# Patient Record
Sex: FEMALE | Race: BLACK OR AFRICAN AMERICAN | NOT HISPANIC OR LATINO | Employment: UNEMPLOYED | URBAN - METROPOLITAN AREA
[De-identification: names, ages, dates, MRNs, and addresses within clinical notes are randomized per-mention and may not be internally consistent; named-entity substitution may affect disease eponyms.]

---

## 2020-09-18 ENCOUNTER — HOSPITAL ENCOUNTER (INPATIENT)
Facility: HOSPITAL | Age: 25
LOS: 30 days | Discharge: HOME HEALTH | DRG: 834 | End: 2020-10-18
Attending: INTERNAL MEDICINE | Admitting: INTERNAL MEDICINE
Payer: MEDICAID

## 2020-09-18 DIAGNOSIS — R00.1 BRADYCARDIA: ICD-10-CM

## 2020-09-18 DIAGNOSIS — J96.01 ACUTE HYPOXEMIC RESPIRATORY FAILURE: ICD-10-CM

## 2020-09-18 DIAGNOSIS — R06.02 SOB (SHORTNESS OF BREATH): ICD-10-CM

## 2020-09-18 DIAGNOSIS — C92.40 APL (ACUTE PROMYELOCYTIC LEUKEMIA): Primary | ICD-10-CM

## 2020-09-18 DIAGNOSIS — E87.6 HYPOKALEMIA: ICD-10-CM

## 2020-09-18 DIAGNOSIS — R07.9 CHEST PAIN: ICD-10-CM

## 2020-09-18 DIAGNOSIS — I49.9 ARRHYTHMIA: ICD-10-CM

## 2020-09-18 DIAGNOSIS — B00.9 HSV-1 INFECTION: ICD-10-CM

## 2020-09-18 DIAGNOSIS — C95.00 ACUTE LEUKEMIA: ICD-10-CM

## 2020-09-18 DIAGNOSIS — E83.42 HYPOMAGNESEMIA: ICD-10-CM

## 2020-09-18 DIAGNOSIS — D61.818 PANCYTOPENIA: ICD-10-CM

## 2020-09-18 DIAGNOSIS — R94.31 QT PROLONGATION: ICD-10-CM

## 2020-09-18 DIAGNOSIS — R00.0 TACHYCARDIA: ICD-10-CM

## 2020-09-18 DIAGNOSIS — C92.40 ACUTE PROMYELOCYTIC LEUKEMIA NOT HAVING ACHIEVED REMISSION: ICD-10-CM

## 2020-09-18 DIAGNOSIS — R94.31 ABNORMAL QT INTERVAL PRESENT ON ELECTROCARDIOGRAM: ICD-10-CM

## 2020-09-18 DIAGNOSIS — R06.02 SHORTNESS OF BREATH: ICD-10-CM

## 2020-09-18 PROBLEM — R50.81 NEUTROPENIC FEVER: Status: ACTIVE | Noted: 2020-09-18

## 2020-09-18 PROBLEM — E66.01 MORBID OBESITY WITH BMI OF 50.0-59.9, ADULT: Status: ACTIVE | Noted: 2020-09-18

## 2020-09-18 PROBLEM — F43.22 ADJUSTMENT REACTION WITH ANXIETY: Status: ACTIVE | Noted: 2020-09-18

## 2020-09-18 PROBLEM — D70.9 NEUTROPENIC FEVER: Status: ACTIVE | Noted: 2020-09-18

## 2020-09-18 LAB
ABO + RH BLD: NORMAL
ALBUMIN SERPL BCP-MCNC: 2.7 G/DL (ref 3.5–5.2)
ALBUMIN SERPL BCP-MCNC: 3.1 G/DL (ref 3.5–5.2)
ALP SERPL-CCNC: 39 U/L (ref 55–135)
ALP SERPL-CCNC: 46 U/L (ref 55–135)
ALT SERPL W/O P-5'-P-CCNC: 44 U/L (ref 10–44)
ALT SERPL W/O P-5'-P-CCNC: 47 U/L (ref 10–44)
ANION GAP SERPL CALC-SCNC: 11 MMOL/L (ref 8–16)
ANION GAP SERPL CALC-SCNC: 12 MMOL/L (ref 8–16)
ANION GAP SERPL CALC-SCNC: 14 MMOL/L (ref 8–16)
ANISOCYTOSIS BLD QL SMEAR: SLIGHT
APTT BLDCRRT: 25.5 SEC (ref 21–32)
APTT BLDCRRT: 27.7 SEC (ref 21–32)
APTT BLDCRRT: 28.5 SEC (ref 21–32)
ASCENDING AORTA: 2.47 CM
AST SERPL-CCNC: 17 U/L (ref 10–40)
AST SERPL-CCNC: 22 U/L (ref 10–40)
AV INDEX (PROSTH): 0.62
AV MEAN GRADIENT: 7 MMHG
AV PEAK GRADIENT: 14 MMHG
AV VALVE AREA: 1.99 CM2
AV VELOCITY RATIO: 0.58
BASO STIPL BLD QL SMEAR: ABNORMAL
BASOPHILS # BLD AUTO: ABNORMAL K/UL (ref 0–0.2)
BASOPHILS # BLD AUTO: ABNORMAL K/UL (ref 0–0.2)
BASOPHILS NFR BLD: 0 % (ref 0–1.9)
BILIRUB SERPL-MCNC: 1.2 MG/DL (ref 0.1–1)
BILIRUB SERPL-MCNC: 2 MG/DL (ref 0.1–1)
BLD GP AB SCN CELLS X3 SERPL QL: NORMAL
BSA FOR ECHO PROCEDURE: 2.38 M2
BUN SERPL-MCNC: 10 MG/DL (ref 6–20)
BUN SERPL-MCNC: 10 MG/DL (ref 6–20)
BUN SERPL-MCNC: 9 MG/DL (ref 6–20)
CALCIUM SERPL-MCNC: 8 MG/DL (ref 8.7–10.5)
CALCIUM SERPL-MCNC: 8.2 MG/DL (ref 8.7–10.5)
CALCIUM SERPL-MCNC: 8.3 MG/DL (ref 8.7–10.5)
CHLORIDE SERPL-SCNC: 101 MMOL/L (ref 95–110)
CHLORIDE SERPL-SCNC: 103 MMOL/L (ref 95–110)
CHLORIDE SERPL-SCNC: 104 MMOL/L (ref 95–110)
CO2 SERPL-SCNC: 24 MMOL/L (ref 23–29)
CO2 SERPL-SCNC: 25 MMOL/L (ref 23–29)
CO2 SERPL-SCNC: 25 MMOL/L (ref 23–29)
CREAT SERPL-MCNC: 0.6 MG/DL (ref 0.5–1.4)
CV ECHO LV RWT: 0.46 CM
DIFFERENTIAL METHOD: ABNORMAL
DOP CALC AO PEAK VEL: 1.85 M/S
DOP CALC AO VTI: 29.81 CM
DOP CALC LVOT AREA: 3.2 CM2
DOP CALC LVOT DIAMETER: 2.02 CM
DOP CALC LVOT PEAK VEL: 1.07 M/S
DOP CALC LVOT STROKE VOLUME: 59.19 CM3
DOP CALCLVOT PEAK VEL VTI: 18.48 CM
ECHO LV POSTERIOR WALL: 1.04 CM (ref 0.6–1.1)
EOSINOPHIL # BLD AUTO: ABNORMAL K/UL (ref 0–0.5)
EOSINOPHIL # BLD AUTO: ABNORMAL K/UL (ref 0–0.5)
EOSINOPHIL NFR BLD: 0 % (ref 0–8)
EOSINOPHIL NFR BLD: 0 % (ref 0–8)
EOSINOPHIL NFR BLD: 1.6 % (ref 0–8)
ERYTHROCYTE [DISTWIDTH] IN BLOOD BY AUTOMATED COUNT: 16.1 % (ref 11.5–14.5)
ERYTHROCYTE [DISTWIDTH] IN BLOOD BY AUTOMATED COUNT: 16.2 % (ref 11.5–14.5)
ERYTHROCYTE [DISTWIDTH] IN BLOOD BY AUTOMATED COUNT: 16.4 % (ref 11.5–14.5)
EST. GFR  (AFRICAN AMERICAN): >60 ML/MIN/1.73 M^2
EST. GFR  (NON AFRICAN AMERICAN): >60 ML/MIN/1.73 M^2
FIBRINOGEN PPP-MCNC: 283 MG/DL (ref 182–366)
FIBRINOGEN PPP-MCNC: 304 MG/DL (ref 182–366)
FIBRINOGEN PPP-MCNC: 304 MG/DL (ref 182–366)
FRACTIONAL SHORTENING: 27 % (ref 28–44)
GLUCOSE SERPL-MCNC: 102 MG/DL (ref 70–110)
GLUCOSE SERPL-MCNC: 111 MG/DL (ref 70–110)
GLUCOSE SERPL-MCNC: 120 MG/DL (ref 70–110)
HCT VFR BLD AUTO: 21.2 % (ref 37–48.5)
HCT VFR BLD AUTO: 22 % (ref 37–48.5)
HCT VFR BLD AUTO: 24.3 % (ref 37–48.5)
HGB BLD-MCNC: 7.5 G/DL (ref 12–16)
HGB BLD-MCNC: 7.6 G/DL (ref 12–16)
HGB BLD-MCNC: 8.2 G/DL (ref 12–16)
IMM GRANULOCYTES # BLD AUTO: ABNORMAL K/UL (ref 0–0.04)
IMM GRANULOCYTES NFR BLD AUTO: ABNORMAL % (ref 0–0.5)
INR PPP: 1.2 (ref 0.8–1.2)
INTERVENTRICULAR SEPTUM: 0.76 CM (ref 0.6–1.1)
LA MAJOR: 5.25 CM
LA MINOR: 4.67 CM
LA WIDTH: 3.43 CM
LDH SERPL L TO P-CCNC: 346 U/L (ref 110–260)
LEFT ATRIUM SIZE: 3.45 CM
LEFT ATRIUM VOLUME INDEX: 22.5 ML/M2
LEFT ATRIUM VOLUME: 49.72 CM3
LEFT INTERNAL DIMENSION IN SYSTOLE: 3.31 CM (ref 2.1–4)
LEFT VENTRICLE DIASTOLIC VOLUME INDEX: 43.18 ML/M2
LEFT VENTRICLE DIASTOLIC VOLUME: 95.21 ML
LEFT VENTRICLE MASS INDEX: 62 G/M2
LEFT VENTRICLE SYSTOLIC VOLUME INDEX: 20.2 ML/M2
LEFT VENTRICLE SYSTOLIC VOLUME: 44.52 ML
LEFT VENTRICULAR INTERNAL DIMENSION IN DIASTOLE: 4.56 CM (ref 3.5–6)
LEFT VENTRICULAR MASS: 135.75 G
LYMPHOCYTES # BLD AUTO: ABNORMAL K/UL (ref 1–4.8)
LYMPHOCYTES # BLD AUTO: ABNORMAL K/UL (ref 1–4.8)
LYMPHOCYTES NFR BLD: 55 % (ref 18–48)
LYMPHOCYTES NFR BLD: 65 % (ref 18–48)
LYMPHOCYTES NFR BLD: 72.6 % (ref 18–48)
MAGNESIUM SERPL-MCNC: 1.6 MG/DL (ref 1.6–2.6)
MCH RBC QN AUTO: 29 PG (ref 27–31)
MCH RBC QN AUTO: 29.1 PG (ref 27–31)
MCH RBC QN AUTO: 29.3 PG (ref 27–31)
MCHC RBC AUTO-ENTMCNC: 33.7 G/DL (ref 32–36)
MCHC RBC AUTO-ENTMCNC: 34.5 G/DL (ref 32–36)
MCHC RBC AUTO-ENTMCNC: 35.4 G/DL (ref 32–36)
MCV RBC AUTO: 83 FL (ref 82–98)
MCV RBC AUTO: 84 FL (ref 82–98)
MCV RBC AUTO: 86 FL (ref 82–98)
METAMYELOCYTES NFR BLD MANUAL: 6.7 %
MONOCYTES # BLD AUTO: ABNORMAL K/UL (ref 0.3–1)
MONOCYTES # BLD AUTO: ABNORMAL K/UL (ref 0.3–1)
MONOCYTES NFR BLD: 5 % (ref 4–15)
MONOCYTES NFR BLD: 6.4 % (ref 4–15)
MONOCYTES NFR BLD: 8.3 % (ref 4–15)
MYELOCYTES NFR BLD MANUAL: 11.7 %
NEUTROPHILS NFR BLD: 12.5 % (ref 38–73)
NEUTROPHILS NFR BLD: 8.3 % (ref 38–73)
NEUTROPHILS NFR BLD: 9.7 % (ref 38–73)
NRBC BLD-RTO: 0 /100 WBC
NRBC BLD-RTO: 0 /100 WBC
NRBC BLD-RTO: 3 /100 WBC
OVALOCYTES BLD QL SMEAR: ABNORMAL
OVALOCYTES BLD QL SMEAR: ABNORMAL
PATH REV BLD -IMP: NORMAL
PATH REV BLD -IMP: NORMAL
PHOSPHATE SERPL-MCNC: 2.3 MG/DL (ref 2.7–4.5)
PLATELET # BLD AUTO: 58 K/UL (ref 150–350)
PLATELET # BLD AUTO: 63 K/UL (ref 150–350)
PLATELET # BLD AUTO: 64 K/UL (ref 150–350)
PLATELET BLD QL SMEAR: ABNORMAL
PLATELET BLD QL SMEAR: ABNORMAL
PMV BLD AUTO: 10.7 FL (ref 9.2–12.9)
PMV BLD AUTO: 11 FL (ref 9.2–12.9)
PMV BLD AUTO: 12 FL (ref 9.2–12.9)
POIKILOCYTOSIS BLD QL SMEAR: SLIGHT
POIKILOCYTOSIS BLD QL SMEAR: SLIGHT
POLYCHROMASIA BLD QL SMEAR: ABNORMAL
POTASSIUM SERPL-SCNC: 3 MMOL/L (ref 3.5–5.1)
POTASSIUM SERPL-SCNC: 3.2 MMOL/L (ref 3.5–5.1)
POTASSIUM SERPL-SCNC: 3.2 MMOL/L (ref 3.5–5.1)
PROT SERPL-MCNC: 6.6 G/DL (ref 6–8.4)
PROT SERPL-MCNC: 7.3 G/DL (ref 6–8.4)
PROTHROMBIN TIME: 13.1 SEC (ref 9–12.5)
PROTHROMBIN TIME: 13.2 SEC (ref 9–12.5)
PROTHROMBIN TIME: 13.3 SEC (ref 9–12.5)
RA MAJOR: 5.58 CM
RA PRESSURE: 3 MMHG
RA WIDTH: 3.13 CM
RBC # BLD AUTO: 2.56 M/UL (ref 4–5.4)
RBC # BLD AUTO: 2.61 M/UL (ref 4–5.4)
RBC # BLD AUTO: 2.83 M/UL (ref 4–5.4)
RIGHT VENTRICULAR END-DIASTOLIC DIMENSION: 2.84 CM
SINUS: 2.28 CM
SODIUM SERPL-SCNC: 139 MMOL/L (ref 136–145)
SODIUM SERPL-SCNC: 139 MMOL/L (ref 136–145)
SODIUM SERPL-SCNC: 141 MMOL/L (ref 136–145)
STJ: 2.54 CM
TDI LATERAL: 0.12 M/S
TDI SEPTAL: 0.1 M/S
TDI: 0.11 M/S
URATE SERPL-MCNC: 5 MG/DL (ref 2.4–5.7)
WBC # BLD AUTO: 0.56 K/UL (ref 3.9–12.7)
WBC # BLD AUTO: 0.61 K/UL (ref 3.9–12.7)
WBC # BLD AUTO: 0.63 K/UL (ref 3.9–12.7)
WBC OTHER NFR BLD MANUAL: 10 %
WBC OTHER NFR BLD MANUAL: 10 %
WBC OTHER NFR BLD MANUAL: 18 %

## 2020-09-18 PROCEDURE — 25000003 PHARM REV CODE 250: Performed by: STUDENT IN AN ORGANIZED HEALTH CARE EDUCATION/TRAINING PROGRAM

## 2020-09-18 PROCEDURE — 25000003 PHARM REV CODE 250: Performed by: INTERNAL MEDICINE

## 2020-09-18 PROCEDURE — 88271 CYTOGENETICS DNA PROBE: CPT | Mod: 59

## 2020-09-18 PROCEDURE — 80053 COMPREHEN METABOLIC PANEL: CPT

## 2020-09-18 PROCEDURE — 88275 CYTOGENETICS 100-300: CPT | Mod: 59

## 2020-09-18 PROCEDURE — 86901 BLOOD TYPING SEROLOGIC RH(D): CPT

## 2020-09-18 PROCEDURE — 20600001 HC STEP DOWN PRIVATE ROOM

## 2020-09-18 PROCEDURE — 99223 1ST HOSP IP/OBS HIGH 75: CPT | Mod: ,,, | Performed by: INTERNAL MEDICINE

## 2020-09-18 PROCEDURE — 85027 COMPLETE CBC AUTOMATED: CPT | Mod: 91

## 2020-09-18 PROCEDURE — 83735 ASSAY OF MAGNESIUM: CPT

## 2020-09-18 PROCEDURE — 36415 COLL VENOUS BLD VENIPUNCTURE: CPT

## 2020-09-18 PROCEDURE — 83615 LACTATE (LD) (LDH) ENZYME: CPT

## 2020-09-18 PROCEDURE — 63600175 PHARM REV CODE 636 W HCPCS: Performed by: STUDENT IN AN ORGANIZED HEALTH CARE EDUCATION/TRAINING PROGRAM

## 2020-09-18 PROCEDURE — 80048 BASIC METABOLIC PNL TOTAL CA: CPT

## 2020-09-18 PROCEDURE — 63600175 PHARM REV CODE 636 W HCPCS: Performed by: INTERNAL MEDICINE

## 2020-09-18 PROCEDURE — 81315 PML/RARALPHA COM BREAKPOINTS: CPT

## 2020-09-18 PROCEDURE — 90791 PR PSYCHIATRIC DIAGNOSTIC EVALUATION: ICD-10-PCS | Mod: ,,, | Performed by: PSYCHOLOGIST

## 2020-09-18 PROCEDURE — 96136 PR PSYCH/NEUROPSYCH TEST ADMIN/SCORING, 2+ TESTS, 1ST 30 MIN: ICD-10-PCS | Mod: ,,, | Performed by: PSYCHOLOGIST

## 2020-09-18 PROCEDURE — 96136 PSYCL/NRPSYC TST PHY/QHP 1ST: CPT | Mod: ,,, | Performed by: PSYCHOLOGIST

## 2020-09-18 PROCEDURE — 99223 PR INITIAL HOSPITAL CARE,LEVL III: ICD-10-PCS | Mod: ,,, | Performed by: INTERNAL MEDICINE

## 2020-09-18 PROCEDURE — 85007 BL SMEAR W/DIFF WBC COUNT: CPT | Mod: 91

## 2020-09-18 PROCEDURE — 85384 FIBRINOGEN ACTIVITY: CPT

## 2020-09-18 PROCEDURE — 86920 COMPATIBILITY TEST SPIN: CPT

## 2020-09-18 PROCEDURE — 93010 ELECTROCARDIOGRAM REPORT: CPT | Mod: ,,, | Performed by: INTERNAL MEDICINE

## 2020-09-18 PROCEDURE — 85730 THROMBOPLASTIN TIME PARTIAL: CPT | Mod: 91

## 2020-09-18 PROCEDURE — 85060 BLOOD SMEAR INTERPRETATION: CPT | Mod: ,,, | Performed by: PATHOLOGY

## 2020-09-18 PROCEDURE — 84550 ASSAY OF BLOOD/URIC ACID: CPT

## 2020-09-18 PROCEDURE — 93005 ELECTROCARDIOGRAM TRACING: CPT

## 2020-09-18 PROCEDURE — 93010 EKG 12-LEAD: ICD-10-PCS | Mod: ,,, | Performed by: INTERNAL MEDICINE

## 2020-09-18 PROCEDURE — 84100 ASSAY OF PHOSPHORUS: CPT

## 2020-09-18 PROCEDURE — 88299 UNLISTED CYTOGENETIC STUDY: CPT

## 2020-09-18 PROCEDURE — 87040 BLOOD CULTURE FOR BACTERIA: CPT | Mod: 59

## 2020-09-18 PROCEDURE — 90791 PSYCH DIAGNOSTIC EVALUATION: CPT | Mod: ,,, | Performed by: PSYCHOLOGIST

## 2020-09-18 PROCEDURE — 85060 PATHOLOGIST REVIEW: ICD-10-PCS | Mod: ,,, | Performed by: PATHOLOGY

## 2020-09-18 PROCEDURE — 85610 PROTHROMBIN TIME: CPT | Mod: 91

## 2020-09-18 PROCEDURE — 88271 CYTOGENETICS DNA PROBE: CPT

## 2020-09-18 PROCEDURE — 25000003 PHARM REV CODE 250: Performed by: NURSE PRACTITIONER

## 2020-09-18 RX ORDER — GLUCAGON 1 MG
1 KIT INJECTION
Status: DISCONTINUED | OUTPATIENT
Start: 2020-09-18 | End: 2020-10-18 | Stop reason: HOSPADM

## 2020-09-18 RX ORDER — POTASSIUM CHLORIDE 750 MG/1
40 CAPSULE, EXTENDED RELEASE ORAL ONCE
Status: COMPLETED | OUTPATIENT
Start: 2020-09-18 | End: 2020-09-18

## 2020-09-18 RX ORDER — POTASSIUM CHLORIDE 750 MG/1
40 CAPSULE, EXTENDED RELEASE ORAL ONCE
Status: DISCONTINUED | OUTPATIENT
Start: 2020-09-18 | End: 2020-09-18

## 2020-09-18 RX ORDER — IBUPROFEN 200 MG
24 TABLET ORAL
Status: DISCONTINUED | OUTPATIENT
Start: 2020-09-18 | End: 2020-10-18 | Stop reason: HOSPADM

## 2020-09-18 RX ORDER — POTASSIUM CHLORIDE 7.45 MG/ML
10 INJECTION INTRAVENOUS
Status: COMPLETED | OUTPATIENT
Start: 2020-09-18 | End: 2020-09-18

## 2020-09-18 RX ORDER — IBUPROFEN 200 MG
16 TABLET ORAL
Status: DISCONTINUED | OUTPATIENT
Start: 2020-09-18 | End: 2020-10-18 | Stop reason: HOSPADM

## 2020-09-18 RX ORDER — POTASSIUM CHLORIDE 1.5 G/1.58G
40 POWDER, FOR SOLUTION ORAL ONCE
Status: COMPLETED | OUTPATIENT
Start: 2020-09-18 | End: 2020-09-18

## 2020-09-18 RX ORDER — LIDOCAINE HYDROCHLORIDE 10 MG/ML
1 INJECTION INFILTRATION; PERINEURAL ONCE AS NEEDED
Status: DISCONTINUED | OUTPATIENT
Start: 2020-09-18 | End: 2020-09-22

## 2020-09-18 RX ORDER — ACETAMINOPHEN 325 MG/1
650 TABLET ORAL ONCE
Status: COMPLETED | OUTPATIENT
Start: 2020-09-18 | End: 2020-09-18

## 2020-09-18 RX ORDER — TRETINOIN 10 MG/1
45 CAPSULE ORAL 2 TIMES DAILY WITH MEALS
Status: DISCONTINUED | OUTPATIENT
Start: 2020-09-18 | End: 2020-10-05

## 2020-09-18 RX ORDER — CEFEPIME HYDROCHLORIDE 2 G/1
2 INJECTION, POWDER, FOR SOLUTION INTRAVENOUS
Status: DISCONTINUED | OUTPATIENT
Start: 2020-09-18 | End: 2020-09-24

## 2020-09-18 RX ORDER — ACYCLOVIR 200 MG/1
400 CAPSULE ORAL 2 TIMES DAILY
Status: DISCONTINUED | OUTPATIENT
Start: 2020-09-18 | End: 2020-09-21

## 2020-09-18 RX ORDER — SODIUM CHLORIDE 0.9 % (FLUSH) 0.9 %
10 SYRINGE (ML) INJECTION
Status: DISCONTINUED | OUTPATIENT
Start: 2020-09-18 | End: 2020-09-22

## 2020-09-18 RX ORDER — TRETINOIN 10 MG/1
45 CAPSULE ORAL 2 TIMES DAILY WITH MEALS
Status: DISCONTINUED | OUTPATIENT
Start: 2020-09-18 | End: 2020-09-18

## 2020-09-18 RX ORDER — SODIUM CHLORIDE 9 MG/ML
INJECTION, SOLUTION INTRAVENOUS CONTINUOUS
Status: DISCONTINUED | OUTPATIENT
Start: 2020-09-18 | End: 2020-09-22

## 2020-09-18 RX ORDER — ALLOPURINOL 100 MG/1
300 TABLET ORAL DAILY
Status: DISCONTINUED | OUTPATIENT
Start: 2020-09-18 | End: 2020-09-29

## 2020-09-18 RX ORDER — TRETINOIN 10 MG/1
10 CAPSULE ORAL 2 TIMES DAILY WITH MEALS
Status: DISCONTINUED | OUTPATIENT
Start: 2020-09-18 | End: 2020-09-18

## 2020-09-18 RX ADMIN — POTASSIUM CHLORIDE 10 MEQ: 7.46 INJECTION, SOLUTION INTRAVENOUS at 03:09

## 2020-09-18 RX ADMIN — POTASSIUM CHLORIDE 40 MEQ: 750 CAPSULE, EXTENDED RELEASE ORAL at 02:09

## 2020-09-18 RX ADMIN — SODIUM CHLORIDE: 0.9 INJECTION, SOLUTION INTRAVENOUS at 03:09

## 2020-09-18 RX ADMIN — SODIUM CHLORIDE: 0.9 INJECTION, SOLUTION INTRAVENOUS at 11:09

## 2020-09-18 RX ADMIN — ACYCLOVIR 400 MG: 200 CAPSULE ORAL at 10:09

## 2020-09-18 RX ADMIN — POTASSIUM CHLORIDE 10 MEQ: 7.46 INJECTION, SOLUTION INTRAVENOUS at 02:09

## 2020-09-18 RX ADMIN — VANCOMYCIN HYDROCHLORIDE 2500 MG: 1.5 INJECTION, POWDER, LYOPHILIZED, FOR SOLUTION INTRAVENOUS at 05:09

## 2020-09-18 RX ADMIN — TRETINOIN 50 MG: 10 CAPSULE ORAL at 09:09

## 2020-09-18 RX ADMIN — CEFEPIME 2 G: 2 INJECTION, POWDER, FOR SOLUTION INTRAVENOUS at 05:09

## 2020-09-18 RX ADMIN — POTASSIUM CHLORIDE 10 MEQ: 7.46 INJECTION, SOLUTION INTRAVENOUS at 05:09

## 2020-09-18 RX ADMIN — ALLOPURINOL 300 MG: 300 TABLET ORAL at 03:09

## 2020-09-18 RX ADMIN — ACYCLOVIR 400 MG: 200 CAPSULE ORAL at 08:09

## 2020-09-18 RX ADMIN — POTASSIUM CHLORIDE 40 MEQ: 1.5 POWDER, FOR SOLUTION ORAL at 11:09

## 2020-09-18 RX ADMIN — CEFEPIME 2 G: 2 INJECTION, POWDER, FOR SOLUTION INTRAVENOUS at 01:09

## 2020-09-18 RX ADMIN — TRETINOIN 50 MG: 10 CAPSULE ORAL at 05:09

## 2020-09-18 RX ADMIN — ACETAMINOPHEN 650 MG: 325 TABLET ORAL at 02:09

## 2020-09-18 RX ADMIN — SODIUM CHLORIDE: 0.9 INJECTION, SOLUTION INTRAVENOUS at 08:09

## 2020-09-18 RX ADMIN — POTASSIUM CHLORIDE 10 MEQ: 7.46 INJECTION, SOLUTION INTRAVENOUS at 04:09

## 2020-09-18 RX ADMIN — CEFEPIME 2 G: 2 INJECTION, POWDER, FOR SOLUTION INTRAVENOUS at 08:09

## 2020-09-18 NOTE — HPI
"Ms. Saini is a 25 year old woman with no significant medical history who presents as a transfer from [facility] for concern of APL. She says that she went to the ED on Wednesday (09/16) for decreased appetite, dehydration. She says that she has been feeling like that since the towards the end of last month. She has also been having a hard time swallowing and feeling weak to the point that she had a fall on Tuesday. She stated that she was too weak and that is why she fell. She did not hit her head at the time. The last time she ate was when she had a chocolate pudding on Wednesday. She says that she had a fever of up to 103F in the hospital but did not have fevers prior to then. Upon review of symptoms, she denies weight loss, chills, night sweats, chest pain, SOB, cough, abdominal pain, nausea, vomiting, constipation, diarrhea, dysuria, leg swelling, headaches. Regarding family history, she states that her paternal grandfather had cancer but does not know which type of cancer. She denies smoking and states that she "barely" drinks alcohol.     At OSH, her labs were notable for pancytopenia: WBC 0.6, H&H 3.0/9.4, platelets 8. She was started on vancomycin and cefepime as she had a fever of 39.6C. CXR did not show an acute process. UA was suggestive of possible UTI. Blood cultures were positive for group B strep. CT chest, abdomen, pelvis did not show acute findings. She was given about 3U PRBC and 2U platelets. Bone marrow biopsy was concerning for APL.     Patient will be admitted to the Hematology BMT service for further evaluation and management.  "

## 2020-09-18 NOTE — PLAN OF CARE
POC reviewed w patient on arrival to unit and PRN. VSS pt afebrile. Educated to call for assistance when getting OOB. IVF initiated and IV cefepime and Vanc given. Awaiting urine specimen for UPT. Pt unable to void as she did prior to leaving [facility] Bed locked in low position call light in reach. Will CTM.

## 2020-09-18 NOTE — ASSESSMENT & PLAN NOTE
Patient is a 25 year old woman presenting with concern for APL.    Plan:  - CBC, CMP, PT/INR, fibrinogen BID    - Uric acid daily    - Follow up PML KAVITHA PCR/FISH, AML FISH, peripheral smear    - Follow up 2D ECHO    - Continue tretinoin BID w/ meals, allopurinol 300mg daily   - IVF  - Transfuse cyroglobulin if fibrinogen <150    - Transfuse if platelets <10, Hgb<7   - Most likely will receive APL induction with ATRA and BEN pending studies

## 2020-09-18 NOTE — ASSESSMENT & PLAN NOTE
Patient presenting with possible APL and neutropenic fever. Likely due to positive Group B strep blood cultures (+ at OSH). She was started on vancomycin and cefepime at the OSH.     - Continue vancomycin and cefepime  - Follow up repeat cultures, UA

## 2020-09-18 NOTE — SUBJECTIVE & OBJECTIVE
Patient information was obtained from patient.     Oncology History: None     No medications prior to admission.       Amoxicillin     No past medical history on file.  No past surgical history on file.  Family History     None        Tobacco Use    Smoking status: Not on file   Substance and Sexual Activity    Alcohol use: Not on file    Drug use: Not on file    Sexual activity: Not on file       Review of Systems   Constitutional: Positive for activity change, appetite change, fatigue and fever. Negative for chills and unexpected weight change.   HENT: Positive for trouble swallowing. Negative for congestion.    Eyes: Negative for photophobia and visual disturbance.   Respiratory: Negative for cough and shortness of breath.    Cardiovascular: Negative for chest pain and leg swelling.   Gastrointestinal: Negative for abdominal pain, constipation, diarrhea and nausea.   Genitourinary: Negative for difficulty urinating and dysuria.   Musculoskeletal: Negative for arthralgias and back pain.   Skin: Negative for rash and wound.   Neurological: Positive for weakness. Negative for syncope and light-headedness.   Psychiatric/Behavioral: Negative for behavioral problems and confusion.     Objective:     Vital Signs (Most Recent):  Temp: 98.6 °F (37 °C) (09/18/20 0155)  Pulse: 101 (09/18/20 0155)  Resp: 18 (09/18/20 0155)  BP: 116/71 (09/18/20 0155)  SpO2: 100 % (09/18/20 0155) Vital Signs (24h Range):  Temp:  [97.3 °F (36.3 °C)-98.6 °F (37 °C)] 98.6 °F (37 °C)  Pulse:  [] 101  Resp:  [15-20] 18  SpO2:  [95 %-100 %] 100 %  BP: (112-118)/(71-87) 116/71     Weight: 134.5 kg (296 lb 8 oz)  Body mass index is 57.91 kg/m².  Body surface area is 2.39 meters squared.    ECOG SCORE         [unfilled]    Lines/Drains/Airways     Peripheral Intravenous Line                 Midline Catheter Insertion/Assessment  - Single Lumen 09/16/20 2000 Right basilic vein (medial side of arm) 1 day         Peripheral IV - Single Lumen  09/16/20 2100 Anterior;Left;Proximal Forearm 1 day                Physical Exam  Vitals signs reviewed.   Constitutional:       Appearance: Normal appearance. She is obese.   HENT:      Head: Normocephalic and atraumatic.      Nose: Nose normal.      Mouth/Throat:      Mouth: Mucous membranes are dry.      Comments: Fever blister noted.  Eyes:      Extraocular Movements: Extraocular movements intact.   Neck:      Musculoskeletal: Normal range of motion and neck supple.   Cardiovascular:      Rate and Rhythm: Normal rate and regular rhythm.      Heart sounds: No murmur.   Pulmonary:      Effort: Pulmonary effort is normal. No respiratory distress.   Abdominal:      General: There is no distension.      Palpations: Abdomen is soft.      Tenderness: There is no abdominal tenderness.   Musculoskeletal: Normal range of motion.         General: No swelling.   Skin:     General: Skin is warm and dry.   Neurological:      General: No focal deficit present.      Mental Status: She is alert and oriented to person, place, and time.   Psychiatric:         Mood and Affect: Mood normal.         Behavior: Behavior normal.          Significant Labs:   CBC:   Recent Labs   Lab 09/18/20 0221   WBC 0.61*   HGB 8.2*   HCT 24.3*   PLT 64*   , CMP:   Recent Labs   Lab 09/18/20 0221      K 3.2*      CO2 24      BUN 10   CREATININE 0.6   CALCIUM 8.3*   PROT 7.3   ALBUMIN 3.1*   BILITOT 2.0*   ALKPHOS 46*   AST 22   ALT 47*   ANIONGAP 14   EGFRNONAA >60.0    and Coagulation:   Recent Labs   Lab 09/18/20 0221   INR 1.2   APTT 25.5       Diagnostic Results:  I have reviewed all pertinent imaging results/findings within the past 24 hours.

## 2020-09-18 NOTE — PROGRESS NOTES
Pharmacokinetic Initial Assessment: IV Vancomycin    Assessment/Plan:    Initiate intravenous vancomycin with loading dose of 2500 mg once followed by a maintenance dose of vancomycin 2000mg IV every 12 hours.  Desired empiric serum trough concentration is 15 to 20 mcg/mL.  Draw vancomycin trough level 30 min prior to fourth dose on 09/19/2020 at approximately 1645.  Pharmacy will continue to follow and monitor vancomycin.      Please contact pharmacy at extension [phone] with any questions regarding this assessment.     Thank you for the consult,   Stephon Olson       Patient brief summary:  Erika Saini is a 25 y.o. female initiated on antimicrobial therapy with IV Vancomycin for treatment of suspected neutropenic fever.    Drug Allergies:   Review of patient's allergies indicates:   Allergen Reactions    Amoxicillin        Actual Body Weight:   134.5 kg    Renal Function:   Estimated Creatinine Clearance: 186.7 mL/min (based on SCr of 0.6 mg/dL).,     Dialysis Method (if applicable):  N/A    CBC (last 72 hours):  Recent Labs   Lab Result Units 09/18/20  0221   WBC K/uL 0.61*   Hemoglobin g/dL 8.2*   Hematocrit % 24.3*   Platelets K/uL 64*   Gran% % 8.3*   Lymph% % 55.0*   Mono% % 8.3   Eosinophil% % 0.0   Basophil% % 0.0   Differential Method  Manual       Metabolic Panel (last 72 hours):  Recent Labs   Lab Result Units 09/18/20  0221   Sodium mmol/L 139   Potassium mmol/L 3.2*   Chloride mmol/L 101   CO2 mmol/L 24   Glucose mg/dL 102   BUN, Bld mg/dL 10   Creatinine mg/dL 0.6   Albumin g/dL 3.1*   Total Bilirubin mg/dL 2.0*   Alkaline Phosphatase U/L 46*   AST U/L 22   ALT U/L 47*   Magnesium mg/dL 1.6   Phosphorus mg/dL 2.3*       Drug levels (last 3 results):  No results for input(s): VANCOMYCINRA, VANCOMYCINPE, VANCOMYCINTR in the last 72 hours.    Microbiologic Results:  Microbiology Results (last 7 days)     Procedure Component Value Units Date/Time    Blood culture [[id number]] Collected:  09/18/20 0222    Order Status: Sent Specimen: Blood Updated: 09/18/20 0303    Blood culture [[id number]] Collected: 09/18/20 0222    Order Status: Sent Specimen: Blood Updated: 09/18/20 0303

## 2020-09-18 NOTE — PROGRESS NOTES
Therapy with vancomycin complete and/or consult discontinued by provider.  Pharmacy will sign off, please re-consult as needed.    Marita Davenport, PharmD, BCPS, BCOP  Clinical Pharmacy Specialist   BMT/Hematology Oncology  SpectraLink: [phone]

## 2020-09-18 NOTE — H&P
"[facility]  Hematology  Bone Marrow Transplant  H&P    Subjective:     Principal Problem: APL (acute promyelocytic leukemia)    HPI: Ms. Saini is a 25 year old woman with no significant medical history who presents as a transfer from [facility] for concern of APL. She says that she went to the ED on Wednesday (09/16) for decreased appetite, dehydration. She says that she has been feeling like that since the towards the end of last month. She has also been having a hard time swallowing and feeling weak to the point that she had a fall on Tuesday. She stated that she was too weak and that is why she fell. She did not hit her head at the time. The last time she ate was when she had a chocolate pudding on Wednesday. She says that she had a fever of up to 103F in the hospital but did not have fevers prior to then. Upon review of symptoms, she denies weight loss, chills, night sweats, chest pain, SOB, cough, abdominal pain, nausea, vomiting, constipation, diarrhea, dysuria, leg swelling, headaches. Regarding family history, she states that her paternal grandfather had cancer but does not know which type of cancer. She denies smoking and states that she "barely" drinks alcohol.     At OSH, her labs were notable for pancytopenia: WBC 0.6, H&H 3.0/9.4, platelets 8. She was started on vancomycin and cefepime as she had a fever of 39.6C. CXR did not show an acute process. UA was suggestive of possible UTI. Blood cultures were positive for group B strep. CT chest, abdomen, pelvis did not show acute findings. She was given about 3U PRBC and 2U platelets. Bone marrow biopsy was concerning for APL.     Patient will be admitted to the Hematology BMT service for further evaluation and management.    Patient information was obtained from patient.     Oncology History: None     No medications prior to admission.       Amoxicillin     No past medical history on file.  No past surgical history on " file.  Family History     None        Tobacco Use    Smoking status: Not on file   Substance and Sexual Activity    Alcohol use: Not on file    Drug use: Not on file    Sexual activity: Not on file       Review of Systems   Constitutional: Positive for activity change, appetite change, fatigue and fever. Negative for chills and unexpected weight change.   HENT: Positive for trouble swallowing. Negative for congestion.    Eyes: Negative for photophobia and visual disturbance.   Respiratory: Negative for cough and shortness of breath.    Cardiovascular: Negative for chest pain and leg swelling.   Gastrointestinal: Negative for abdominal pain, constipation, diarrhea and nausea.   Genitourinary: Negative for difficulty urinating and dysuria.   Musculoskeletal: Negative for arthralgias and back pain.   Skin: Negative for rash and wound.   Neurological: Positive for weakness. Negative for syncope and light-headedness.   Psychiatric/Behavioral: Negative for behavioral problems and confusion.     Objective:     Vital Signs (Most Recent):  Temp: 98.6 °F (37 °C) (09/18/20 0155)  Pulse: 101 (09/18/20 0155)  Resp: 18 (09/18/20 0155)  BP: 116/71 (09/18/20 0155)  SpO2: 100 % (09/18/20 0155) Vital Signs (24h Range):  Temp:  [97.3 °F (36.3 °C)-98.6 °F (37 °C)] 98.6 °F (37 °C)  Pulse:  [] 101  Resp:  [15-20] 18  SpO2:  [95 %-100 %] 100 %  BP: (112-118)/(71-87) 116/71     Weight: 134.5 kg (296 lb 8 oz)  Body mass index is 57.91 kg/m².  Body surface area is 2.39 meters squared.    ECOG SCORE         [unfilled]    Lines/Drains/Airways     Peripheral Intravenous Line                 Midline Catheter Insertion/Assessment  - Single Lumen 09/16/20 2000 Right basilic vein (medial side of arm) 1 day         Peripheral IV - Single Lumen 09/16/20 2100 Anterior;Left;Proximal Forearm 1 day                Physical Exam  Vitals signs reviewed.   Constitutional:       Appearance: Normal appearance. She is obese.   HENT:      Head:  Normocephalic and atraumatic.      Nose: Nose normal.      Mouth/Throat:      Mouth: Mucous membranes are dry.      Comments: Fever blister noted.  Eyes:      Extraocular Movements: Extraocular movements intact.   Neck:      Musculoskeletal: Normal range of motion and neck supple.   Cardiovascular:      Rate and Rhythm: Normal rate and regular rhythm.      Heart sounds: No murmur.   Pulmonary:      Effort: Pulmonary effort is normal. No respiratory distress.   Abdominal:      General: There is no distension.      Palpations: Abdomen is soft.      Tenderness: There is no abdominal tenderness.   Musculoskeletal: Normal range of motion.         General: No swelling.   Skin:     General: Skin is warm and dry.   Neurological:      General: No focal deficit present.      Mental Status: She is alert and oriented to person, place, and time.   Psychiatric:         Mood and Affect: Mood normal.         Behavior: Behavior normal.          Significant Labs:   CBC:   Recent Labs   Lab 09/18/20 0221   WBC 0.61*   HGB 8.2*   HCT 24.3*   PLT 64*   , CMP:   Recent Labs   Lab 09/18/20 0221      K 3.2*      CO2 24      BUN 10   CREATININE 0.6   CALCIUM 8.3*   PROT 7.3   ALBUMIN 3.1*   BILITOT 2.0*   ALKPHOS 46*   AST 22   ALT 47*   ANIONGAP 14   EGFRNONAA >60.0    and Coagulation:   Recent Labs   Lab 09/18/20 0221   INR 1.2   APTT 25.5       Diagnostic Results:  I have reviewed all pertinent imaging results/findings within the past 24 hours.    Assessment/Plan:     * APL (acute promyelocytic leukemia)  Patient is a 25 year old woman presenting with concern for APL.    Plan:  - CBC, CMP, PT/INR, fibrinogen BID    - Uric acid daily    - Follow up PML KAVITHA PCR/FISH, AML FISH, peripheral smear    - Follow up 2D ECHO    - Continue tretinoin BID w/ meals, allopurinol 300mg daily   - IVF  - Transfuse cyroglobulin if fibrinogen <150    - Transfuse if platelets <10, Hgb<7   - Most likely will receive APL induction with  ATRA and BEN pending studies     Neutropenic fever  Patient presenting with possible APL and neutropenic fever. Likely due to positive Group B strep blood cultures (+ at OSH). She was started on vancomycin and cefepime at the OSH.     - Continue vancomycin and cefepime  - Follow up repeat cultures, UA    Morbid obesity with BMI of 50.0-59.9, adult  Patient with BMI 57.91    Patient seen, and case to be discussed with staff. Attending attestation to follow. Please contact BMT with any questions.      VTE Risk Mitigation (From admission, onward)         Ordered     IP VTE HIGH RISK PATIENT  Once      09/18/20 0148     Place sequential compression device  Until discontinued      09/18/20 0148                Disposition: TBD, remains inpatient    Chyna Claire MD  Bone Marrow Transplant  Hematology  [facility]

## 2020-09-18 NOTE — NURSING TRANSFER
Nursing Transfer Note  Arrived w EMS via stretcher in NAD w PRBC's infusion complete to DAT Midline. DC'd blood. Flushed line. Pt transferred self from Stretcher to bed. VSS . Oriented to POC and call light placed in reach. Notified Dr Claire of patient arrival. Urine collection hat placed in urinal and pt educated to void in hat and notify Nursing for specimen collection.

## 2020-09-18 NOTE — PROGRESS NOTES
Received request to assist family with lodging.  Spoke to Mother Jennifer ([phone]), who will need to stay close due to her own health issues.  Comfortable with plan for discounted rate at [facility]; [facility] name and her  Mohamud.  Will follow.

## 2020-09-19 PROBLEM — D61.818 PANCYTOPENIA: Status: ACTIVE | Noted: 2020-09-19

## 2020-09-19 PROBLEM — E83.42 HYPOMAGNESEMIA: Status: ACTIVE | Noted: 2020-09-19

## 2020-09-19 LAB
ALBUMIN SERPL BCP-MCNC: 2.5 G/DL (ref 3.5–5.2)
ALP SERPL-CCNC: 48 U/L (ref 55–135)
ALT SERPL W/O P-5'-P-CCNC: 36 U/L (ref 10–44)
ANION GAP SERPL CALC-SCNC: 10 MMOL/L (ref 8–16)
ANISOCYTOSIS BLD QL SMEAR: SLIGHT
APTT BLDCRRT: 28.9 SEC (ref 21–32)
APTT BLDCRRT: 29.6 SEC (ref 21–32)
AST SERPL-CCNC: 15 U/L (ref 10–40)
BASOPHILS NFR BLD: 0 % (ref 0–1.9)
BILIRUB SERPL-MCNC: 0.5 MG/DL (ref 0.1–1)
BUN SERPL-MCNC: 9 MG/DL (ref 6–20)
CALCIUM SERPL-MCNC: 7.8 MG/DL (ref 8.7–10.5)
CHLORIDE SERPL-SCNC: 106 MMOL/L (ref 95–110)
CO2 SERPL-SCNC: 22 MMOL/L (ref 23–29)
CREAT SERPL-MCNC: 0.6 MG/DL (ref 0.5–1.4)
DIFFERENTIAL METHOD: ABNORMAL
EOSINOPHIL NFR BLD: 0 % (ref 0–8)
ERYTHROCYTE [DISTWIDTH] IN BLOOD BY AUTOMATED COUNT: 17.2 % (ref 11.5–14.5)
EST. GFR  (AFRICAN AMERICAN): >60 ML/MIN/1.73 M^2
EST. GFR  (NON AFRICAN AMERICAN): >60 ML/MIN/1.73 M^2
FIBRINOGEN PPP-MCNC: 378 MG/DL (ref 182–366)
FIBRINOGEN PPP-MCNC: 378 MG/DL (ref 182–366)
GLUCOSE SERPL-MCNC: 117 MG/DL (ref 70–110)
HCT VFR BLD AUTO: 20.7 % (ref 37–48.5)
HGB BLD-MCNC: 7.1 G/DL (ref 12–16)
HYPOCHROMIA BLD QL SMEAR: ABNORMAL
IMM GRANULOCYTES # BLD AUTO: ABNORMAL K/UL (ref 0–0.04)
IMM GRANULOCYTES NFR BLD AUTO: ABNORMAL % (ref 0–0.5)
INR PPP: 1.1 (ref 0.8–1.2)
INR PPP: 1.1 (ref 0.8–1.2)
LDH SERPL L TO P-CCNC: 340 U/L (ref 110–260)
LYMPHOCYTES NFR BLD: 84.3 % (ref 18–48)
MAGNESIUM SERPL-MCNC: 1.4 MG/DL (ref 1.6–2.6)
MCH RBC QN AUTO: 29 PG (ref 27–31)
MCHC RBC AUTO-ENTMCNC: 34.3 G/DL (ref 32–36)
MCV RBC AUTO: 85 FL (ref 82–98)
MONOCYTES NFR BLD: 0 % (ref 4–15)
NEUTROPHILS NFR BLD: 15.7 % (ref 38–73)
NRBC BLD-RTO: 0 /100 WBC
OVALOCYTES BLD QL SMEAR: ABNORMAL
PHOSPHATE SERPL-MCNC: 2.6 MG/DL (ref 2.7–4.5)
PLATELET # BLD AUTO: 47 K/UL (ref 150–350)
PMV BLD AUTO: 12.1 FL (ref 9.2–12.9)
POIKILOCYTOSIS BLD QL SMEAR: SLIGHT
POLYCHROMASIA BLD QL SMEAR: ABNORMAL
POTASSIUM SERPL-SCNC: 4.2 MMOL/L (ref 3.5–5.1)
PROT SERPL-MCNC: 6.1 G/DL (ref 6–8.4)
PROTHROMBIN TIME: 12.2 SEC (ref 9–12.5)
PROTHROMBIN TIME: 12.4 SEC (ref 9–12.5)
RBC # BLD AUTO: 2.45 M/UL (ref 4–5.4)
SODIUM SERPL-SCNC: 138 MMOL/L (ref 136–145)
SPHEROCYTES BLD QL SMEAR: ABNORMAL
URATE SERPL-MCNC: 4.1 MG/DL (ref 2.4–5.7)
VANCOMYCIN TROUGH SERPL-MCNC: 2.3 UG/ML (ref 10–22)
WBC # BLD AUTO: 0.99 K/UL (ref 3.9–12.7)

## 2020-09-19 PROCEDURE — 99233 SBSQ HOSP IP/OBS HIGH 50: CPT | Mod: ,,, | Performed by: INTERNAL MEDICINE

## 2020-09-19 PROCEDURE — 85610 PROTHROMBIN TIME: CPT

## 2020-09-19 PROCEDURE — 36415 COLL VENOUS BLD VENIPUNCTURE: CPT

## 2020-09-19 PROCEDURE — 99233 PR SUBSEQUENT HOSPITAL CARE,LEVL III: ICD-10-PCS | Mod: ,,, | Performed by: INTERNAL MEDICINE

## 2020-09-19 PROCEDURE — 25000003 PHARM REV CODE 250: Performed by: STUDENT IN AN ORGANIZED HEALTH CARE EDUCATION/TRAINING PROGRAM

## 2020-09-19 PROCEDURE — 20600001 HC STEP DOWN PRIVATE ROOM

## 2020-09-19 PROCEDURE — 85027 COMPLETE CBC AUTOMATED: CPT

## 2020-09-19 PROCEDURE — 63600175 PHARM REV CODE 636 W HCPCS: Performed by: STUDENT IN AN ORGANIZED HEALTH CARE EDUCATION/TRAINING PROGRAM

## 2020-09-19 PROCEDURE — 85007 BL SMEAR W/DIFF WBC COUNT: CPT

## 2020-09-19 PROCEDURE — 83735 ASSAY OF MAGNESIUM: CPT

## 2020-09-19 PROCEDURE — 85384 FIBRINOGEN ACTIVITY: CPT | Mod: 91

## 2020-09-19 PROCEDURE — 80202 ASSAY OF VANCOMYCIN: CPT

## 2020-09-19 PROCEDURE — 80053 COMPREHEN METABOLIC PANEL: CPT

## 2020-09-19 PROCEDURE — 85730 THROMBOPLASTIN TIME PARTIAL: CPT

## 2020-09-19 PROCEDURE — 84550 ASSAY OF BLOOD/URIC ACID: CPT

## 2020-09-19 PROCEDURE — 25000003 PHARM REV CODE 250: Performed by: NURSE PRACTITIONER

## 2020-09-19 PROCEDURE — 83615 LACTATE (LD) (LDH) ENZYME: CPT

## 2020-09-19 PROCEDURE — 84100 ASSAY OF PHOSPHORUS: CPT

## 2020-09-19 RX ORDER — SODIUM,POTASSIUM PHOSPHATES 280-250MG
2 POWDER IN PACKET (EA) ORAL ONCE
Status: COMPLETED | OUTPATIENT
Start: 2020-09-19 | End: 2020-09-19

## 2020-09-19 RX ORDER — ONDANSETRON 2 MG/ML
4 INJECTION INTRAMUSCULAR; INTRAVENOUS EVERY 6 HOURS PRN
Status: DISCONTINUED | OUTPATIENT
Start: 2020-09-19 | End: 2020-09-21

## 2020-09-19 RX ORDER — MAGNESIUM SULFATE HEPTAHYDRATE 40 MG/ML
2 INJECTION, SOLUTION INTRAVENOUS ONCE
Status: COMPLETED | OUTPATIENT
Start: 2020-09-19 | End: 2020-09-19

## 2020-09-19 RX ADMIN — CEFEPIME 2 G: 2 INJECTION, POWDER, FOR SOLUTION INTRAVENOUS at 12:09

## 2020-09-19 RX ADMIN — ACYCLOVIR 400 MG: 200 CAPSULE ORAL at 08:09

## 2020-09-19 RX ADMIN — MAGNESIUM SULFATE IN WATER 2 G: 40 INJECTION, SOLUTION INTRAVENOUS at 09:09

## 2020-09-19 RX ADMIN — SODIUM CHLORIDE: 0.9 INJECTION, SOLUTION INTRAVENOUS at 06:09

## 2020-09-19 RX ADMIN — TRETINOIN 50 MG: 10 CAPSULE ORAL at 05:09

## 2020-09-19 RX ADMIN — CEFEPIME 2 G: 2 INJECTION, POWDER, FOR SOLUTION INTRAVENOUS at 05:09

## 2020-09-19 RX ADMIN — ONDANSETRON 4 MG: 2 INJECTION INTRAMUSCULAR; INTRAVENOUS at 08:09

## 2020-09-19 RX ADMIN — SODIUM CHLORIDE: 0.9 INJECTION, SOLUTION INTRAVENOUS at 11:09

## 2020-09-19 RX ADMIN — CEFEPIME 2 G: 2 INJECTION, POWDER, FOR SOLUTION INTRAVENOUS at 08:09

## 2020-09-19 RX ADMIN — ALLOPURINOL 300 MG: 300 TABLET ORAL at 08:09

## 2020-09-19 RX ADMIN — TRETINOIN 50 MG: 10 CAPSULE ORAL at 08:09

## 2020-09-19 RX ADMIN — POTASSIUM & SODIUM PHOSPHATES POWDER PACK 280-160-250 MG 2 PACKET: 280-160-250 PACK at 09:09

## 2020-09-19 NOTE — HPI
Erika Saini, a 25 y.o. female, for therapy visit.  Met with patient    Chief Complaint/Reason for Encounter: adaptation to disease and treatment, anxiety, interpersonal skills
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
## 2020-09-19 NOTE — PROGRESS NOTES
[facility]  Hematology  Bone Marrow Transplant  Progress Note    Patient Name: Erika Saini  Admission Date: 9/18/2020  Hospital Length of Stay: 1 days  Code Status: Full Code    Subjective:     Interval History:   Patient has headache and vomited this morning. Denies any other symptoms.     Objective:     Vital Signs (Most Recent):  Temp: 99 °F (37.2 °C) (09/19/20 1151)  Pulse: 99 (09/19/20 1151)  Resp: 17 (09/19/20 1151)  BP: 119/71 (09/19/20 1151)  SpO2: 97 % (09/19/20 1151) Vital Signs (24h Range):  Temp:  [98.1 °F (36.7 °C)-99.2 °F (37.3 °C)] 99 °F (37.2 °C)  Pulse:  [] 99  Resp:  [16-20] 17  SpO2:  [96 %-100 %] 97 %  BP: (103-134)/(52-73) 119/71     Weight: 134.3 kg (296 lb)  Body mass index is 57.81 kg/m².  Body surface area is 2.38 meters squared.    ECOG SCORE         [unfilled]    Intake/Output - Last 3 Shifts       09/17 0700 - 09/18 0659 09/18 0700 - 09/19 0659 09/19 0700 - 09/20 0659    P.O.  520 200    I.V. (mL/kg)  3680 (27.4) 870 (6.5)    IV Piggyback  400     Total Intake(mL/kg)  4600 (34.3) 1070 (8)    Urine (mL/kg/hr)  650 (0.2)     Total Output  650     Net  +3950 +1070           Urine Occurrence  5 x 1 x    Stool Occurrence  1 x 1 x    Emesis Occurrence   1 x          Physical Exam  Vitals signs reviewed.   Constitutional:       Appearance: Normal appearance. She is obese.   HENT:      Head: Normocephalic and atraumatic.      Nose: Nose normal.      Mouth/Throat:      Comments: Fever blister noted.  Eyes:      Extraocular Movements: Extraocular movements intact.   Neck:      Musculoskeletal: Normal range of motion and neck supple.   Cardiovascular:      Rate and Rhythm: Normal rate and regular rhythm.      Heart sounds: No murmur.   Pulmonary:      Effort: Pulmonary effort is normal. No respiratory distress.   Abdominal:      General: There is no distension.      Palpations: Abdomen is soft.      Tenderness: There is no abdominal tenderness.   Musculoskeletal: Normal  range of motion.         General: No swelling.   Skin:     General: Skin is warm and dry.   Neurological:      General: No focal deficit present.      Mental Status: She is alert and oriented to person, place, and time.   Psychiatric:         Mood and Affect: Mood normal.         Behavior: Behavior normal.         Significant Labs:   All pertinent labs from the last 24 hours have been reviewed.    Diagnostic Results:  I have reviewed and interpreted all pertinent imaging results/findings within the past 24 hours.    Assessment/Plan:     * APL (acute promyelocytic leukemia)  Patient is a 25 year old woman presenting with concern for APL.    Plan:  - Monitor TLS labs daily and DIC labs BID   - Follow up PML KAVITHA PCR/FISH, AML FISH, peripheral smear    - Follow up 2D ECHO    - Continue tretinoin (start 9/18)  BID w/ meals Day 2  - Continue allopurinol 300mg daily and IVF  - Transfuse cyroglobulin if fibrinogen <150    - Most likely will receive APL induction with ATRA and BEN pending studies next week  - On acyclovir prophylaxis  - Monitor I/O and daily weights twice daily for differentiation syndrome     Pancytopenia  Secondary to AML   -- Transfuse if platelets <10, Hgb<7     Hypomagnesemia  - Monitor and replace PRN      Adjustment reaction with anxiety  - seen by psychology/oncology    Neutropenic fever  Patient presenting with possible APL and neutropenic fever. Likely due to positive Group B strep blood cultures (+ at OSH). She was started on vancomycin and cefepime at the OSH.     - Continue cefepime  - Follow up repeat cultures  - obtain records from OSH    Morbid obesity with BMI of 50.0-59.9, adult  Patient with BMI 57.91        VTE Risk Mitigation (From admission, onward)         Ordered     IP VTE HIGH RISK PATIENT  Once      09/18/20 0148     Place sequential compression device  Until discontinued      09/18/20 0148                Disposition:  SATISH Cheng MD  Bone Marrow  Transplant  [facility]

## 2020-09-19 NOTE — ASSESSMENT & PLAN NOTE
Patient with current significant anxiety symptoms interfering with social, occupational and functional activities    Anxiety over being away from home is significant and will complicate her emotional reaction to illness and prolonged hospitalization.  Her mother's arrival on Sunday will help her to adjust.    She has never taken psychotropic medications and is not willing to consider medications at this time. She is willing to see me while inpatient, so I will continue to follow her.     She was given information about expected behavior toward staff and reminded of her responsibility to treat others with respect.

## 2020-09-19 NOTE — ASSESSMENT & PLAN NOTE
Competence: Based upon my evaluation,  Ms. Saini does communicate a clear, consistent, and free choice regarding chemotherapy treatment. Ms. Saini has a rudimentary, but factual understanding of her medical situation (limited by her characterological avoidance of upsetting information).  Erika Saini does appreciate some of the risks and benefits of treatment and nontreatment, but would benefit from additional education.  She is particularly interested in information about future fertility. She demonstrates adequate health literacy, so could be directed to appropriate websites.  Lastly, Ms. Saini does demonstrate rational reasoning. Therefore, in my professional opinion, Erika Saini has the capacity to make this medical decision.  Barriers to completion of treatment for this patient will likely include anxiety and interpersonal behaviors.     She has no historical educational deficits, no history of head injury, graduated from high school with a standard degree, and has never been tested to be incompetent or intellectually disabled.  She does demonstrate some impairment in cognitive functioning, but has the ability to complete all ADLs and has the ability to consent to chemotherapy based upon knowledge and understanding of the treatment.

## 2020-09-19 NOTE — PLAN OF CARE
POC reviewed with patient; understanding verbalized. NS @150. Cefepime continued. Zofran administered for one episode of nausea and emesis this AM with moderate relief obtained. Regular diet; good appetite. Pt instructed to call before getting OOB; standby assist to ambulate; no BMs this shift. Pt. with nonskid footwear on, bed in lowest position, and locked with bed rails up x2. Pt. has call light and personal items within reach. VSS and afebrile this shift. All questions and concerns addressed at this time.

## 2020-09-19 NOTE — ASSESSMENT & PLAN NOTE
Patient presenting with possible APL and neutropenic fever. Likely due to positive Group B strep blood cultures (+ at OSH). She was started on vancomycin and cefepime at the OSH.     - Continue cefepime  - Follow up repeat cultures  - obtain records from OSH
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
## 2020-09-19 NOTE — PLAN OF CARE
POC reviewed w patient at beginning of shift and PRN. VSS pt afebrile. Educated to call for assistance when getting OOB.Ambulated to BR w standby assist x2 to void. IVF continued and IV cefepime.Emesis w/o nausea x1 episode. Bed locked in low position call light in reach. Will CTM

## 2020-09-19 NOTE — ASSESSMENT & PLAN NOTE
Patient is a 25 year old woman presenting with concern for APL.    Plan:  - Monitor TLS labs daily and DIC labs BID   - Follow up PML KAVITHA PCR/FISH, AML FISH, peripheral smear    - Follow up 2D ECHO    - Continue tretinoin (start 9/18)  BID w/ meals Day 2  - Continue allopurinol 300mg daily and IVF  - Transfuse cyroglobulin if fibrinogen <150    - Most likely will receive APL induction with ATRA and BEN pending studies next week  - On acyclovir prophylaxis  - Monitor I/O and daily weights twice daily for differentiation syndrome

## 2020-09-19 NOTE — HOSPITAL COURSE
26 yo F transferred from [facility] with concern for APL (presented pancytopenic with nonspecific sx and fever). 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 Treated with ATRA (held day 18-20 d/t differentiation syndrome) and ASO (held day 11, 12, 15-18 d/t QTc prolongation, transaminitis, differentiation syndrome). 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     Patient with APL and pancytopenia, started on ATRA on 9/18, on allopurinol and IVF, being monitored for TLS and DIC. She was found to have Group B streptococcus bacteremia in the OSH, currently on cefepime, will need to get records from OSH.   09/21/2020 NAEON. Weight has increased 10lbs since admission, no shortness of breath. D4 ATRA. APL testing pending, reaching out to pathology dept at [facility]. Blood cultures have NGTD. Plan for PICC today.   09/22/2020  PICC line placed yesterday. PML LORENA FISH confirmed low risk APL. Initiated ASO, today is day 2. ATRA day 5. Hypoxic overnight - briefly requiring 5L NC, now comfortable on 2L. CXR revealed ? RLL airspace disease.  Weight has increased 4kgs. Blood cultures have NGTD. Plan for PICC today.  09/23/2020 ATRA day 6, ASO day 3. Dexamethasone day 2 for ? Differentiation syndrome. Now on RA. Awaiting slides from [facility], request sent. Platelets 29, getting 1 pack, .  09/24/2020 ATRA day 7, ASO day 4. Dexamethasone day 3/3 for differentiation syndrome. Now on RA. Awaiting slides from [facility], request sent. WBC count has recovered to 1.98  09/25/2020 ATRA day 8, ASO day 5. Comfortable on RA. Awaiting slides from [facility], request sent. WBC count has recovered to 3.67.   09/26/2020 ATRA day 9 and  ASO D6, has heartburn, no other complaints.  09/27/2020 ATRA day 10 and ASO D7. Complained of vision changes, evaluated by ophthalmology possibly related to leukemia retinopathy.    09/28/2020 ATRA day 11, ASO day 8. Comfortable on RA. Endorses blurry vision but denies severe HA, focal neurological deficits, or severe vision loss. No chest pain, comfortable on RA. WBC count normal. PLTs 41.  09/29/2020 ATRA day 12, ASO day 9. Comfortable on RA. Tachycardic overnight. Awaiting ophthalmology exam. Denies severe HA, focal neurological deficits, or severe vision loss. WBC count normal. PLTs 41, transfusion ordered for goal PLT > 50.   09/30/2020 ATRA day 13, ASO day 10. Comfortable on RA. HA last night resolved with tramadol, no complaints this AM, says vision improved. Tachycardic overnight. Ophthalmology exam revealed scattered intra retinal hemorrhages. Denies severe HA, focal neurological deficits, or severe vision loss. AST 71 -> 106,  -> 144  10/01/2020 ATRA day 14, ASO day 11. Had reflux due to coughing and a few loose stools overnight. Continues to have the same headache that has not changed in intensity. Feels her visual symptoms have not changed. Slept well otherwise and denies complaints this morning. Plts of 36 with LFTs remaining stable from yesterday.  10/02/2020 ATRA day 15, ASO day 12. ASO held yesterday due to prolonged QTc.Continues to complain of headache and vomiting that is brought on by cough. Symptoms improve with Tylenol. Has not tried the Benzonatate yet to prevent cough, encouraged use this morning. WBC increased to 19 this AM, remains afebrile. Plts 53. Received a 250cc bolus overnight for BP in the 90s/50s with improvement. Pt reports not eating or drinking very much.  10/03/2020  ATRA day 16, ASO day 13 (but ASO has been held since day 11 due to prolonged QTc). EKG this AM pending. WBC stable at 19 this AM, remains afebrile.  10/04/2020 ATRA day 17, ASO day 14 (ASO held day 11 - 12  due to prolonged QTc). EKG this AM pending. WBC increased to 24 this AM, remains afebrile. Complaining of nausea and persistent cough this AM.   10/04/2020 ATRA day 17, ASO day 14 (ASO held day 11 - 12 due to prolonged QTc). EKG this AM pending. WBC increased to 24 this AM, remains afebrile. Complaining of nausea and persistent cough this AM. In the afternoon her O2 sats dropped to 64%, put on NRB. CXR and stat labs ordered. Given Dexamethasone 10mg IV.   10/05/2020 ATRA day 18, ASO day 15 (ASO held day 11 - 12 due to prolonged QTc). EKG this AM with QTc 496. Remains on Comfort Flow 22L 70% FIO2. Only complains of cough this morning. Otherwise feels comfortable and denies complaints. States headache resolved Saturday and has not returned. Will hold ATRA and ASO today given pleural effusion/pulmonary edema with oxygen requirements and QTc.   10/06/2020 ATRA day 19, ASO day 16 (both held yesterday with QTc 493 and concern for differentiation syndrome). Comfort flow titrated down, currently 20L 50%. Pt comfortable in bed on those settings however desaturates and becomes short of breath with movement to the bedside commode. Hgb 6.9 and plts 48 this AM so received a unit of both with lasix prior to transfusion. Net negative 870cc over last 24hrs. Pt denies complaints this morning other than shortness of breath with significant movement.  10/07/2020 ATRA 20 day, ASO day 17 (both held the last two days). Slept well overnight, was awoken by cough this morning. Still feels short of breath with getting up to use the cammode but is comfortable in bed. Remains on comfort flow 20L 50%. Denies any new complaints. Left blurry vision remains the same and has had no recurrence of her headache.   10/08/2020 ATRA 21 day, ASO day 18 (both held the last three days). UOP 2L yesterday with net negative 1L. Comfort flow down to 15L and 40% this morning. Was able to get up and use the commode without shortness of breath. Continues to have a  "cough but denies any other complaints this morning. Bradycardia noted into the 30s while pt was asleep, EKG showed pt with HR in the 70's once awake with QTc 504.  10/09/2020 ATRA 22 day, ASO day 19. Received ATRA but not ASO yesterday.UOP 1.9L with net negative 1.4L over last 24hrs. Afebrile overnight and comfortably asleep this morning on comfort flow 15L 30%.  10/10/2020 ATRA 23 day, ASO day 20. Received ATRA but not ASO yesterday. UOP 2.8L, in's not recorded. Titrated down from comfort flow to high flow overnight. Afebrile and comfortably asleep this morning. Denies complaints once awake.  10/11/2020  ATRA 24 day, ASO day 21. ATRA and ASO held yesterday with increasing ALT. UOP 1.8L with PO 40mg lasix. Currently on 2L NC with no complaints. Had a headache yesterday evening which has since resolved. Denies any complaints and is questioning when she will have her bone marrow done.  10/12/2020 ATRA 25 day, ASO day 22. ATRA and ASO held yesterday with increasing ALT. Net negative 360cc without diuretics yesterday. Remains on 2L NC. Will give another dose of oral lasix to help remove oxygen. Weight 125kg from 134kg on admission. Encouraged movement as pt lays in bed all day. Denies any complaints this morning. Slept well. Has been persistently bradycardic when laying in bed/sleeping but HR will increase into 60s when pt sit up.  10/13/2020 ATRA 26 day, ASO day 23 for APL. Afebrile. Weaned off oxygen overnight. Urinated "a lot" yesterday but only net negative -120cc documented with 800cc UOP so likely inaccurate recording. Denies any complaints this morning. States she got up to the couch several times yesterday "just no one saw me."  10/14/2020 ATRA 27 day, ASO day 24 for APL. ATRA and ASO continue to be held due to transaminitis. Walked with PT in halls yesterday. Denies any complaints this morning. Afebrile. Saturating 93-97% on RA.  10/15/2020 ATRA 28 day, ASO day 25 for APL. ATRA and ASO continue to be held. " Underwent BMBx yesterday without issue. Weights 121kg from 134.5 on admission. Is upset she's losing weight while in the hospital. Denies any complaints this morning.  10/16/2020 ATRA 29 day, ASO day 26 for APL. ATRA and ASO continue to be held due to transaminitis which is improving today. Her biopsy site was sore yesterday but states it is completely resolved today and denies any complaints other than the lgihts in her room not working appropriately.  10/17/2020 ATRA 30 day, ASO, day 27 for APL. On hold due to to transaminitis, which is same levels as yesterday. She has no other complaints today.   10/18/2020 Bone marrow done on 10/14 is reviewed. She is in morphological remission. PML/LORENA +ve 2.2%.   She will discharged. Follow-up will be arranged with Dr. Jenkins at [facility]. She will be discharged on acyclovir. ATRA will be mailed to her address. Her mother was informed of the results and discharge plans.

## 2020-09-19 NOTE — SUBJECTIVE & OBJECTIVE
Subjective:     Interval History:   Patient has headache and vomited this morning. Denies any other symptoms.     Objective:     Vital Signs (Most Recent):  Temp: 99 °F (37.2 °C) (09/19/20 1151)  Pulse: 99 (09/19/20 1151)  Resp: 17 (09/19/20 1151)  BP: 119/71 (09/19/20 1151)  SpO2: 97 % (09/19/20 1151) Vital Signs (24h Range):  Temp:  [98.1 °F (36.7 °C)-99.2 °F (37.3 °C)] 99 °F (37.2 °C)  Pulse:  [] 99  Resp:  [16-20] 17  SpO2:  [96 %-100 %] 97 %  BP: (103-134)/(52-73) 119/71     Weight: 134.3 kg (296 lb)  Body mass index is 57.81 kg/m².  Body surface area is 2.38 meters squared.    ECOG SCORE         [unfilled]    Intake/Output - Last 3 Shifts       09/17 0700 - 09/18 0659 09/18 0700 - 09/19 0659 09/19 0700 - 09/20 0659    P.O.  520 200    I.V. (mL/kg)  3680 (27.4) 870 (6.5)    IV Piggyback  400     Total Intake(mL/kg)  4600 (34.3) 1070 (8)    Urine (mL/kg/hr)  650 (0.2)     Total Output  650     Net  +3950 +1070           Urine Occurrence  5 x 1 x    Stool Occurrence  1 x 1 x    Emesis Occurrence   1 x          Physical Exam  Vitals signs reviewed.   Constitutional:       Appearance: Normal appearance. She is obese.   HENT:      Head: Normocephalic and atraumatic.      Nose: Nose normal.      Mouth/Throat:      Comments: Fever blister noted.  Eyes:      Extraocular Movements: Extraocular movements intact.   Neck:      Musculoskeletal: Normal range of motion and neck supple.   Cardiovascular:      Rate and Rhythm: Normal rate and regular rhythm.      Heart sounds: No murmur.   Pulmonary:      Effort: Pulmonary effort is normal. No respiratory distress.   Abdominal:      General: There is no distension.      Palpations: Abdomen is soft.      Tenderness: There is no abdominal tenderness.   Musculoskeletal: Normal range of motion.         General: No swelling.   Skin:     General: Skin is warm and dry.   Neurological:      General: No focal deficit present.      Mental Status: She is alert and oriented to  person, place, and time.   Psychiatric:         Mood and Affect: Mood normal.         Behavior: Behavior normal.         Significant Labs:   All pertinent labs from the last 24 hours have been reviewed.    Diagnostic Results:  I have reviewed and interpreted all pertinent imaging results/findings within the past 24 hours.

## 2020-09-19 NOTE — PLAN OF CARE
Side rails up x2; call bell in place; bed in lowest, locked position; skid proof socks on; no evidence of skin breakdown; care plan explained to patient; pt remains free of injury.  Pt tolerated po, voids, BM x 1, ambulates to BR,  IVF infusing at 150 cc/hr, received cefepime and tretinoin as scheduled. Pt given tylenol for a HA, VSS and afebrile.

## 2020-09-20 PROBLEM — E87.6 HYPOKALEMIA: Status: ACTIVE | Noted: 2020-09-20

## 2020-09-20 PROBLEM — E83.42 HYPOMAGNESEMIA: Status: RESOLVED | Noted: 2020-09-19 | Resolved: 2020-09-20

## 2020-09-20 LAB
ALBUMIN SERPL BCP-MCNC: 2.4 G/DL (ref 3.5–5.2)
ALP SERPL-CCNC: 47 U/L (ref 55–135)
ALT SERPL W/O P-5'-P-CCNC: 44 U/L (ref 10–44)
ANION GAP SERPL CALC-SCNC: 11 MMOL/L (ref 8–16)
ANISOCYTOSIS BLD QL SMEAR: SLIGHT
APTT BLDCRRT: 23.1 SEC (ref 21–32)
APTT BLDCRRT: 27 SEC (ref 21–32)
AST SERPL-CCNC: 32 U/L (ref 10–40)
BASOPHILS # BLD AUTO: 0 K/UL (ref 0–0.2)
BASOPHILS NFR BLD: 0 % (ref 0–1.9)
BILIRUB SERPL-MCNC: 0.4 MG/DL (ref 0.1–1)
BLD PROD TYP BPU: NORMAL
BLOOD UNIT EXPIRATION DATE: NORMAL
BLOOD UNIT TYPE CODE: 5100
BLOOD UNIT TYPE: NORMAL
BUN SERPL-MCNC: 5 MG/DL (ref 6–20)
CALCIUM SERPL-MCNC: 7.7 MG/DL (ref 8.7–10.5)
CHLORIDE SERPL-SCNC: 109 MMOL/L (ref 95–110)
CO2 SERPL-SCNC: 23 MMOL/L (ref 23–29)
CODING SYSTEM: NORMAL
CREAT SERPL-MCNC: 0.6 MG/DL (ref 0.5–1.4)
DIFFERENTIAL METHOD: ABNORMAL
DISPENSE STATUS: NORMAL
EOSINOPHIL # BLD AUTO: 0 K/UL (ref 0–0.5)
EOSINOPHIL NFR BLD: 0 % (ref 0–8)
ERYTHROCYTE [DISTWIDTH] IN BLOOD BY AUTOMATED COUNT: 17.1 % (ref 11.5–14.5)
EST. GFR  (AFRICAN AMERICAN): >60 ML/MIN/1.73 M^2
EST. GFR  (NON AFRICAN AMERICAN): >60 ML/MIN/1.73 M^2
FIBRINOGEN PPP-MCNC: 395 MG/DL (ref 182–366)
FIBRINOGEN PPP-MCNC: 404 MG/DL (ref 182–366)
GLUCOSE SERPL-MCNC: 118 MG/DL (ref 70–110)
HCT VFR BLD AUTO: 20 % (ref 37–48.5)
HGB BLD-MCNC: 6.5 G/DL (ref 12–16)
HYPOCHROMIA BLD QL SMEAR: ABNORMAL
IMM GRANULOCYTES # BLD AUTO: 0 K/UL (ref 0–0.04)
IMM GRANULOCYTES NFR BLD AUTO: 0 % (ref 0–0.5)
INR PPP: 1.1 (ref 0.8–1.2)
INR PPP: 1.1 (ref 0.8–1.2)
LDH SERPL L TO P-CCNC: 359 U/L (ref 110–260)
LYMPHOCYTES # BLD AUTO: 0.5 K/UL (ref 1–4.8)
LYMPHOCYTES NFR BLD: 63 % (ref 18–48)
MAGNESIUM SERPL-MCNC: 1.7 MG/DL (ref 1.6–2.6)
MCH RBC QN AUTO: 28.8 PG (ref 27–31)
MCHC RBC AUTO-ENTMCNC: 32.5 G/DL (ref 32–36)
MCV RBC AUTO: 89 FL (ref 82–98)
MONOCYTES # BLD AUTO: 0.1 K/UL (ref 0.3–1)
MONOCYTES NFR BLD: 12.3 % (ref 4–15)
NEUTROPHILS # BLD AUTO: 0.2 K/UL (ref 1.8–7.7)
NEUTROPHILS NFR BLD: 24.7 % (ref 38–73)
NRBC BLD-RTO: 0 /100 WBC
NUM UNITS TRANS PACKED RBC: NORMAL
OVALOCYTES BLD QL SMEAR: ABNORMAL
PHOSPHATE SERPL-MCNC: 3.7 MG/DL (ref 2.7–4.5)
PLATELET # BLD AUTO: 42 K/UL (ref 150–350)
PMV BLD AUTO: 11.5 FL (ref 9.2–12.9)
POIKILOCYTOSIS BLD QL SMEAR: SLIGHT
POLYCHROMASIA BLD QL SMEAR: ABNORMAL
POTASSIUM SERPL-SCNC: 3.4 MMOL/L (ref 3.5–5.1)
PROT SERPL-MCNC: 5.9 G/DL (ref 6–8.4)
PROTHROMBIN TIME: 11.6 SEC (ref 9–12.5)
PROTHROMBIN TIME: 11.7 SEC (ref 9–12.5)
RBC # BLD AUTO: 2.26 M/UL (ref 4–5.4)
SODIUM SERPL-SCNC: 143 MMOL/L (ref 136–145)
URATE SERPL-MCNC: 3.4 MG/DL (ref 2.4–5.7)
WBC # BLD AUTO: 0.73 K/UL (ref 3.9–12.7)

## 2020-09-20 PROCEDURE — 85610 PROTHROMBIN TIME: CPT

## 2020-09-20 PROCEDURE — 84100 ASSAY OF PHOSPHORUS: CPT

## 2020-09-20 PROCEDURE — 25000003 PHARM REV CODE 250: Performed by: STUDENT IN AN ORGANIZED HEALTH CARE EDUCATION/TRAINING PROGRAM

## 2020-09-20 PROCEDURE — 83615 LACTATE (LD) (LDH) ENZYME: CPT

## 2020-09-20 PROCEDURE — 63600175 PHARM REV CODE 636 W HCPCS: Performed by: STUDENT IN AN ORGANIZED HEALTH CARE EDUCATION/TRAINING PROGRAM

## 2020-09-20 PROCEDURE — 99233 SBSQ HOSP IP/OBS HIGH 50: CPT | Mod: ,,, | Performed by: INTERNAL MEDICINE

## 2020-09-20 PROCEDURE — 80053 COMPREHEN METABOLIC PANEL: CPT

## 2020-09-20 PROCEDURE — 83735 ASSAY OF MAGNESIUM: CPT

## 2020-09-20 PROCEDURE — 36430 TRANSFUSION BLD/BLD COMPNT: CPT

## 2020-09-20 PROCEDURE — 25000003 PHARM REV CODE 250: Performed by: NURSE PRACTITIONER

## 2020-09-20 PROCEDURE — P9040 RBC LEUKOREDUCED IRRADIATED: HCPCS

## 2020-09-20 PROCEDURE — 86644 CMV ANTIBODY: CPT

## 2020-09-20 PROCEDURE — 99233 PR SUBSEQUENT HOSPITAL CARE,LEVL III: ICD-10-PCS | Mod: ,,, | Performed by: INTERNAL MEDICINE

## 2020-09-20 PROCEDURE — 85730 THROMBOPLASTIN TIME PARTIAL: CPT | Mod: 91

## 2020-09-20 PROCEDURE — 85025 COMPLETE CBC W/AUTO DIFF WBC: CPT

## 2020-09-20 PROCEDURE — 84550 ASSAY OF BLOOD/URIC ACID: CPT

## 2020-09-20 PROCEDURE — 36415 COLL VENOUS BLD VENIPUNCTURE: CPT

## 2020-09-20 PROCEDURE — 85384 FIBRINOGEN ACTIVITY: CPT

## 2020-09-20 PROCEDURE — 20600001 HC STEP DOWN PRIVATE ROOM

## 2020-09-20 RX ORDER — HYDROCODONE BITARTRATE AND ACETAMINOPHEN 500; 5 MG/1; MG/1
TABLET ORAL
Status: DISCONTINUED | OUTPATIENT
Start: 2020-09-20 | End: 2020-09-23

## 2020-09-20 RX ORDER — DIPHENHYDRAMINE HCL 25 MG
25 CAPSULE ORAL EVERY 6 HOURS PRN
Status: DISCONTINUED | OUTPATIENT
Start: 2020-09-20 | End: 2020-10-18 | Stop reason: HOSPADM

## 2020-09-20 RX ORDER — POTASSIUM CHLORIDE 750 MG/1
40 CAPSULE, EXTENDED RELEASE ORAL ONCE
Status: COMPLETED | OUTPATIENT
Start: 2020-09-20 | End: 2020-09-20

## 2020-09-20 RX ORDER — ACETAMINOPHEN 325 MG/1
650 TABLET ORAL ONCE AS NEEDED
Status: COMPLETED | OUTPATIENT
Start: 2020-09-20 | End: 2020-09-20

## 2020-09-20 RX ORDER — ACETAMINOPHEN 325 MG/1
650 TABLET ORAL EVERY 6 HOURS PRN
Status: DISCONTINUED | OUTPATIENT
Start: 2020-09-20 | End: 2020-10-18 | Stop reason: HOSPADM

## 2020-09-20 RX ORDER — MAGNESIUM SULFATE HEPTAHYDRATE 40 MG/ML
2 INJECTION, SOLUTION INTRAVENOUS ONCE
Status: COMPLETED | OUTPATIENT
Start: 2020-09-20 | End: 2020-09-20

## 2020-09-20 RX ADMIN — CEFEPIME 2 G: 2 INJECTION, POWDER, FOR SOLUTION INTRAVENOUS at 01:09

## 2020-09-20 RX ADMIN — TRETINOIN 50 MG: 10 CAPSULE ORAL at 09:09

## 2020-09-20 RX ADMIN — ACETAMINOPHEN 650 MG: 325 TABLET ORAL at 04:09

## 2020-09-20 RX ADMIN — CEFEPIME 2 G: 2 INJECTION, POWDER, FOR SOLUTION INTRAVENOUS at 06:09

## 2020-09-20 RX ADMIN — POTASSIUM CHLORIDE 40 MEQ: 750 CAPSULE, EXTENDED RELEASE ORAL at 09:09

## 2020-09-20 RX ADMIN — MAGNESIUM SULFATE IN WATER 2 G: 40 INJECTION, SOLUTION INTRAVENOUS at 09:09

## 2020-09-20 RX ADMIN — ACETAMINOPHEN 650 MG: 325 TABLET ORAL at 09:09

## 2020-09-20 RX ADMIN — ALLOPURINOL 300 MG: 300 TABLET ORAL at 09:09

## 2020-09-20 RX ADMIN — DIPHENHYDRAMINE HYDROCHLORIDE 25 MG: 25 CAPSULE ORAL at 09:09

## 2020-09-20 RX ADMIN — SODIUM CHLORIDE: 0.9 INJECTION, SOLUTION INTRAVENOUS at 08:09

## 2020-09-20 RX ADMIN — CEFEPIME 2 G: 2 INJECTION, POWDER, FOR SOLUTION INTRAVENOUS at 08:09

## 2020-09-20 RX ADMIN — ACYCLOVIR 400 MG: 200 CAPSULE ORAL at 08:09

## 2020-09-20 RX ADMIN — TRETINOIN 50 MG: 10 CAPSULE ORAL at 05:09

## 2020-09-20 RX ADMIN — ACYCLOVIR 400 MG: 200 CAPSULE ORAL at 09:09

## 2020-09-20 NOTE — PLAN OF CARE
Pt with no acute events overnight. Emesis x1 relieved with prn zofran IV. Pt's lower lip with large bleeding sore due to Pt biting her lips. Gauze applied to site. IVF infusing @ 150. Pt voiding in toilet. Pt with no other complaints at this time. Remains afebrile and VSS. Will continue to monitor.

## 2020-09-20 NOTE — ASSESSMENT & PLAN NOTE
Patient is a 25 year old woman presenting with concern for APL.    Plan:  - Monitor TLS labs daily and DIC labs BID   - Follow up PML KAVITHA PCR/FISH, AML FISH, peripheral smear    - Follow up 2D ECHO    - Continue tretinoin (start 9/18)  BID w/ meals Day 3  - Continue allopurinol 300mg daily and IVF 100ml hr  - Transfuse cyroglobulin if fibrinogen <150    - Most likely will receive APL induction with ATRA and BEN pending studies next week  - PICC line on Monday   - On acyclovir prophylaxis  - Monitor I/O and daily weights twice daily for differentiation syndrome

## 2020-09-20 NOTE — PROGRESS NOTES
POC reviewed with patient; understanding verbalized. NS @100. 1U PRBCs administered today; pt tolerated well. Cefepime continued. Tylenol administered x1 for complaint of headache. Regular diet; good appetite. Pt instructed to call before getting OOB; standby assist to ambulate; no BMs this shift. Pt. with nonskid footwear on, bed in lowest position, and locked with bed rails up x2. Pt. has call light and personal items within reach. VSS and afebrile this shift. All questions and concerns addressed at this time.

## 2020-09-20 NOTE — SUBJECTIVE & OBJECTIVE
Subjective:     Interval History:   Patient has vomiting controlled with RPN. Denies any other symptoms.   Monitoring weight gain closely. Denies SOB    Objective:     Vital Signs (Most Recent):  Temp: 98.3 °F (36.8 °C) (09/20/20 1045)  Pulse: 98 (09/20/20 1045)  Resp: 17 (09/20/20 1045)  BP: (!) 99/54 (09/20/20 1045)  SpO2: 100 % (09/20/20 1045) Vital Signs (24h Range):  Temp:  [97.3 °F (36.3 °C)-99.6 °F (37.6 °C)] 98.3 °F (36.8 °C)  Pulse:  [] 98  Resp:  [17-18] 17  SpO2:  [95 %-100 %] 100 %  BP: ()/(54-77) 99/54     Weight: (!) 137.2 kg (302 lb 7.5 oz)  Body mass index is 59.07 kg/m².  Body surface area is 2.41 meters squared.    ECOG SCORE         [unfilled]    Intake/Output - Last 3 Shifts       09/18 0700 - 09/19 0659 09/19 0700 - 09/20 0659 09/20 0700 - 09/21 0659    P.O. 520 450     I.V. (mL/kg) 3680 (27.4) 3700 (27) 363 (2.6)    Blood   320    IV Piggyback 400      Total Intake(mL/kg) 4600 (34.3) 4150 (30.2) 683 (5)    Urine (mL/kg/hr) 650 (0.2)      Total Output 650      Net +3950 +4150 +683           Urine Occurrence 5 x 5 x 601 x    Stool Occurrence 1 x 1 x     Emesis Occurrence  2 x           Physical Exam  Vitals signs reviewed.   Constitutional:       Appearance: Normal appearance. She is obese.   HENT:      Head: Normocephalic and atraumatic.      Nose: Nose normal.      Mouth/Throat:      Comments: Fever blister noted.  Eyes:      Extraocular Movements: Extraocular movements intact.   Neck:      Musculoskeletal: Normal range of motion and neck supple.   Cardiovascular:      Rate and Rhythm: Normal rate and regular rhythm.      Heart sounds: No murmur.   Pulmonary:      Effort: Pulmonary effort is normal. No respiratory distress.   Abdominal:      General: There is no distension.      Palpations: Abdomen is soft.      Tenderness: There is no abdominal tenderness.   Musculoskeletal: Normal range of motion.         General: No swelling.   Skin:     General: Skin is warm and dry.    Neurological:      General: No focal deficit present.      Mental Status: She is alert and oriented to person, place, and time.   Psychiatric:         Mood and Affect: Mood normal.         Behavior: Behavior normal.         Significant Labs:   All pertinent labs from the last 24 hours have been reviewed.    Diagnostic Results:  I have reviewed and interpreted all pertinent imaging results/findings within the past 24 hours.

## 2020-09-20 NOTE — PROGRESS NOTES
[facility]  Hematology  Bone Marrow Transplant  Progress Note    Patient Name: Erika Saini  Admission Date: 9/18/2020  Hospital Length of Stay: 2 days  Code Status: Full Code    Subjective:     Interval History:   Patient has vomiting controlled with RPN. Denies any other symptoms.   Monitoring weight gain closely. Denies SOB    Objective:     Vital Signs (Most Recent):  Temp: 98.3 °F (36.8 °C) (09/20/20 1045)  Pulse: 98 (09/20/20 1045)  Resp: 17 (09/20/20 1045)  BP: (!) 99/54 (09/20/20 1045)  SpO2: 100 % (09/20/20 1045) Vital Signs (24h Range):  Temp:  [97.3 °F (36.3 °C)-99.6 °F (37.6 °C)] 98.3 °F (36.8 °C)  Pulse:  [] 98  Resp:  [17-18] 17  SpO2:  [95 %-100 %] 100 %  BP: ()/(54-77) 99/54     Weight: (!) 137.2 kg (302 lb 7.5 oz)  Body mass index is 59.07 kg/m².  Body surface area is 2.41 meters squared.    ECOG SCORE         [unfilled]    Intake/Output - Last 3 Shifts       09/18 0700 - 09/19 0659 09/19 0700 - 09/20 0659 09/20 0700 - 09/21 0659    P.O. 520 450     I.V. (mL/kg) 3680 (27.4) 3700 (27) 363 (2.6)    Blood   320    IV Piggyback 400      Total Intake(mL/kg) 4600 (34.3) 4150 (30.2) 683 (5)    Urine (mL/kg/hr) 650 (0.2)      Total Output 650      Net +3950 +4150 +683           Urine Occurrence 5 x 5 x 601 x    Stool Occurrence 1 x 1 x     Emesis Occurrence  2 x           Physical Exam  Vitals signs reviewed.   Constitutional:       Appearance: Normal appearance. She is obese.   HENT:      Head: Normocephalic and atraumatic.      Nose: Nose normal.      Mouth/Throat:      Comments: Fever blister noted.  Eyes:      Extraocular Movements: Extraocular movements intact.   Neck:      Musculoskeletal: Normal range of motion and neck supple.   Cardiovascular:      Rate and Rhythm: Normal rate and regular rhythm.      Heart sounds: No murmur.   Pulmonary:      Effort: Pulmonary effort is normal. No respiratory distress.   Abdominal:      General: There is no distension.       Palpations: Abdomen is soft.      Tenderness: There is no abdominal tenderness.   Musculoskeletal: Normal range of motion.         General: No swelling.   Skin:     General: Skin is warm and dry.   Neurological:      General: No focal deficit present.      Mental Status: She is alert and oriented to person, place, and time.   Psychiatric:         Mood and Affect: Mood normal.         Behavior: Behavior normal.         Significant Labs:   All pertinent labs from the last 24 hours have been reviewed.    Diagnostic Results:  I have reviewed and interpreted all pertinent imaging results/findings within the past 24 hours.    Assessment/Plan:     * APL (acute promyelocytic leukemia)  Patient is a 25 year old woman presenting with concern for APL.    Plan:  - Monitor TLS labs daily and DIC labs BID   - Follow up PML KAVITHA PCR/FISH, AML FISH, peripheral smear    - Follow up 2D ECHO    - Continue tretinoin (start 9/18)  BID w/ meals Day 3  - Continue allopurinol 300mg daily and IVF 100ml hr  - Transfuse cyroglobulin if fibrinogen <150    - Most likely will receive APL induction with ATRA and BEN pending studies next week  - PICC line on Monday   - On acyclovir prophylaxis  - Monitor I/O and daily weights twice daily for differentiation syndrome     Hypokalemia  - Monitor and replace PRN      Pancytopenia  Secondary to AML   -- Transfuse if platelets <10, Hgb<7   1U PRBC transfused today     Adjustment reaction with anxiety  - seen by oncology psychology     Neutropenic fever  Patient presenting with possible APL and neutropenic fever. Likely due to positive Group B strep blood cultures (+ at OSH). She was started on vancomycin and cefepime at the OSH.     - Continue cefepime  - Follow up repeat cultures  - obtain records from OSH to tailor antibiotics     Morbid obesity with BMI of 50.0-59.9, adult  Patient with BMI 57.91        VTE Risk Mitigation (From admission, onward)         Ordered     IP VTE HIGH RISK PATIENT  Once       09/18/20 0148     Place sequential compression device  Until discontinued      09/18/20 0148                Disposition: Home    Paul Cheng MD  Bone Marrow Transplant  [facility]

## 2020-09-21 LAB
ALBUMIN SERPL BCP-MCNC: 2.4 G/DL (ref 3.5–5.2)
ALP SERPL-CCNC: 51 U/L (ref 55–135)
ALT SERPL W/O P-5'-P-CCNC: 67 U/L (ref 10–44)
AML FISH ADDITIONAL INFORMATION: NORMAL
AML FISH DISCLAIMER: NORMAL
AML FISH REASON FOR REFERRAL (BLD): NORMAL
AML FISH RELEASED BY: NORMAL
AML FISH RESULT (BLOOD): NORMAL
AML FISH RESULT SUMMARY: NORMAL
AML FISH RESULT TABLE: NORMAL
ANION GAP SERPL CALC-SCNC: 12 MMOL/L (ref 8–16)
ANISOCYTOSIS BLD QL SMEAR: SLIGHT
APTT BLDCRRT: 29.6 SEC (ref 21–32)
APTT BLDCRRT: 30.4 SEC (ref 21–32)
AST SERPL-CCNC: 53 U/L (ref 10–40)
BASOPHILS # BLD AUTO: 0 K/UL (ref 0–0.2)
BASOPHILS NFR BLD: 0 % (ref 0–1.9)
BILIRUB SERPL-MCNC: 0.4 MG/DL (ref 0.1–1)
BUN SERPL-MCNC: 5 MG/DL (ref 6–20)
CALCIUM SERPL-MCNC: 8 MG/DL (ref 8.7–10.5)
CHLORIDE SERPL-SCNC: 110 MMOL/L (ref 95–110)
CLINICAL CYTOGENETICIST REVIEW: NORMAL
CO2 SERPL-SCNC: 18 MMOL/L (ref 23–29)
CREAT SERPL-MCNC: 0.6 MG/DL (ref 0.5–1.4)
DIFFERENTIAL METHOD: ABNORMAL
EOSINOPHIL # BLD AUTO: 0 K/UL (ref 0–0.5)
EOSINOPHIL NFR BLD: 0 % (ref 0–8)
ERYTHROCYTE [DISTWIDTH] IN BLOOD BY AUTOMATED COUNT: 16.1 % (ref 11.5–14.5)
EST. GFR  (AFRICAN AMERICAN): >60 ML/MIN/1.73 M^2
EST. GFR  (NON AFRICAN AMERICAN): >60 ML/MIN/1.73 M^2
FAML SPECIMEN: NORMAL
FIBRINOGEN PPP-MCNC: 377 MG/DL (ref 182–366)
FIBRINOGEN PPP-MCNC: 423 MG/DL (ref 182–366)
GLUCOSE SERPL-MCNC: 112 MG/DL (ref 70–110)
HCT VFR BLD AUTO: 24.4 % (ref 37–48.5)
HGB BLD-MCNC: 8.3 G/DL (ref 12–16)
HYPOCHROMIA BLD QL SMEAR: ABNORMAL
IMM GRANULOCYTES # BLD AUTO: 0 K/UL (ref 0–0.04)
IMM GRANULOCYTES NFR BLD AUTO: 0 % (ref 0–0.5)
INR PPP: 1 (ref 0.8–1.2)
INR PPP: 1.1 (ref 0.8–1.2)
LDH SERPL L TO P-CCNC: 410 U/L (ref 110–260)
LYMPHOCYTES # BLD AUTO: 0.7 K/UL (ref 1–4.8)
LYMPHOCYTES NFR BLD: 61.9 % (ref 18–48)
MAGNESIUM SERPL-MCNC: 1.9 MG/DL (ref 1.6–2.6)
MCH RBC QN AUTO: 29.3 PG (ref 27–31)
MCHC RBC AUTO-ENTMCNC: 34 G/DL (ref 32–36)
MCV RBC AUTO: 86 FL (ref 82–98)
MONOCYTES # BLD AUTO: 0 K/UL (ref 0.3–1)
MONOCYTES NFR BLD: 3.4 % (ref 4–15)
NEUTROPHILS # BLD AUTO: 0.4 K/UL (ref 1.8–7.7)
NEUTROPHILS NFR BLD: 34.7 % (ref 38–73)
NRBC BLD-RTO: 0 /100 WBC
PHOSPHATE SERPL-MCNC: 3.4 MG/DL (ref 2.7–4.5)
PLATELET # BLD AUTO: 45 K/UL (ref 150–350)
PLATELET BLD QL SMEAR: ABNORMAL
PMV BLD AUTO: 11.2 FL (ref 9.2–12.9)
POLYCHROMASIA BLD QL SMEAR: ABNORMAL
POTASSIUM SERPL-SCNC: 3.7 MMOL/L (ref 3.5–5.1)
PROT SERPL-MCNC: 6.4 G/DL (ref 6–8.4)
PROTHROMBIN TIME: 11.3 SEC (ref 9–12.5)
PROTHROMBIN TIME: 11.9 SEC (ref 9–12.5)
RBC # BLD AUTO: 2.83 M/UL (ref 4–5.4)
REF LAB TEST METHOD: NORMAL
SODIUM SERPL-SCNC: 140 MMOL/L (ref 136–145)
SPECIMEN SOURCE: NORMAL
URATE SERPL-MCNC: 3.1 MG/DL (ref 2.4–5.7)
WBC # BLD AUTO: 1.18 K/UL (ref 3.9–12.7)

## 2020-09-21 PROCEDURE — 76937 US GUIDE VASCULAR ACCESS: CPT

## 2020-09-21 PROCEDURE — 99233 PR SUBSEQUENT HOSPITAL CARE,LEVL III: ICD-10-PCS | Mod: ,,, | Performed by: INTERNAL MEDICINE

## 2020-09-21 PROCEDURE — 25000003 PHARM REV CODE 250: Performed by: STUDENT IN AN ORGANIZED HEALTH CARE EDUCATION/TRAINING PROGRAM

## 2020-09-21 PROCEDURE — 85730 THROMBOPLASTIN TIME PARTIAL: CPT

## 2020-09-21 PROCEDURE — 36573 INSJ PICC RS&I 5 YR+: CPT

## 2020-09-21 PROCEDURE — 84550 ASSAY OF BLOOD/URIC ACID: CPT

## 2020-09-21 PROCEDURE — 63600175 PHARM REV CODE 636 W HCPCS: Performed by: STUDENT IN AN ORGANIZED HEALTH CARE EDUCATION/TRAINING PROGRAM

## 2020-09-21 PROCEDURE — 25000003 PHARM REV CODE 250: Performed by: NURSE PRACTITIONER

## 2020-09-21 PROCEDURE — 36415 COLL VENOUS BLD VENIPUNCTURE: CPT

## 2020-09-21 PROCEDURE — 85025 COMPLETE CBC W/AUTO DIFF WBC: CPT

## 2020-09-21 PROCEDURE — 85610 PROTHROMBIN TIME: CPT | Mod: 91

## 2020-09-21 PROCEDURE — 25000003 PHARM REV CODE 250: Performed by: INTERNAL MEDICINE

## 2020-09-21 PROCEDURE — 20600001 HC STEP DOWN PRIVATE ROOM

## 2020-09-21 PROCEDURE — 99233 SBSQ HOSP IP/OBS HIGH 50: CPT | Mod: ,,, | Performed by: INTERNAL MEDICINE

## 2020-09-21 PROCEDURE — 83615 LACTATE (LD) (LDH) ENZYME: CPT

## 2020-09-21 PROCEDURE — 80053 COMPREHEN METABOLIC PANEL: CPT

## 2020-09-21 PROCEDURE — 63600175 PHARM REV CODE 636 W HCPCS: Mod: JG | Performed by: INTERNAL MEDICINE

## 2020-09-21 PROCEDURE — 84100 ASSAY OF PHOSPHORUS: CPT

## 2020-09-21 PROCEDURE — 83735 ASSAY OF MAGNESIUM: CPT

## 2020-09-21 PROCEDURE — C1751 CATH, INF, PER/CENT/MIDLINE: HCPCS

## 2020-09-21 PROCEDURE — 85384 FIBRINOGEN ACTIVITY: CPT

## 2020-09-21 RX ORDER — SODIUM CHLORIDE 0.9 % (FLUSH) 0.9 %
10 SYRINGE (ML) INJECTION
Status: CANCELLED | OUTPATIENT
Start: 2020-09-21

## 2020-09-21 RX ORDER — VALACYCLOVIR HYDROCHLORIDE 500 MG/1
1000 TABLET, FILM COATED ORAL 2 TIMES DAILY
Status: DISCONTINUED | OUTPATIENT
Start: 2020-09-21 | End: 2020-09-28

## 2020-09-21 RX ORDER — HEPARIN 100 UNIT/ML
300 SYRINGE INTRAVENOUS
Status: DISCONTINUED | OUTPATIENT
Start: 2020-09-21 | End: 2020-10-18 | Stop reason: HOSPADM

## 2020-09-21 RX ORDER — SODIUM CHLORIDE 0.9 % (FLUSH) 0.9 %
10 SYRINGE (ML) INJECTION
Status: DISCONTINUED | OUTPATIENT
Start: 2020-09-21 | End: 2020-09-22

## 2020-09-21 RX ORDER — SODIUM CHLORIDE 0.9 % (FLUSH) 0.9 %
10 SYRINGE (ML) INJECTION
Status: DISCONTINUED | OUTPATIENT
Start: 2020-09-21 | End: 2020-10-18 | Stop reason: HOSPADM

## 2020-09-21 RX ORDER — SODIUM CHLORIDE 0.9 % (FLUSH) 0.9 %
10 SYRINGE (ML) INJECTION EVERY 6 HOURS
Status: DISCONTINUED | OUTPATIENT
Start: 2020-09-21 | End: 2020-09-22

## 2020-09-21 RX ORDER — GUAIFENESIN 100 MG/5ML
100 SOLUTION ORAL EVERY 8 HOURS PRN
Status: DISCONTINUED | OUTPATIENT
Start: 2020-09-21 | End: 2020-10-18 | Stop reason: HOSPADM

## 2020-09-21 RX ORDER — PROCHLORPERAZINE EDISYLATE 5 MG/ML
10 INJECTION INTRAMUSCULAR; INTRAVENOUS
Status: DISCONTINUED | OUTPATIENT
Start: 2020-09-21 | End: 2020-10-01

## 2020-09-21 RX ORDER — HEPARIN 100 UNIT/ML
300 SYRINGE INTRAVENOUS
Status: CANCELLED | OUTPATIENT
Start: 2020-09-21

## 2020-09-21 RX ORDER — PROCHLORPERAZINE EDISYLATE 5 MG/ML
10 INJECTION INTRAMUSCULAR; INTRAVENOUS
Status: CANCELLED
Start: 2020-09-21

## 2020-09-21 RX ADMIN — CEFEPIME 2 G: 2 INJECTION, POWDER, FOR SOLUTION INTRAVENOUS at 09:09

## 2020-09-21 RX ADMIN — TRETINOIN 50 MG: 10 CAPSULE ORAL at 08:09

## 2020-09-21 RX ADMIN — ALLOPURINOL 300 MG: 300 TABLET ORAL at 08:09

## 2020-09-21 RX ADMIN — CEFEPIME 2 G: 2 INJECTION, POWDER, FOR SOLUTION INTRAVENOUS at 04:09

## 2020-09-21 RX ADMIN — VALACYCLOVIR HYDROCHLORIDE 1000 MG: 500 TABLET, FILM COATED ORAL at 08:09

## 2020-09-21 RX ADMIN — CEFEPIME 2 G: 2 INJECTION, POWDER, FOR SOLUTION INTRAVENOUS at 01:09

## 2020-09-21 RX ADMIN — SODIUM CHLORIDE: 0.9 INJECTION, SOLUTION INTRAVENOUS at 01:09

## 2020-09-21 RX ADMIN — GUAIFENESIN 100 MG: 200 SOLUTION ORAL at 11:09

## 2020-09-21 RX ADMIN — ONDANSETRON 4 MG: 2 INJECTION INTRAMUSCULAR; INTRAVENOUS at 05:09

## 2020-09-21 RX ADMIN — ARSENIC TRIOXIDE 20 MG: 1 INJECTION, SOLUTION INTRAVENOUS at 05:09

## 2020-09-21 RX ADMIN — TRETINOIN 50 MG: 10 CAPSULE ORAL at 04:09

## 2020-09-21 RX ADMIN — PROCHLORPERAZINE EDISYLATE 10 MG: 5 INJECTION INTRAMUSCULAR; INTRAVENOUS at 04:09

## 2020-09-21 RX ADMIN — ACETAMINOPHEN 650 MG: 325 TABLET ORAL at 12:09

## 2020-09-21 RX ADMIN — VALACYCLOVIR HYDROCHLORIDE 1000 MG: 500 TABLET, FILM COATED ORAL at 09:09

## 2020-09-21 RX ADMIN — ONDANSETRON 4 MG: 2 INJECTION INTRAMUSCULAR; INTRAVENOUS at 12:09

## 2020-09-21 NOTE — PSYCH
Brief visit with patient. Patient not interested in therapy session. Will attempt to see patient with her mother tomorrow.  KARLI Munoz, PhD

## 2020-09-21 NOTE — PROGRESS NOTES
[facility]  Hematology  Bone Marrow Transplant  Progress Note    Patient Name: Erkia Saini  Admission Date: 9/18/2020  Hospital Length of Stay: 3 days  Code Status: Full Code    Subjective:     Interval History: NAEON. Weight has increased 10lbs since admission, no shortness of breath. D4 ATRA. APL testing pending, reaching out to pathology dept at [facility]. Blood cultures have NGTD. Plan for PICC today.     Objective:     Vital Signs (Most Recent):  Temp: 98.2 °F (36.8 °C) (09/21/20 0424)  Pulse: 101 (09/21/20 0424)  Resp: 19 (09/21/20 0424)  BP: 124/72 (09/21/20 0424)  SpO2: 96 % (09/21/20 0424) Vital Signs (24h Range):  Temp:  [98 °F (36.7 °C)-99 °F (37.2 °C)] 98.2 °F (36.8 °C)  Pulse:  [] 101  Resp:  [16-19] 19  SpO2:  [93 %-100 %] 96 %  BP: ()/(54-72) 124/72     Weight: (!) 139 kg (306 lb 5.3 oz)  Body mass index is 59.83 kg/m².  Body surface area is 2.43 meters squared.    ECOG SCORE         [unfilled]    Intake/Output - Last 3 Shifts       09/19 0700 - 09/20 0659 09/20 0700 - 09/21 0659 09/21 0700 - 09/22 0659    P.O. 450 567     I.V. (mL/kg) 3700 (27) 2813 (20.3)     Blood  320     IV Piggyback       Total Intake(mL/kg) 4150 (30.2) 3700 (26.6)     Urine (mL/kg/hr)  2300 (0.7)     Total Output  2300     Net +4150 +1400            Urine Occurrence 5 x 1 x     Stool Occurrence 1 x      Emesis Occurrence 2 x            Physical Exam  Vitals signs reviewed.   Constitutional:       Appearance: Normal appearance. She is obese.   HENT:      Head: Normocephalic and atraumatic.      Nose: Nose normal.      Mouth/Throat:      Comments: Fever blister noted.  Eyes:      Extraocular Movements: Extraocular movements intact.   Neck:      Musculoskeletal: Normal range of motion and neck supple.   Cardiovascular:      Rate and Rhythm: Normal rate and regular rhythm.      Heart sounds: No murmur.   Pulmonary:      Effort: Pulmonary effort is normal. No respiratory distress.   Abdominal:       General: There is no distension.      Palpations: Abdomen is soft.      Tenderness: There is no abdominal tenderness.   Musculoskeletal: Normal range of motion.         General: No swelling.   Skin:     General: Skin is warm and dry.   Neurological:      General: No focal deficit present.      Mental Status: She is alert and oriented to person, place, and time.   Psychiatric:         Mood and Affect: Mood normal.         Behavior: Behavior normal.         Significant Labs:   All pertinent labs from the last 24 hours have been reviewed.    Diagnostic Results:  I have reviewed and interpreted all pertinent imaging results/findings within the past 24 hours.    Assessment/Plan:     * APL (acute promyelocytic leukemia)  Patient is a 25 year old woman presenting with concern for APL.  Day 4 ATRA    Plan:  - Monitor TLS labs daily and DIC labs BID   - Follow up PML KAVITHA PCR/FISH, AML FISH  - 2D ECHO - normal heart function.    - Continue tretinoin (start 9/18, today is Day 4) BID w/ meals   - Continue allopurinol 300mg daily and IVF 100ml hr  - Transfuse cyroglobulin if fibrinogen <150    - Most likely will receive APL induction with ATRA and BEN pending studies next week  - PICC line   - Monitor I/O and daily weights twice daily for differentiation syndrome     Hypokalemia  - Monitor and replace PRN      Pancytopenia  Secondary to AML   -- Transfuse if platelets <10, Hgb<7       Adjustment reaction with anxiety  - seen by oncology psychology     Neutropenic fever  Patient presenting with possible APL and neutropenic fever. Likely due to positive Group B strep blood cultures (+ at OSH). She was started on vancomycin and cefepime at the OSH.     - Continue cefepime  - Follow up repeat cultures  - obtain records from OSH to tailor antibiotics     Morbid obesity with BMI of 50.0-59.9, adult  Patient with BMI 57.91        VTE Risk Mitigation (From admission, onward)         Ordered     IP VTE HIGH RISK PATIENT  Once       09/18/20 0148     Place sequential compression device  Until discontinued      09/18/20 0148                Disposition: BMT unit    Ethel Uribe MD  Bone Marrow Transplant  [facility]

## 2020-09-21 NOTE — PLAN OF CARE
Problem: Adult Inpatient Plan of Care  Goal: Plan of Care Review  Outcome: Ongoing, Progressing  Patient remains free from falls and injury this shift. Bed in low, locked position with call light in reach. Plan of care reviewed with pt and mother. Mother at bedside. VSS. Patient affect irritable and abrasive, uncooperative. PICC placement today. No pain reported. Nausea present. Diet tolerated. Gash sites to lower lip monitored with partially scabbed appearance, patient re-opens gashes due to repeated biting of her lip. Open to air. Consults to psych. Patient encouraged to call for assistance when getting out of bed. Arsenic chemo infusing at 135ml/hr for 2 hours. Vital signs [id number] x4, [id number] until complete. Patient tolerating infusion. Patient verbalized understanding, but needs reinforcement with teaching. All belongings within reach. Will continue to monitor.

## 2020-09-21 NOTE — ASSESSMENT & PLAN NOTE
Patient presenting with possible APL and neutropenic fever. Likely due to positive Group B strep blood cultures (+ at OSH). She was started on vancomycin and cefepime at the OSH.     - Continue cefepime  - Follow up repeat cultures  - obtain records from OSH to tailor antibiotics

## 2020-09-21 NOTE — PT/OT/SLP PROGRESS
Physical Therapy      Patient Name:  Erika Saini   MRN:  [id number]    Patient not seen today secondary to (pt. with MD in room and then c/o N/V). Will follow-up tomorrow.    Alan Carias, PT   9/21/2020

## 2020-09-21 NOTE — PROCEDURES
Erika Saini is a 25 y.o. female patient.    Temp: 98.7 °F (37.1 °C) (09/21/20 0743)  Pulse: 97 (09/21/20 0743)  Resp: 18 (09/21/20 0743)  BP: 132/76 (09/21/20 0743)  SpO2: (!) 93 % (09/21/20 0743)  Weight: (!) 139 kg (306 lb 5.3 oz) (09/21/20 0400)  Height: 5' (152.4 cm) (09/18/20 1049)    PICC  Date/Time: 9/21/2020 12:18 PM  Performed by: Wale Killian RN  Consent Done: Yes  Time out: Immediately prior to procedure a time out was called to verify the correct patient, procedure, equipment, support staff and site/side marked as required  Indications: med administration and vascular access  Local anesthetic: lidocaine 1% without epinephrine  Anesthetic Total (mL): 4  Preparation: skin prepped with ChloraPrep  Skin prep agent dried: skin prep agent completely dried prior to procedure  Sterile barriers: all five maximum sterile barriers used - cap, mask, sterile gown, sterile gloves, and large sterile sheet  Hand hygiene: hand hygiene performed prior to central venous catheter insertion  Location details: right basilic  Catheter type: double lumen  Catheter size: 5 Fr  Catheter Length: 35cm    Ultrasound guidance: yes  Vessel Caliber: medium and patent, compressibility normal  Vascular Doppler: not done  Needle advanced into vessel with real time Ultrasound guidance.  Guidewire confirmed in vessel.  Image recorded and saved.  Sterile sheath used.  Number of attempts: 1  Post-procedure: blood return through all ports, chlorhexidine patch and sterile dressing applied    Assessment: placement verified by x-ray          Naman Greene  9/21/2020

## 2020-09-21 NOTE — CONSULTS
Double lumen PICC placed to rt basilic- vein. 35 cm in length, 0 cm exposed, 55cm circumference.  Lot # [id number]

## 2020-09-21 NOTE — ASSESSMENT & PLAN NOTE
Patient is a 25 year old woman presenting with concern for APL.  Day 4 ATRA    Plan:  - Monitor TLS labs daily and DIC labs BID   - Follow up PML KAVITHA PCR/FISH, AML FISH  - 2D ECHO - normal heart function.    - Continue tretinoin (start 9/18, today is Day 4) BID w/ meals   - Continue allopurinol 300mg daily and IVF 100ml hr  - Transfuse cyroglobulin if fibrinogen <150    - Most likely will receive APL induction with ATRA and BEN pending studies next week  - PICC line   - Monitor I/O and daily weights twice daily for differentiation syndrome

## 2020-09-21 NOTE — PLAN OF CARE
Pt with no acute events overnight. Given prn zofran x1 for c/o nausea, no emesis this shift. IVF infusing @ 100. Pt up to toilet and voiding without difficulty. Strict I/Os and BID standing weights. Remains afebrile and VSS. Will continue to monitor.

## 2020-09-21 NOTE — SUBJECTIVE & OBJECTIVE
Subjective:     Interval History: NAEON. Weight has increased 10lbs since admission, no shortness of breath. D4 ATRA. APL testing pending, reaching out to pathology dept at [facility]. Blood cultures have NGTD. Plan for PICC today.     Objective:     Vital Signs (Most Recent):  Temp: 98.2 °F (36.8 °C) (09/21/20 0424)  Pulse: 101 (09/21/20 0424)  Resp: 19 (09/21/20 0424)  BP: 124/72 (09/21/20 0424)  SpO2: 96 % (09/21/20 0424) Vital Signs (24h Range):  Temp:  [98 °F (36.7 °C)-99 °F (37.2 °C)] 98.2 °F (36.8 °C)  Pulse:  [] 101  Resp:  [16-19] 19  SpO2:  [93 %-100 %] 96 %  BP: ()/(54-72) 124/72     Weight: (!) 139 kg (306 lb 5.3 oz)  Body mass index is 59.83 kg/m².  Body surface area is 2.43 meters squared.    ECOG SCORE         [unfilled]    Intake/Output - Last 3 Shifts       09/19 0700 - 09/20 0659 09/20 0700 - 09/21 0659 09/21 0700 - 09/22 0659    P.O. 450 567     I.V. (mL/kg) 3700 (27) 2813 (20.3)     Blood  320     IV Piggyback       Total Intake(mL/kg) 4150 (30.2) 3700 (26.6)     Urine (mL/kg/hr)  2300 (0.7)     Total Output  2300     Net +4150 +1400            Urine Occurrence 5 x 1 x     Stool Occurrence 1 x      Emesis Occurrence 2 x            Physical Exam  Vitals signs reviewed.   Constitutional:       Appearance: Normal appearance. She is obese.   HENT:      Head: Normocephalic and atraumatic.      Nose: Nose normal.      Mouth/Throat:      Comments: Fever blister noted.  Eyes:      Extraocular Movements: Extraocular movements intact.   Neck:      Musculoskeletal: Normal range of motion and neck supple.   Cardiovascular:      Rate and Rhythm: Normal rate and regular rhythm.      Heart sounds: No murmur.   Pulmonary:      Effort: Pulmonary effort is normal. No respiratory distress.   Abdominal:      General: There is no distension.      Palpations: Abdomen is soft.      Tenderness: There is no abdominal tenderness.   Musculoskeletal: Normal range of motion.         General: No swelling.   Skin:      General: Skin is warm and dry.   Neurological:      General: No focal deficit present.      Mental Status: She is alert and oriented to person, place, and time.   Psychiatric:         Mood and Affect: Mood normal.         Behavior: Behavior normal.         Significant Labs:   All pertinent labs from the last 24 hours have been reviewed.    Diagnostic Results:  I have reviewed and interpreted all pertinent imaging results/findings within the past 24 hours.

## 2020-09-22 PROBLEM — R06.02 SHORTNESS OF BREATH: Status: ACTIVE | Noted: 2020-09-22

## 2020-09-22 LAB
ABO + RH BLD: NORMAL
ALBUMIN SERPL BCP-MCNC: 2.3 G/DL (ref 3.5–5.2)
ALP SERPL-CCNC: 51 U/L (ref 55–135)
ALT SERPL W/O P-5'-P-CCNC: 66 U/L (ref 10–44)
ANION GAP SERPL CALC-SCNC: 10 MMOL/L (ref 8–16)
APTT BLDCRRT: 31.3 SEC (ref 21–32)
APTT BLDCRRT: 31.6 SEC (ref 21–32)
AST SERPL-CCNC: 41 U/L (ref 10–40)
BASOPHILS # BLD AUTO: 0 K/UL (ref 0–0.2)
BASOPHILS NFR BLD: 0 % (ref 0–1.9)
BILIRUB SERPL-MCNC: 0.5 MG/DL (ref 0.1–1)
BLD GP AB SCN CELLS X3 SERPL QL: NORMAL
BUN SERPL-MCNC: 6 MG/DL (ref 6–20)
CALCIUM SERPL-MCNC: 8 MG/DL (ref 8.7–10.5)
CHLORIDE SERPL-SCNC: 109 MMOL/L (ref 95–110)
CO2 SERPL-SCNC: 23 MMOL/L (ref 23–29)
CREAT SERPL-MCNC: 0.6 MG/DL (ref 0.5–1.4)
DACRYOCYTES BLD QL SMEAR: ABNORMAL
DIFFERENTIAL METHOD: ABNORMAL
DNA/RNA EXTRACT AND HOLD RESULT: NORMAL
DNA/RNA EXTRACTION: NORMAL
EOSINOPHIL # BLD AUTO: 0 K/UL (ref 0–0.5)
EOSINOPHIL NFR BLD: 0 % (ref 0–8)
ERYTHROCYTE [DISTWIDTH] IN BLOOD BY AUTOMATED COUNT: 15.7 % (ref 11.5–14.5)
EST. GFR  (AFRICAN AMERICAN): >60 ML/MIN/1.73 M^2
EST. GFR  (NON AFRICAN AMERICAN): >60 ML/MIN/1.73 M^2
EXHR SPECIMEN TYPE: NORMAL
FIBRINOGEN PPP-MCNC: 386 MG/DL (ref 182–366)
FIBRINOGEN PPP-MCNC: 423 MG/DL (ref 182–366)
GLUCOSE SERPL-MCNC: 118 MG/DL (ref 70–110)
HCT VFR BLD AUTO: 23.3 % (ref 37–48.5)
HGB BLD-MCNC: 7.7 G/DL (ref 12–16)
HYPOCHROMIA BLD QL SMEAR: ABNORMAL
IMM GRANULOCYTES # BLD AUTO: 0 K/UL (ref 0–0.04)
IMM GRANULOCYTES NFR BLD AUTO: 0 % (ref 0–0.5)
INR PPP: 1.1 (ref 0.8–1.2)
INR PPP: 1.1 (ref 0.8–1.2)
LDH SERPL L TO P-CCNC: 387 U/L (ref 110–260)
LYMPHOCYTES # BLD AUTO: 0.6 K/UL (ref 1–4.8)
LYMPHOCYTES NFR BLD: 61.9 % (ref 18–48)
MAGNESIUM SERPL-MCNC: 1.7 MG/DL (ref 1.6–2.6)
MCH RBC QN AUTO: 28.7 PG (ref 27–31)
MCHC RBC AUTO-ENTMCNC: 33 G/DL (ref 32–36)
MCV RBC AUTO: 87 FL (ref 82–98)
MONOCYTES # BLD AUTO: 0 K/UL (ref 0.3–1)
MONOCYTES NFR BLD: 3.1 % (ref 4–15)
NEUTROPHILS # BLD AUTO: 0.3 K/UL (ref 1.8–7.7)
NEUTROPHILS NFR BLD: 35 % (ref 38–73)
NRBC BLD-RTO: 0 /100 WBC
OVALOCYTES BLD QL SMEAR: ABNORMAL
PHOSPHATE SERPL-MCNC: 3.4 MG/DL (ref 2.7–4.5)
PLATELET # BLD AUTO: 33 K/UL (ref 150–350)
PLATELET BLD QL SMEAR: ABNORMAL
PMV BLD AUTO: 10.8 FL (ref 9.2–12.9)
POLYCHROMASIA BLD QL SMEAR: ABNORMAL
POTASSIUM SERPL-SCNC: 3.4 MMOL/L (ref 3.5–5.1)
PROT SERPL-MCNC: 6.1 G/DL (ref 6–8.4)
PROTHROMBIN TIME: 11.9 SEC (ref 9–12.5)
PROTHROMBIN TIME: 12.2 SEC (ref 9–12.5)
RBC # BLD AUTO: 2.68 M/UL (ref 4–5.4)
SCHISTOCYTES BLD QL SMEAR: PRESENT
SODIUM SERPL-SCNC: 142 MMOL/L (ref 136–145)
URATE SERPL-MCNC: 3.1 MG/DL (ref 2.4–5.7)
WBC # BLD AUTO: 0.97 K/UL (ref 3.9–12.7)

## 2020-09-22 PROCEDURE — 99900035 HC TECH TIME PER 15 MIN (STAT)

## 2020-09-22 PROCEDURE — 25000003 PHARM REV CODE 250: Performed by: STUDENT IN AN ORGANIZED HEALTH CARE EDUCATION/TRAINING PROGRAM

## 2020-09-22 PROCEDURE — 80053 COMPREHEN METABOLIC PANEL: CPT

## 2020-09-22 PROCEDURE — 84100 ASSAY OF PHOSPHORUS: CPT

## 2020-09-22 PROCEDURE — 90834 PSYTX W PT 45 MINUTES: CPT | Mod: ,,, | Performed by: PSYCHOLOGIST

## 2020-09-22 PROCEDURE — 25000003 PHARM REV CODE 250: Performed by: NURSE PRACTITIONER

## 2020-09-22 PROCEDURE — 94761 N-INVAS EAR/PLS OXIMETRY MLT: CPT

## 2020-09-22 PROCEDURE — 85730 THROMBOPLASTIN TIME PARTIAL: CPT

## 2020-09-22 PROCEDURE — 83615 LACTATE (LD) (LDH) ENZYME: CPT

## 2020-09-22 PROCEDURE — 90834 PR PSYCHOTHERAPY W/PATIENT, 45 MIN: ICD-10-PCS | Mod: ,,, | Performed by: PSYCHOLOGIST

## 2020-09-22 PROCEDURE — 85025 COMPLETE CBC W/AUTO DIFF WBC: CPT

## 2020-09-22 PROCEDURE — 27000221 HC OXYGEN, UP TO 24 HOURS

## 2020-09-22 PROCEDURE — 84550 ASSAY OF BLOOD/URIC ACID: CPT

## 2020-09-22 PROCEDURE — 63600175 PHARM REV CODE 636 W HCPCS: Performed by: STUDENT IN AN ORGANIZED HEALTH CARE EDUCATION/TRAINING PROGRAM

## 2020-09-22 PROCEDURE — 83735 ASSAY OF MAGNESIUM: CPT

## 2020-09-22 PROCEDURE — 94799 UNLISTED PULMONARY SVC/PX: CPT

## 2020-09-22 PROCEDURE — 20600001 HC STEP DOWN PRIVATE ROOM

## 2020-09-22 PROCEDURE — 97161 PT EVAL LOW COMPLEX 20 MIN: CPT

## 2020-09-22 PROCEDURE — 25000242 PHARM REV CODE 250 ALT 637 W/ HCPCS: Performed by: STUDENT IN AN ORGANIZED HEALTH CARE EDUCATION/TRAINING PROGRAM

## 2020-09-22 PROCEDURE — 85384 FIBRINOGEN ACTIVITY: CPT

## 2020-09-22 PROCEDURE — A4216 STERILE WATER/SALINE, 10 ML: HCPCS | Performed by: INTERNAL MEDICINE

## 2020-09-22 PROCEDURE — 25000003 PHARM REV CODE 250: Performed by: INTERNAL MEDICINE

## 2020-09-22 PROCEDURE — 99233 SBSQ HOSP IP/OBS HIGH 50: CPT | Mod: ,,, | Performed by: INTERNAL MEDICINE

## 2020-09-22 PROCEDURE — 99233 PR SUBSEQUENT HOSPITAL CARE,LEVL III: ICD-10-PCS | Mod: ,,, | Performed by: INTERNAL MEDICINE

## 2020-09-22 PROCEDURE — 86850 RBC ANTIBODY SCREEN: CPT

## 2020-09-22 PROCEDURE — 94640 AIRWAY INHALATION TREATMENT: CPT

## 2020-09-22 PROCEDURE — 85610 PROTHROMBIN TIME: CPT

## 2020-09-22 PROCEDURE — 63600175 PHARM REV CODE 636 W HCPCS: Performed by: INTERNAL MEDICINE

## 2020-09-22 RX ORDER — POTASSIUM CHLORIDE 750 MG/1
60 CAPSULE, EXTENDED RELEASE ORAL ONCE
Status: COMPLETED | OUTPATIENT
Start: 2020-09-22 | End: 2020-09-22

## 2020-09-22 RX ORDER — LANOLIN ALCOHOL/MO/W.PET/CERES
800 CREAM (GRAM) TOPICAL EVERY 4 HOURS PRN
Status: DISCONTINUED | OUTPATIENT
Start: 2020-09-22 | End: 2020-10-18 | Stop reason: HOSPADM

## 2020-09-22 RX ORDER — ALBUTEROL SULFATE 2.5 MG/.5ML
2.5 SOLUTION RESPIRATORY (INHALATION) EVERY 4 HOURS PRN
Status: DISCONTINUED | OUTPATIENT
Start: 2020-09-22 | End: 2020-10-18 | Stop reason: HOSPADM

## 2020-09-22 RX ORDER — LANOLIN ALCOHOL/MO/W.PET/CERES
400 CREAM (GRAM) TOPICAL EVERY 4 HOURS PRN
Status: DISCONTINUED | OUTPATIENT
Start: 2020-09-22 | End: 2020-10-18 | Stop reason: HOSPADM

## 2020-09-22 RX ORDER — SODIUM,POTASSIUM PHOSPHATES 280-250MG
2 POWDER IN PACKET (EA) ORAL EVERY 4 HOURS PRN
Status: DISCONTINUED | OUTPATIENT
Start: 2020-09-22 | End: 2020-10-18 | Stop reason: HOSPADM

## 2020-09-22 RX ORDER — SODIUM,POTASSIUM PHOSPHATES 280-250MG
1 POWDER IN PACKET (EA) ORAL EVERY 4 HOURS PRN
Status: DISCONTINUED | OUTPATIENT
Start: 2020-09-22 | End: 2020-10-18 | Stop reason: HOSPADM

## 2020-09-22 RX ORDER — DEXAMETHASONE SODIUM PHOSPHATE 4 MG/ML
10 INJECTION, SOLUTION INTRA-ARTICULAR; INTRALESIONAL; INTRAMUSCULAR; INTRAVENOUS; SOFT TISSUE EVERY 12 HOURS
Status: COMPLETED | OUTPATIENT
Start: 2020-09-22 | End: 2020-09-24

## 2020-09-22 RX ORDER — POTASSIUM CHLORIDE 750 MG/1
20 CAPSULE, EXTENDED RELEASE ORAL
Status: DISCONTINUED | OUTPATIENT
Start: 2020-09-22 | End: 2020-10-18 | Stop reason: HOSPADM

## 2020-09-22 RX ADMIN — Medication 10 ML: at 12:09

## 2020-09-22 RX ADMIN — POTASSIUM CHLORIDE 60 MEQ: 750 CAPSULE, EXTENDED RELEASE ORAL at 10:09

## 2020-09-22 RX ADMIN — VALACYCLOVIR HYDROCHLORIDE 1000 MG: 500 TABLET, FILM COATED ORAL at 09:09

## 2020-09-22 RX ADMIN — PROCHLORPERAZINE EDISYLATE 10 MG: 5 INJECTION INTRAMUSCULAR; INTRAVENOUS at 04:09

## 2020-09-22 RX ADMIN — PROMETHAZINE HYDROCHLORIDE 6.25 MG: 25 INJECTION INTRAMUSCULAR; INTRAVENOUS at 12:09

## 2020-09-22 RX ADMIN — CEFEPIME 2 G: 2 INJECTION, POWDER, FOR SOLUTION INTRAVENOUS at 04:09

## 2020-09-22 RX ADMIN — CEFEPIME 2 G: 2 INJECTION, POWDER, FOR SOLUTION INTRAVENOUS at 01:09

## 2020-09-22 RX ADMIN — Medication 10 ML: at 06:09

## 2020-09-22 RX ADMIN — ALLOPURINOL 300 MG: 300 TABLET ORAL at 09:09

## 2020-09-22 RX ADMIN — ALBUTEROL SULFATE 2.5 MG: 2.5 SOLUTION RESPIRATORY (INHALATION) at 01:09

## 2020-09-22 RX ADMIN — ARSENIC TRIOXIDE 20 MG: 1 INJECTION, SOLUTION INTRAVENOUS at 05:09

## 2020-09-22 RX ADMIN — TRETINOIN 50 MG: 10 CAPSULE ORAL at 08:09

## 2020-09-22 RX ADMIN — DEXAMETHASONE SODIUM PHOSPHATE 10 MG: 4 INJECTION, SOLUTION INTRA-ARTICULAR; INTRALESIONAL; INTRAMUSCULAR; INTRAVENOUS; SOFT TISSUE at 10:09

## 2020-09-22 RX ADMIN — DEXAMETHASONE SODIUM PHOSPHATE 10 MG: 4 INJECTION, SOLUTION INTRA-ARTICULAR; INTRALESIONAL; INTRAMUSCULAR; INTRAVENOUS; SOFT TISSUE at 09:09

## 2020-09-22 RX ADMIN — TRETINOIN 50 MG: 10 CAPSULE ORAL at 05:09

## 2020-09-22 RX ADMIN — CEFEPIME 2 G: 2 INJECTION, POWDER, FOR SOLUTION INTRAVENOUS at 09:09

## 2020-09-22 NOTE — PROGRESS NOTES
[facility]  Hematology  Bone Marrow Transplant  Progress Note    Patient Name: Erika Saini  Admission Date: 9/18/2020  Hospital Length of Stay: 4 days  Code Status: Full Code    Subjective:     Interval History: PICC line placed yesterday. PML LORENA FISH confirmed low risk APL. Initiated ASO, today is day 2. ATRA day 5. Hypoxic overnight - briefly requiring 5L NC, now comfortable on 2L. CXR revealed ? RLL airspace disease.  Weight has increased 4kgs. Blood cultures have NGTD.      Objective:     Vital Signs (Most Recent):  Temp: 98.6 °F (37 °C) (09/22/20 0400)  Pulse: 105 (09/22/20 0400)  Resp: 19 (09/22/20 0400)  BP: 128/72 (09/22/20 0400)  SpO2: 95 % (09/22/20 0430) Vital Signs (24h Range):  Temp:  [98.6 °F (37 °C)-99.2 °F (37.3 °C)] 98.6 °F (37 °C)  Pulse:  [] 105  Resp:  [16-20] 19  SpO2:  [80 %-100 %] 95 %  BP: (111-151)/() 128/72     Weight: (!) 138.3 kg (305 lb 0.1 oz)  Body mass index is 59.57 kg/m².  Body surface area is 2.42 meters squared.    ECOG SCORE         [unfilled]    Intake/Output - Last 3 Shifts       09/20 0700 - 09/21 0659 09/21 0700 - 09/22 0659    P.O. 567     I.V. (mL/kg) 2813 (20.3)     Blood 320     Total Intake(mL/kg) 3700 (26.6)     Urine (mL/kg/hr) 2300 (0.7) 900 (0.3)    Stool  0    Total Output 2300 900    Net +1400 -900          Urine Occurrence 1 x 1 x    Stool Occurrence  2 x          Physical Exam  Vitals signs reviewed.   Constitutional:       Appearance: Normal appearance. She is obese.   HENT:      Head: Normocephalic and atraumatic.      Nose: Nose normal.      Mouth/Throat:      Comments: Fever blister noted.  Eyes:      Extraocular Movements: Extraocular movements intact.   Neck:      Musculoskeletal: Normal range of motion and neck supple.   Cardiovascular:      Rate and Rhythm: Normal rate and regular rhythm.      Heart sounds: No murmur.   Pulmonary:      Effort: Pulmonary effort is normal. No respiratory distress.   Abdominal:       General: There is no distension.      Palpations: Abdomen is soft.      Tenderness: There is no abdominal tenderness.   Musculoskeletal: Normal range of motion.         General: No swelling.   Skin:     General: Skin is warm and dry.   Neurological:      General: No focal deficit present.      Mental Status: She is alert and oriented to person, place, and time.   Psychiatric:         Mood and Affect: Mood normal.         Behavior: Behavior normal.         Significant Labs:   All pertinent labs from the last 24 hours have been reviewed.    Diagnostic Results:  I have reviewed and interpreted all pertinent imaging results/findings within the past 24 hours.    Assessment/Plan:     * APL (acute promyelocytic leukemia)  Patient is a 25 year old woman presenting with concern for APL.  Day 5 ATRA  Day 2 ASO    Plan:  - Monitor TLS labs daily and DIC labs BID   - Follow up PML KAVITHA PCR/FISH, AML FISH - FISH +ve for PML LORENA translocation  - 2D ECHO - normal heart function.    - Continue tretinoin (start 9/18, today is Day 5) BID w/ meals  - Continue ASO (start 09/21, today is day 2).   - Continue allopurinol 300mg daily and IVF 100ml hr  - Transfuse cyroglobulin if fibrinogen <150    - Monitor I/O and daily weights twice daily for differentiation syndrome     Shortness of breath  Stable on 2L  New RLL airspace disease.   Weight gain of 4Kg since admit.  Monitor closely for signs of differentiation syndrome and start Dexamethasone 10mg BID if condition worsens.    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 Likely due to positive strep B agalactiae blood cultures (+ at OSH). She was started on vancomycin and cefepime at the OSH.     - Continue cefepime, organism sensitive to ampicillin per out side records  but will clarify PCN allergy.   - Follow up repeat cultures       Morbid obesity with BMI of 50.0-59.9, adult  Patient with BMI 57.91        VTE Risk Mitigation (From admission, onward)         Ordered     heparin, porcine (PF) 100 unit/mL injection flush 300 Units  As needed (PRN)      09/21/20 1554     IP VTE HIGH RISK PATIENT  Once      09/18/20 0148     Place sequential compression device  Until discontinued      09/18/20 0148                Disposition: BMT unit    Ethel Uribe MD  Bone Marrow Transplant  [facility]

## 2020-09-22 NOTE — ASSESSMENT & PLAN NOTE
Stable on 2L  New RLL airspace disease.   Weight gain of 4Kg since admit.  Monitor closely for signs of differentiation syndrome and start Dexamethasone 10mg BID if condition worsens.
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
## 2020-09-22 NOTE — PLAN OF CARE
Pt completed first dose of arsenic last night, tolerated infusion. Overnight Pt was placed on 5L NC. MD notified and stat CXR and resp tx done. Pt with worsening productive cough with copious amounts of sputum. Guaifenesin syrup given x1. IV phenergan given for nausea. IVF d/c'd. At 0400 vitals Pt weaned back to room air, sats now 95%. Pt up to BSC and voiding without difficulty. Strict I/Os and BID standing weights. Remains afebrile and VSS. Will continue to monitor

## 2020-09-22 NOTE — ASSESSMENT & PLAN NOTE
Patient with current significant anxiety symptoms interfering with social, occupational and functional activities    Anxiety over being away from home is significant and will complicate her emotional reaction to illness and prolonged hospitalization.  Her mother's arrival has helped but her mother is leaving again from Sunday (9/27) to Sunday (10/4).    She was again given information about expected behavior toward staff and reminded of her responsibility to treat others with respect.   Daily relaxation training practice encouraged. I will follow up with her to assess implementation.   She is still not willing to consider medications at this time.     Importance of movement, remaining out of bed during the daytime, and daytime light reinforced for mood/sleep management.

## 2020-09-22 NOTE — PT/OT/SLP EVAL
Physical Therapy Evaluation and Discharge Note    Patient Name:  Erkia Saini   MRN:  [id number]    Recommendations:     Discharge Recommendations:  home   Discharge Equipment Recommendations: none   Barriers to discharge: None    Assessment:     Erika Saini is a 25 y.o. female admitted with a medical diagnosis of APL (acute promyelocytic leukemia). .  At this time, patient is functioning at their prior level of function and does not require further acute PT services.     Recent Surgery: * No surgery found *      Plan:     During this hospitalization, patient does not require further acute PT services.  Please re-consult if situation changes.      Subjective     Chief Complaint: none  Patient/Family Comments/goals: to go home  Pain/Comfort:  · Pain Rating 1: 0/10  · Pain Rating Post-Intervention 1: 0/10    Patients cultural, spiritual, Alevism conflicts given the current situation: no    Living Environment:  Pt. Lives with mother in [facility]  Prior to admission, patients level of function was indep.  Equipment used at home: none.  Upon discharge, patient will have assistance from mother.    Objective:     Communicated with nursing prior to session.  Patient found supine with peripheral IV upon PT entry to room.    General Precautions: Standard, fall   Orthopedic Precautions:N/A   Braces:       Exams:  · RLE ROM: WFL  · RLE Strength: WFL  · LLE ROM: WFL  · LLE Strength: WFL    Functional Mobility:  · Bed Mobility:     · Rolling Left:  modified independence  · Scooting: modified independence  · Supine to Sit: modified independence  · Sit to Supine: modified independence  · Transfers:     · Sit to Stand:  modified independence with no AD  · Gait: Pt. amb. in room without AD or LOB  · Balance: good    AM-PAC 6 CLICK MOBILITY  Total Score:24       Therapeutic Activities and Exercises:   Discussed therapy needs, goals, and POC    AM-PAC 6 CLICK MOBILITY  Total Score:24     Patient left supine with all lines intact  and call button in reach.    GOALS:   Multidisciplinary Problems     Physical Therapy Goals     Not on file          Multidisciplinary Problems (Resolved)        Problem: Physical Therapy Goal    Goal Priority Disciplines Outcome Goal Variances Interventions   Physical Therapy Goal   (Resolved)     PT, PT/OT Met                     History:     No past medical history on file.    No past surgical history on file.    Time Tracking:     PT Received On: 09/22/20  PT Start Time: 1458     PT Stop Time: 1501  PT Total Time (min): 3 min     Billable Minutes: Evaluation 3      Alan Carias, PT  09/22/2020

## 2020-09-22 NOTE — SUBJECTIVE & OBJECTIVE
Therapeutic Intervention: Met with patient and mother.  Inpatient/Partial Hospital - Behavior modifying psychotherapy 45 min - CPT code 52785    Chief Complaint/Reason for Encounter: adaptation to disease and treatment, anxiety, interpersonal skills    Interval History and Content of Current Session:  Discussed goals of therapy and ways to minimize strain during prolonged admission. Nature of relaxation training for anxiety discussed. Handout given to patient. Reinforced that this is a skill that must be learned during less stressful times for implementation during greater stress.    Patient and mother discussed chronic interpersonal difficulties and struggles to contain irritability/temper with others. This will continue to be a focus of care for this patient.       Risk Parameters:  Patient reports no suicidal ideation  Patient reports no homicidal ideation  Patient reports no self-injurious behavior  Patient reports no violent behavior    Verbal Deficits: None    Patient's response to intervention:  The patient's response to intervention is guarded.    Progress toward goals and other mental status changes:  The patient's progress toward goals is not progressing.

## 2020-09-22 NOTE — ASSESSMENT & PLAN NOTE
Patient presenting with possible APL and neutropenic fever. Likely due to positive strep B agalactiae blood cultures (+ at OSH). She was started on vancomycin and cefepime at the OSH.     - Continue cefepime, organism sensitive to ampicillin per out side records but will clarify PCN allergy.   - Follow up repeat cultures

## 2020-09-22 NOTE — PLAN OF CARE
Problem: Adult Inpatient Plan of Care  Goal: Plan of Care Review  Outcome: Ongoing, Progressing  Patient remains free from falls and injury this shift. Bed in low, locked position with call light in reach. Plan of care reviewed with pt and mother. mother at bedside. VSS with tachycardia. Patient abrasive and uncooperative. Foul language, anxiety. Reenforcement needed when explaining care. Body odor. Refusing to shower. No pain or nausea reported. Electrolytes replaced. Diet tolerated. Wound to lower lip scabbed. Occasional scant drainage due to patient picking at lip. Consults to psychology. Patient independent, but encouraged to call for assistance when getting out of bed. Patient verbalized understanding, but needs reenforcement. All belongings within reach. Will continue to monitor.      Arsenic 20mg/270ml NS hung at 1730. Infusing at 135ml/hr. BP taken [id number] x4, then [id number] until complete. Patient tolerating infusion.

## 2020-09-22 NOTE — ASSESSMENT & PLAN NOTE
Patient is a 25 year old woman presenting with concern for APL.  Day 5 ATRA  Day 2 ASO    Plan:  - Monitor TLS labs daily and DIC labs BID   - Follow up PML KAVITHA PCR/FISH, AML FISH - FISH +ve for PML LORENA translocation  - 2D ECHO - normal heart function.    - Continue tretinoin (start 9/18, today is Day 5) BID w/ meals  - Continue ASO (start 09/21, today is day 2).   - Continue allopurinol 300mg daily and IVF 100ml hr  - Transfuse cyroglobulin if fibrinogen <150    - Monitor I/O and daily weights twice daily for differentiation syndrome

## 2020-09-22 NOTE — PROGRESS NOTES
[facility]  Psychology  Progress Note  Individual Psychotherapy (PhD/LCSW)    Patient Name: Erika Saini  MRN: [id number]    Patient Class: IP- Inpatient  Admission Date: 9/18/2020  Hospital Length of Stay: 4 days  Attending Physician: Karel Kaur MD  Primary Care Provider: Provider [employer]    Therapeutic Intervention: Met with patient and mother.  Inpatient/Partial Hospital - Behavior modifying psychotherapy 45 min - CPT code 75635    Chief Complaint/Reason for Encounter: adaptation to disease and treatment, anxiety, interpersonal skills    Interval History and Content of Current Session:  Discussed goals of therapy and ways to minimize strain during prolonged admission. Nature of relaxation training for anxiety discussed. Handout given to patient. Reinforced that this is a skill that must be learned during less stressful times for implementation during greater stress.    Patient and mother discussed chronic interpersonal difficulties and struggles to contain irritability/temper with others. This will continue to be a focus of care for this patient.       Risk Parameters:  Patient reports no suicidal ideation  Patient reports no homicidal ideation  Patient reports no self-injurious behavior  Patient reports no violent behavior    Verbal Deficits: None    Patient's response to intervention:  The patient's response to intervention is guarded.    Progress toward goals and other mental status changes:  The patient's progress toward goals is not progressing.    Diagnostic Impression - Plan:     Adjustment reaction with anxiety  Patient with current significant anxiety symptoms interfering with social, occupational and functional activities    Anxiety over being away from home is significant and will complicate her emotional reaction to illness and prolonged hospitalization.  Her mother's arrival has helped but her mother is leaving again from Sunday (9/27) to Sunday (10/4).    She was  again given information about expected behavior toward staff and reminded of her responsibility to treat others with respect.   Daily relaxation training practice encouraged. I will follow up with her to assess implementation.   She is still not willing to consider medications at this time.     Importance of movement, remaining out of bed during the daytime, and daytime light reinforced for mood/sleep management.          Treatment Plan:  · Target symptoms: depression, anxiety and interpersonal  · Why chosen therapy is appropriate versus another modality: relevant to diagnosis, evidence based practice  · Outcome monitoring methods: self-report, observation  · Therapeutic intervention type: behavior modifying psychotherapy    Plan:  individual psychotherapy  Medication management if patient agrees.    Next anticipated visit: 3 days    Length of Service (minutes): 45    Tejas Munoz, PhD  Psychology  [facility]

## 2020-09-23 LAB
ALBUMIN SERPL BCP-MCNC: 2.4 G/DL (ref 3.5–5.2)
ALP SERPL-CCNC: 55 U/L (ref 55–135)
ALT SERPL W/O P-5'-P-CCNC: 60 U/L (ref 10–44)
ANION GAP SERPL CALC-SCNC: 11 MMOL/L (ref 8–16)
ANISOCYTOSIS BLD QL SMEAR: SLIGHT
APTT BLDCRRT: 27.9 SEC (ref 21–32)
APTT BLDCRRT: 30.8 SEC (ref 21–32)
AST SERPL-CCNC: 32 U/L (ref 10–40)
BACTERIA BLD CULT: NORMAL
BACTERIA BLD CULT: NORMAL
BASOPHILS # BLD AUTO: 0 K/UL (ref 0–0.2)
BASOPHILS NFR BLD: 0 % (ref 0–1.9)
BILIRUB SERPL-MCNC: 0.4 MG/DL (ref 0.1–1)
BLD PROD TYP BPU: NORMAL
BLOOD UNIT EXPIRATION DATE: NORMAL
BLOOD UNIT TYPE CODE: 6200
BLOOD UNIT TYPE: NORMAL
BUN SERPL-MCNC: 6 MG/DL (ref 6–20)
CALCIUM SERPL-MCNC: 8.3 MG/DL (ref 8.7–10.5)
CHLORIDE SERPL-SCNC: 108 MMOL/L (ref 95–110)
CO2 SERPL-SCNC: 21 MMOL/L (ref 23–29)
CODING SYSTEM: NORMAL
CREAT SERPL-MCNC: 0.6 MG/DL (ref 0.5–1.4)
DIFFERENTIAL METHOD: ABNORMAL
DISPENSE STATUS: NORMAL
EOSINOPHIL # BLD AUTO: 0 K/UL (ref 0–0.5)
EOSINOPHIL NFR BLD: 0 % (ref 0–8)
ERYTHROCYTE [DISTWIDTH] IN BLOOD BY AUTOMATED COUNT: 15.3 % (ref 11.5–14.5)
EST. GFR  (AFRICAN AMERICAN): >60 ML/MIN/1.73 M^2
EST. GFR  (NON AFRICAN AMERICAN): >60 ML/MIN/1.73 M^2
FIBRINOGEN PPP-MCNC: 387 MG/DL (ref 182–366)
FIBRINOGEN PPP-MCNC: 395 MG/DL (ref 182–366)
GLUCOSE SERPL-MCNC: 196 MG/DL (ref 70–110)
HCT VFR BLD AUTO: 24.8 % (ref 37–48.5)
HGB BLD-MCNC: 8.3 G/DL (ref 12–16)
IMM GRANULOCYTES # BLD AUTO: 0 K/UL (ref 0–0.04)
IMM GRANULOCYTES NFR BLD AUTO: 0 % (ref 0–0.5)
INR PPP: 1.1 (ref 0.8–1.2)
INR PPP: 1.1 (ref 0.8–1.2)
LDH SERPL L TO P-CCNC: 432 U/L (ref 110–260)
LYMPHOCYTES # BLD AUTO: 0.3 K/UL (ref 1–4.8)
LYMPHOCYTES NFR BLD: 38.2 % (ref 18–48)
MAGNESIUM SERPL-MCNC: 1.6 MG/DL (ref 1.6–2.6)
MCH RBC QN AUTO: 29.2 PG (ref 27–31)
MCHC RBC AUTO-ENTMCNC: 33.5 G/DL (ref 32–36)
MCV RBC AUTO: 87 FL (ref 82–98)
MONOCYTES # BLD AUTO: 0 K/UL (ref 0.3–1)
MONOCYTES NFR BLD: 5.3 % (ref 4–15)
NEUTROPHILS # BLD AUTO: 0.4 K/UL (ref 1.8–7.7)
NEUTROPHILS NFR BLD: 56.5 % (ref 38–73)
NRBC BLD-RTO: 3 /100 WBC
NUM UNITS TRANS WBC-POOR PLATPHERESIS: NORMAL
OVALOCYTES BLD QL SMEAR: ABNORMAL
PATH REPORT.FINAL DX SPEC: NORMAL
PHOSPHATE SERPL-MCNC: 3.3 MG/DL (ref 2.7–4.5)
PLATELET # BLD AUTO: 29 K/UL (ref 150–350)
PLATELET BLD QL SMEAR: ABNORMAL
PML/RARA RESULT (BLD): NORMAL
PML/RARA SPECIMEN:: NORMAL
PMV BLD AUTO: 12.8 FL (ref 9.2–12.9)
POIKILOCYTOSIS BLD QL SMEAR: SLIGHT
POLYCHROMASIA BLD QL SMEAR: ABNORMAL
POTASSIUM SERPL-SCNC: 4.1 MMOL/L (ref 3.5–5.1)
PROT SERPL-MCNC: 6.5 G/DL (ref 6–8.4)
PROTHROMBIN TIME: 12.4 SEC (ref 9–12.5)
PROTHROMBIN TIME: 12.5 SEC (ref 9–12.5)
RBC # BLD AUTO: 2.84 M/UL (ref 4–5.4)
SODIUM SERPL-SCNC: 140 MMOL/L (ref 136–145)
URATE SERPL-MCNC: 2.6 MG/DL (ref 2.4–5.7)
WBC # BLD AUTO: 0.76 K/UL (ref 3.9–12.7)

## 2020-09-23 PROCEDURE — 63600175 PHARM REV CODE 636 W HCPCS: Performed by: STUDENT IN AN ORGANIZED HEALTH CARE EDUCATION/TRAINING PROGRAM

## 2020-09-23 PROCEDURE — 83615 LACTATE (LD) (LDH) ENZYME: CPT

## 2020-09-23 PROCEDURE — P9037 PLATE PHERES LEUKOREDU IRRAD: HCPCS

## 2020-09-23 PROCEDURE — 83735 ASSAY OF MAGNESIUM: CPT

## 2020-09-23 PROCEDURE — 25000003 PHARM REV CODE 250: Performed by: STUDENT IN AN ORGANIZED HEALTH CARE EDUCATION/TRAINING PROGRAM

## 2020-09-23 PROCEDURE — 63600175 PHARM REV CODE 636 W HCPCS: Mod: JG | Performed by: INTERNAL MEDICINE

## 2020-09-23 PROCEDURE — 99233 SBSQ HOSP IP/OBS HIGH 50: CPT | Mod: ,,, | Performed by: INTERNAL MEDICINE

## 2020-09-23 PROCEDURE — 84100 ASSAY OF PHOSPHORUS: CPT

## 2020-09-23 PROCEDURE — 20600001 HC STEP DOWN PRIVATE ROOM

## 2020-09-23 PROCEDURE — 25000003 PHARM REV CODE 250: Performed by: INTERNAL MEDICINE

## 2020-09-23 PROCEDURE — 99233 PR SUBSEQUENT HOSPITAL CARE,LEVL III: ICD-10-PCS | Mod: ,,, | Performed by: INTERNAL MEDICINE

## 2020-09-23 PROCEDURE — 80053 COMPREHEN METABOLIC PANEL: CPT

## 2020-09-23 PROCEDURE — 94761 N-INVAS EAR/PLS OXIMETRY MLT: CPT

## 2020-09-23 PROCEDURE — 84550 ASSAY OF BLOOD/URIC ACID: CPT

## 2020-09-23 PROCEDURE — 85384 FIBRINOGEN ACTIVITY: CPT | Mod: 91

## 2020-09-23 PROCEDURE — 94799 UNLISTED PULMONARY SVC/PX: CPT

## 2020-09-23 PROCEDURE — 85610 PROTHROMBIN TIME: CPT

## 2020-09-23 PROCEDURE — 85730 THROMBOPLASTIN TIME PARTIAL: CPT

## 2020-09-23 PROCEDURE — 25000003 PHARM REV CODE 250: Performed by: NURSE PRACTITIONER

## 2020-09-23 PROCEDURE — 85025 COMPLETE CBC W/AUTO DIFF WBC: CPT

## 2020-09-23 RX ORDER — HYDROCODONE BITARTRATE AND ACETAMINOPHEN 500; 5 MG/1; MG/1
TABLET ORAL
Status: DISCONTINUED | OUTPATIENT
Start: 2020-09-23 | End: 2020-09-27

## 2020-09-23 RX ORDER — NYSTATIN 100000 [USP'U]/G
POWDER TOPICAL 2 TIMES DAILY
Status: DISCONTINUED | OUTPATIENT
Start: 2020-09-23 | End: 2020-10-12

## 2020-09-23 RX ORDER — NYSTATIN 100000 [USP'U]/G
POWDER TOPICAL 2 TIMES DAILY
Status: DISCONTINUED | OUTPATIENT
Start: 2020-09-23 | End: 2020-09-23

## 2020-09-23 RX ADMIN — ALLOPURINOL 300 MG: 300 TABLET ORAL at 08:09

## 2020-09-23 RX ADMIN — CEFEPIME 2 G: 2 INJECTION, POWDER, FOR SOLUTION INTRAVENOUS at 05:09

## 2020-09-23 RX ADMIN — TRETINOIN 50 MG: 10 CAPSULE ORAL at 05:09

## 2020-09-23 RX ADMIN — NYSTATIN: 100000 POWDER TOPICAL at 09:09

## 2020-09-23 RX ADMIN — VALACYCLOVIR HYDROCHLORIDE 1000 MG: 500 TABLET, FILM COATED ORAL at 09:09

## 2020-09-23 RX ADMIN — CEFEPIME 2 G: 2 INJECTION, POWDER, FOR SOLUTION INTRAVENOUS at 08:09

## 2020-09-23 RX ADMIN — DEXAMETHASONE SODIUM PHOSPHATE 10 MG: 4 INJECTION, SOLUTION INTRA-ARTICULAR; INTRALESIONAL; INTRAMUSCULAR; INTRAVENOUS; SOFT TISSUE at 08:09

## 2020-09-23 RX ADMIN — CEFEPIME 2 G: 2 INJECTION, POWDER, FOR SOLUTION INTRAVENOUS at 01:09

## 2020-09-23 RX ADMIN — PROCHLORPERAZINE EDISYLATE 10 MG: 5 INJECTION INTRAMUSCULAR; INTRAVENOUS at 01:09

## 2020-09-23 RX ADMIN — Medication 400 MG: at 04:09

## 2020-09-23 RX ADMIN — ARSENIC TRIOXIDE 20 MG: 1 INJECTION, SOLUTION INTRAVENOUS at 02:09

## 2020-09-23 RX ADMIN — NYSTATIN: 100000 POWDER TOPICAL at 04:09

## 2020-09-23 RX ADMIN — Medication 400 MG: at 08:09

## 2020-09-23 RX ADMIN — TRETINOIN 50 MG: 10 CAPSULE ORAL at 08:09

## 2020-09-23 RX ADMIN — VALACYCLOVIR HYDROCHLORIDE 1000 MG: 500 TABLET, FILM COATED ORAL at 08:09

## 2020-09-23 RX ADMIN — Medication 400 MG: at 01:09

## 2020-09-23 NOTE — ASSESSMENT & PLAN NOTE
Patient is a 25 year old woman presenting with concern for APL.  Day 6 ATRA  Day 3 ASO    Plan:  - Monitor TLS labs daily and DIC labs BID   - Follow up PML KAVITHA PCR/FISH, AML FISH - FISH +ve for PML LORENA translocation  - 2D ECHO - normal heart function.    - Continue tretinoin (start 9/18, today is Day 6) BID w/ meals  - Continue ASO (start 09/21, today is day 3).   - Continue allopurinol 300mg daily and IVF 100ml hr  - Transfuse cyroglobulin if fibrinogen <150    - Platelet goal > 50k.   - INR < 1.5  - Monitor I/O and daily weights twice daily for differentiation syndrome

## 2020-09-23 NOTE — SUBJECTIVE & OBJECTIVE
Subjective:     Interval History: ATRA day 6, ASO day 3. Dexamethasone day 2 for ? Differentiation syndrome. Now on RA. Awaiting slides from [facility], request sent. Platelets 29, getting 1 pack, .    Objective:     Vital Signs (Most Recent):  Temp: 98.3 °F (36.8 °C) (09/23/20 0333)  Pulse: 83 (09/23/20 0333)  Resp: 19 (09/23/20 0333)  BP: 120/67 (09/23/20 0333)  SpO2: (!) 92 % (09/23/20 0333) Vital Signs (24h Range):  Temp:  [98.1 °F (36.7 °C)-99.2 °F (37.3 °C)] 98.3 °F (36.8 °C)  Pulse:  [] 83  Resp:  [17-20] 19  SpO2:  [92 %-98 %] 92 %  BP: (120-134)/(67-89) 120/67     Weight: (!) 137 kg (301 lb 14.7 oz)  Body mass index is 58.96 kg/m².  Body surface area is 2.41 meters squared.    ECOG SCORE         [unfilled]    Intake/Output - Last 3 Shifts       09/21 0700 - 09/22 0659 09/22 0700 - 09/23 0659    IV Piggyback 320 270    Total Intake(mL/kg) 320 (2.3) 270 (2)    Urine (mL/kg/hr) 1500 (0.5) 1500 (0.5)    Stool 0 0    Total Output 1500 1500    Net -1180 -1230          Urine Occurrence 1 x 3 x    Stool Occurrence 2 x 5 x          Physical Exam  Vitals signs reviewed.   Constitutional:       Appearance: Normal appearance. She is obese.   HENT:      Head: Normocephalic and atraumatic.      Nose: Nose normal.      Mouth/Throat:      Comments: Fever blister noted.  Eyes:      Extraocular Movements: Extraocular movements intact.   Neck:      Musculoskeletal: Normal range of motion and neck supple.   Cardiovascular:      Rate and Rhythm: Normal rate and regular rhythm.      Heart sounds: No murmur.   Pulmonary:      Effort: Pulmonary effort is normal. No respiratory distress.   Abdominal:      General: There is no distension.      Palpations: Abdomen is soft.      Tenderness: There is no abdominal tenderness.   Musculoskeletal: Normal range of motion.         General: No swelling.   Skin:     General: Skin is warm and dry.   Neurological:      General: No focal deficit present.      Mental Status: She is alert and  oriented to person, place, and time.   Psychiatric:         Mood and Affect: Mood normal.         Behavior: Behavior normal.         Significant Labs:   All pertinent labs from the last 24 hours have been reviewed.    Diagnostic Results:  I have reviewed and interpreted all pertinent imaging results/findings within the past 24 hours.

## 2020-09-23 NOTE — PLAN OF CARE
Pt's overall status much improved this shift. O2 sats 92-95% on room air, no evidence of resp distress. Pt up to BSC and voiding without difficulty. Strict I/Os and BID standing weights. Remains afebrile and VSS. Pt with no complaints at this time. Will continue to monitor.

## 2020-09-23 NOTE — PLAN OF CARE
Problem: Adult Inpatient Plan of Care  Goal: Plan of Care Review  Outcome: Ongoing, Progressing  Patient remains free from falls and injury this shift. Bed in low, locked position with call light in reach. Plan of care reviewed with pt and mother. Mother at bedside. VSS. Patient abrasive and Irritable. No pain reported. Electrolytes replaced. Diet tolerated. Patient complains of having a rash to her anus and labia, no rash observed by nurse. 2 gashes in lower lip with scabbing due to patient picking at lip. Patient needs reinforced teaching. Patient had shower today. 1 unit platelets given. Dressing change on midline done today. Consults to psychology. Patient independent, but encouraged to call for assistance when getting out of bed.  Patient verbalized understanding. All belongings within reach.  Will continue to monitor.      Arsenic trioxide 20mg/270ml NS infused at 1400. Infusing at 135ml/hr. Compazine given as premedication. Vitals taken [id number] x4, then [id number] until completion. Patient tolerating infusion.

## 2020-09-23 NOTE — PROGRESS NOTES
[facility]  Hematology  Bone Marrow Transplant  Progress Note    Patient Name: Erika Saini  Admission Date: 9/18/2020  Hospital Length of Stay: 5 days  Code Status: Full Code    Subjective:     Interval History: ATRA day 6, ASO day 3. Dexamethasone day 2 for ? Differentiation syndrome. Now on RA. Awaiting slides from [facility], request sent. Platelets 29, getting 1 pack, .    Objective:     Vital Signs (Most Recent):  Temp: 98.3 °F (36.8 °C) (09/23/20 0333)  Pulse: 83 (09/23/20 0333)  Resp: 19 (09/23/20 0333)  BP: 120/67 (09/23/20 0333)  SpO2: (!) 92 % (09/23/20 0333) Vital Signs (24h Range):  Temp:  [98.1 °F (36.7 °C)-99.2 °F (37.3 °C)] 98.3 °F (36.8 °C)  Pulse:  [] 83  Resp:  [17-20] 19  SpO2:  [92 %-98 %] 92 %  BP: (120-134)/(67-89) 120/67     Weight: (!) 137 kg (301 lb 14.7 oz)  Body mass index is 58.96 kg/m².  Body surface area is 2.41 meters squared.    ECOG SCORE         [unfilled]    Intake/Output - Last 3 Shifts       09/21 0700 - 09/22 0659 09/22 0700 - 09/23 0659    IV Piggyback 320 270    Total Intake(mL/kg) 320 (2.3) 270 (2)    Urine (mL/kg/hr) 1500 (0.5) 1500 (0.5)    Stool 0 0    Total Output 1500 1500    Net -1180 -1230          Urine Occurrence 1 x 3 x    Stool Occurrence 2 x 5 x          Physical Exam  Vitals signs reviewed.   Constitutional:       Appearance: Normal appearance. She is obese.   HENT:      Head: Normocephalic and atraumatic.      Nose: Nose normal.      Mouth/Throat:      Comments: Fever blister noted.  Eyes:      Extraocular Movements: Extraocular movements intact.   Neck:      Musculoskeletal: Normal range of motion and neck supple.   Cardiovascular:      Rate and Rhythm: Normal rate and regular rhythm.      Heart sounds: No murmur.   Pulmonary:      Effort: Pulmonary effort is normal. No respiratory distress.   Abdominal:      General: There is no distension.      Palpations: Abdomen is soft.      Tenderness: There is no abdominal tenderness.    Musculoskeletal: Normal range of motion.         General: No swelling.   Skin:     General: Skin is warm and dry.   Neurological:      General: No focal deficit present.      Mental Status: She is alert and oriented to person, place, and time.   Psychiatric:         Mood and Affect: Mood normal.         Behavior: Behavior normal.         Significant Labs:   All pertinent labs from the last 24 hours have been reviewed.    Diagnostic Results:  I have reviewed and interpreted all pertinent imaging results/findings within the past 24 hours.    Assessment/Plan:     * APL (acute promyelocytic leukemia)  Patient is a 25 year old woman presenting with concern for APL.  Day 6 ATRA  Day 3 ASO    Plan:  - Monitor TLS labs daily and DIC labs BID   - Follow up PML KAVITHA PCR/FISH, AML FISH - FISH +ve for PML LORENA translocation  - 2D ECHO - normal heart function.    - Continue tretinoin (start 9/18, today is Day 6) BID w/ meals  - Continue ASO (start 09/21, today is day 3).   - Continue allopurinol 300mg daily and IVF 100ml hr  - Transfuse cyroglobulin if fibrinogen <150    - Platelet goal > 50k.   - INR < 1.5  - Monitor I/O and daily weights twice daily for differentiation syndrome     Shortness of breath  Stable on 2L  New RLL airspace disease.   Weight gain of 4Kg since admit.  Monitor closely for signs of differentiation syndrome and start Dexamethasone 10mg BID if condition worsens.    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 Likely due to positive strep B agalactiae blood cultures (+ at OSH). She was started on vancomycin and cefepime at the OSH.     - Continue cefepime, organism sensitive to ampicillin per out side records but will clarify PCN allergy.   - Follow up repeat cultures       Morbid  obesity with BMI of 50.0-59.9, adult  Patient with BMI 57.91        VTE Risk Mitigation (From admission, onward)         Ordered     heparin, porcine (PF) 100 unit/mL injection flush 300 Units  As needed (PRN)      09/21/20 1554     IP VTE HIGH RISK PATIENT  Once      09/18/20 0148     Place sequential compression device  Until discontinued      09/18/20 0148                Disposition: BMT unit    Ethel Uribe MD  Bone Marrow Transplant  [facility]

## 2020-09-24 LAB
ALBUMIN SERPL BCP-MCNC: 2.3 G/DL (ref 3.5–5.2)
ALP SERPL-CCNC: 49 U/L (ref 55–135)
ALT SERPL W/O P-5'-P-CCNC: 40 U/L (ref 10–44)
ANION GAP SERPL CALC-SCNC: 9 MMOL/L (ref 8–16)
ANISOCYTOSIS BLD QL SMEAR: SLIGHT
APTT BLDCRRT: 26.2 SEC (ref 21–32)
APTT BLDCRRT: 27 SEC (ref 21–32)
AST SERPL-CCNC: 41 U/L (ref 10–40)
BASOPHILS NFR BLD: 0 % (ref 0–1.9)
BILIRUB SERPL-MCNC: 0.4 MG/DL (ref 0.1–1)
BLASTS NFR BLD MANUAL: 26 %
BUN SERPL-MCNC: 13 MG/DL (ref 6–20)
CALCIUM SERPL-MCNC: 8.2 MG/DL (ref 8.7–10.5)
CHLORIDE SERPL-SCNC: 108 MMOL/L (ref 95–110)
CO2 SERPL-SCNC: 23 MMOL/L (ref 23–29)
CREAT SERPL-MCNC: 0.6 MG/DL (ref 0.5–1.4)
DACRYOCYTES BLD QL SMEAR: ABNORMAL
DIFFERENTIAL METHOD: ABNORMAL
EOSINOPHIL NFR BLD: 0 % (ref 0–8)
ERYTHROCYTE [DISTWIDTH] IN BLOOD BY AUTOMATED COUNT: 15.5 % (ref 11.5–14.5)
EST. GFR  (AFRICAN AMERICAN): >60 ML/MIN/1.73 M^2
EST. GFR  (NON AFRICAN AMERICAN): >60 ML/MIN/1.73 M^2
FIBRINOGEN PPP-MCNC: 288 MG/DL (ref 182–366)
FIBRINOGEN PPP-MCNC: 304 MG/DL (ref 182–366)
GLUCOSE SERPL-MCNC: 185 MG/DL (ref 70–110)
HCT VFR BLD AUTO: 23.9 % (ref 37–48.5)
HGB BLD-MCNC: 7.9 G/DL (ref 12–16)
HYPOCHROMIA BLD QL SMEAR: ABNORMAL
IMM GRANULOCYTES # BLD AUTO: ABNORMAL K/UL (ref 0–0.04)
IMM GRANULOCYTES NFR BLD AUTO: ABNORMAL % (ref 0–0.5)
INR PPP: 1.2 (ref 0.8–1.2)
INR PPP: 1.2 (ref 0.8–1.2)
LDH SERPL L TO P-CCNC: 451 U/L (ref 110–260)
LYMPHOCYTES NFR BLD: 24 % (ref 18–48)
MAGNESIUM SERPL-MCNC: 1.7 MG/DL (ref 1.6–2.6)
MCH RBC QN AUTO: 29.2 PG (ref 27–31)
MCHC RBC AUTO-ENTMCNC: 33.1 G/DL (ref 32–36)
MCV RBC AUTO: 88 FL (ref 82–98)
MONOCYTES NFR BLD: 12 % (ref 4–15)
MYELOCYTES NFR BLD MANUAL: 24 %
NEUTROPHILS NFR BLD: 14 % (ref 38–73)
NRBC BLD-RTO: 3 /100 WBC
OVALOCYTES BLD QL SMEAR: ABNORMAL
PATH REV BLD -IMP: NORMAL
PHOSPHATE SERPL-MCNC: 3.9 MG/DL (ref 2.7–4.5)
PLATELET # BLD AUTO: 65 K/UL (ref 150–350)
PLATELET BLD QL SMEAR: ABNORMAL
PMV BLD AUTO: 10.9 FL (ref 9.2–12.9)
POIKILOCYTOSIS BLD QL SMEAR: SLIGHT
POLYCHROMASIA BLD QL SMEAR: ABNORMAL
POTASSIUM SERPL-SCNC: 4 MMOL/L (ref 3.5–5.1)
PROT SERPL-MCNC: 6 G/DL (ref 6–8.4)
PROTHROMBIN TIME: 13.5 SEC (ref 9–12.5)
PROTHROMBIN TIME: 13.5 SEC (ref 9–12.5)
RBC # BLD AUTO: 2.71 M/UL (ref 4–5.4)
SODIUM SERPL-SCNC: 140 MMOL/L (ref 136–145)
URATE SERPL-MCNC: 2.4 MG/DL (ref 2.4–5.7)
WBC # BLD AUTO: 1.98 K/UL (ref 3.9–12.7)

## 2020-09-24 PROCEDURE — 99233 SBSQ HOSP IP/OBS HIGH 50: CPT | Mod: ,,, | Performed by: INTERNAL MEDICINE

## 2020-09-24 PROCEDURE — 85610 PROTHROMBIN TIME: CPT | Mod: 91

## 2020-09-24 PROCEDURE — 63600175 PHARM REV CODE 636 W HCPCS: Performed by: STUDENT IN AN ORGANIZED HEALTH CARE EDUCATION/TRAINING PROGRAM

## 2020-09-24 PROCEDURE — 85027 COMPLETE CBC AUTOMATED: CPT

## 2020-09-24 PROCEDURE — 83735 ASSAY OF MAGNESIUM: CPT

## 2020-09-24 PROCEDURE — 25000003 PHARM REV CODE 250: Performed by: STUDENT IN AN ORGANIZED HEALTH CARE EDUCATION/TRAINING PROGRAM

## 2020-09-24 PROCEDURE — 85060 BLOOD SMEAR INTERPRETATION: CPT | Mod: ,,, | Performed by: PATHOLOGY

## 2020-09-24 PROCEDURE — 63600175 PHARM REV CODE 636 W HCPCS: Performed by: INTERNAL MEDICINE

## 2020-09-24 PROCEDURE — 93005 ELECTROCARDIOGRAM TRACING: CPT

## 2020-09-24 PROCEDURE — 87449 NOS EACH ORGANISM AG IA: CPT

## 2020-09-24 PROCEDURE — 84550 ASSAY OF BLOOD/URIC ACID: CPT

## 2020-09-24 PROCEDURE — 20600001 HC STEP DOWN PRIVATE ROOM

## 2020-09-24 PROCEDURE — 85007 BL SMEAR W/DIFF WBC COUNT: CPT

## 2020-09-24 PROCEDURE — 85730 THROMBOPLASTIN TIME PARTIAL: CPT | Mod: 91

## 2020-09-24 PROCEDURE — 25000003 PHARM REV CODE 250: Performed by: NURSE PRACTITIONER

## 2020-09-24 PROCEDURE — 93010 ELECTROCARDIOGRAM REPORT: CPT | Mod: ,,, | Performed by: INTERNAL MEDICINE

## 2020-09-24 PROCEDURE — 80053 COMPREHEN METABOLIC PANEL: CPT

## 2020-09-24 PROCEDURE — 93010 EKG 12-LEAD: ICD-10-PCS | Mod: ,,, | Performed by: INTERNAL MEDICINE

## 2020-09-24 PROCEDURE — 85060 PATHOLOGIST REVIEW: ICD-10-PCS | Mod: ,,, | Performed by: PATHOLOGY

## 2020-09-24 PROCEDURE — 99233 PR SUBSEQUENT HOSPITAL CARE,LEVL III: ICD-10-PCS | Mod: ,,, | Performed by: INTERNAL MEDICINE

## 2020-09-24 PROCEDURE — 84100 ASSAY OF PHOSPHORUS: CPT

## 2020-09-24 PROCEDURE — 25000003 PHARM REV CODE 250: Performed by: INTERNAL MEDICINE

## 2020-09-24 PROCEDURE — 85384 FIBRINOGEN ACTIVITY: CPT | Mod: 91

## 2020-09-24 PROCEDURE — 87324 CLOSTRIDIUM AG IA: CPT

## 2020-09-24 PROCEDURE — 83615 LACTATE (LD) (LDH) ENZYME: CPT

## 2020-09-24 RX ADMIN — PROCHLORPERAZINE EDISYLATE 10 MG: 5 INJECTION INTRAMUSCULAR; INTRAVENOUS at 01:09

## 2020-09-24 RX ADMIN — DEXAMETHASONE SODIUM PHOSPHATE 10 MG: 4 INJECTION, SOLUTION INTRA-ARTICULAR; INTRALESIONAL; INTRAMUSCULAR; INTRAVENOUS; SOFT TISSUE at 08:09

## 2020-09-24 RX ADMIN — CEFTRIAXONE 2 G: 2 INJECTION, SOLUTION INTRAVENOUS at 10:09

## 2020-09-24 RX ADMIN — NYSTATIN: 100000 POWDER TOPICAL at 09:09

## 2020-09-24 RX ADMIN — VALACYCLOVIR HYDROCHLORIDE 1000 MG: 500 TABLET, FILM COATED ORAL at 08:09

## 2020-09-24 RX ADMIN — TRETINOIN 50 MG: 10 CAPSULE ORAL at 08:09

## 2020-09-24 RX ADMIN — TRETINOIN 50 MG: 10 CAPSULE ORAL at 05:09

## 2020-09-24 RX ADMIN — Medication 800 MG: at 10:09

## 2020-09-24 RX ADMIN — ALLOPURINOL 300 MG: 300 TABLET ORAL at 08:09

## 2020-09-24 RX ADMIN — CEFEPIME 2 G: 2 INJECTION, POWDER, FOR SOLUTION INTRAVENOUS at 04:09

## 2020-09-24 RX ADMIN — Medication 400 MG: at 06:09

## 2020-09-24 RX ADMIN — NYSTATIN: 100000 POWDER TOPICAL at 08:09

## 2020-09-24 RX ADMIN — ARSENIC TRIOXIDE 20 MG: 1 INJECTION, SOLUTION INTRAVENOUS at 01:09

## 2020-09-24 NOTE — PLAN OF CARE
POC reviewed with patient; pt very irritable during the shift. Day 7 of ATRA  and Day 3 ASO. Pt voiding without difficulty in BSCC. Pt with nonskid footwear on, bed in lowest position, and locked with bed rails up x2. Pt instructed to call prior to getting OOB. Pt has call light and personal items within reach. Patient ambulates in room independently. Lesion on lip noted on assessment. Pt complains of rash to anus. Nystatin powder given scheduled. VSS and afebrile this shift. All questions and concerns addressed at this time. Will continue to monitor.

## 2020-09-24 NOTE — ASSESSMENT & PLAN NOTE
Patient is a 25 year old woman presenting with concern for APL.  Day 7 ATRA  Day 4 ASO    Plan:  - Monitor TLS labs daily and DIC labs BID   - 2D ECHO - normal heart function.    - Continue tretinoin (start 9/18, today is Day 7) BID w/ meals  - Continue ASO (start 09/21, today is day 4).   - Continue allopurinol 300mg daily and IVF 100ml hr  - Transfuse cyroglobulin if fibrinogen <150    - Platelet goal > 50k.   - INR < 1.5  - Monitor I/O and daily weights twice daily

## 2020-09-24 NOTE — PROGRESS NOTES
[facility]  Hematology  Bone Marrow Transplant  Progress Note    Patient Name: Erika Saini  Admission Date: 9/18/2020  Hospital Length of Stay: 6 days  Code Status: Full Code    Subjective:     Interval History: ATRA day 7, ASO day 4. Dexamethasone day 3/3 for differentiation syndrome. Now on RA. Awaiting slides from [facility], request sent. WBC count has recovered to 1.98.    Objective:     Vital Signs (Most Recent):  Temp: 98.2 °F (36.8 °C) (09/24/20 0342)  Pulse: 89 (09/24/20 0342)  Resp: 18 (09/24/20 0342)  BP: 121/67 (09/24/20 0342)  SpO2: 95 % (09/24/20 0342) Vital Signs (24h Range):  Temp:  [98 °F (36.7 °C)-98.6 °F (37 °C)] 98.2 °F (36.8 °C)  Pulse:  [] 89  Resp:  [17-19] 18  SpO2:  [92 %-100 %] 95 %  BP: (106-162)/(58-90) 121/67     Weight: (!) 137.9 kg (304 lb 2 oz)  Body mass index is 59.4 kg/m².  Body surface area is 2.42 meters squared.    ECOG SCORE         [unfilled]    Intake/Output - Last 3 Shifts       09/22 0700 - 09/23 0659 09/23 0700 - 09/24 0659 09/24 0700 - 09/25 0659    Blood  250     IV Piggyback 270      Total Intake(mL/kg) 270 (2) 250 (1.8)     Urine (mL/kg/hr) 1500 (0.5)      Stool 0      Total Output 1500      Net -1230 +250            Urine Occurrence 3 x 2 x     Stool Occurrence 5 x 6 x           Physical Exam  Vitals signs reviewed.   Constitutional:       Appearance: Normal appearance. She is obese.   HENT:      Head: Normocephalic and atraumatic.      Nose: Nose normal.      Mouth/Throat:      Comments: Fever blister noted.  Eyes:      Extraocular Movements: Extraocular movements intact.   Neck:      Musculoskeletal: Normal range of motion and neck supple.   Cardiovascular:      Rate and Rhythm: Normal rate and regular rhythm.      Heart sounds: No murmur.   Pulmonary:      Effort: Pulmonary effort is normal. No respiratory distress.   Abdominal:      General: There is no distension.      Palpations: Abdomen is soft.      Tenderness: There is no abdominal  tenderness.   Musculoskeletal: Normal range of motion.         General: No swelling.   Skin:     General: Skin is warm and dry.   Neurological:      General: No focal deficit present.      Mental Status: She is alert and oriented to person, place, and time.   Psychiatric:         Mood and Affect: Mood normal.         Behavior: Behavior normal.         Significant Labs:   All pertinent labs from the last 24 hours have been reviewed.    Diagnostic Results:  I have reviewed and interpreted all pertinent imaging results/findings within the past 24 hours.    Assessment/Plan:     * APL (acute promyelocytic leukemia)  Patient is a 25 year old woman presenting with concern for APL.  Day 7 ATRA  Day 4 ASO    Plan:  - Monitor TLS labs daily and DIC labs BID   - 2D ECHO - normal heart function.    - Continue tretinoin (start 9/18, today is Day 7) BID w/ meals  - Continue ASO (start 09/21, today is day 4).   - Continue allopurinol 300mg daily and IVF 100ml hr  - Transfuse cyroglobulin if fibrinogen <150    - Platelet goal > 50k.   - INR < 1.5  - Monitor I/O and daily weights twice daily    Shortness of breath  Stable on 2L  New RLL airspace disease.   Weight gain of 4Kg since admit.  Monitor closely for signs of differentiation syndrome and start Dexamethasone 10mg BID if condition worsens.    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 Likely due to positive strep B agalactiae blood cultures (+ at OSH). She was started on vancomycin and cefepime at the OSH.     - Continue cefepime, organism sensitive to ampicillin per out side records but will clarify PCN allergy.   - Follow up repeat cultures       Morbid obesity with BMI of 50.0-59.9, adult  Patient with BMI 57.91        VTE Risk Mitigation (From  admission, onward)         Ordered     heparin, porcine (PF) 100 unit/mL injection flush 300 Units  As needed (PRN)      09/21/20 1554     IP VTE HIGH RISK PATIENT  Once      09/18/20 0148     Place sequential compression device  Until discontinued      09/18/20 0148                Disposition: BMT unit    Ethel Uribe MD  Bone Marrow Transplant  [facility]

## 2020-09-24 NOTE — SUBJECTIVE & OBJECTIVE
Subjective:     Interval History: ATRA day 7, ASO day 4. Dexamethasone day 3/3 for differentiation syndrome. Now on RA. Awaiting slides from [facility], request sent. WBC count has recovered to 1.98.    Objective:     Vital Signs (Most Recent):  Temp: 98.2 °F (36.8 °C) (09/24/20 0342)  Pulse: 89 (09/24/20 0342)  Resp: 18 (09/24/20 0342)  BP: 121/67 (09/24/20 0342)  SpO2: 95 % (09/24/20 0342) Vital Signs (24h Range):  Temp:  [98 °F (36.7 °C)-98.6 °F (37 °C)] 98.2 °F (36.8 °C)  Pulse:  [] 89  Resp:  [17-19] 18  SpO2:  [92 %-100 %] 95 %  BP: (106-162)/(58-90) 121/67     Weight: (!) 137.9 kg (304 lb 2 oz)  Body mass index is 59.4 kg/m².  Body surface area is 2.42 meters squared.    ECOG SCORE         [unfilled]    Intake/Output - Last 3 Shifts       09/22 0700 - 09/23 0659 09/23 0700 - 09/24 0659 09/24 0700 - 09/25 0659    Blood  250     IV Piggyback 270      Total Intake(mL/kg) 270 (2) 250 (1.8)     Urine (mL/kg/hr) 1500 (0.5)      Stool 0      Total Output 1500      Net -1230 +250            Urine Occurrence 3 x 2 x     Stool Occurrence 5 x 6 x           Physical Exam  Vitals signs reviewed.   Constitutional:       Appearance: Normal appearance. She is obese.   HENT:      Head: Normocephalic and atraumatic.      Nose: Nose normal.      Mouth/Throat:      Comments: Fever blister noted.  Eyes:      Extraocular Movements: Extraocular movements intact.   Neck:      Musculoskeletal: Normal range of motion and neck supple.   Cardiovascular:      Rate and Rhythm: Normal rate and regular rhythm.      Heart sounds: No murmur.   Pulmonary:      Effort: Pulmonary effort is normal. No respiratory distress.   Abdominal:      General: There is no distension.      Palpations: Abdomen is soft.      Tenderness: There is no abdominal tenderness.   Musculoskeletal: Normal range of motion.         General: No swelling.   Skin:     General: Skin is warm and dry.   Neurological:      General: No focal deficit present.      Mental Status:  She is alert and oriented to person, place, and time.   Psychiatric:         Mood and Affect: Mood normal.         Behavior: Behavior normal.         Significant Labs:   All pertinent labs from the last 24 hours have been reviewed.    Diagnostic Results:  I have reviewed and interpreted all pertinent imaging results/findings within the past 24 hours.

## 2020-09-24 NOTE — PLAN OF CARE
Problem: Adult Inpatient Plan of Care  Goal: Plan of Care Review  Outcome: Ongoing, Progressing   Pt AAO; independent with assist oob  Vital signs stable and pt remained afebrile throughout shift..   Patient is day 7 of arsenic and day 3 of tretinoin.  Patient tolerated treatment well .  Ceftriaxone started this shift. PICC line dressing change completed due to dressing being loose. Pt remained free from falls during shift.  Bed lowest position and locked.  Call light in reach.  [facility]

## 2020-09-24 NOTE — PLAN OF CARE
Problem: Adult Inpatient Plan of Care  Goal: Plan of Care Review   Arsenic started through midline right upper arm  noted for having positive blood return over2 hours.Chemotherapy dosage and BSA checked by two chemotherapy certified nurses prior to administration.  Chemotherapy education done prior to hanging of chemotherapy.  Chemotherapeutic precautions in place throughout therapy.  Will continue to monitor.

## 2020-09-25 LAB
ALBUMIN SERPL BCP-MCNC: 2.4 G/DL (ref 3.5–5.2)
ALP SERPL-CCNC: 47 U/L (ref 55–135)
ALT SERPL W/O P-5'-P-CCNC: 52 U/L (ref 10–44)
ANION GAP SERPL CALC-SCNC: 9 MMOL/L (ref 8–16)
ANISOCYTOSIS BLD QL SMEAR: SLIGHT
APTT BLDCRRT: 25.5 SEC (ref 21–32)
AST SERPL-CCNC: 46 U/L (ref 10–40)
BASOPHILS NFR BLD: 0 % (ref 0–1.9)
BILIRUB SERPL-MCNC: 0.4 MG/DL (ref 0.1–1)
BLASTS NFR BLD MANUAL: 34 %
BUN SERPL-MCNC: 17 MG/DL (ref 6–20)
C DIFF GDH STL QL: NEGATIVE
C DIFF TOX A+B STL QL IA: NEGATIVE
CALCIUM SERPL-MCNC: 8.1 MG/DL (ref 8.7–10.5)
CHLORIDE SERPL-SCNC: 108 MMOL/L (ref 95–110)
CO2 SERPL-SCNC: 23 MMOL/L (ref 23–29)
CREAT SERPL-MCNC: 0.6 MG/DL (ref 0.5–1.4)
DIFFERENTIAL METHOD: ABNORMAL
EOSINOPHIL NFR BLD: 0 % (ref 0–8)
ERYTHROCYTE [DISTWIDTH] IN BLOOD BY AUTOMATED COUNT: 15.5 % (ref 11.5–14.5)
EST. GFR  (AFRICAN AMERICAN): >60 ML/MIN/1.73 M^2
EST. GFR  (NON AFRICAN AMERICAN): >60 ML/MIN/1.73 M^2
FIBRINOGEN PPP-MCNC: 249 MG/DL (ref 182–366)
GLUCOSE SERPL-MCNC: 204 MG/DL (ref 70–110)
HCT VFR BLD AUTO: 23.2 % (ref 37–48.5)
HGB BLD-MCNC: 7.6 G/DL (ref 12–16)
IMM GRANULOCYTES # BLD AUTO: ABNORMAL K/UL (ref 0–0.04)
IMM GRANULOCYTES NFR BLD AUTO: ABNORMAL % (ref 0–0.5)
INR PPP: 1.3 (ref 0.8–1.2)
LDH SERPL L TO P-CCNC: 519 U/L (ref 110–260)
LYMPHOCYTES NFR BLD: 40 % (ref 18–48)
MAGNESIUM SERPL-MCNC: 1.8 MG/DL (ref 1.6–2.6)
MCH RBC QN AUTO: 28.8 PG (ref 27–31)
MCHC RBC AUTO-ENTMCNC: 32.8 G/DL (ref 32–36)
MCV RBC AUTO: 88 FL (ref 82–98)
METAMYELOCYTES NFR BLD MANUAL: 1 %
MONOCYTES NFR BLD: 18 % (ref 4–15)
MYELOCYTES NFR BLD MANUAL: 3 %
NEUTROPHILS NFR BLD: 4 % (ref 38–73)
NRBC BLD-RTO: 1 /100 WBC
OVALOCYTES BLD QL SMEAR: ABNORMAL
PHOSPHATE SERPL-MCNC: 4.3 MG/DL (ref 2.7–4.5)
PLATELET # BLD AUTO: 61 K/UL (ref 150–350)
PLATELET BLD QL SMEAR: ABNORMAL
PMV BLD AUTO: 11.4 FL (ref 9.2–12.9)
POIKILOCYTOSIS BLD QL SMEAR: SLIGHT
POTASSIUM SERPL-SCNC: 4.2 MMOL/L (ref 3.5–5.1)
PROT SERPL-MCNC: 5.8 G/DL (ref 6–8.4)
PROTHROMBIN TIME: 13.7 SEC (ref 9–12.5)
RBC # BLD AUTO: 2.64 M/UL (ref 4–5.4)
SMUDGE CELLS BLD QL SMEAR: PRESENT
SODIUM SERPL-SCNC: 140 MMOL/L (ref 136–145)
URATE SERPL-MCNC: 2.4 MG/DL (ref 2.4–5.7)
WBC # BLD AUTO: 3.67 K/UL (ref 3.9–12.7)

## 2020-09-25 PROCEDURE — 25000003 PHARM REV CODE 250: Performed by: STUDENT IN AN ORGANIZED HEALTH CARE EDUCATION/TRAINING PROGRAM

## 2020-09-25 PROCEDURE — 80053 COMPREHEN METABOLIC PANEL: CPT

## 2020-09-25 PROCEDURE — 83735 ASSAY OF MAGNESIUM: CPT

## 2020-09-25 PROCEDURE — 63600175 PHARM REV CODE 636 W HCPCS: Performed by: INTERNAL MEDICINE

## 2020-09-25 PROCEDURE — 25000003 PHARM REV CODE 250: Performed by: NURSE PRACTITIONER

## 2020-09-25 PROCEDURE — 83615 LACTATE (LD) (LDH) ENZYME: CPT

## 2020-09-25 PROCEDURE — 20600001 HC STEP DOWN PRIVATE ROOM

## 2020-09-25 PROCEDURE — 85027 COMPLETE CBC AUTOMATED: CPT

## 2020-09-25 PROCEDURE — 84100 ASSAY OF PHOSPHORUS: CPT

## 2020-09-25 PROCEDURE — 85610 PROTHROMBIN TIME: CPT

## 2020-09-25 PROCEDURE — 85007 BL SMEAR W/DIFF WBC COUNT: CPT

## 2020-09-25 PROCEDURE — 85384 FIBRINOGEN ACTIVITY: CPT

## 2020-09-25 PROCEDURE — 99233 PR SUBSEQUENT HOSPITAL CARE,LEVL III: ICD-10-PCS | Mod: ,,, | Performed by: INTERNAL MEDICINE

## 2020-09-25 PROCEDURE — 25000003 PHARM REV CODE 250: Performed by: INTERNAL MEDICINE

## 2020-09-25 PROCEDURE — 99233 SBSQ HOSP IP/OBS HIGH 50: CPT | Mod: ,,, | Performed by: INTERNAL MEDICINE

## 2020-09-25 PROCEDURE — 85730 THROMBOPLASTIN TIME PARTIAL: CPT

## 2020-09-25 PROCEDURE — 84550 ASSAY OF BLOOD/URIC ACID: CPT

## 2020-09-25 RX ADMIN — ARSENIC TRIOXIDE 20 MG: 1 INJECTION, SOLUTION INTRAVENOUS at 02:09

## 2020-09-25 RX ADMIN — VALACYCLOVIR HYDROCHLORIDE 1000 MG: 500 TABLET, FILM COATED ORAL at 08:09

## 2020-09-25 RX ADMIN — TRETINOIN 50 MG: 10 CAPSULE ORAL at 05:09

## 2020-09-25 RX ADMIN — Medication 400 MG: at 10:09

## 2020-09-25 RX ADMIN — NYSTATIN: 100000 POWDER TOPICAL at 08:09

## 2020-09-25 RX ADMIN — TRETINOIN 50 MG: 10 CAPSULE ORAL at 07:09

## 2020-09-25 RX ADMIN — CEFTRIAXONE 2 G: 2 INJECTION, SOLUTION INTRAVENOUS at 10:09

## 2020-09-25 RX ADMIN — PROCHLORPERAZINE EDISYLATE 10 MG: 5 INJECTION INTRAMUSCULAR; INTRAVENOUS at 01:09

## 2020-09-25 RX ADMIN — Medication 400 MG: at 06:09

## 2020-09-25 RX ADMIN — ALLOPURINOL 300 MG: 300 TABLET ORAL at 08:09

## 2020-09-25 NOTE — ASSESSMENT & PLAN NOTE
Patient is a 25 year old woman presenting with concern for APL.    Plan:  - Monitor TLS labs daily and DIC labs BID   - 2D ECHO - normal heart function.    - Continue tretinoin (start 9/18, today is Day 8) BID w/ meals  - Continue ASO (start 09/21, today is day 5).   - Continue allopurinol 300mg daily and IVF 100ml hr  - Transfuse cyroglobulin if fibrinogen <150    - Platelet goal > 50k.   - INR < 1.5  - Monitor I/O and daily weights twice daily

## 2020-09-25 NOTE — PLAN OF CARE
Arsenic started through  right arm midline noted for having positive blood return over2 hours  Chemotherapy dosage and BSA checked by two chemotherapy certified nurses prior to administration.  Chemotherapy education done prior to hanging of chemotherapy.  Chemotherapeutic precautions in place throughout therapy.  Will continue to monitor.

## 2020-09-25 NOTE — PLAN OF CARE
POC reviewed with patient; pt very irritable during the shift. Day 8 of ATRA  and Day 4 ASO. Pt voiding without difficulty in BSCC. `1 BM this shift, Cdiff test pending. Pt with nonskid footwear on, bed in lowest position, and locked with bed rails up x2. Pt instructed to call prior to getting OOB. Pt has call light and personal items within reach. Patient ambulates in room independently. Lesion on lip noted on assessment. Pt complains of rash to anus. Nystatin powder given scheduled. VSS and afebrile this shift. All questions and concerns addressed at this time. Will continue to monitor.

## 2020-09-25 NOTE — PLAN OF CARE
Problem: Adult Inpatient Plan of Care  Goal: Plan of Care Review  9/25/2020 1618 by Becky Bernardo RN  Outcome: Ongoing, Progressing  9/25/2020 1434 by Becky Bernardo RN  Outcome: Ongoing, Progressing   Pt AAO;  assist oob for safety . Vital signs stable and pt remained afebrile throughout shift. Receiving  day 8 of Arsenic and Day 4 of tretinoin .  Tolerating chemo well. Patient remains ceftriaxone. Labs drawn as ordered.    Pt remained free from falls during shift. Patient bathed today.   Bed lowest position and locked.  Call light in reach.  TM

## 2020-09-25 NOTE — ASSESSMENT & PLAN NOTE
Patient presenting with possible APL and neutropenic fever.   Found to have strep B agalactiae blood cultures (+ at OSH). She was started on vancomycin and cefepime at the OSH.   - cultures and sensitivities faxed to us by [facility] show organism sensitive to pcn.   - changed to ctx 09/24  - Follow up repeat cultures - ngtd

## 2020-09-25 NOTE — SUBJECTIVE & OBJECTIVE
Subjective:     Interval History: ATRA day 8, ASO day 5. Comfortable on RA. Awaiting slides from [facility], request sent. WBC count has recovered to 3.67.    Objective:     Vital Signs (Most Recent):  Temp: 98.3 °F (36.8 °C) (09/25/20 0409)  Pulse: 94 (09/25/20 0409)  Resp: 19 (09/25/20 0409)  BP: 119/65 (09/25/20 0409)  SpO2: (!) 91 % (09/25/20 0409) Vital Signs (24h Range):  Temp:  [96.1 °F (35.6 °C)-98.6 °F (37 °C)] 98.3 °F (36.8 °C)  Pulse:  [] 94  Resp:  [15-19] 19  SpO2:  [91 %-98 %] 91 %  BP: ()/(54-75) 119/65     Weight: (!) 138.2 kg (304 lb 9.1 oz)  Body mass index is 59.48 kg/m².  Body surface area is 2.42 meters squared.    ECOG SCORE         [unfilled]    Intake/Output - Last 3 Shifts       09/23 0700 - 09/24 0659 09/24 0700 - 09/25 0659    Blood 250     Total Intake(mL/kg) 250 (1.8)     Net +250           Urine Occurrence 2 x 4 x    Stool Occurrence 6 x 2 x          Physical Exam  Vitals signs reviewed.   Constitutional:       Appearance: Normal appearance. She is obese.   HENT:      Head: Normocephalic and atraumatic.      Nose: Nose normal.      Mouth/Throat:      Comments: Fever blister noted.  Eyes:      Extraocular Movements: Extraocular movements intact.   Neck:      Musculoskeletal: Normal range of motion and neck supple.   Cardiovascular:      Rate and Rhythm: Normal rate and regular rhythm.      Heart sounds: No murmur.   Pulmonary:      Effort: Pulmonary effort is normal. No respiratory distress.   Abdominal:      General: There is no distension.      Palpations: Abdomen is soft.      Tenderness: There is no abdominal tenderness.   Musculoskeletal: Normal range of motion.         General: No swelling.   Skin:     General: Skin is warm and dry.   Neurological:      General: No focal deficit present.      Mental Status: She is alert and oriented to person, place, and time.   Psychiatric:         Mood and Affect: Mood normal.         Behavior: Behavior normal.         Significant Labs:    All pertinent labs from the last 24 hours have been reviewed.    Diagnostic Results:  I have reviewed and interpreted all pertinent imaging results/findings within the past 24 hours.

## 2020-09-25 NOTE — PROGRESS NOTES
[facility]  Hematology  Bone Marrow Transplant  Progress Note    Patient Name: Erika Saini  Admission Date: 9/18/2020  Hospital Length of Stay: 7 days  Code Status: Full Code    Subjective:     Interval History: ATRA day 8, ASO day 5. Comfortable on RA. Awaiting slides from [facility], request sent. WBC count has recovered to 3.67.    Objective:     Vital Signs (Most Recent):  Temp: 98.3 °F (36.8 °C) (09/25/20 0409)  Pulse: 94 (09/25/20 0409)  Resp: 19 (09/25/20 0409)  BP: 119/65 (09/25/20 0409)  SpO2: (!) 91 % (09/25/20 0409) Vital Signs (24h Range):  Temp:  [96.1 °F (35.6 °C)-98.6 °F (37 °C)] 98.3 °F (36.8 °C)  Pulse:  [] 94  Resp:  [15-19] 19  SpO2:  [91 %-98 %] 91 %  BP: ()/(54-75) 119/65     Weight: (!) 138.2 kg (304 lb 9.1 oz)  Body mass index is 59.48 kg/m².  Body surface area is 2.42 meters squared.    ECOG SCORE         [unfilled]    Intake/Output - Last 3 Shifts       09/23 0700 - 09/24 0659 09/24 0700 - 09/25 0659    Blood 250     Total Intake(mL/kg) 250 (1.8)     Net +250           Urine Occurrence 2 x 4 x    Stool Occurrence 6 x 2 x          Physical Exam  Vitals signs reviewed.   Constitutional:       Appearance: Normal appearance. She is obese.   HENT:      Head: Normocephalic and atraumatic.      Nose: Nose normal.      Mouth/Throat:      Comments: Fever blister noted.  Eyes:      Extraocular Movements: Extraocular movements intact.   Neck:      Musculoskeletal: Normal range of motion and neck supple.   Cardiovascular:      Rate and Rhythm: Normal rate and regular rhythm.      Heart sounds: No murmur.   Pulmonary:      Effort: Pulmonary effort is normal. No respiratory distress.   Abdominal:      General: There is no distension.      Palpations: Abdomen is soft.      Tenderness: There is no abdominal tenderness.   Musculoskeletal: Normal range of motion.         General: No swelling.   Skin:     General: Skin is warm and dry.   Neurological:      General: No focal deficit  present.      Mental Status: She is alert and oriented to person, place, and time.   Psychiatric:         Mood and Affect: Mood normal.         Behavior: Behavior normal.         Significant Labs:   All pertinent labs from the last 24 hours have been reviewed.    Diagnostic Results:  I have reviewed and interpreted all pertinent imaging results/findings within the past 24 hours.    Assessment/Plan:     * APL (acute promyelocytic leukemia)  Patient is a 25 year old woman presenting with concern for APL.    Plan:  - Monitor TLS labs daily and DIC labs BID   - 2D ECHO - normal heart function.    - Continue tretinoin (start 9/18, today is Day 8) BID w/ meals  - Continue ASO (start 09/21, today is day 5).   - Continue allopurinol 300mg daily and IVF 100ml hr  - Transfuse cyroglobulin if fibrinogen <150    - Platelet goal > 50k.   - INR < 1.5  - Monitor I/O and daily weights twice daily    Shortness of breath  On RA  S/p 3days of dexamethasone 10mg bid for mild symptoms of differentiation syndrome.     Hypokalemia  - Monitor and replace PRN      Pancytopenia  Secondary to APL   -- Transfuse if platelets <10, Hgb<7       Adjustment reaction with anxiety  - seen by oncology psychology     Neutropenic fever  Patient presenting with possible APL and neutropenic fever.   Found to have strep B agalactiae blood cultures (+ at OSH). She was started on vancomycin and cefepime at the OSH.   - cultures and sensitivities faxed to us by [facility] show organism sensitive to pcn.   - changed to ctx 09/24  - Follow up repeat cultures - ngtd       Morbid obesity with BMI of 50.0-59.9, adult  Patient with BMI 57.91        VTE Risk Mitigation (From admission, onward)         Ordered     heparin, porcine (PF) 100 unit/mL injection flush 300 Units  As needed (PRN)      09/21/20 1554     IP VTE HIGH RISK PATIENT  Once      09/18/20 0148     Place sequential compression device  Until discontinued      09/18/20 0148                Disposition: in  patient for induction    Ethel Uribe MD  Bone Marrow Transplant  [facility]

## 2020-09-26 LAB
ABO + RH BLD: NORMAL
ALBUMIN SERPL BCP-MCNC: 2.4 G/DL (ref 3.5–5.2)
ALP SERPL-CCNC: 57 U/L (ref 55–135)
ALT SERPL W/O P-5'-P-CCNC: 75 U/L (ref 10–44)
ANION GAP SERPL CALC-SCNC: 8 MMOL/L (ref 8–16)
ANISOCYTOSIS BLD QL SMEAR: SLIGHT
APTT BLDCRRT: 25.7 SEC (ref 21–32)
AST SERPL-CCNC: 51 U/L (ref 10–40)
BASOPHILS NFR BLD: 0 % (ref 0–1.9)
BILIRUB SERPL-MCNC: 0.3 MG/DL (ref 0.1–1)
BLASTS NFR BLD MANUAL: 16 %
BLD GP AB SCN CELLS X3 SERPL QL: NORMAL
BUN SERPL-MCNC: 21 MG/DL (ref 6–20)
CALCIUM SERPL-MCNC: 7.8 MG/DL (ref 8.7–10.5)
CHLORIDE SERPL-SCNC: 108 MMOL/L (ref 95–110)
CO2 SERPL-SCNC: 24 MMOL/L (ref 23–29)
CREAT SERPL-MCNC: 0.6 MG/DL (ref 0.5–1.4)
DIFFERENTIAL METHOD: ABNORMAL
EOSINOPHIL NFR BLD: 0 % (ref 0–8)
ERYTHROCYTE [DISTWIDTH] IN BLOOD BY AUTOMATED COUNT: 15.9 % (ref 11.5–14.5)
EST. GFR  (AFRICAN AMERICAN): >60 ML/MIN/1.73 M^2
EST. GFR  (NON AFRICAN AMERICAN): >60 ML/MIN/1.73 M^2
FIBRINOGEN PPP-MCNC: 200 MG/DL (ref 182–366)
GLUCOSE SERPL-MCNC: 146 MG/DL (ref 70–110)
HCT VFR BLD AUTO: 22.1 % (ref 37–48.5)
HGB BLD-MCNC: 7.2 G/DL (ref 12–16)
HYPOCHROMIA BLD QL SMEAR: ABNORMAL
IMM GRANULOCYTES # BLD AUTO: ABNORMAL K/UL (ref 0–0.04)
IMM GRANULOCYTES NFR BLD AUTO: ABNORMAL % (ref 0–0.5)
INR PPP: 1.2 (ref 0.8–1.2)
LDH SERPL L TO P-CCNC: 558 U/L (ref 110–260)
LYMPHOCYTES NFR BLD: 37 % (ref 18–48)
MAGNESIUM SERPL-MCNC: 1.8 MG/DL (ref 1.6–2.6)
MCH RBC QN AUTO: 28.8 PG (ref 27–31)
MCHC RBC AUTO-ENTMCNC: 32.6 G/DL (ref 32–36)
MCV RBC AUTO: 88 FL (ref 82–98)
METAMYELOCYTES NFR BLD MANUAL: 3 %
MONOCYTES NFR BLD: 19 % (ref 4–15)
MYELOCYTES NFR BLD MANUAL: 9 %
NEUTROPHILS NFR BLD: 10 % (ref 38–73)
NEUTS BAND NFR BLD MANUAL: 1 %
NRBC BLD-RTO: 1 /100 WBC
OVALOCYTES BLD QL SMEAR: ABNORMAL
PHOSPHATE SERPL-MCNC: 4.1 MG/DL (ref 2.7–4.5)
PLATELET # BLD AUTO: 52 K/UL (ref 150–350)
PLATELET BLD QL SMEAR: ABNORMAL
PMV BLD AUTO: 12.6 FL (ref 9.2–12.9)
POCT GLUCOSE: 117 MG/DL (ref 70–110)
POCT GLUCOSE: 121 MG/DL (ref 70–110)
POCT GLUCOSE: 141 MG/DL (ref 70–110)
POIKILOCYTOSIS BLD QL SMEAR: SLIGHT
POLYCHROMASIA BLD QL SMEAR: ABNORMAL
POTASSIUM SERPL-SCNC: 3.3 MMOL/L (ref 3.5–5.1)
PROMYELOCYTES NFR BLD MANUAL: 5 %
PROT SERPL-MCNC: 5.5 G/DL (ref 6–8.4)
PROTHROMBIN TIME: 13.5 SEC (ref 9–12.5)
RBC # BLD AUTO: 2.5 M/UL (ref 4–5.4)
SODIUM SERPL-SCNC: 140 MMOL/L (ref 136–145)
TROPONIN I SERPL DL<=0.01 NG/ML-MCNC: 0.01 NG/ML (ref 0–0.03)
URATE SERPL-MCNC: 2.5 MG/DL (ref 2.4–5.7)
WBC # BLD AUTO: 5.57 K/UL (ref 3.9–12.7)

## 2020-09-26 PROCEDURE — 85730 THROMBOPLASTIN TIME PARTIAL: CPT

## 2020-09-26 PROCEDURE — 25000003 PHARM REV CODE 250: Performed by: STUDENT IN AN ORGANIZED HEALTH CARE EDUCATION/TRAINING PROGRAM

## 2020-09-26 PROCEDURE — 85610 PROTHROMBIN TIME: CPT

## 2020-09-26 PROCEDURE — 25000003 PHARM REV CODE 250: Performed by: INTERNAL MEDICINE

## 2020-09-26 PROCEDURE — 93010 EKG 12-LEAD: ICD-10-PCS | Mod: ,,, | Performed by: INTERNAL MEDICINE

## 2020-09-26 PROCEDURE — 93005 ELECTROCARDIOGRAM TRACING: CPT

## 2020-09-26 PROCEDURE — 99232 SBSQ HOSP IP/OBS MODERATE 35: CPT | Mod: ,,, | Performed by: INTERNAL MEDICINE

## 2020-09-26 PROCEDURE — 85384 FIBRINOGEN ACTIVITY: CPT

## 2020-09-26 PROCEDURE — 84550 ASSAY OF BLOOD/URIC ACID: CPT

## 2020-09-26 PROCEDURE — 86920 COMPATIBILITY TEST SPIN: CPT

## 2020-09-26 PROCEDURE — 20600001 HC STEP DOWN PRIVATE ROOM

## 2020-09-26 PROCEDURE — 84100 ASSAY OF PHOSPHORUS: CPT

## 2020-09-26 PROCEDURE — 86901 BLOOD TYPING SEROLOGIC RH(D): CPT

## 2020-09-26 PROCEDURE — 99232 PR SUBSEQUENT HOSPITAL CARE,LEVL II: ICD-10-PCS | Mod: ,,, | Performed by: INTERNAL MEDICINE

## 2020-09-26 PROCEDURE — 84484 ASSAY OF TROPONIN QUANT: CPT

## 2020-09-26 PROCEDURE — 85027 COMPLETE CBC AUTOMATED: CPT

## 2020-09-26 PROCEDURE — 93010 ELECTROCARDIOGRAM REPORT: CPT | Mod: ,,, | Performed by: INTERNAL MEDICINE

## 2020-09-26 PROCEDURE — 85007 BL SMEAR W/DIFF WBC COUNT: CPT

## 2020-09-26 PROCEDURE — 63600175 PHARM REV CODE 636 W HCPCS: Performed by: STUDENT IN AN ORGANIZED HEALTH CARE EDUCATION/TRAINING PROGRAM

## 2020-09-26 PROCEDURE — 83615 LACTATE (LD) (LDH) ENZYME: CPT

## 2020-09-26 PROCEDURE — 25000003 PHARM REV CODE 250: Performed by: NURSE PRACTITIONER

## 2020-09-26 PROCEDURE — 83735 ASSAY OF MAGNESIUM: CPT

## 2020-09-26 PROCEDURE — 63600175 PHARM REV CODE 636 W HCPCS: Performed by: INTERNAL MEDICINE

## 2020-09-26 PROCEDURE — 80053 COMPREHEN METABOLIC PANEL: CPT

## 2020-09-26 RX ORDER — MAG HYDROX/ALUMINUM HYD/SIMETH 200-200-20
30 SUSPENSION, ORAL (FINAL DOSE FORM) ORAL ONCE
Status: COMPLETED | OUTPATIENT
Start: 2020-09-26 | End: 2020-09-26

## 2020-09-26 RX ORDER — LIDOCAINE HYDROCHLORIDE 20 MG/ML
10 SOLUTION OROPHARYNGEAL ONCE
Status: DISCONTINUED | OUTPATIENT
Start: 2020-09-26 | End: 2020-09-27

## 2020-09-26 RX ORDER — CALCIUM CARBONATE 200(500)MG
500 TABLET,CHEWABLE ORAL DAILY PRN
Status: DISCONTINUED | OUTPATIENT
Start: 2020-09-26 | End: 2020-10-18 | Stop reason: HOSPADM

## 2020-09-26 RX ORDER — POTASSIUM CHLORIDE 750 MG/1
40 CAPSULE, EXTENDED RELEASE ORAL ONCE
Status: COMPLETED | OUTPATIENT
Start: 2020-09-26 | End: 2020-09-26

## 2020-09-26 RX ADMIN — Medication 400 MG: at 06:09

## 2020-09-26 RX ADMIN — VALACYCLOVIR HYDROCHLORIDE 1000 MG: 500 TABLET, FILM COATED ORAL at 08:09

## 2020-09-26 RX ADMIN — TRETINOIN 50 MG: 10 CAPSULE ORAL at 05:09

## 2020-09-26 RX ADMIN — TRETINOIN 50 MG: 10 CAPSULE ORAL at 08:09

## 2020-09-26 RX ADMIN — POTASSIUM CHLORIDE 40 MEQ: 750 CAPSULE, EXTENDED RELEASE ORAL at 02:09

## 2020-09-26 RX ADMIN — Medication 400 MG: at 09:09

## 2020-09-26 RX ADMIN — CALCIUM CARBONATE (ANTACID) CHEW TAB 500 MG 500 MG: 500 CHEW TAB at 05:09

## 2020-09-26 RX ADMIN — CEFTRIAXONE 2 G: 2 INJECTION, SOLUTION INTRAVENOUS at 01:09

## 2020-09-26 RX ADMIN — ALLOPURINOL 300 MG: 300 TABLET ORAL at 08:09

## 2020-09-26 RX ADMIN — POTASSIUM CHLORIDE 20 MEQ: 750 CAPSULE, EXTENDED RELEASE ORAL at 06:09

## 2020-09-26 RX ADMIN — PROMETHAZINE HYDROCHLORIDE 6.25 MG: 25 INJECTION INTRAMUSCULAR; INTRAVENOUS at 09:09

## 2020-09-26 RX ADMIN — NYSTATIN: 100000 POWDER TOPICAL at 08:09

## 2020-09-26 RX ADMIN — ARSENIC TRIOXIDE 20 MG: 1 INJECTION, SOLUTION INTRAVENOUS at 02:09

## 2020-09-26 RX ADMIN — ALUMINUM HYDROXIDE, MAGNESIUM HYDROXIDE, AND SIMETHICONE 30 ML: 200; 200; 20 SUSPENSION ORAL at 03:09

## 2020-09-26 RX ADMIN — PROCHLORPERAZINE EDISYLATE 10 MG: 5 INJECTION INTRAMUSCULAR; INTRAVENOUS at 01:09

## 2020-09-26 RX ADMIN — POTASSIUM CHLORIDE 20 MEQ: 750 CAPSULE, EXTENDED RELEASE ORAL at 09:09

## 2020-09-26 NOTE — SUBJECTIVE & OBJECTIVE
Subjective:     Interval History: ATRA day 8, ASO day 6. Complained of heartburn after having gumbo. Discussed importance of staying inpatient and undergoing treatment, as patient keeps wanting to go home. WBC count is increasing due to differentiation.     Objective:     Vital Signs (Most Recent):  Temp: 97.6 °F (36.4 °C) (09/26/20 1111)  Pulse: (!) 128 (09/26/20 1111)  Resp: 16 (09/26/20 1111)  BP: (!) 124/90 (09/26/20 1111)  SpO2: 100 % (09/26/20 1111) Vital Signs (24h Range):  Temp:  [97.6 °F (36.4 °C)-98.6 °F (37 °C)] 97.6 °F (36.4 °C)  Pulse:  [107-128] 128  Resp:  [16-20] 16  SpO2:  [9 %-100 %] 100 %  BP: (106-130)/(52-90) 124/90     Weight: (!) 139.9 kg (308 lb 6.8 oz)  Body mass index is 60.23 kg/m².  Body surface area is 2.43 meters squared.    ECOG SCORE         [unfilled]    Intake/Output - Last 3 Shifts       09/24 0700 - 09/25 0659 09/25 0700 - 09/26 0659 09/26 0700 - 09/27 0659    P.O.  1038 525    Blood       IV Piggyback  50 50    Total Intake(mL/kg)  1088 (7.8) 575 (4.1)    Urine (mL/kg/hr)  100 (0) 125 (0.1)    Total Output  100 125    Net  +988 +450           Urine Occurrence 4 x 3 x     Stool Occurrence 2 x            Physical Exam  Vitals signs reviewed.   Constitutional:       Appearance: Normal appearance. She is obese.   HENT:      Head: Normocephalic and atraumatic.      Nose: Nose normal.      Mouth/Throat:      Comments: Fever blister noted.  Eyes:      Extraocular Movements: Extraocular movements intact.   Neck:      Musculoskeletal: Normal range of motion and neck supple.   Cardiovascular:      Rate and Rhythm: Regular rhythm. Tachycardia present.      Heart sounds: No murmur.   Pulmonary:      Effort: Pulmonary effort is normal. No respiratory distress.   Abdominal:      General: There is no distension.      Palpations: Abdomen is soft.      Tenderness: There is no abdominal tenderness.   Musculoskeletal: Normal range of motion.         General: No swelling.   Skin:     General: Skin  is warm and dry.   Neurological:      General: No focal deficit present.      Mental Status: She is alert and oriented to person, place, and time.   Psychiatric:         Mood and Affect: Mood normal.         Behavior: Behavior normal.         Significant Labs:   All pertinent labs from the last 24 hours have been reviewed.    Diagnostic Results:  I have reviewed and interpreted all pertinent imaging results/findings within the past 24 hours.

## 2020-09-26 NOTE — ASSESSMENT & PLAN NOTE
On RA  S/p 3days of dexamethasone 10mg bid for mild symptoms of differentiation syndrome.   - Denies any symptoms now, continue to monitor

## 2020-09-26 NOTE — PROGRESS NOTES
[facility]  Hematology  Bone Marrow Transplant  Progress Note    Patient Name: Erika Saini  Admission Date: 9/18/2020  Hospital Length of Stay: 8 days  Code Status: Full Code    Subjective:     Interval History: ATRA day 8, ASO day 6. Complained of heartburn after having gumbo. Discussed importance of staying inpatient and undergoing treatment, as patient keeps wanting to go home. WBC count is increasing due to differentiation.     Objective:     Vital Signs (Most Recent):  Temp: 97.6 °F (36.4 °C) (09/26/20 1111)  Pulse: (!) 128 (09/26/20 1111)  Resp: 16 (09/26/20 1111)  BP: (!) 124/90 (09/26/20 1111)  SpO2: 100 % (09/26/20 1111) Vital Signs (24h Range):  Temp:  [97.6 °F (36.4 °C)-98.6 °F (37 °C)] 97.6 °F (36.4 °C)  Pulse:  [107-128] 128  Resp:  [16-20] 16  SpO2:  [9 %-100 %] 100 %  BP: (106-130)/(52-90) 124/90     Weight: (!) 139.9 kg (308 lb 6.8 oz)  Body mass index is 60.23 kg/m².  Body surface area is 2.43 meters squared.    ECOG SCORE         [unfilled]    Intake/Output - Last 3 Shifts       09/24 0700 - 09/25 0659 09/25 0700 - 09/26 0659 09/26 0700 - 09/27 0659    P.O.  1038 525    Blood       IV Piggyback  50 50    Total Intake(mL/kg)  1088 (7.8) 575 (4.1)    Urine (mL/kg/hr)  100 (0) 125 (0.1)    Total Output  100 125    Net  +988 +450           Urine Occurrence 4 x 3 x     Stool Occurrence 2 x            Physical Exam  Vitals signs reviewed.   Constitutional:       Appearance: Normal appearance. She is obese.   HENT:      Head: Normocephalic and atraumatic.      Nose: Nose normal.      Mouth/Throat:      Comments: Fever blister noted.  Eyes:      Extraocular Movements: Extraocular movements intact.   Neck:      Musculoskeletal: Normal range of motion and neck supple.   Cardiovascular:      Rate and Rhythm: Regular rhythm. Tachycardia present.      Heart sounds: No murmur.   Pulmonary:      Effort: Pulmonary effort is normal. No respiratory distress.   Abdominal:      General: There  is no distension.      Palpations: Abdomen is soft.      Tenderness: There is no abdominal tenderness.   Musculoskeletal: Normal range of motion.         General: No swelling.   Skin:     General: Skin is warm and dry.   Neurological:      General: No focal deficit present.      Mental Status: She is alert and oriented to person, place, and time.   Psychiatric:         Mood and Affect: Mood normal.         Behavior: Behavior normal.         Significant Labs:   All pertinent labs from the last 24 hours have been reviewed.    Diagnostic Results:  I have reviewed and interpreted all pertinent imaging results/findings within the past 24 hours.    Assessment/Plan:     * APL (acute promyelocytic leukemia)  Patient is a 25 year old woman presenting with concern for APL.    Plan:  - Monitor TLS and DIC  labs daily, Keep monitoring for differentiation syndrome  - 2D ECHO - EF 58%  - Continue tretinoin (start 9/18, today is Day 9) BID w/ meals, monitor Qtc ( EKG weekly), LFT  - Continue ASO (start 09/21, today is day 6).   - Continue allopurinol 300mg daily  - Transfuse cyroglobulin if fibrinogen <150    - Platelet goal > 50k.   - INR < 1.5  - Monitor I/O and daily weights twice daily    Shortness of breath  On RA  S/p 3days of dexamethasone 10mg bid for mild symptoms of differentiation syndrome.   - Denies any symptoms now, continue to monitor     Hypokalemia  - Monitor and replace PRN, keep K >4 and Mg >4      Pancytopenia  Secondary to APL   -- Transfuse if platelets < 50, Hgb< 7      Adjustment reaction with anxiety  - seen by oncology psychology     Neutropenic fever  Patient presenting with possible APL and neutropenic fever.   Found to have strep B agalactiae blood cultures (+ at OSH). She was started on vancomycin and cefepime at the OSH.   - cultures and sensitivities faxed to us by [facility] show organism sensitive to pcn.   - changed to Ceftriaxone 09/24, End date 10/3  - Follow up repeat cultures - ngtd       Morbid  obesity with BMI of 50.0-59.9, adult  Patient with BMI 57.91        VTE Risk Mitigation (From admission, onward)         Ordered     heparin, porcine (PF) 100 unit/mL injection flush 300 Units  As needed (PRN)      09/21/20 1554     IP VTE HIGH RISK PATIENT  Once      09/18/20 0148     Place sequential compression device  Until discontinued      09/18/20 0148                Disposition: Home    Paul Cheng MD  Bone Marrow Transplant  [facility]

## 2020-09-26 NOTE — ASSESSMENT & PLAN NOTE
Patient is a 25 year old woman presenting with concern for APL.    Plan:  - Monitor TLS and DIC  labs daily, Keep monitoring for differentiation syndrome  - 2D ECHO - EF 58%  - Continue tretinoin (start 9/18, today is Day 9) BID w/ meals, monitor Qtc ( EKG weekly), LFT  - Continue ASO (start 09/21, today is day 6).   - Continue allopurinol 300mg daily  - Transfuse cyroglobulin if fibrinogen <150    - Platelet goal > 50k.   - INR < 1.5  - Monitor I/O and daily weights twice daily

## 2020-09-26 NOTE — PLAN OF CARE
POC reviewed with patient; pt very irritable during the shift. Day 9 of ATRA  and Day 5 ASO. Pt voiding without difficulty in BSCC. Pt with nonskid footwear on, bed in lowest position, and locked with bed rails up x2. Pt instructed to call prior to getting OOB. Pt has call light and personal items within reach. Patient ambulates in room independently. Lesion on lip noted on assessment. VSS and afebrile this shift. All questions and concerns addressed at this time. Will continue to monitor.

## 2020-09-26 NOTE — ASSESSMENT & PLAN NOTE
Patient presenting with possible APL and neutropenic fever.   Found to have strep B agalactiae blood cultures (+ at OSH). She was started on vancomycin and cefepime at the OSH.   - cultures and sensitivities faxed to us by [facility] show organism sensitive to pcn.   - changed to Ceftriaxone 09/24, End date 10/3  - Follow up repeat cultures - ngtd

## 2020-09-27 PROBLEM — H35.00 RETINOPATHY: Status: ACTIVE | Noted: 2020-09-27

## 2020-09-27 LAB
ALBUMIN SERPL BCP-MCNC: 2.7 G/DL (ref 3.5–5.2)
ALP SERPL-CCNC: 60 U/L (ref 55–135)
ALT SERPL W/O P-5'-P-CCNC: 107 U/L (ref 10–44)
ANION GAP SERPL CALC-SCNC: 10 MMOL/L (ref 8–16)
ANISOCYTOSIS BLD QL SMEAR: SLIGHT
APTT BLDCRRT: 28.8 SEC (ref 21–32)
AST SERPL-CCNC: 64 U/L (ref 10–40)
BASOPHILS NFR BLD: 0 % (ref 0–1.9)
BILIRUB SERPL-MCNC: 0.4 MG/DL (ref 0.1–1)
BLASTS NFR BLD MANUAL: 10 %
BLD PROD TYP BPU: NORMAL
BLOOD UNIT EXPIRATION DATE: NORMAL
BLOOD UNIT TYPE CODE: 6200
BLOOD UNIT TYPE: NORMAL
BUN SERPL-MCNC: 19 MG/DL (ref 6–20)
CALCIUM SERPL-MCNC: 8.2 MG/DL (ref 8.7–10.5)
CHLORIDE SERPL-SCNC: 110 MMOL/L (ref 95–110)
CO2 SERPL-SCNC: 21 MMOL/L (ref 23–29)
CODING SYSTEM: NORMAL
CREAT SERPL-MCNC: 0.6 MG/DL (ref 0.5–1.4)
DIFFERENTIAL METHOD: ABNORMAL
DISPENSE STATUS: NORMAL
EOSINOPHIL NFR BLD: 0 % (ref 0–8)
ERYTHROCYTE [DISTWIDTH] IN BLOOD BY AUTOMATED COUNT: 15.9 % (ref 11.5–14.5)
EST. GFR  (AFRICAN AMERICAN): >60 ML/MIN/1.73 M^2
EST. GFR  (NON AFRICAN AMERICAN): >60 ML/MIN/1.73 M^2
FIBRINOGEN PPP-MCNC: 249 MG/DL (ref 182–366)
GLUCOSE SERPL-MCNC: 114 MG/DL (ref 70–110)
HCT VFR BLD AUTO: 22.4 % (ref 37–48.5)
HGB BLD-MCNC: 7.2 G/DL (ref 12–16)
IMM GRANULOCYTES # BLD AUTO: ABNORMAL K/UL (ref 0–0.04)
IMM GRANULOCYTES NFR BLD AUTO: ABNORMAL % (ref 0–0.5)
INR PPP: 1.2 (ref 0.8–1.2)
LDH SERPL L TO P-CCNC: 587 U/L (ref 110–260)
LYMPHOCYTES NFR BLD: 42 % (ref 18–48)
MAGNESIUM SERPL-MCNC: 1.8 MG/DL (ref 1.6–2.6)
MCH RBC QN AUTO: 28.9 PG (ref 27–31)
MCHC RBC AUTO-ENTMCNC: 32.1 G/DL (ref 32–36)
MCV RBC AUTO: 90 FL (ref 82–98)
METAMYELOCYTES NFR BLD MANUAL: 6 %
MONOCYTES NFR BLD: 7 % (ref 4–15)
MYELOCYTES NFR BLD MANUAL: 12 %
NEUTROPHILS NFR BLD: 14 % (ref 38–73)
NEUTS BAND NFR BLD MANUAL: 4 %
NRBC BLD-RTO: 0 /100 WBC
NUM UNITS TRANS WBC-POOR PLATPHERESIS: NORMAL
OVALOCYTES BLD QL SMEAR: ABNORMAL
PHOSPHATE SERPL-MCNC: 3.1 MG/DL (ref 2.7–4.5)
PLATELET # BLD AUTO: 41 K/UL (ref 150–350)
PMV BLD AUTO: 11 FL (ref 9.2–12.9)
POCT GLUCOSE: 115 MG/DL (ref 70–110)
POCT GLUCOSE: 123 MG/DL (ref 70–110)
POCT GLUCOSE: 139 MG/DL (ref 70–110)
POIKILOCYTOSIS BLD QL SMEAR: SLIGHT
POLYCHROMASIA BLD QL SMEAR: ABNORMAL
POTASSIUM SERPL-SCNC: 3.6 MMOL/L (ref 3.5–5.1)
PROMYELOCYTES NFR BLD MANUAL: 5 %
PROT SERPL-MCNC: 5.7 G/DL (ref 6–8.4)
PROTHROMBIN TIME: 13.2 SEC (ref 9–12.5)
RBC # BLD AUTO: 2.49 M/UL (ref 4–5.4)
SODIUM SERPL-SCNC: 141 MMOL/L (ref 136–145)
URATE SERPL-MCNC: 2.5 MG/DL (ref 2.4–5.7)
WBC # BLD AUTO: 8.64 K/UL (ref 3.9–12.7)

## 2020-09-27 PROCEDURE — 25000003 PHARM REV CODE 250: Performed by: INTERNAL MEDICINE

## 2020-09-27 PROCEDURE — 25000003 PHARM REV CODE 250: Performed by: STUDENT IN AN ORGANIZED HEALTH CARE EDUCATION/TRAINING PROGRAM

## 2020-09-27 PROCEDURE — P9037 PLATE PHERES LEUKOREDU IRRAD: HCPCS

## 2020-09-27 PROCEDURE — 85027 COMPLETE CBC AUTOMATED: CPT

## 2020-09-27 PROCEDURE — 85384 FIBRINOGEN ACTIVITY: CPT

## 2020-09-27 PROCEDURE — 86644 CMV ANTIBODY: CPT

## 2020-09-27 PROCEDURE — 99233 PR SUBSEQUENT HOSPITAL CARE,LEVL III: ICD-10-PCS | Mod: ,,, | Performed by: INTERNAL MEDICINE

## 2020-09-27 PROCEDURE — 84100 ASSAY OF PHOSPHORUS: CPT

## 2020-09-27 PROCEDURE — 83615 LACTATE (LD) (LDH) ENZYME: CPT

## 2020-09-27 PROCEDURE — 36430 TRANSFUSION BLD/BLD COMPNT: CPT

## 2020-09-27 PROCEDURE — 36415 COLL VENOUS BLD VENIPUNCTURE: CPT

## 2020-09-27 PROCEDURE — 20600001 HC STEP DOWN PRIVATE ROOM

## 2020-09-27 PROCEDURE — 85730 THROMBOPLASTIN TIME PARTIAL: CPT

## 2020-09-27 PROCEDURE — 80053 COMPREHEN METABOLIC PANEL: CPT

## 2020-09-27 PROCEDURE — 63600175 PHARM REV CODE 636 W HCPCS: Performed by: INTERNAL MEDICINE

## 2020-09-27 PROCEDURE — 99233 SBSQ HOSP IP/OBS HIGH 50: CPT | Mod: ,,, | Performed by: INTERNAL MEDICINE

## 2020-09-27 PROCEDURE — 85610 PROTHROMBIN TIME: CPT

## 2020-09-27 PROCEDURE — 84550 ASSAY OF BLOOD/URIC ACID: CPT

## 2020-09-27 PROCEDURE — 85007 BL SMEAR W/DIFF WBC COUNT: CPT

## 2020-09-27 PROCEDURE — 83735 ASSAY OF MAGNESIUM: CPT

## 2020-09-27 PROCEDURE — 25000003 PHARM REV CODE 250: Performed by: NURSE PRACTITIONER

## 2020-09-27 RX ORDER — POTASSIUM CHLORIDE 750 MG/1
20 CAPSULE, EXTENDED RELEASE ORAL ONCE
Status: COMPLETED | OUTPATIENT
Start: 2020-09-27 | End: 2020-09-27

## 2020-09-27 RX ORDER — POTASSIUM CHLORIDE 750 MG/1
40 CAPSULE, EXTENDED RELEASE ORAL ONCE
Status: DISCONTINUED | OUTPATIENT
Start: 2020-09-27 | End: 2020-09-27

## 2020-09-27 RX ORDER — HYDROCODONE BITARTRATE AND ACETAMINOPHEN 500; 5 MG/1; MG/1
TABLET ORAL
Status: DISCONTINUED | OUTPATIENT
Start: 2020-09-27 | End: 2020-09-28

## 2020-09-27 RX ADMIN — ACETAMINOPHEN 650 MG: 325 TABLET ORAL at 05:09

## 2020-09-27 RX ADMIN — TRETINOIN 50 MG: 10 CAPSULE ORAL at 05:09

## 2020-09-27 RX ADMIN — NYSTATIN: 100000 POWDER TOPICAL at 09:09

## 2020-09-27 RX ADMIN — ALLOPURINOL 300 MG: 300 TABLET ORAL at 09:09

## 2020-09-27 RX ADMIN — CALCIUM CARBONATE (ANTACID) CHEW TAB 500 MG 500 MG: 500 CHEW TAB at 04:09

## 2020-09-27 RX ADMIN — VALACYCLOVIR HYDROCHLORIDE 1000 MG: 500 TABLET, FILM COATED ORAL at 09:09

## 2020-09-27 RX ADMIN — Medication 400 MG: at 09:09

## 2020-09-27 RX ADMIN — PROCHLORPERAZINE EDISYLATE 10 MG: 5 INJECTION INTRAMUSCULAR; INTRAVENOUS at 01:09

## 2020-09-27 RX ADMIN — DIPHENHYDRAMINE HYDROCHLORIDE 25 MG: 25 CAPSULE ORAL at 05:09

## 2020-09-27 RX ADMIN — CEFTRIAXONE 2 G: 2 INJECTION, SOLUTION INTRAVENOUS at 01:09

## 2020-09-27 RX ADMIN — POTASSIUM CHLORIDE 20 MEQ: 750 CAPSULE, EXTENDED RELEASE ORAL at 05:09

## 2020-09-27 RX ADMIN — TRETINOIN 50 MG: 10 CAPSULE ORAL at 09:09

## 2020-09-27 RX ADMIN — POTASSIUM CHLORIDE 20 MEQ: 750 CAPSULE, EXTENDED RELEASE ORAL at 09:09

## 2020-09-27 RX ADMIN — Medication 400 MG: at 05:09

## 2020-09-27 RX ADMIN — ARSENIC TRIOXIDE 20 MG: 1 INJECTION, SOLUTION INTRAVENOUS at 02:09

## 2020-09-27 NOTE — PLAN OF CARE
POC reviewed with patient and mother,questions answered and concerns addressed. VSS, tolerating regular diet without difficulty, OOB to chair x 1.5hr today and went to [employer] with SN and mother. Replacement K+ given as ordered, medicated x 2 for c/o heartburn. In no acute distress; WCM.

## 2020-09-27 NOTE — ASSESSMENT & PLAN NOTE
Patient presenting with possible APL and neutropenic fever.   Found to have strep B agalactiae blood cultures (+ at OSH). She was started on vancomycin and cefepime at the OSH.   - cultures and sensitivities faxed to us by [facility] show organism sensitive to pcn.   - changed to Ceftriaxone 09/24, End date 10/3  - Follow up repeat cultures - ngtd

## 2020-09-27 NOTE — NURSING
Chemotherapy note. CAR and consent on chart, pre=meds given as ordered. Dose # 9 Arsenic 20mg/ 270cc NS given over 2 hrs. Blood return good on both lumens of PICC line. Tolerating chemotherapy well, CATRACHITO.

## 2020-09-27 NOTE — CONSULTS
Consultation Report  Ophthalmology Service    Date: 09/27/2020    Chief complaint/Reason for Consult: Decreased vision both eyes      History of Present Illness: Erika Saini is a 25 y.o. female with recent diagnosis of APL was admitted on 9/18 for neutropenic fever. Currently on ATRA (day 10). Reports noticing intermittent blurriness with glare while watching tv a few weeks ago (prior to APL diagnosis) which has progressively worsened and lately her blurry vision seems constant. Occurring in both eyes but worse in the left. Denies flashes/floaters, visual obscurations, missing parts of vision, black spots in vision, changes in color vision, headache, eye pain. Reports her glasses are recent within the last year.     POcularHx: No history of ocular problems or past ocular surgeries.  Wears glasses but not contacts     Current eye gtts: none      PMHx:  has no past medical history on file.     PSurgHx:  has no past surgical history on file.     Home Medications:   Prior to Admission medications    Not on File        Medications this encounter:    allopurinoL  300 mg Oral Daily    arsenic (TRISENOX) chemo infusion  20 mg Intravenous Q24H    cefTRIAXone (ROCEPHIN) IVPB  2 g Intravenous Q24H    nystatin   Topical (Top) BID    prochlorperazine  10 mg Intravenous Q24H    tretinoin  45 mg/m2/day Oral BID WM    valACYclovir  1,000 mg Oral BID       Allergies: is allergic to amoxicillin.     Social:       Family Hx: grandfather with glaucoma.     ROS: Negative x 10 except for complaints as described in HPI; negative for fever, chills, weight loss, nausea, vomiting, diarrhea, shortness of breath, nasal discharge, cough, abdominal pain, dyspnea, difficulty moving arms and legs, confusion, dysuria, palpitations, or chest pain     Ocular examination/Dilated fundus examination:  Base Eye Exam     Visual Acuity       Right Left    Near cc 20/70 20/200          Tonometry (tonopen , 12:31 PM)       Right Left    Pressure  23 18          Pupils       Dark Light Shape React APD    Right 4 2 Round Brisk None    Left 4 2 Round Brisk Trace          Visual Fields       Right Left     Full Full          Extraocular Movement       Right Left     Full, Ortho Full, Ortho            Additional Tests     Color       Right Left    Ishihara 14/14 14/14            Slit Lamp and Fundus Exam     External Exam       Right Left    External Normal Normal          Pen Light Exam       Right Left    Lids/Lashes Normal Normal    Conjunctiva/Sclera White and quiet White and quiet    Cornea Clear     Iris Round and reactive Round and reactive    Lens Clear Clear    Vitreous Normal Normal          Fundus Exam       Right Left    Disc sharp/pink  sharp/pink     C/D Ratio 0.2 0.2    Macula Diffuse intraretinal/preretinal heme not overlying fovea 3-4DD preretinal heme overlying fovea, scatterred intraretinal heme, CWS along superior arcade    Vessels Normal Normal    Periphery Scattered intraretinal heme in midperiphery 360 Scattered intraretinal heme in midperiphery 360                  Assessment/Plan:   1. Leukemic Retinopathy Both Eyes   - Plan to obtain Mac OCT in ophthalmology clinic during the day tomorrow to rule out submacular hemorrhage.   - Recommend medical optimization of anemia and thrombocytopenia.   - Will bring patient to retina clinic this week while admitted.     Discussed patient's history, physical, assessment and plan with Dr. Frausto.  If there are further questions, please page the on call ophthalmology resident.    Brooks Miller MD  PGY2, Ophthalmology Resident  09/27/2020  12:32 PM

## 2020-09-27 NOTE — PLAN OF CARE
"Plan of care reviewed with the patient at the beginning of shift. The patient is alert and oriented. GCS 15. The patient did complaints of chest pain once over night. CXR, ECG, and labs ordered. Pt is also complaining of bed alarm being on despite being educated on why she has the bed alarm on. Pt admitted to HX of falls at home. Pt repeatedly would press the call bell then immediately get out of bed within 15-30 seconds of calling. By the time staff arrived in the room the pt would be up and about stating she "got tired of waiting." The patient was explained the safety reasons as to why she needed to give staff adequate time to respond. Per pt she "feels like I am in [place]" "I feel like I'm on a slave ship" and "I feel like a salve." Pt called her mother to complain of the bed alarm who asked to speak with the staff. After speaking with the mother, the mother attempted to explain to the PT the safety reasons as to why she is in a bed alarm. Pt began arguing with her mother. Staff attempted to explain to the patient again, that while it is desired that she spends time out of bed and moving around to prevent complications, there is a safety aspect to be considered and that her rapid movements, complaints of chest pain, and hx of falls makes her a high risk for falls. Up with assistance. Remained free from falls and injuries throughout shift. VSS. Bed in low locked position. Call bell and personal items within reach. Will continue to monitor.   "

## 2020-09-27 NOTE — PROGRESS NOTES
[facility]  Hematology  Bone Marrow Transplant  Progress Note    Patient Name: Erika Saini  Admission Date: 9/18/2020  Hospital Length of Stay: 9 days  Code Status: Full Code    Subjective:     Interval History: ATRA day 10, ASO day 7.  Chest pain overnight, work up negative and now resolved. 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     Objective:     Vital Signs (Most Recent):  Temp: 98.8 °F (37.1 °C) (09/27/20 1127)  Pulse: (!) 113 (09/27/20 1127)  Resp: 17 (09/27/20 1127)  BP: 109/64 (09/27/20 1127)  SpO2: 96 % (09/27/20 1127) Vital Signs (24h Range):  Temp:  [98.2 °F (36.8 °C)-98.8 °F (37.1 °C)] 98.8 °F (37.1 °C)  Pulse:  [113-131] 113  Resp:  [17-20] 17  SpO2:  [96 %-98 %] 96 %  BP: (101-109)/(56-67) 109/64     Weight: (!) 140.8 kg (310 lb 6.5 oz)  Body mass index is 60.62 kg/m².  Body surface area is 2.44 meters squared.    ECOG SCORE         [unfilled]    Intake/Output - Last 3 Shifts       09/25 0700 - 09/26 0659 09/26 0700 - 09/27 0659 09/27 0700 - 09/28 0659    P.O. 1038 1300     IV Piggyback 50 370     Total Intake(mL/kg) 1088 (7.8) 1670 (11.9)     Urine (mL/kg/hr) 100 (0) 525 (0.2)     Total Output 100 525     Net +988 +1145            Urine Occurrence 3 x 1 x           Physical Exam  Vitals signs reviewed.   Constitutional:       Appearance: Normal appearance. She is obese.   HENT:      Head: Normocephalic and atraumatic.      Nose: Nose normal.      Mouth/Throat:      Comments: Fever blister noted.  Eyes:      Extraocular Movements: Extraocular movements intact.      Pupils: Pupils are equal, round, and reactive to light.   Neck:      Musculoskeletal: Normal range of motion and neck supple.   Cardiovascular:      Rate and Rhythm: Regular rhythm. Tachycardia present.      Heart sounds: No murmur.   Pulmonary:      Effort: Pulmonary effort is normal. No respiratory  distress.   Abdominal:      General: There is no distension.      Palpations: Abdomen is soft.      Tenderness: There is no abdominal tenderness.   Musculoskeletal: Normal range of motion.         General: No swelling.   Skin:     General: Skin is warm and dry.   Neurological:      General: No focal deficit present.      Mental Status: She is alert and oriented to person, place, and time.   Psychiatric:         Mood and Affect: Mood normal.         Behavior: Behavior normal.         Significant Labs:   All pertinent labs from the last 24 hours have been reviewed.    Diagnostic Results:  I have reviewed and interpreted all pertinent imaging results/findings within the past 24 hours.    Assessment/Plan:     * APL (acute promyelocytic leukemia)  Patient is a 25 year old woman presenting with concern for APL.    Plan:  - Monitor TLS and DIC  labs daily, Keep monitoring for differentiation syndrome  - 2D ECHO - EF 58%  - Continue tretinoin (start 9/18, today is Day 10) BID w/ meals,  - Continue ASO (start 09/21, today is day 7), monitor Qtc ( EKG weekly), LFT  - Continue allopurinol 300mg daily  - Transfuse cyroglobulin if fibrinogen <150    - Platelet goal > 50k.   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   - Denies any symptoms now, continue to monitor     Hypokalemia  - Monitor and replace PRN, keep K >4 and Mg >2 ( Given qt prolongation risk with treatment)       Pancytopenia  Secondary to APL   -- Transfuse if platelets < 50, Hgb< 7      Adjustment reaction with anxiety  - seen by oncology psychology     Neutropenic fever  Patient presenting with possible APL and neutropenic fever.   Found to have strep B  agalactiae blood cultures (+ at OSH). She was started on vancomycin and cefepime at the OSH.   - cultures and sensitivities faxed to us by [facility] show organism sensitive to pcn.   - changed to Ceftriaxone 09/24, End date 10/3  - Follow up repeat cultures - ngtd       Morbid obesity with BMI of 50.0-59.9, adult  Patient with BMI 57.91        VTE Risk Mitigation (From admission, onward)         Ordered     heparin, porcine (PF) 100 unit/mL injection flush 300 Units  As needed (PRN)      09/21/20 1554     IP VTE HIGH RISK PATIENT  Once      09/18/20 0148     Place sequential compression device  Until discontinued      09/18/20 0148                Disposition: Home    Paul Cheng MD  Bone Marrow Transplant  [facility]

## 2020-09-27 NOTE — ASSESSMENT & PLAN NOTE
Patient is a 25 year old woman presenting with concern for APL.    Plan:  - Monitor TLS and DIC  labs daily, Keep monitoring for differentiation syndrome  - 2D ECHO - EF 58%  - Continue tretinoin (start 9/18, today is Day 10) BID w/ meals,  - Continue ASO (start 09/21, today is day 7), monitor Qtc ( EKG weekly), LFT  - Continue allopurinol 300mg daily  - Transfuse cyroglobulin if fibrinogen <150    - Platelet goal > 50k.   - INR < 1.5  - Monitor I/O and daily weights twice daily

## 2020-09-27 NOTE — ASSESSMENT & PLAN NOTE
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

## 2020-09-27 NOTE — SUBJECTIVE & OBJECTIVE
Subjective:     Interval History: ATRA day 10, ASO day 7.  Chest pain overnight, work up negative and now resolved. 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     Objective:     Vital Signs (Most Recent):  Temp: 98.8 °F (37.1 °C) (09/27/20 1127)  Pulse: (!) 113 (09/27/20 1127)  Resp: 17 (09/27/20 1127)  BP: 109/64 (09/27/20 1127)  SpO2: 96 % (09/27/20 1127) Vital Signs (24h Range):  Temp:  [98.2 °F (36.8 °C)-98.8 °F (37.1 °C)] 98.8 °F (37.1 °C)  Pulse:  [113-131] 113  Resp:  [17-20] 17  SpO2:  [96 %-98 %] 96 %  BP: (101-109)/(56-67) 109/64     Weight: (!) 140.8 kg (310 lb 6.5 oz)  Body mass index is 60.62 kg/m².  Body surface area is 2.44 meters squared.    ECOG SCORE         [unfilled]    Intake/Output - Last 3 Shifts       09/25 0700 - 09/26 0659 09/26 0700 - 09/27 0659 09/27 0700 - 09/28 0659    P.O. 1038 1300     IV Piggyback 50 370     Total Intake(mL/kg) 1088 (7.8) 1670 (11.9)     Urine (mL/kg/hr) 100 (0) 525 (0.2)     Total Output 100 525     Net +988 +1145            Urine Occurrence 3 x 1 x           Physical Exam  Vitals signs reviewed.   Constitutional:       Appearance: Normal appearance. She is obese.   HENT:      Head: Normocephalic and atraumatic.      Nose: Nose normal.      Mouth/Throat:      Comments: Fever blister noted.  Eyes:      Extraocular Movements: Extraocular movements intact.      Pupils: Pupils are equal, round, and reactive to light.   Neck:      Musculoskeletal: Normal range of motion and neck supple.   Cardiovascular:      Rate and Rhythm: Regular rhythm. Tachycardia present.      Heart sounds: No murmur.   Pulmonary:      Effort: Pulmonary effort is normal. No respiratory distress.   Abdominal:      General: There is no distension.      Palpations: Abdomen is soft.      Tenderness: There is no abdominal tenderness.   Musculoskeletal: Normal range of motion.         General:  No swelling.   Skin:     General: Skin is warm and dry.   Neurological:      General: No focal deficit present.      Mental Status: She is alert and oriented to person, place, and time.   Psychiatric:         Mood and Affect: Mood normal.         Behavior: Behavior normal.         Significant Labs:   All pertinent labs from the last 24 hours have been reviewed.    Diagnostic Results:  I have reviewed and interpreted all pertinent imaging results/findings within the past 24 hours.

## 2020-09-28 LAB
ALBUMIN SERPL BCP-MCNC: 2.4 G/DL (ref 3.5–5.2)
ALP SERPL-CCNC: 53 U/L (ref 55–135)
ALT SERPL W/O P-5'-P-CCNC: 95 U/L (ref 10–44)
ANION GAP SERPL CALC-SCNC: 9 MMOL/L (ref 8–16)
ANISOCYTOSIS BLD QL SMEAR: SLIGHT
APTT BLDCRRT: 31.5 SEC (ref 21–32)
AST SERPL-CCNC: 46 U/L (ref 10–40)
BASO STIPL BLD QL SMEAR: ABNORMAL
BASOPHILS NFR BLD: 0 % (ref 0–1.9)
BILIRUB SERPL-MCNC: 0.4 MG/DL (ref 0.1–1)
BLASTS NFR BLD MANUAL: 4 %
BLD PROD TYP BPU: NORMAL
BLOOD UNIT EXPIRATION DATE: NORMAL
BLOOD UNIT TYPE CODE: 5100
BLOOD UNIT TYPE: NORMAL
BUN SERPL-MCNC: 13 MG/DL (ref 6–20)
CALCIUM SERPL-MCNC: 7.8 MG/DL (ref 8.7–10.5)
CHLORIDE SERPL-SCNC: 110 MMOL/L (ref 95–110)
CO2 SERPL-SCNC: 21 MMOL/L (ref 23–29)
CODING SYSTEM: NORMAL
CREAT SERPL-MCNC: 0.5 MG/DL (ref 0.5–1.4)
DIFFERENTIAL METHOD: ABNORMAL
DISPENSE STATUS: NORMAL
EOSINOPHIL NFR BLD: 1 % (ref 0–8)
ERYTHROCYTE [DISTWIDTH] IN BLOOD BY AUTOMATED COUNT: 16.2 % (ref 11.5–14.5)
EST. GFR  (AFRICAN AMERICAN): >60 ML/MIN/1.73 M^2
EST. GFR  (NON AFRICAN AMERICAN): >60 ML/MIN/1.73 M^2
FIBRINOGEN PPP-MCNC: 310 MG/DL (ref 182–366)
GLUCOSE SERPL-MCNC: 123 MG/DL (ref 70–110)
HCT VFR BLD AUTO: 19.9 % (ref 37–48.5)
HGB BLD-MCNC: 6.5 G/DL (ref 12–16)
IMM GRANULOCYTES # BLD AUTO: ABNORMAL K/UL (ref 0–0.04)
IMM GRANULOCYTES NFR BLD AUTO: ABNORMAL % (ref 0–0.5)
INR PPP: 1.2 (ref 0.8–1.2)
LDH SERPL L TO P-CCNC: 445 U/L (ref 110–260)
LYMPHOCYTES NFR BLD: 34 % (ref 18–48)
MAGNESIUM SERPL-MCNC: 1.6 MG/DL (ref 1.6–2.6)
MCH RBC QN AUTO: 29 PG (ref 27–31)
MCHC RBC AUTO-ENTMCNC: 32.7 G/DL (ref 32–36)
MCV RBC AUTO: 89 FL (ref 82–98)
METAMYELOCYTES NFR BLD MANUAL: 4 %
MONOCYTES NFR BLD: 10 % (ref 4–15)
MYELOCYTES NFR BLD MANUAL: 10 %
NEUTROPHILS NFR BLD: 35 % (ref 38–73)
NEUTS BAND NFR BLD MANUAL: 2 %
NRBC BLD-RTO: 0 /100 WBC
NUM UNITS TRANS PACKED RBC: NORMAL
OVALOCYTES BLD QL SMEAR: ABNORMAL
PHOSPHATE SERPL-MCNC: 3.9 MG/DL (ref 2.7–4.5)
PLATELET # BLD AUTO: 47 K/UL (ref 150–350)
PLATELET BLD QL SMEAR: ABNORMAL
PMV BLD AUTO: 11.9 FL (ref 9.2–12.9)
POCT GLUCOSE: 121 MG/DL (ref 70–110)
POCT GLUCOSE: 130 MG/DL (ref 70–110)
POCT GLUCOSE: 141 MG/DL (ref 70–110)
POIKILOCYTOSIS BLD QL SMEAR: SLIGHT
POLYCHROMASIA BLD QL SMEAR: ABNORMAL
POTASSIUM SERPL-SCNC: 3.6 MMOL/L (ref 3.5–5.1)
PROT SERPL-MCNC: 5.3 G/DL (ref 6–8.4)
PROTHROMBIN TIME: 13.5 SEC (ref 9–12.5)
RBC # BLD AUTO: 2.24 M/UL (ref 4–5.4)
SMUDGE CELLS BLD QL SMEAR: PRESENT
SODIUM SERPL-SCNC: 140 MMOL/L (ref 136–145)
URATE SERPL-MCNC: 2.4 MG/DL (ref 2.4–5.7)
WBC # BLD AUTO: 7.79 K/UL (ref 3.9–12.7)

## 2020-09-28 PROCEDURE — 85730 THROMBOPLASTIN TIME PARTIAL: CPT

## 2020-09-28 PROCEDURE — 25000003 PHARM REV CODE 250: Performed by: NURSE PRACTITIONER

## 2020-09-28 PROCEDURE — 99233 PR SUBSEQUENT HOSPITAL CARE,LEVL III: ICD-10-PCS | Mod: ,,, | Performed by: INTERNAL MEDICINE

## 2020-09-28 PROCEDURE — 97802 MEDICAL NUTRITION INDIV IN: CPT

## 2020-09-28 PROCEDURE — P9040 RBC LEUKOREDUCED IRRADIATED: HCPCS

## 2020-09-28 PROCEDURE — 20600001 HC STEP DOWN PRIVATE ROOM

## 2020-09-28 PROCEDURE — 80053 COMPREHEN METABOLIC PANEL: CPT

## 2020-09-28 PROCEDURE — 84550 ASSAY OF BLOOD/URIC ACID: CPT

## 2020-09-28 PROCEDURE — 99233 SBSQ HOSP IP/OBS HIGH 50: CPT | Mod: ,,, | Performed by: INTERNAL MEDICINE

## 2020-09-28 PROCEDURE — 83615 LACTATE (LD) (LDH) ENZYME: CPT

## 2020-09-28 PROCEDURE — 85384 FIBRINOGEN ACTIVITY: CPT

## 2020-09-28 PROCEDURE — 25000003 PHARM REV CODE 250: Performed by: INTERNAL MEDICINE

## 2020-09-28 PROCEDURE — 63600175 PHARM REV CODE 636 W HCPCS: Mod: JG | Performed by: INTERNAL MEDICINE

## 2020-09-28 PROCEDURE — 83735 ASSAY OF MAGNESIUM: CPT

## 2020-09-28 PROCEDURE — 25000003 PHARM REV CODE 250: Performed by: STUDENT IN AN ORGANIZED HEALTH CARE EDUCATION/TRAINING PROGRAM

## 2020-09-28 PROCEDURE — 85007 BL SMEAR W/DIFF WBC COUNT: CPT

## 2020-09-28 PROCEDURE — 85610 PROTHROMBIN TIME: CPT

## 2020-09-28 PROCEDURE — 85027 COMPLETE CBC AUTOMATED: CPT

## 2020-09-28 PROCEDURE — 84100 ASSAY OF PHOSPHORUS: CPT

## 2020-09-28 RX ORDER — HYDROCODONE BITARTRATE AND ACETAMINOPHEN 500; 5 MG/1; MG/1
TABLET ORAL
Status: DISCONTINUED | OUTPATIENT
Start: 2020-09-28 | End: 2020-09-29

## 2020-09-28 RX ORDER — ACYCLOVIR 200 MG/1
400 CAPSULE ORAL 2 TIMES DAILY
Status: DISCONTINUED | OUTPATIENT
Start: 2020-09-28 | End: 2020-10-18 | Stop reason: HOSPADM

## 2020-09-28 RX ADMIN — PROCHLORPERAZINE EDISYLATE 10 MG: 5 INJECTION INTRAMUSCULAR; INTRAVENOUS at 05:09

## 2020-09-28 RX ADMIN — NYSTATIN: 100000 POWDER TOPICAL at 10:09

## 2020-09-28 RX ADMIN — ALLOPURINOL 300 MG: 300 TABLET ORAL at 09:09

## 2020-09-28 RX ADMIN — TRETINOIN 50 MG: 10 CAPSULE ORAL at 06:09

## 2020-09-28 RX ADMIN — ACYCLOVIR 400 MG: 200 CAPSULE ORAL at 09:09

## 2020-09-28 RX ADMIN — NYSTATIN: 100000 POWDER TOPICAL at 09:09

## 2020-09-28 RX ADMIN — CEFTRIAXONE 2 G: 2 INJECTION, SOLUTION INTRAVENOUS at 12:09

## 2020-09-28 RX ADMIN — ARSENIC TRIOXIDE 20 MG: 1 INJECTION, SOLUTION INTRAVENOUS at 06:09

## 2020-09-28 RX ADMIN — TRETINOIN 50 MG: 10 CAPSULE ORAL at 09:09

## 2020-09-28 RX ADMIN — DIPHENHYDRAMINE HYDROCHLORIDE 25 MG: 25 CAPSULE ORAL at 10:09

## 2020-09-28 RX ADMIN — ACETAMINOPHEN 650 MG: 325 TABLET ORAL at 10:09

## 2020-09-28 NOTE — ASSESSMENT & PLAN NOTE
Patient presenting with possible APL and neutropenic fever.   Found to have strep B agalactiae blood cultures (+ at OSH). She was started on vancomycin and cefepime at the OSH.   - cultures and sensitivities faxed to us by [facility] show organism sensitive to pcn.   - changed to Ceftriaxone 09/24, End date 10/3  - Follow up repeat cultures - ngtd

## 2020-09-28 NOTE — PLAN OF CARE
POC reviewed with patient and family, questions answered and concerns addressed. Tolerating regular diet without difficulty, voiding adequate amount cyu, c/o double vision and seen by ophthalmology @ bedside. CBG < 150 today, received 1 U platelets today without any problems. Patient answers questions appropriatey but does not retain instructions or directions continues to be very impulsive and tries to jumps  OOB setting bed alarm off. In no acute distress; will continue to monitor.

## 2020-09-28 NOTE — PROGRESS NOTES
[facility] Caregiver Fund card in the amount of $50 given to pts Mother Jennifer Saini.

## 2020-09-28 NOTE — ASSESSMENT & PLAN NOTE
Patient is a 25 year old woman presenting with concern for APL.    Plan:  - Monitor TLS and DIC  labs daily, Keep monitoring for differentiation syndrome  - 2D ECHO - EF 58%  - Continue tretinoin (start 9/18, today is Day 11) BID w/ meals,  - Continue ASO (start 09/21, today is day 8), monitor Qtc ( EKG weekly), LFT  - Continue allopurinol 300mg daily  - Transfuse cyroglobulin if fibrinogen <150    - Platelet goal > 50k.   - INR < 1.5  - Monitor I/O and daily weights twice daily

## 2020-09-28 NOTE — PROGRESS NOTES
Arsenic trioxide 20mg administered over two hours per chemo protocol via right arm PICC.  Blood return noted.  Dose delayed due to patient appointment in opthomology clinic.  Frequent vitals during transfusion.  Patient upset that dose might interfere with her 2030 bedtime and initially refused  but was assured that it would end about that time and that it would be detrimental to miss a dose.  Patient verbalized understanding.

## 2020-09-28 NOTE — CARE UPDATE
Rapid Response Nurse Chart Check     Chart check completed, abnormal VS noted. 1 unit PRBCs ordered and to be infused. Please closely monitor pt's BP. Call [phone] for further concerns or assistance.

## 2020-09-28 NOTE — PROGRESS NOTES
[facility]  Hematology  Bone Marrow Transplant  Progress Note    Patient Name: Erika Saini  Admission Date: 9/18/2020  Hospital Length of Stay: 10 days  Code Status: Full Code    Subjective:     Interval History: ATRA day 11, ASO day 8. Comfortable on RA. Endorses blurry vision but denies severe HA, focal neurological deficits, or severe vision loss. No chest pain, comfortable on RA. WBC count normal. PLTs 41. Optho plan mac OCT to rule submacular hemorrhage.     Objective:     Vital Signs (Most Recent):  Temp: 98.7 °F (37.1 °C) (09/28/20 0356)  Pulse: (!) 127 (09/28/20 0356)  Resp: 18 (09/28/20 0356)  BP: (!) 118/59 (09/28/20 0356)  SpO2: 95 % (09/28/20 0356) Vital Signs (24h Range):  Temp:  [97.6 °F (36.4 °C)-98.8 °F (37.1 °C)] 98.7 °F (37.1 °C)  Pulse:  [106-138] 127  Resp:  [17-20] 18  SpO2:  [95 %-100 %] 95 %  BP: (107-118)/(54-72) 118/59     Weight: (!) 141.3 kg (311 lb 6.4 oz)  Body mass index is 60.82 kg/m².  Body surface area is 2.45 meters squared.    ECOG SCORE         [unfilled]    Intake/Output - Last 3 Shifts       09/26 0700 - 09/27 0659 09/27 0700 - 09/28 0659    P.O. 1300 360    Blood  424    IV Piggyback 370     Total Intake(mL/kg) 1670 (11.9) 784 (5.6)    Urine (mL/kg/hr) 525 (0.2) 625 (0.2)    Total Output 525 625    Net +1145 +159          Urine Occurrence 1 x           Physical Exam  Vitals signs reviewed.   Constitutional:       Appearance: Normal appearance. She is obese.   HENT:      Head: Normocephalic and atraumatic.      Nose: Nose normal.      Mouth/Throat:      Comments: Fever blister noted.  Eyes:      Extraocular Movements: Extraocular movements intact.   Neck:      Musculoskeletal: Normal range of motion and neck supple.   Cardiovascular:      Rate and Rhythm: Normal rate and regular rhythm.      Heart sounds: No murmur.   Pulmonary:      Effort: Pulmonary effort is normal. No respiratory distress.   Abdominal:      General: There is no distension.       Palpations: Abdomen is soft.      Tenderness: There is no abdominal tenderness.   Musculoskeletal: Normal range of motion.         General: No swelling.   Skin:     General: Skin is warm and dry.   Neurological:      General: No focal deficit present.      Mental Status: She is alert and oriented to person, place, and time.   Psychiatric:         Mood and Affect: Mood normal.         Behavior: Behavior normal.         Significant Labs:   All pertinent labs from the last 24 hours have been reviewed.    Diagnostic Results:  I have reviewed and interpreted all pertinent imaging results/findings within the past 24 hours.    Assessment/Plan:     * APL (acute promyelocytic leukemia)  Patient is a 25 year old woman presenting with concern for APL.    Plan:  - Monitor TLS and DIC  labs daily, Keep monitoring for differentiation syndrome  - 2D ECHO - EF 58%  - Continue tretinoin (start 9/18, today is Day 11) BID w/ meals,  - Continue ASO (start 09/21, today is day 8), monitor Qtc ( EKG weekly), LFT  - Continue allopurinol 300mg daily  - Transfuse cyroglobulin if fibrinogen <150    - Platelet goal > 50k.   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     Shortness of breath  On RA  S/p 3days of dexamethasone 10mg bid for mild symptoms of differentiation syndrome.   - Denies any symptoms now, continue to monitor     Hypokalemia  - Monitor and replace PRN, keep K >4 and Mg >2 ( Given qt prolongation risk with treatment)       Pancytopenia  Secondary to APL   -- Transfuse if platelets < 50, Hgb< 7      Adjustment reaction with anxiety  - seen by oncology psychology     Neutropenic fever  Patient presenting with possible APL and neutropenic fever.   Found to have strep B agalactiae  blood cultures (+ at OSH). She was started on vancomycin and cefepime at the OSH.   - cultures and sensitivities faxed to us by [facility] show organism sensitive to pcn.   - changed to Ceftriaxone 09/24, End date 10/3  - Follow up repeat cultures - ngtd       Morbid obesity with BMI of 50.0-59.9, adult  Patient with BMI 57.91        VTE Risk Mitigation (From admission, onward)         Ordered     heparin, porcine (PF) 100 unit/mL injection flush 300 Units  As needed (PRN)      09/21/20 1554     IP VTE HIGH RISK PATIENT  Once      09/18/20 0148     Place sequential compression device  Until discontinued      09/18/20 0148                Disposition: in patient.    Ethel Uribe MD  Bone Marrow Transplant  [facility]

## 2020-09-28 NOTE — PLAN OF CARE
Plan of care reviewed. Patient is oriented X4. Bed alarm in use. Stand by assist. Bedside commode in room. DAT PICC and midline flushed and saline locked. Accu check HS. No complaints of pain or discomfort at this time. Antibiotics continued. Remained afebrile. Patient has been calm and cooperative this shift. All questions and concerns addressed. All safety precautions in place. Remains free of injury. Patient is stable. Will continue to monitor.

## 2020-09-28 NOTE — PLAN OF CARE
Problem: Oral Intake Inadequate  Goal: Improved Oral Intake  Outcome: Ongoing, Progressing   Recommendations     1. Continue regular diet, double portions.   2. Add Boost Plus TID.     Goals: 1. Pt's intake meals >75% x 7 days.  Nutrition Goal Status: progressing towards goal  Communication of RD Recs: (POC)

## 2020-09-28 NOTE — PROGRESS NOTES
"[facility]  Adult Nutrition  Progress Note    SUMMARY       Recommendations    1. Continue regular diet, double portions.   2. Add Boost Plus TID.    Goals: 1. Pt's intake meals >75% x 7 days.  Nutrition Goal Status: progressing towards goal  Communication of RD Recs: (POC)    Reason for Assessment    Reason For Assessment: length of stay  Diagnosis: cancer diagnosis/related complications(acute promyelocytic leukemia)  Relevant Medical History: APL, severe obesity  Interdisciplinary Rounds: did not attend  General Information Comments: Pt admitted with decreased appetite, dehydration. 75% intake double portions meals. Pt did not eat breakfast this morning and complained she is a picky eater and doesn't like the food options here; worked with pt to find things she can eat. Spoke with pt's mom on the phone; she is going to bring some things to put in the patient refrigerator that the pt likes. Pt reports she "thinks she has lost weight" but doesn't know her previous wt. Completed NFPE today 9/28/20: pt has no s/s wasting and does not meet criteria for malnutrition.  Nutrition Discharge Planning: Discharge on general healthy diet.    Nutrition Risk Screen    Nutrition Risk Screen: no indicators present    Nutrition/Diet History    Spiritual, Cultural Beliefs, Pentecostal Practices, Values that Affect Care: no    Anthropometrics    Temp: 98.6 °F (37 °C)  Height Method: Stated  Height: 5' (152.4 cm)  Height (inches): 60 in  Weight Method: Standard Scale  Weight: (!) 141.3 kg (311 lb 6.4 oz)  Weight (lb): (!) 311.4 lb  Ideal Body Weight (IBW), Female: 100 lb  % Ideal Body Weight, Female (lb): 296 %  BMI (Calculated): 60.8       Lab/Procedures/Meds    Pertinent Labs Reviewed: reviewed  Pertinent Labs Comments: Glu 123, Alb 2.4, ALT 95  Pertinent Medications Reviewed: reviewed  Pertinent Medications Comments: ---    Estimated/Assessed Needs    Weight Used For Calorie Calculations: (!) 141.3 kg (311 lb 8.2 " oz)  Energy Calorie Requirements (kcal): 2120  Energy Need Method: Kcal/kg(15 kcal/kg)  Protein Requirements: 113-127g  Weight Used For Protein Calculations: (!) 141.3 kg (311 lb 8.2 oz)        RDA Method (mL): 2120         Nutrition Prescription Ordered    Current Diet Order: Regular, double portions    Evaluation of Received Nutrient/Fluid Intake    Comments: LBM 9/28  % Intake of Estimated Energy Needs: 75 - 100 %  % Meal Intake: 75 - 100 %    Nutrition Risk    Level of Risk/Frequency of Follow-up: low     Assessment and Plan    Nutrition Problem  Inadequate oral intake    Related to (etiology):   Pt is picky eater    Signs and Symptoms (as evidenced by):   Pt reports not liking the hospital meal options, did not eat breakfast this morning.    Interventions(treatment strategy):  Collaboration of care with providers.    Nutrition Diagnosis Status:   New       Monitor and Evaluation    Food and Nutrient Intake: energy intake, food and beverage intake  Food and Nutrient Adminstration: diet order  Knowledge/Beliefs/Attitudes: food and nutrition knowledge/skill  Anthropometric Measurements: weight, weight change, body mass index  Biochemical Data, Medical Tests and Procedures: electrolyte and renal panel, gastrointestinal profile, glucose/endocrine profile, inflammatory profile, lipid profile  Nutrition-Focused Physical Findings: overall appearance     Malnutrition Assessment                 Orbital Region (Subcutaneous Fat Loss): well nourished  Upper Arm Region (Subcutaneous Fat Loss): well nourished  Thoracic and Lumbar Region: well nourished   Sabianism Region (Muscle Loss): well nourished  Clavicle Bone Region (Muscle Loss): well nourished  Clavicle and Acromion Bone Region (Muscle Loss): well nourished  Scapular Bone Region (Muscle Loss): well nourished  Dorsal Hand (Muscle Loss): mild depletion  Patellar Region (Muscle Loss): well nourished  Anterior Thigh Region (Muscle Loss): well nourished  Posterior Calf  Region (Muscle Loss): well nourished                 Nutrition Follow-Up    RD Follow-up?: Yes

## 2020-09-28 NOTE — ASSESSMENT & PLAN NOTE
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

## 2020-09-28 NOTE — SUBJECTIVE & OBJECTIVE
Subjective:     Interval History: ATRA day 11, ASO day 8. Comfortable on RA. Endorses blurry vision but denies severe HA, focal neurological deficits, or severe vision loss. No chest pain, comfortable on RA. WBC count normal. PLTs 41. Optho plan mac OCT to rule submacular hemorrhage.     Objective:     Vital Signs (Most Recent):  Temp: 98.7 °F (37.1 °C) (09/28/20 0356)  Pulse: (!) 127 (09/28/20 0356)  Resp: 18 (09/28/20 0356)  BP: (!) 118/59 (09/28/20 0356)  SpO2: 95 % (09/28/20 0356) Vital Signs (24h Range):  Temp:  [97.6 °F (36.4 °C)-98.8 °F (37.1 °C)] 98.7 °F (37.1 °C)  Pulse:  [106-138] 127  Resp:  [17-20] 18  SpO2:  [95 %-100 %] 95 %  BP: (107-118)/(54-72) 118/59     Weight: (!) 141.3 kg (311 lb 6.4 oz)  Body mass index is 60.82 kg/m².  Body surface area is 2.45 meters squared.    ECOG SCORE         [unfilled]    Intake/Output - Last 3 Shifts       09/26 0700 - 09/27 0659 09/27 0700 - 09/28 0659    P.O. 1300 360    Blood  424    IV Piggyback 370     Total Intake(mL/kg) 1670 (11.9) 784 (5.6)    Urine (mL/kg/hr) 525 (0.2) 625 (0.2)    Total Output 525 625    Net +1145 +159          Urine Occurrence 1 x           Physical Exam  Vitals signs reviewed.   Constitutional:       Appearance: Normal appearance. She is obese.   HENT:      Head: Normocephalic and atraumatic.      Nose: Nose normal.      Mouth/Throat:      Comments: Fever blister noted.  Eyes:      Extraocular Movements: Extraocular movements intact.   Neck:      Musculoskeletal: Normal range of motion and neck supple.   Cardiovascular:      Rate and Rhythm: Normal rate and regular rhythm.      Heart sounds: No murmur.   Pulmonary:      Effort: Pulmonary effort is normal. No respiratory distress.   Abdominal:      General: There is no distension.      Palpations: Abdomen is soft.      Tenderness: There is no abdominal tenderness.   Musculoskeletal: Normal range of motion.         General: No swelling.   Skin:     General: Skin is warm and dry.    Neurological:      General: No focal deficit present.      Mental Status: She is alert and oriented to person, place, and time.   Psychiatric:         Mood and Affect: Mood normal.         Behavior: Behavior normal.         Significant Labs:   All pertinent labs from the last 24 hours have been reviewed.    Diagnostic Results:  I have reviewed and interpreted all pertinent imaging results/findings within the past 24 hours.

## 2020-09-29 DIAGNOSIS — C92.40 ACUTE PROMYELOCYTIC LEUKEMIA NOT HAVING ACHIEVED REMISSION: Primary | ICD-10-CM

## 2020-09-29 LAB
ALBUMIN SERPL BCP-MCNC: 2.6 G/DL (ref 3.5–5.2)
ALP SERPL-CCNC: 62 U/L (ref 55–135)
ALT SERPL W/O P-5'-P-CCNC: 116 U/L (ref 10–44)
ANION GAP SERPL CALC-SCNC: 11 MMOL/L (ref 8–16)
ANISOCYTOSIS BLD QL SMEAR: SLIGHT
APTT BLDCRRT: 32.9 SEC (ref 21–32)
AST SERPL-CCNC: 71 U/L (ref 10–40)
BASOPHILS NFR BLD: 0 % (ref 0–1.9)
BILIRUB SERPL-MCNC: 0.5 MG/DL (ref 0.1–1)
BLASTS NFR BLD MANUAL: 2 %
BLD PROD TYP BPU: NORMAL
BLOOD UNIT EXPIRATION DATE: NORMAL
BLOOD UNIT TYPE CODE: 9500
BLOOD UNIT TYPE: NORMAL
BUN SERPL-MCNC: 10 MG/DL (ref 6–20)
CALCIUM SERPL-MCNC: 8.3 MG/DL (ref 8.7–10.5)
CHLORIDE SERPL-SCNC: 108 MMOL/L (ref 95–110)
CO2 SERPL-SCNC: 20 MMOL/L (ref 23–29)
CODING SYSTEM: NORMAL
CREAT SERPL-MCNC: 0.6 MG/DL (ref 0.5–1.4)
DIFFERENTIAL METHOD: ABNORMAL
DISPENSE STATUS: NORMAL
EOSINOPHIL NFR BLD: 0 % (ref 0–8)
ERYTHROCYTE [DISTWIDTH] IN BLOOD BY AUTOMATED COUNT: 15.9 % (ref 11.5–14.5)
EST. GFR  (AFRICAN AMERICAN): >60 ML/MIN/1.73 M^2
EST. GFR  (NON AFRICAN AMERICAN): >60 ML/MIN/1.73 M^2
FIBRINOGEN PPP-MCNC: 355 MG/DL (ref 182–366)
GLUCOSE SERPL-MCNC: 125 MG/DL (ref 70–110)
HCT VFR BLD AUTO: 23.4 % (ref 37–48.5)
HGB BLD-MCNC: 7.7 G/DL (ref 12–16)
HYPOCHROMIA BLD QL SMEAR: ABNORMAL
IMM GRANULOCYTES # BLD AUTO: ABNORMAL K/UL (ref 0–0.04)
IMM GRANULOCYTES NFR BLD AUTO: ABNORMAL % (ref 0–0.5)
INR PPP: 1.1 (ref 0.8–1.2)
LDH SERPL L TO P-CCNC: 490 U/L (ref 110–260)
LYMPHOCYTES NFR BLD: 42 % (ref 18–48)
MAGNESIUM SERPL-MCNC: 1.7 MG/DL (ref 1.6–2.6)
MCH RBC QN AUTO: 29.1 PG (ref 27–31)
MCHC RBC AUTO-ENTMCNC: 32.9 G/DL (ref 32–36)
MCV RBC AUTO: 88 FL (ref 82–98)
METAMYELOCYTES NFR BLD MANUAL: 10 %
MONOCYTES NFR BLD: 1 % (ref 4–15)
MYELOCYTES NFR BLD MANUAL: 7 %
NEUTROPHILS NFR BLD: 32 % (ref 38–73)
NEUTS BAND NFR BLD MANUAL: 6 %
NRBC BLD-RTO: 0 /100 WBC
NUM UNITS TRANS WBC-POOR PLATPHERESIS: NORMAL
PHOSPHATE SERPL-MCNC: 4.5 MG/DL (ref 2.7–4.5)
PLATELET # BLD AUTO: 41 K/UL (ref 150–350)
PLATELET BLD QL SMEAR: ABNORMAL
PMV BLD AUTO: 12.1 FL (ref 9.2–12.9)
POCT GLUCOSE: 112 MG/DL (ref 70–110)
POCT GLUCOSE: 121 MG/DL (ref 70–110)
POCT GLUCOSE: 127 MG/DL (ref 70–110)
POIKILOCYTOSIS BLD QL SMEAR: SLIGHT
POTASSIUM SERPL-SCNC: 3.6 MMOL/L (ref 3.5–5.1)
PROT SERPL-MCNC: 5.7 G/DL (ref 6–8.4)
PROTHROMBIN TIME: 12.4 SEC (ref 9–12.5)
RBC # BLD AUTO: 2.65 M/UL (ref 4–5.4)
SODIUM SERPL-SCNC: 139 MMOL/L (ref 136–145)
SPHEROCYTES BLD QL SMEAR: ABNORMAL
URATE SERPL-MCNC: 2.4 MG/DL (ref 2.4–5.7)
WBC # BLD AUTO: 8.83 K/UL (ref 3.9–12.7)

## 2020-09-29 PROCEDURE — 83735 ASSAY OF MAGNESIUM: CPT

## 2020-09-29 PROCEDURE — P9037 PLATE PHERES LEUKOREDU IRRAD: HCPCS

## 2020-09-29 PROCEDURE — 25000003 PHARM REV CODE 250: Performed by: STUDENT IN AN ORGANIZED HEALTH CARE EDUCATION/TRAINING PROGRAM

## 2020-09-29 PROCEDURE — 88321 CONSLTJ&REPRT SLD PREP ELSWR: CPT | Performed by: PATHOLOGY

## 2020-09-29 PROCEDURE — 85730 THROMBOPLASTIN TIME PARTIAL: CPT

## 2020-09-29 PROCEDURE — 86644 CMV ANTIBODY: CPT

## 2020-09-29 PROCEDURE — 85027 COMPLETE CBC AUTOMATED: CPT

## 2020-09-29 PROCEDURE — 90832 PSYTX W PT 30 MINUTES: CPT | Mod: ,,, | Performed by: PSYCHOLOGIST

## 2020-09-29 PROCEDURE — 99233 SBSQ HOSP IP/OBS HIGH 50: CPT | Mod: ,,, | Performed by: INTERNAL MEDICINE

## 2020-09-29 PROCEDURE — 63600175 PHARM REV CODE 636 W HCPCS: Mod: JG | Performed by: INTERNAL MEDICINE

## 2020-09-29 PROCEDURE — 90832 PR PSYCHOTHERAPY W/PATIENT, 30 MIN: ICD-10-PCS | Mod: ,,, | Performed by: PSYCHOLOGIST

## 2020-09-29 PROCEDURE — 20600001 HC STEP DOWN PRIVATE ROOM

## 2020-09-29 PROCEDURE — 85384 FIBRINOGEN ACTIVITY: CPT

## 2020-09-29 PROCEDURE — 84100 ASSAY OF PHOSPHORUS: CPT

## 2020-09-29 PROCEDURE — 25000003 PHARM REV CODE 250: Performed by: NURSE PRACTITIONER

## 2020-09-29 PROCEDURE — 84550 ASSAY OF BLOOD/URIC ACID: CPT

## 2020-09-29 PROCEDURE — 85610 PROTHROMBIN TIME: CPT

## 2020-09-29 PROCEDURE — 85007 BL SMEAR W/DIFF WBC COUNT: CPT

## 2020-09-29 PROCEDURE — 83615 LACTATE (LD) (LDH) ENZYME: CPT

## 2020-09-29 PROCEDURE — 25000003 PHARM REV CODE 250: Performed by: INTERNAL MEDICINE

## 2020-09-29 PROCEDURE — 99233 PR SUBSEQUENT HOSPITAL CARE,LEVL III: ICD-10-PCS | Mod: ,,, | Performed by: INTERNAL MEDICINE

## 2020-09-29 PROCEDURE — 80053 COMPREHEN METABOLIC PANEL: CPT

## 2020-09-29 RX ORDER — HYDROCODONE BITARTRATE AND ACETAMINOPHEN 500; 5 MG/1; MG/1
TABLET ORAL
Status: DISCONTINUED | OUTPATIENT
Start: 2020-09-29 | End: 2020-09-30

## 2020-09-29 RX ADMIN — PROCHLORPERAZINE EDISYLATE 10 MG: 5 INJECTION INTRAMUSCULAR; INTRAVENOUS at 01:09

## 2020-09-29 RX ADMIN — NYSTATIN: 100000 POWDER TOPICAL at 09:09

## 2020-09-29 RX ADMIN — ARSENIC TRIOXIDE 20 MG: 1 INJECTION, SOLUTION INTRAVENOUS at 02:09

## 2020-09-29 RX ADMIN — DIPHENHYDRAMINE HYDROCHLORIDE 25 MG: 25 CAPSULE ORAL at 05:09

## 2020-09-29 RX ADMIN — Medication 400 MG: at 11:09

## 2020-09-29 RX ADMIN — ACETAMINOPHEN 650 MG: 325 TABLET ORAL at 05:09

## 2020-09-29 RX ADMIN — ACYCLOVIR 400 MG: 200 CAPSULE ORAL at 09:09

## 2020-09-29 RX ADMIN — CEFTRIAXONE 2 G: 2 INJECTION, SOLUTION INTRAVENOUS at 01:09

## 2020-09-29 RX ADMIN — Medication 400 MG: at 05:09

## 2020-09-29 RX ADMIN — TRETINOIN 50 MG: 10 CAPSULE ORAL at 09:09

## 2020-09-29 RX ADMIN — TRETINOIN 50 MG: 10 CAPSULE ORAL at 05:09

## 2020-09-29 NOTE — SUBJECTIVE & OBJECTIVE
Subjective:     Interval History: ATRA day 12, ASO day 9. Comfortable on RA. Tachycardic overnight. Awaiting ophthalmology exam. Denies severe HA, focal neurological deficits, or severe vision loss. WBC count normal. PLTs 41, transfusion ordered for goal PLT > 50.     Objective:     Vital Signs (Most Recent):  Temp: 99.1 °F (37.3 °C) (09/29/20 0312)  Pulse: (!) 116 (09/29/20 0312)  Resp: 16 (09/29/20 0312)  BP: (!) 98/53 (09/29/20 0312)  SpO2: 97 % (09/29/20 0312) Vital Signs (24h Range):  Temp:  [98.1 °F (36.7 °C)-99.7 °F (37.6 °C)] 99.1 °F (37.3 °C)  Pulse:  [111-119] 116  Resp:  [16-19] 16  SpO2:  [93 %-100 %] 97 %  BP: ()/(51-62) 98/53     Weight: (!) 141 kg (310 lb 13.8 oz)  Body mass index is 60.71 kg/m².  Body surface area is 2.44 meters squared.    ECOG SCORE         [unfilled]    Intake/Output - Last 3 Shifts       09/27 0700 - 09/28 0659 09/28 0700 - 09/29 0659    P.O. 360 556    Blood 424 296    IV Piggyback  270    Total Intake(mL/kg) 784 (5.6) 1122 (8)    Urine (mL/kg/hr) 625 (0.2) 900 (0.3)    Other  0    Stool 0     Total Output 625 900    Net +159 +222          Urine Occurrence  5 x    Stool Occurrence 1 x           Physical Exam  Vitals signs reviewed.   Constitutional:       Appearance: Normal appearance. She is obese.   HENT:      Head: Normocephalic and atraumatic.      Nose: Nose normal.      Mouth/Throat:      Comments: Fever blister noted.  Eyes:      Extraocular Movements: Extraocular movements intact.   Neck:      Musculoskeletal: Normal range of motion and neck supple.   Cardiovascular:      Rate and Rhythm: Normal rate and regular rhythm.      Heart sounds: No murmur.   Pulmonary:      Effort: Pulmonary effort is normal. No respiratory distress.   Abdominal:      General: There is no distension.      Palpations: Abdomen is soft.      Tenderness: There is no abdominal tenderness.   Musculoskeletal: Normal range of motion.         General: No swelling.   Skin:     General: Skin is warm  and dry.   Neurological:      General: No focal deficit present.      Mental Status: She is alert and oriented to person, place, and time.   Psychiatric:         Mood and Affect: Mood normal.         Behavior: Behavior normal.         Significant Labs:   All pertinent labs from the last 24 hours have been reviewed.    Diagnostic Results:  I have reviewed and interpreted all pertinent imaging results/findings within the past 24 hours.

## 2020-09-29 NOTE — SUBJECTIVE & OBJECTIVE
"Therapeutic Intervention: Met with patient.  Inpatient/Partial Hospital - Behavior modifying psychotherapy 30 min - CPT code 87085    Chief Complaint/Reason for Encounter: anxiety and interpersonal     Interval History and Content of Current Session: Patient processed litany of complaints about hospitalization and family members. Attempted to elicit positive aspects of interactions without success. Discussed difference between assertiveness and aggression and ways to "make the best" of challenging situations. Patient did agree to attempt to change her mode of interaction with certain staff members in an attempt to make her stay less frustrating.    Risk Parameters:  Patient reports no suicidal ideation  Patient reports no homicidal ideation  Patient reports no self-injurious behavior  Patient reports no violent behavior    Verbal Deficits: None    Patient's response to intervention:  The patient's response to intervention is reluctant.    Progress toward goals and other mental status changes:  The patient's progress toward goals is limited.  "

## 2020-09-29 NOTE — PLAN OF CARE
POC reviewed with patient, questions answered and concerns addressed. VSS with exception of HR has been tachycardic with rate 104-120. Tolerating diet without difficulty, encouraged activity but refused to get OOB, went to opthamology clinic today. Arsenic chemotherapy given today without any problems through R PICC line; blood return good. In no acute distress, WCM.

## 2020-09-29 NOTE — ASSESSMENT & PLAN NOTE
Patient presenting with possible APL and neutropenic fever.   Found to have strep B agalactiae blood cultures (+ at OSH). She was started on vancomycin and cefepime at the OSH.   - cultures and sensitivities faxed to us by [facility] show organism sensitive to pcn.   - changed to Ceftriaxone 09/24, End date 10/3  - Follow up repeat cultures - ngtd

## 2020-09-29 NOTE — ASSESSMENT & PLAN NOTE
On 9/27 complained of vision changes present even before admission    - Opthalmology evaluated, appreciate assistance   - Related to leukemic retinopathy vs ATRA vs retinal hemorrhage?   - Plan for mac OCT this week to rule out submacular hemorrhage.

## 2020-09-29 NOTE — ASSESSMENT & PLAN NOTE
Patient is a 25 year old woman presenting with concern for APL.    Plan:  - Monitor TLS and DIC labs daily, monitoring for differentiation syndrome  - 2D ECHO - EF 58%  - Continue tretinoin (start 9/18, today is Day 12) BID w/ meals,  - Continue ASO (start 09/21, today is day 9), monitor Qtc ( EKG weekly, last 09/26), LFTs  - Continue allopurinol 300mg daily  - Transfuse cyroglobulin if fibrinogen <150    - Platelet goal > 50k.   - INR < 1.5  - Monitor I/O and daily weights twice daily

## 2020-09-29 NOTE — CARE UPDATE
Rapid Response Nurse Chart Check     Chart check completed, abnormal VS noted. Charge RN, Breanna contacted. No concerns verbalized at this time. Instructed to call [phone] for further concerns or assistance.

## 2020-09-29 NOTE — PROGRESS NOTES
Received call from patient's mother Jennifer ([phone]) expressing concern over daughter's report of not bathing since mother left on 9/27.  Mother reports patient is capable of bathing, but needs assistance transferring from bed to bathroom and obtaining towel.  Notified charge RN who will follow up with assigned RN; she expressed concern that patient may be refusing assistance when offered.  Notified mother of pending letter from [employer] requesting prrof of income following completion of online [facility]gent Need application.  Mother reports patient has no bank account or tax returns.  Spoke to [facility] customer service and notified them of the situation.  They will send a form for the patient to complete and return in lieu of documents.  Will follow.

## 2020-09-29 NOTE — PROGRESS NOTES
[facility]  Hematology  Bone Marrow Transplant  Progress Note    Patient Name: Erika Saini  Admission Date: 9/18/2020  Hospital Length of Stay: 11 days  Code Status: Full Code    Subjective:     Interval History: ATRA day 12, ASO day 9. Comfortable on RA. Tachycardic overnight. Awaiting ophthalmology exam. Denies severe HA, focal neurological deficits, or severe vision loss. WBC count normal. PLTs 41, transfusion ordered for goal PLT > 50.     Objective:     Vital Signs (Most Recent):  Temp: 99.1 °F (37.3 °C) (09/29/20 0312)  Pulse: (!) 116 (09/29/20 0312)  Resp: 16 (09/29/20 0312)  BP: (!) 98/53 (09/29/20 0312)  SpO2: 97 % (09/29/20 0312) Vital Signs (24h Range):  Temp:  [98.1 °F (36.7 °C)-99.7 °F (37.6 °C)] 99.1 °F (37.3 °C)  Pulse:  [111-119] 116  Resp:  [16-19] 16  SpO2:  [93 %-100 %] 97 %  BP: ()/(51-62) 98/53     Weight: (!) 141 kg (310 lb 13.8 oz)  Body mass index is 60.71 kg/m².  Body surface area is 2.44 meters squared.    ECOG SCORE         [unfilled]    Intake/Output - Last 3 Shifts       09/27 0700 - 09/28 0659 09/28 0700 - 09/29 0659    P.O. 360 556    Blood 424 296    IV Piggyback  270    Total Intake(mL/kg) 784 (5.6) 1122 (8)    Urine (mL/kg/hr) 625 (0.2) 900 (0.3)    Other  0    Stool 0     Total Output 625 900    Net +159 +222          Urine Occurrence  5 x    Stool Occurrence 1 x           Physical Exam  Vitals signs reviewed.   Constitutional:       Appearance: Normal appearance. She is obese.   HENT:      Head: Normocephalic and atraumatic.      Nose: Nose normal.      Mouth/Throat:      Comments: Fever blister noted.  Eyes:      Extraocular Movements: Extraocular movements intact.   Neck:      Musculoskeletal: Normal range of motion and neck supple.   Cardiovascular:      Rate and Rhythm: Normal rate and regular rhythm.      Heart sounds: No murmur.   Pulmonary:      Effort: Pulmonary effort is normal. No respiratory distress.   Abdominal:      General: There is no  distension.      Palpations: Abdomen is soft.      Tenderness: There is no abdominal tenderness.   Musculoskeletal: Normal range of motion.         General: No swelling.   Skin:     General: Skin is warm and dry.   Neurological:      General: No focal deficit present.      Mental Status: She is alert and oriented to person, place, and time.   Psychiatric:         Mood and Affect: Mood normal.         Behavior: Behavior normal.         Significant Labs:   All pertinent labs from the last 24 hours have been reviewed.    Diagnostic Results:  I have reviewed and interpreted all pertinent imaging results/findings within the past 24 hours.    Assessment/Plan:     * APL (acute promyelocytic leukemia)  Patient is a 25 year old woman presenting with concern for APL.    Plan:  - Monitor TLS and DIC labs daily, monitoring for differentiation syndrome  - 2D ECHO - EF 58%  - Continue tretinoin (start 9/18, today is Day 12) BID w/ meals,  - Continue ASO (start 09/21, today is day 9), monitor Qtc ( EKG weekly, last 09/26), LFTs  - Continue allopurinol 300mg daily  - Transfuse cyroglobulin if fibrinogen <150    - Platelet goal > 50k.   - INR < 1.5  - Monitor I/O and daily weights twice daily    Retinopathy  On 9/27 complained of vision changes present even before admission    - Opthalmology evaluated, appreciate assistance   - Related to leukemic retinopathy vs ATRA vs retinal hemorrhage?   - Plan for mac OCT this week to rule out submacular hemorrhage.     Shortness of breath  On RA  S/p 3days of dexamethasone 10mg bid for mild symptoms of differentiation syndrome.   - Denies any symptoms now, continue to monitor     Hypokalemia  - Monitor and replace PRN, keep K >4 and Mg >2 ( Given qt prolongation risk with treatment)       Pancytopenia  Secondary to APL   -- Transfuse if platelets < 50, Hgb< 7      Adjustment reaction with anxiety  - seen by oncology psychology     Neutropenic fever  Patient presenting with possible APL and  neutropenic fever.   Found to have strep B agalactiae blood cultures (+ at OSH). She was started on vancomycin and cefepime at the OSH.   - cultures and sensitivities faxed to us by [facility] show organism sensitive to pcn.   - changed to Ceftriaxone 09/24, End date 10/3  - Follow up repeat cultures - ngtd       Morbid obesity with BMI of 50.0-59.9, adult  Patient with BMI 57.91        VTE Risk Mitigation (From admission, onward)         Ordered     heparin, porcine (PF) 100 unit/mL injection flush 300 Units  As needed (PRN)      09/21/20 1554     IP VTE HIGH RISK PATIENT  Once      09/18/20 0148     Place sequential compression device  Until discontinued      09/18/20 0148                Disposition: BMT unit    Ethel Uribe MD  Bone Marrow Transplant  [facility]

## 2020-09-29 NOTE — PROGRESS NOTES
"[facility]  Psychology  Progress Note  Individual Psychotherapy (PhD/LCSW)    Patient Name: Erika Saini  MRN: [id number]    Patient Class: IP- Inpatient  Admission Date: 9/18/2020  Hospital Length of Stay: 11 days  Attending Physician: Sourav Recinos MD  Primary Care Provider: Provider [employer]    Therapeutic Intervention: Met with patient.  Inpatient/Partial Hospital - Behavior modifying psychotherapy 30 min - CPT code 71901    Chief Complaint/Reason for Encounter: anxiety and interpersonal     Interval History and Content of Current Session: Patient processed litany of complaints about hospitalization and family members. Attempted to elicit positive aspects of interactions without success. Discussed difference between assertiveness and aggression and ways to "make the best" of challenging situations. Patient did agree to attempt to change her mode of interaction with certain staff members in an attempt to make her stay less frustrating.    Risk Parameters:  Patient reports no suicidal ideation  Patient reports no homicidal ideation  Patient reports no self-injurious behavior  Patient reports no violent behavior    Verbal Deficits: None    Patient's response to intervention:  The patient's response to intervention is reluctant.    Progress toward goals and other mental status changes:  The patient's progress toward goals is limited.    Diagnostic Impression - Plan:     Adjustment reaction with anxiety  Patient with current significant anxiety symptoms interfering with social, occupational and functional activities    Anxiety over being away from home is significant and continues to fuel dissatisfaction with stay. Mother will return on Sunday (10/4).    She was again given information about expected behavior toward staff and reminded of her responsibility to treat others with respect.   Patient agreed to behave more positively with night staff members who are doing their jobs by waking " "her for vitals.  She is doing this as a   "behavioral experiment" to assess potential change in interactions.    Daily relaxation training practice encouraged. Patient has not followed through with this and does not commit to future practice.    She is still not willing to consider medications at this time.     Importance of movement, remaining out of bed during the daytime, and daytime light reinforced for mood/sleep management.          Treatment Plan:  · Target symptoms: depression, anxiety and interpersonal  · Why chosen therapy is appropriate versus another modality: relevant to diagnosis, evidence based practice  · Outcome monitoring methods: self-report, observation, feedback from family  · Therapeutic intervention type: behavior modifying psychotherapy    Plan:  individual psychotherapy   Medication management if/when patient is willing to consider same    Next planned contact: 3-5 days    Length of Service (minutes): 30    Tejas Munoz, PhD  Psychology  [facility]  "

## 2020-09-29 NOTE — PLAN OF CARE
POC reviewed with patient at beginning of shift. AAOX4 throughout shift. Assessment completed & no alarming findings noted. Vital signs remained stable. All scheduled/PRN medications administered as ordered. Tolerating a regular diet without any difficulty; poor intake. Assisted with activity; up to bedside commode; no complaints of pain. No BM noted today. Fall/safety reviewed with patient: side rails up x2; call bell in place; bed in lowest, locked position; skid proof socks on; no evidence of skin breakdown; care plan explained to patient; no additional complaints at this time. Will continue routine plan of care.

## 2020-09-29 NOTE — PLAN OF CARE
No acute events overnight. Pt tolerated IV arsenic without issue. No complaints of pain or nausea. Voiding without difficulty, no BM this shift. Vaginal bleeding present, pads at the bedside. Bed alarm set. Will receive 1 unit PLT today. Remains afebrile and VSS. Will continue to monitor.

## 2020-09-29 NOTE — ASSESSMENT & PLAN NOTE
"Patient with current significant anxiety symptoms interfering with social, occupational and functional activities    Anxiety over being away from home is significant and continues to fuel dissatisfaction with stay. Mother will return on Sunday (10/4).    She was again given information about expected behavior toward staff and reminded of her responsibility to treat others with respect.   Patient agreed to behave more positively with night staff members who are doing their jobs by waking her for vitals.  She is doing this as a   "behavioral experiment" to assess potential change in interactions.    Daily relaxation training practice encouraged. Patient has not followed through with this and does not commit to future practice.    She is still not willing to consider medications at this time.     Importance of movement, remaining out of bed during the daytime, and daytime light reinforced for mood/sleep management.    "

## 2020-09-30 LAB
ABO + RH BLD: NORMAL
ALBUMIN SERPL BCP-MCNC: 2.5 G/DL (ref 3.5–5.2)
ALP SERPL-CCNC: 62 U/L (ref 55–135)
ALT SERPL W/O P-5'-P-CCNC: 144 U/L (ref 10–44)
ANION GAP SERPL CALC-SCNC: 12 MMOL/L (ref 8–16)
ANISOCYTOSIS BLD QL SMEAR: SLIGHT
APTT BLDCRRT: 33.4 SEC (ref 21–32)
AST SERPL-CCNC: 106 U/L (ref 10–40)
BASOPHILS NFR BLD: 0 % (ref 0–1.9)
BILIRUB SERPL-MCNC: 0.4 MG/DL (ref 0.1–1)
BLD GP AB SCN CELLS X3 SERPL QL: NORMAL
BLD PROD TYP BPU: NORMAL
BLOOD UNIT EXPIRATION DATE: NORMAL
BLOOD UNIT TYPE CODE: 5100
BLOOD UNIT TYPE: NORMAL
BUN SERPL-MCNC: 9 MG/DL (ref 6–20)
CALCIUM SERPL-MCNC: 7.8 MG/DL (ref 8.7–10.5)
CHLORIDE SERPL-SCNC: 109 MMOL/L (ref 95–110)
CO2 SERPL-SCNC: 21 MMOL/L (ref 23–29)
CODING SYSTEM: NORMAL
CREAT SERPL-MCNC: 0.5 MG/DL (ref 0.5–1.4)
DACRYOCYTES BLD QL SMEAR: ABNORMAL
DIFFERENTIAL METHOD: ABNORMAL
DISPENSE STATUS: NORMAL
EOSINOPHIL NFR BLD: 0 % (ref 0–8)
ERYTHROCYTE [DISTWIDTH] IN BLOOD BY AUTOMATED COUNT: 16.1 % (ref 11.5–14.5)
EST. GFR  (AFRICAN AMERICAN): >60 ML/MIN/1.73 M^2
EST. GFR  (NON AFRICAN AMERICAN): >60 ML/MIN/1.73 M^2
FIBRINOGEN PPP-MCNC: 404 MG/DL (ref 182–366)
GLUCOSE SERPL-MCNC: 129 MG/DL (ref 70–110)
HCT VFR BLD AUTO: 21 % (ref 37–48.5)
HGB BLD-MCNC: 6.7 G/DL (ref 12–16)
IMM GRANULOCYTES # BLD AUTO: ABNORMAL K/UL (ref 0–0.04)
IMM GRANULOCYTES NFR BLD AUTO: ABNORMAL % (ref 0–0.5)
INR PPP: 1.1 (ref 0.8–1.2)
LYMPHOCYTES NFR BLD: 51 % (ref 18–48)
MAGNESIUM SERPL-MCNC: 1.7 MG/DL (ref 1.6–2.6)
MCH RBC QN AUTO: 29.1 PG (ref 27–31)
MCHC RBC AUTO-ENTMCNC: 31.9 G/DL (ref 32–36)
MCV RBC AUTO: 91 FL (ref 82–98)
METAMYELOCYTES NFR BLD MANUAL: 2 %
MONOCYTES NFR BLD: 4 % (ref 4–15)
MYELOCYTES NFR BLD MANUAL: 2 %
NEUTROPHILS NFR BLD: 34 % (ref 38–73)
NEUTS BAND NFR BLD MANUAL: 7 %
NRBC BLD-RTO: 0 /100 WBC
NUM UNITS TRANS PACKED RBC: NORMAL
OVALOCYTES BLD QL SMEAR: ABNORMAL
PHOSPHATE SERPL-MCNC: 4.7 MG/DL (ref 2.7–4.5)
PLATELET # BLD AUTO: 55 K/UL (ref 150–350)
PLATELET BLD QL SMEAR: ABNORMAL
PMV BLD AUTO: 10.4 FL (ref 9.2–12.9)
POCT GLUCOSE: 143 MG/DL (ref 70–110)
POIKILOCYTOSIS BLD QL SMEAR: SLIGHT
POTASSIUM SERPL-SCNC: 3.4 MMOL/L (ref 3.5–5.1)
PROT SERPL-MCNC: 5.4 G/DL (ref 6–8.4)
PROTHROMBIN TIME: 12.5 SEC (ref 9–12.5)
RBC # BLD AUTO: 2.3 M/UL (ref 4–5.4)
SODIUM SERPL-SCNC: 142 MMOL/L (ref 136–145)
WBC # BLD AUTO: 7.8 K/UL (ref 3.9–12.7)

## 2020-09-30 PROCEDURE — 85384 FIBRINOGEN ACTIVITY: CPT

## 2020-09-30 PROCEDURE — 85007 BL SMEAR W/DIFF WBC COUNT: CPT

## 2020-09-30 PROCEDURE — 84100 ASSAY OF PHOSPHORUS: CPT

## 2020-09-30 PROCEDURE — 99900035 HC TECH TIME PER 15 MIN (STAT)

## 2020-09-30 PROCEDURE — 63600175 PHARM REV CODE 636 W HCPCS: Performed by: STUDENT IN AN ORGANIZED HEALTH CARE EDUCATION/TRAINING PROGRAM

## 2020-09-30 PROCEDURE — 25000003 PHARM REV CODE 250: Performed by: INTERNAL MEDICINE

## 2020-09-30 PROCEDURE — 85027 COMPLETE CBC AUTOMATED: CPT

## 2020-09-30 PROCEDURE — 85730 THROMBOPLASTIN TIME PARTIAL: CPT

## 2020-09-30 PROCEDURE — 36430 TRANSFUSION BLD/BLD COMPNT: CPT

## 2020-09-30 PROCEDURE — 20600001 HC STEP DOWN PRIVATE ROOM

## 2020-09-30 PROCEDURE — 83735 ASSAY OF MAGNESIUM: CPT

## 2020-09-30 PROCEDURE — 99233 PR SUBSEQUENT HOSPITAL CARE,LEVL III: ICD-10-PCS | Mod: ,,, | Performed by: INTERNAL MEDICINE

## 2020-09-30 PROCEDURE — 86920 COMPATIBILITY TEST SPIN: CPT

## 2020-09-30 PROCEDURE — 25000003 PHARM REV CODE 250: Performed by: STUDENT IN AN ORGANIZED HEALTH CARE EDUCATION/TRAINING PROGRAM

## 2020-09-30 PROCEDURE — 85610 PROTHROMBIN TIME: CPT

## 2020-09-30 PROCEDURE — 63600175 PHARM REV CODE 636 W HCPCS: Performed by: INTERNAL MEDICINE

## 2020-09-30 PROCEDURE — 99233 SBSQ HOSP IP/OBS HIGH 50: CPT | Mod: ,,, | Performed by: INTERNAL MEDICINE

## 2020-09-30 PROCEDURE — 80053 COMPREHEN METABOLIC PANEL: CPT

## 2020-09-30 PROCEDURE — P9040 RBC LEUKOREDUCED IRRADIATED: HCPCS

## 2020-09-30 PROCEDURE — 94761 N-INVAS EAR/PLS OXIMETRY MLT: CPT

## 2020-09-30 PROCEDURE — 86644 CMV ANTIBODY: CPT

## 2020-09-30 PROCEDURE — 86901 BLOOD TYPING SEROLOGIC RH(D): CPT

## 2020-09-30 PROCEDURE — 25000003 PHARM REV CODE 250: Performed by: NURSE PRACTITIONER

## 2020-09-30 RX ORDER — HYDROCODONE BITARTRATE AND ACETAMINOPHEN 500; 5 MG/1; MG/1
TABLET ORAL
Status: DISCONTINUED | OUTPATIENT
Start: 2020-09-30 | End: 2020-10-01

## 2020-09-30 RX ORDER — TRAMADOL HYDROCHLORIDE 50 MG/1
50 TABLET ORAL ONCE
Status: COMPLETED | OUTPATIENT
Start: 2020-09-30 | End: 2020-09-30

## 2020-09-30 RX ADMIN — GUAIFENESIN 100 MG: 200 SOLUTION ORAL at 11:09

## 2020-09-30 RX ADMIN — ACYCLOVIR 400 MG: 200 CAPSULE ORAL at 08:09

## 2020-09-30 RX ADMIN — ARSENIC TRIOXIDE 20 MG: 1 INJECTION, SOLUTION INTRAVENOUS at 02:09

## 2020-09-30 RX ADMIN — ACYCLOVIR 400 MG: 200 CAPSULE ORAL at 09:09

## 2020-09-30 RX ADMIN — TRAMADOL HYDROCHLORIDE 50 MG: 50 TABLET, FILM COATED ORAL at 12:09

## 2020-09-30 RX ADMIN — CEFTRIAXONE 2 G: 2 INJECTION, SOLUTION INTRAVENOUS at 01:09

## 2020-09-30 RX ADMIN — Medication 400 MG: at 06:09

## 2020-09-30 RX ADMIN — PROCHLORPERAZINE EDISYLATE 10 MG: 5 INJECTION INTRAMUSCULAR; INTRAVENOUS at 01:09

## 2020-09-30 RX ADMIN — Medication 400 MG: at 10:09

## 2020-09-30 RX ADMIN — ACETAMINOPHEN 650 MG: 325 TABLET ORAL at 10:09

## 2020-09-30 RX ADMIN — DIPHENHYDRAMINE HYDROCHLORIDE 25 MG: 25 CAPSULE ORAL at 10:09

## 2020-09-30 RX ADMIN — TRETINOIN 50 MG: 10 CAPSULE ORAL at 08:09

## 2020-09-30 RX ADMIN — NYSTATIN: 100000 POWDER TOPICAL at 09:09

## 2020-09-30 RX ADMIN — POTASSIUM CHLORIDE 20 MEQ: 750 CAPSULE, EXTENDED RELEASE ORAL at 10:09

## 2020-09-30 RX ADMIN — POTASSIUM CHLORIDE 20 MEQ: 750 CAPSULE, EXTENDED RELEASE ORAL at 06:09

## 2020-09-30 RX ADMIN — PROMETHAZINE HYDROCHLORIDE 6.25 MG: 25 INJECTION INTRAMUSCULAR; INTRAVENOUS at 06:09

## 2020-09-30 RX ADMIN — TRETINOIN 50 MG: 10 CAPSULE ORAL at 05:09

## 2020-09-30 NOTE — ASSESSMENT & PLAN NOTE
Patient is a 25 year old woman presenting with concern for APL.    Plan:  - Monitor TLS and DIC labs daily, monitoring for differentiation syndrome  - 2D ECHO - EF 58%  - Continue tretinoin (start 9/18, today is Day 13) BID w/ meals,  - Continue ASO (start 09/21, today is day 10), monitor Qtc ( EKG weekly, last 09/26), LFTs  - Continue allopurinol 300mg daily  - Transfuse cyroglobulin if fibrinogen <150    - Platelet goal > 50k.   - INR < 1.5  - Monitor I/O and daily weights twice daily

## 2020-09-30 NOTE — PROGRESS NOTES
[facility]  Hematology  Bone Marrow Transplant  Progress Note    Patient Name: Erika Saiin  Admission Date: 9/18/2020  Hospital Length of Stay: 12 days  Code Status: Full Code    Subjective:     Interval History: ATRA day 13, ASO day 10. Comfortable on RA. HA last night resolved with tramadol, no complaints this AM, says vision improved. Tachycardic overnight. Ophthalmology exam revealed scattered intra retinal hemorrhages. Denies severe HA, focal neurological deficits, or severe vision loss. AST 71 -> 106,  -> 144.    Objective:     Vital Signs (Most Recent):  Temp: 98.2 °F (36.8 °C) (09/30/20 0413)  Pulse: (!) 112 (09/30/20 0413)  Resp: 16 (09/30/20 0413)  BP: (!) 109/58 (09/30/20 0413)  SpO2: 95 % (09/30/20 0413) Vital Signs (24h Range):  Temp:  [98 °F (36.7 °C)-99.3 °F (37.4 °C)] 98.2 °F (36.8 °C)  Pulse:  [102-122] 112  Resp:  [16-19] 16  SpO2:  [95 %-100 %] 95 %  BP: ()/(50-73) 109/58     Weight: (!) 140.1 kg (308 lb 12.1 oz)  Body mass index is 60.3 kg/m².  Body surface area is 2.44 meters squared.    ECOG SCORE         [unfilled]    Intake/Output - Last 3 Shifts       09/28 0700 - 09/29 0659 09/29 0700 - 09/30 0659    P.O. 556 1050    Blood 296     IV Piggyback 270 320    Total Intake(mL/kg) 1122 (8) 1370 (9.8)    Urine (mL/kg/hr) 900 (0.3) 900 (0.3)    Other 0     Stool  0    Total Output 900 900    Net +222 +470          Urine Occurrence 5 x 2 x    Stool Occurrence  1 x    Emesis Occurrence  1 x          Physical Exam  Vitals signs reviewed.   Constitutional:       Appearance: Normal appearance. She is obese.   HENT:      Head: Normocephalic and atraumatic.      Nose: Nose normal.      Mouth/Throat:      Comments: Fever blister noted.  Eyes:      Extraocular Movements: Extraocular movements intact.   Neck:      Musculoskeletal: Normal range of motion and neck supple.   Cardiovascular:      Rate and Rhythm: Normal rate and regular rhythm.      Heart sounds: No murmur.    Pulmonary:      Effort: Pulmonary effort is normal. No respiratory distress.   Abdominal:      General: There is no distension.      Palpations: Abdomen is soft.      Tenderness: There is no abdominal tenderness.   Musculoskeletal: Normal range of motion.         General: No swelling.   Skin:     General: Skin is warm and dry.   Neurological:      General: No focal deficit present.      Mental Status: She is alert and oriented to person, place, and time.   Psychiatric:         Mood and Affect: Mood normal.         Behavior: Behavior normal.         Significant Labs:   All pertinent labs from the last 24 hours have been reviewed.    Diagnostic Results:  I have reviewed and interpreted all pertinent imaging results/findings within the past 24 hours.    Assessment/Plan:     * APL (acute promyelocytic leukemia)  Patient is a 25 year old woman presenting with concern for APL.    Plan:  - Monitor TLS and DIC labs daily, monitoring for differentiation syndrome  - 2D ECHO - EF 58%  - Continue tretinoin (start 9/18, today is Day 13) BID w/ meals,  - Continue ASO (start 09/21, today is day 10), monitor Qtc ( EKG weekly, last 09/26), LFTs  - Continue allopurinol 300mg daily  - Transfuse cyroglobulin if fibrinogen <150    - Platelet goal > 50k.   - INR < 1.5  - Monitor I/O and daily weights twice daily    Retinopathy  On 9/27 complained of vision changes present even before admission    - Opthalmology evaluated, appreciate assistance   - Secondary to subretinal hemorrhage?   - Per optho patient's vision should improve with time as pre-retinal hemorrhages resolve.   - May need surgical intervention with vitrectomy if hemorrhages do not resolve in a few weeks.   - Return to retina clinic in 6 weeks    Shortness of breath  On RA  S/p 3days of dexamethasone 10mg bid for mild symptoms of differentiation syndrome.   - Denies any symptoms now, continue to monitor     Hypokalemia  - Monitor and replace PRN, keep K >4 and Mg >2 (  Given qt prolongation risk with treatment)       Pancytopenia  Secondary to APL   -- Transfuse if platelets < 50, Hgb< 7      Adjustment reaction with anxiety  - seen by oncology psychology     Neutropenic fever  Patient presenting with possible APL and neutropenic fever.   Found to have strep B agalactiae blood cultures (+ at OSH). She was started on vancomycin and cefepime at the OSH.   - cultures and sensitivities faxed to us by [facility] show organism sensitive to pcn.   - changed to Ceftriaxone 09/24, End date 10/3  - Follow up repeat cultures - ngtd       Morbid obesity with BMI of 50.0-59.9, adult  Patient with BMI 57.91        VTE Risk Mitigation (From admission, onward)         Ordered     heparin, porcine (PF) 100 unit/mL injection flush 300 Units  As needed (PRN)      09/21/20 1554     IP VTE HIGH RISK PATIENT  Once      09/18/20 0148     Place sequential compression device  Until discontinued      09/18/20 0148                Disposition: bmt unit    Ethel Uribe MD  Bone Marrow Transplant  [facility]

## 2020-09-30 NOTE — ASSESSMENT & PLAN NOTE
On 9/27 complained of vision changes present even before admission    - Opthalmology evaluated, appreciate assistance   - Secondary to subretinal hemorrhage?   - Per optho patient's vision should improve with time as pre-retinal hemorrhages resolve.   - May need surgical intervention with vitrectomy if hemorrhages do not resolve in a few weeks.   - Return to retina clinic in 6 weeks

## 2020-09-30 NOTE — ASSESSMENT & PLAN NOTE
Patient presenting with possible APL and neutropenic fever.   Found to have strep B agalactiae blood cultures (+ at OSH). She was started on vancomycin and cefepime at the OSH.   - cultures and sensitivities faxed to us by [facility] show organism sensitive to pcn.   - changed to Ceftriaxone 09/24, End date 10/3  - Follow up repeat cultures - ngtd

## 2020-09-30 NOTE — PLAN OF CARE
No acute events overnight. Pt with c/o HA relieved by tramadol x1. Voiding without difficulty, one BM this shift. Remains afebrile. -110s. All other VSS. Will continue to monitor.

## 2020-10-01 LAB
ALBUMIN SERPL BCP-MCNC: 2.4 G/DL (ref 3.5–5.2)
ALP SERPL-CCNC: 72 U/L (ref 55–135)
ALT SERPL W/O P-5'-P-CCNC: 157 U/L (ref 10–44)
ANION GAP SERPL CALC-SCNC: 12 MMOL/L (ref 8–16)
ANISOCYTOSIS BLD QL SMEAR: SLIGHT
APTT BLDCRRT: 34.2 SEC (ref 21–32)
AST SERPL-CCNC: 107 U/L (ref 10–40)
BASOPHILS NFR BLD: 0 % (ref 0–1.9)
BILIRUB SERPL-MCNC: 0.4 MG/DL (ref 0.1–1)
BLASTS NFR BLD MANUAL: 2 %
BLD PROD TYP BPU: NORMAL
BLOOD UNIT EXPIRATION DATE: NORMAL
BLOOD UNIT TYPE CODE: 5100
BLOOD UNIT TYPE: NORMAL
BUN SERPL-MCNC: 10 MG/DL (ref 6–20)
CALCIUM SERPL-MCNC: 7.9 MG/DL (ref 8.7–10.5)
CHLORIDE SERPL-SCNC: 110 MMOL/L (ref 95–110)
CO2 SERPL-SCNC: 20 MMOL/L (ref 23–29)
CODING SYSTEM: NORMAL
CREAT SERPL-MCNC: 0.5 MG/DL (ref 0.5–1.4)
DIFFERENTIAL METHOD: ABNORMAL
DISPENSE STATUS: NORMAL
EOSINOPHIL NFR BLD: 0 % (ref 0–8)
ERYTHROCYTE [DISTWIDTH] IN BLOOD BY AUTOMATED COUNT: 15.9 % (ref 11.5–14.5)
EST. GFR  (AFRICAN AMERICAN): >60 ML/MIN/1.73 M^2
EST. GFR  (NON AFRICAN AMERICAN): >60 ML/MIN/1.73 M^2
FIBRINOGEN PPP-MCNC: 414 MG/DL (ref 182–366)
GLUCOSE SERPL-MCNC: 131 MG/DL (ref 70–110)
HCT VFR BLD AUTO: 23.4 % (ref 37–48.5)
HGB BLD-MCNC: 7.6 G/DL (ref 12–16)
IMM GRANULOCYTES # BLD AUTO: ABNORMAL K/UL (ref 0–0.04)
IMM GRANULOCYTES NFR BLD AUTO: ABNORMAL % (ref 0–0.5)
INR PPP: 1.1 (ref 0.8–1.2)
LYMPHOCYTES NFR BLD: 7 % (ref 18–48)
MAGNESIUM SERPL-MCNC: 1.7 MG/DL (ref 1.6–2.6)
MCH RBC QN AUTO: 29.5 PG (ref 27–31)
MCHC RBC AUTO-ENTMCNC: 32.5 G/DL (ref 32–36)
MCV RBC AUTO: 91 FL (ref 82–98)
METAMYELOCYTES NFR BLD MANUAL: 5 %
MONOCYTES NFR BLD: 4 % (ref 4–15)
MYELOCYTES NFR BLD MANUAL: 6 %
NEUTROPHILS NFR BLD: 69 % (ref 38–73)
NEUTS BAND NFR BLD MANUAL: 7 %
NRBC BLD-RTO: 0 /100 WBC
NUM UNITS TRANS WBC-POOR PLATPHERESIS: NORMAL
OVALOCYTES BLD QL SMEAR: ABNORMAL
PHOSPHATE SERPL-MCNC: 3.8 MG/DL (ref 2.7–4.5)
PLATELET # BLD AUTO: 36 K/UL (ref 150–350)
PLATELET BLD QL SMEAR: ABNORMAL
PMV BLD AUTO: 12.3 FL (ref 9.2–12.9)
POIKILOCYTOSIS BLD QL SMEAR: SLIGHT
POTASSIUM SERPL-SCNC: 3.6 MMOL/L (ref 3.5–5.1)
PROT SERPL-MCNC: 5.4 G/DL (ref 6–8.4)
PROTHROMBIN TIME: 12.4 SEC (ref 9–12.5)
RBC # BLD AUTO: 2.58 M/UL (ref 4–5.4)
SCHISTOCYTES BLD QL SMEAR: ABNORMAL
SCHISTOCYTES BLD QL SMEAR: PRESENT
SODIUM SERPL-SCNC: 142 MMOL/L (ref 136–145)
WBC # BLD AUTO: 15.59 K/UL (ref 3.9–12.7)

## 2020-10-01 PROCEDURE — 63600175 PHARM REV CODE 636 W HCPCS: Performed by: STUDENT IN AN ORGANIZED HEALTH CARE EDUCATION/TRAINING PROGRAM

## 2020-10-01 PROCEDURE — 85007 BL SMEAR W/DIFF WBC COUNT: CPT

## 2020-10-01 PROCEDURE — 25000003 PHARM REV CODE 250: Performed by: STUDENT IN AN ORGANIZED HEALTH CARE EDUCATION/TRAINING PROGRAM

## 2020-10-01 PROCEDURE — 86644 CMV ANTIBODY: CPT

## 2020-10-01 PROCEDURE — 20600001 HC STEP DOWN PRIVATE ROOM

## 2020-10-01 PROCEDURE — 80053 COMPREHEN METABOLIC PANEL: CPT

## 2020-10-01 PROCEDURE — 25000003 PHARM REV CODE 250: Performed by: NURSE PRACTITIONER

## 2020-10-01 PROCEDURE — 84100 ASSAY OF PHOSPHORUS: CPT

## 2020-10-01 PROCEDURE — 85610 PROTHROMBIN TIME: CPT

## 2020-10-01 PROCEDURE — P9037 PLATE PHERES LEUKOREDU IRRAD: HCPCS

## 2020-10-01 PROCEDURE — 83735 ASSAY OF MAGNESIUM: CPT

## 2020-10-01 PROCEDURE — 85027 COMPLETE CBC AUTOMATED: CPT

## 2020-10-01 PROCEDURE — 85384 FIBRINOGEN ACTIVITY: CPT

## 2020-10-01 PROCEDURE — 93010 ELECTROCARDIOGRAM REPORT: CPT | Mod: ,,, | Performed by: INTERNAL MEDICINE

## 2020-10-01 PROCEDURE — 99233 PR SUBSEQUENT HOSPITAL CARE,LEVL III: ICD-10-PCS | Mod: ,,, | Performed by: INTERNAL MEDICINE

## 2020-10-01 PROCEDURE — 93010 EKG 12-LEAD: ICD-10-PCS | Mod: ,,, | Performed by: INTERNAL MEDICINE

## 2020-10-01 PROCEDURE — 85730 THROMBOPLASTIN TIME PARTIAL: CPT

## 2020-10-01 PROCEDURE — 93005 ELECTROCARDIOGRAM TRACING: CPT

## 2020-10-01 PROCEDURE — 99233 SBSQ HOSP IP/OBS HIGH 50: CPT | Mod: ,,, | Performed by: INTERNAL MEDICINE

## 2020-10-01 RX ORDER — BENZONATATE 100 MG/1
100 CAPSULE ORAL 3 TIMES DAILY PRN
Status: DISCONTINUED | OUTPATIENT
Start: 2020-10-01 | End: 2020-10-02

## 2020-10-01 RX ORDER — HYDROCODONE BITARTRATE AND ACETAMINOPHEN 500; 5 MG/1; MG/1
TABLET ORAL
Status: DISCONTINUED | OUTPATIENT
Start: 2020-10-01 | End: 2020-10-05

## 2020-10-01 RX ADMIN — GUAIFENESIN 100 MG: 200 SOLUTION ORAL at 04:10

## 2020-10-01 RX ADMIN — TRETINOIN 50 MG: 10 CAPSULE ORAL at 08:10

## 2020-10-01 RX ADMIN — NYSTATIN: 100000 POWDER TOPICAL at 08:10

## 2020-10-01 RX ADMIN — CEFTRIAXONE 2 G: 2 INJECTION, SOLUTION INTRAVENOUS at 12:10

## 2020-10-01 RX ADMIN — GUAIFENESIN 100 MG: 200 SOLUTION ORAL at 09:10

## 2020-10-01 RX ADMIN — SODIUM CHLORIDE, SODIUM LACTATE, POTASSIUM CHLORIDE, AND CALCIUM CHLORIDE 250 ML: .6; .31; .03; .02 INJECTION, SOLUTION INTRAVENOUS at 08:10

## 2020-10-01 RX ADMIN — PROMETHAZINE HYDROCHLORIDE 6.25 MG: 25 INJECTION INTRAMUSCULAR; INTRAVENOUS at 09:10

## 2020-10-01 RX ADMIN — PROMETHAZINE HYDROCHLORIDE 6.25 MG: 25 INJECTION INTRAMUSCULAR; INTRAVENOUS at 01:10

## 2020-10-01 RX ADMIN — ACYCLOVIR 400 MG: 200 CAPSULE ORAL at 08:10

## 2020-10-01 RX ADMIN — ACETAMINOPHEN 650 MG: 325 TABLET ORAL at 09:10

## 2020-10-01 RX ADMIN — ACETAMINOPHEN 650 MG: 325 TABLET ORAL at 05:10

## 2020-10-01 RX ADMIN — TRETINOIN 50 MG: 10 CAPSULE ORAL at 05:10

## 2020-10-01 NOTE — PLAN OF CARE
Afebrile. Free from falls or injury. No complaints of pain. Acyclovir given as scheduled. Phenergan given for nausea. Pt up to bedside commode with assistance. Bed locked in lowest position, non skid socks on, call light within reach. Pt instructed to call if any assistance is needed. Vitals stable. Will cont to donald pt.

## 2020-10-01 NOTE — PROGRESS NOTES
[facility]  Hematology  Bone Marrow Transplant  Progress Note    Patient Name: Erika Saini  Admission Date: 9/18/2020  Hospital Length of Stay: 13 days  Code Status: Full Code    Subjective:     Interval History: ATRA day 14, ASO day 11 (held due to QTc >500). Had reflux due to coughing and a few loose stools overnight. Continues to have the same headache that has not changed in intensity. Feels her visual symptoms have not changed. Slept well otherwise and denies complaints this morning. Plts of 36 with LFTs remaining stable from yesterday.    Objective:     Vital Signs (Most Recent):  Temp: 97.5 °F (36.4 °C) (10/01/20 1126)  Pulse: (!) 126 (10/01/20 1126)  Resp: 20 (10/01/20 1126)  BP: (!) 113/56 (10/01/20 1126)  SpO2: 99 % (10/01/20 1126) Vital Signs (24h Range):  Temp:  [97.5 °F (36.4 °C)-99.2 °F (37.3 °C)] 97.5 °F (36.4 °C)  Pulse:  [100-126] 126  Resp:  [18-20] 20  SpO2:  [95 %-99 %] 99 %  BP: (107-132)/(54-77) 113/56     Weight: (!) 140.7 kg (310 lb 3 oz)  Body mass index is 60.58 kg/m².  Body surface area is 2.44 meters squared.      Intake/Output - Last 3 Shifts       09/29 0700 - 09/30 0659 09/30 0700 - 10/01 0659 10/01 0700 - 10/02 0659    P.O. 1050 1200     Blood  700 257    IV Piggyback 370 150     Total Intake(mL/kg) 1420 (10.1) 2050 (14.6) 257 (1.8)    Urine (mL/kg/hr) 900 (0.3) 750 (0.2)     Emesis/NG output  0     Other       Stool 0      Total Output 900 750     Net +520 +1300 +257           Urine Occurrence 2 x 3 x     Stool Occurrence 1 x 3 x     Emesis Occurrence 1 x 1 x           Physical Exam  Vitals signs reviewed.   Constitutional:       Appearance: Normal appearance. She is obese.   HENT:      Head: Normocephalic and atraumatic.      Nose: Nose normal.      Mouth/Throat:   Eyes:      Extraocular Movements: Extraocular movements intact.   Neck:      Musculoskeletal: Normal range of motion and neck supple.   Cardiovascular:      Rate and Rhythm: Normal rate and  regular rhythm.      Heart sounds: No murmur.   Pulmonary:      Effort: Pulmonary effort is normal. No respiratory distress.   Abdominal:      General: There is no distension.      Palpations: Abdomen is soft.      Tenderness: There is no abdominal tenderness.   Musculoskeletal: Normal range of motion.         General: No swelling.   Skin:     General: Skin is warm and dry.   Neurological:      General: No focal deficit present.      Mental Status: She is alert and oriented to person, place, and time.   Psychiatric:         Mood and Affect: Mood normal.         Behavior: Behavior normal.         Significant Labs:   All pertinent labs from the last 24 hours have been reviewed.    Diagnostic Results:  I have reviewed and interpreted all pertinent imaging results/findings within the past 24 hours.    Assessment/Plan:     * APL (acute promyelocytic leukemia)  Patient is a 25 year old woman presenting with concern for APL.    Plan:  - Monitor TLS and DIC labs daily, monitoring for differentiation syndrome  - 2D ECHO - EF 58%  - Continue tretinoin (start 9/18, today is Day 14) BID w/ meals,  - Continue ASO (start 09/21, today is day 11), monitor Qtc ( EKG weekly, last 10/1), LFTs  - Holding ASO on day 11 due to QTc >500  - Continue allopurinol 300mg daily  - Transfuse cyroglobulin if fibrinogen <150    - Platelet goal > 50k.   - INR < 1.5  - Monitor I/O and daily weights twice daily    Pancytopenia  Secondary to APL   -- Transfuse if platelets < 50, Hgb< 7  -- Plt transfusion 10/1 for plt of 36      Neutropenic fever  Patient presenting with possible APL and neutropenic fever.   Found to have strep B agalactiae blood cultures (+ at OSH). She was started on vancomycin and cefepime at the OSH.   - cultures and sensitivities faxed to us by [facility] show organism sensitive to pcn.   - changed to Ceftriaxone 09/24, End date 10/3  - Follow up repeat cultures - ngtd       Retinopathy  On 9/27 complained of vision changes present even  before admission    - Opthalmology evaluated, appreciate assistance   - Secondary to subretinal hemorrhage?   - Per optho patient's vision should improve with time as pre-retinal hemorrhages resolve.   - May need surgical intervention with vitrectomy if hemorrhages do not resolve in a few weeks.   - Return to retina clinic in 6 weeks    Adjustment reaction with anxiety  - seen by oncology psychology     Shortness of breath  On RA  S/p 3days of dexamethasone 10mg bid for mild symptoms of differentiation syndrome.   - Denies any symptoms now, continue to monitor     Hypokalemia  - Monitor and replace PRN, keep K >4 and Mg >2 ( Given qt prolongation risk with treatment)       Morbid obesity with BMI of 50.0-59.9, adult  Patient with BMI 57.91        VTE Risk Mitigation (From admission, onward)         Ordered     heparin, porcine (PF) 100 unit/mL injection flush 300 Units  As needed (PRN)      09/21/20 1554     IP VTE HIGH RISK PATIENT  Once      09/18/20 0148     Place sequential compression device  Until discontinued      09/18/20 0148                Disposition: Continue with inpatient management    Jorge A Ferrer MD  Bone Marrow Transplant  [facility]

## 2020-10-01 NOTE — PROGRESS NOTES
Pt seen for follow up; called parents on speakerphone.  Patient reports getting adequate assistance with ADLs; family appreciative of staff assistance.  Patient complains of continued coughing and loose stool.  Patient and family understand process for completing application for [facility]gent Need joanna being mailed to their home.  Patient hopeful that [facility]'s merger with the [facility] system may enable  Greater treatment options near home or shorten length of this hospital stay.  Mother expressed concerns about ASO being held today, but was receptive to education.  No needs identified at this time. Will continue to follow and assist as needed.

## 2020-10-01 NOTE — PLAN OF CARE
POC reviewed with patient, questions answered and concerns addressed. VSS, received 1 U PRBC's without any problems. Tolerating diet without difficulty, encouraged to get OOB and sit in chair but patient refused. Received Arsenic through red lumen of RA PICC, blood return good and tolerated treatment well. In no acute distress; WCM>

## 2020-10-01 NOTE — PLAN OF CARE
Problem: Adult Inpatient Plan of Care  Goal: Plan of Care Review  Outcome: Ongoing, Progressing  Patient remains free from falls and injury this shift. Bed in low, locked position with call light in reach. Plan of care reviewed with pt. Mitch. VSS with tachycardia. Headache Pain resolved with tylenol. Tretinoin given and tolerated. Chemotherapy discontinued. 1 unit of platelets given. Diet tolerated. Wound to bottom lip healing, scabbed over, but patient still picking at scab. Patient encouraged to call for assistance when getting out of bed, needs reinforcement to teaching. Patient verbalized understanding. All belongings within reach. Will continue to monitor.

## 2020-10-01 NOTE — ASSESSMENT & PLAN NOTE
Patient is a 25 year old woman presenting with concern for APL.    Plan:  - Monitor TLS and DIC labs daily, monitoring for differentiation syndrome  - 2D ECHO - EF 58%  - Continue tretinoin (start 9/18, today is Day 14) BID w/ meals,  - Continue ASO (start 09/21, today is day 11), monitor Qtc ( EKG weekly, last 10/1), LFTs  - Holding ASO on day 11 due to QTc >500  - Continue allopurinol 300mg daily  - Transfuse cyroglobulin if fibrinogen <150    - Platelet goal > 50k.   - INR < 1.5  - Monitor I/O and daily weights twice daily

## 2020-10-01 NOTE — SUBJECTIVE & OBJECTIVE
Subjective:     Interval History: ATRA day 14, ASO day 11. Had reflux due to coughing and a few loose stools overnight. Continues to have the same headache that has not changed in intensity. Feels her visual symptoms have not changed. Slept well otherwise and denies complaints this morning. Plts of 36 with LFTs remaining stable from yesterday.    Objective:     Vital Signs (Most Recent):  Temp: 97.5 °F (36.4 °C) (10/01/20 1126)  Pulse: (!) 126 (10/01/20 1126)  Resp: 20 (10/01/20 1126)  BP: (!) 113/56 (10/01/20 1126)  SpO2: 99 % (10/01/20 1126) Vital Signs (24h Range):  Temp:  [97.5 °F (36.4 °C)-99.2 °F (37.3 °C)] 97.5 °F (36.4 °C)  Pulse:  [100-126] 126  Resp:  [18-20] 20  SpO2:  [95 %-99 %] 99 %  BP: (107-132)/(54-77) 113/56     Weight: (!) 140.7 kg (310 lb 3 oz)  Body mass index is 60.58 kg/m².  Body surface area is 2.44 meters squared.      Intake/Output - Last 3 Shifts       09/29 0700 - 09/30 0659 09/30 0700 - 10/01 0659 10/01 0700 - 10/02 0659    P.O. 1050 1200     Blood  700 257    IV Piggyback 370 150     Total Intake(mL/kg) 1420 (10.1) 2050 (14.6) 257 (1.8)    Urine (mL/kg/hr) 900 (0.3) 750 (0.2)     Emesis/NG output  0     Other       Stool 0      Total Output 900 750     Net +520 +1300 +257           Urine Occurrence 2 x 3 x     Stool Occurrence 1 x 3 x     Emesis Occurrence 1 x 1 x           Physical Exam  Vitals signs reviewed.   Constitutional:       Appearance: Normal appearance. She is obese.   HENT:      Head: Normocephalic and atraumatic.      Nose: Nose normal.      Mouth/Throat:      Comments: Fever blister noted.  Eyes:      Extraocular Movements: Extraocular movements intact.   Neck:      Musculoskeletal: Normal range of motion and neck supple.   Cardiovascular:      Rate and Rhythm: Normal rate and regular rhythm.      Heart sounds: No murmur.   Pulmonary:      Effort: Pulmonary effort is normal. No respiratory distress.   Abdominal:      General: There is no distension.      Palpations:  Abdomen is soft.      Tenderness: There is no abdominal tenderness.   Musculoskeletal: Normal range of motion.         General: No swelling.   Skin:     General: Skin is warm and dry.   Neurological:      General: No focal deficit present.      Mental Status: She is alert and oriented to person, place, and time.   Psychiatric:         Mood and Affect: Mood normal.         Behavior: Behavior normal.         Significant Labs:   All pertinent labs from the last 24 hours have been reviewed.    Diagnostic Results:  I have reviewed and interpreted all pertinent imaging results/findings within the past 24 hours.

## 2020-10-01 NOTE — ASSESSMENT & PLAN NOTE
Patient presenting with possible APL and neutropenic fever.   Found to have strep B agalactiae blood cultures (+ at OSH). She was started on vancomycin and cefepime at the OSH.   - cultures and sensitivities faxed to us by [facility] show organism sensitive to pcn.   - changed to Ceftriaxone 09/24, End date 10/3  - Follow up repeat cultures - ngtd

## 2020-10-01 NOTE — ASSESSMENT & PLAN NOTE
Secondary to APL   -- Transfuse if platelets < 50, Hgb< 7  -- Plt transfusion 10/1 for plt of 36

## 2020-10-02 PROBLEM — R00.0 SINUS TACHYCARDIA: Status: ACTIVE | Noted: 2020-10-02

## 2020-10-02 LAB
ALBUMIN SERPL BCP-MCNC: 2.6 G/DL (ref 3.5–5.2)
ALP SERPL-CCNC: 83 U/L (ref 55–135)
ALT SERPL W/O P-5'-P-CCNC: 146 U/L (ref 10–44)
ANION GAP SERPL CALC-SCNC: 12 MMOL/L (ref 8–16)
ANISOCYTOSIS BLD QL SMEAR: SLIGHT
APTT BLDCRRT: 32.9 SEC (ref 21–32)
AST SERPL-CCNC: 97 U/L (ref 10–40)
BASOPHILS NFR BLD: 0 % (ref 0–1.9)
BILIRUB SERPL-MCNC: 0.4 MG/DL (ref 0.1–1)
BUN SERPL-MCNC: 10 MG/DL (ref 6–20)
CALCIUM SERPL-MCNC: 8.2 MG/DL (ref 8.7–10.5)
CHLORIDE SERPL-SCNC: 108 MMOL/L (ref 95–110)
CO2 SERPL-SCNC: 21 MMOL/L (ref 23–29)
CREAT SERPL-MCNC: 0.5 MG/DL (ref 0.5–1.4)
DIFFERENTIAL METHOD: ABNORMAL
EOSINOPHIL NFR BLD: 0 % (ref 0–8)
ERYTHROCYTE [DISTWIDTH] IN BLOOD BY AUTOMATED COUNT: 16.1 % (ref 11.5–14.5)
EST. GFR  (AFRICAN AMERICAN): >60 ML/MIN/1.73 M^2
EST. GFR  (NON AFRICAN AMERICAN): >60 ML/MIN/1.73 M^2
FIBRINOGEN PPP-MCNC: 406 MG/DL (ref 182–366)
GLUCOSE SERPL-MCNC: 124 MG/DL (ref 70–110)
HCT VFR BLD AUTO: 21.9 % (ref 37–48.5)
HGB BLD-MCNC: 7.2 G/DL (ref 12–16)
IMM GRANULOCYTES # BLD AUTO: ABNORMAL K/UL (ref 0–0.04)
IMM GRANULOCYTES NFR BLD AUTO: ABNORMAL % (ref 0–0.5)
INR PPP: 1.1 (ref 0.8–1.2)
LYMPHOCYTES NFR BLD: 12 % (ref 18–48)
MAGNESIUM SERPL-MCNC: 1.7 MG/DL (ref 1.6–2.6)
MCH RBC QN AUTO: 29.8 PG (ref 27–31)
MCHC RBC AUTO-ENTMCNC: 32.9 G/DL (ref 32–36)
MCV RBC AUTO: 91 FL (ref 82–98)
METAMYELOCYTES NFR BLD MANUAL: 5 %
MONOCYTES NFR BLD: 7 % (ref 4–15)
MYELOCYTES NFR BLD MANUAL: 6 %
NEUTROPHILS NFR BLD: 63 % (ref 38–73)
NEUTS BAND NFR BLD MANUAL: 4 %
NRBC BLD-RTO: 0 /100 WBC
PHOSPHATE SERPL-MCNC: 4.5 MG/DL (ref 2.7–4.5)
PLATELET # BLD AUTO: 53 K/UL (ref 150–350)
PLATELET BLD QL SMEAR: ABNORMAL
PMV BLD AUTO: 11.3 FL (ref 9.2–12.9)
POLYCHROMASIA BLD QL SMEAR: ABNORMAL
POTASSIUM SERPL-SCNC: 3.4 MMOL/L (ref 3.5–5.1)
PROMYELOCYTES NFR BLD MANUAL: 3 %
PROT SERPL-MCNC: 5.7 G/DL (ref 6–8.4)
PROTHROMBIN TIME: 12.6 SEC (ref 9–12.5)
RBC # BLD AUTO: 2.42 M/UL (ref 4–5.4)
SODIUM SERPL-SCNC: 141 MMOL/L (ref 136–145)
WBC # BLD AUTO: 19.28 K/UL (ref 3.9–12.7)

## 2020-10-02 PROCEDURE — 25000003 PHARM REV CODE 250: Performed by: STUDENT IN AN ORGANIZED HEALTH CARE EDUCATION/TRAINING PROGRAM

## 2020-10-02 PROCEDURE — 63600175 PHARM REV CODE 636 W HCPCS: Performed by: INTERNAL MEDICINE

## 2020-10-02 PROCEDURE — 93010 EKG 12-LEAD: ICD-10-PCS | Mod: ,,, | Performed by: INTERNAL MEDICINE

## 2020-10-02 PROCEDURE — 85730 THROMBOPLASTIN TIME PARTIAL: CPT

## 2020-10-02 PROCEDURE — 63600175 PHARM REV CODE 636 W HCPCS: Performed by: STUDENT IN AN ORGANIZED HEALTH CARE EDUCATION/TRAINING PROGRAM

## 2020-10-02 PROCEDURE — 99233 PR SUBSEQUENT HOSPITAL CARE,LEVL III: ICD-10-PCS | Mod: ,,, | Performed by: INTERNAL MEDICINE

## 2020-10-02 PROCEDURE — 93010 ELECTROCARDIOGRAM REPORT: CPT | Mod: ,,, | Performed by: INTERNAL MEDICINE

## 2020-10-02 PROCEDURE — 84100 ASSAY OF PHOSPHORUS: CPT

## 2020-10-02 PROCEDURE — 83735 ASSAY OF MAGNESIUM: CPT

## 2020-10-02 PROCEDURE — 99233 SBSQ HOSP IP/OBS HIGH 50: CPT | Mod: ,,, | Performed by: INTERNAL MEDICINE

## 2020-10-02 PROCEDURE — 85007 BL SMEAR W/DIFF WBC COUNT: CPT

## 2020-10-02 PROCEDURE — 94761 N-INVAS EAR/PLS OXIMETRY MLT: CPT

## 2020-10-02 PROCEDURE — 25000003 PHARM REV CODE 250: Performed by: INTERNAL MEDICINE

## 2020-10-02 PROCEDURE — 85027 COMPLETE CBC AUTOMATED: CPT

## 2020-10-02 PROCEDURE — 20600001 HC STEP DOWN PRIVATE ROOM

## 2020-10-02 PROCEDURE — 85384 FIBRINOGEN ACTIVITY: CPT

## 2020-10-02 PROCEDURE — 80053 COMPREHEN METABOLIC PANEL: CPT

## 2020-10-02 PROCEDURE — 85610 PROTHROMBIN TIME: CPT

## 2020-10-02 PROCEDURE — 93005 ELECTROCARDIOGRAM TRACING: CPT

## 2020-10-02 PROCEDURE — 25000003 PHARM REV CODE 250: Performed by: NURSE PRACTITIONER

## 2020-10-02 RX ORDER — BENZONATATE 100 MG/1
100 CAPSULE ORAL 3 TIMES DAILY
Status: DISCONTINUED | OUTPATIENT
Start: 2020-10-02 | End: 2020-10-13

## 2020-10-02 RX ORDER — MAGNESIUM SULFATE HEPTAHYDRATE 40 MG/ML
2 INJECTION, SOLUTION INTRAVENOUS ONCE
Status: DISCONTINUED | OUTPATIENT
Start: 2020-10-02 | End: 2020-10-02

## 2020-10-02 RX ORDER — POTASSIUM CHLORIDE 7.45 MG/ML
10 INJECTION INTRAVENOUS
Status: DISCONTINUED | OUTPATIENT
Start: 2020-10-02 | End: 2020-10-02

## 2020-10-02 RX ADMIN — TRETINOIN 50 MG: 10 CAPSULE ORAL at 08:10

## 2020-10-02 RX ADMIN — Medication 400 MG: at 08:10

## 2020-10-02 RX ADMIN — CEFTRIAXONE 2 G: 2 INJECTION, SOLUTION INTRAVENOUS at 12:10

## 2020-10-02 RX ADMIN — POTASSIUM CHLORIDE 20 MEQ: 750 CAPSULE, EXTENDED RELEASE ORAL at 12:10

## 2020-10-02 RX ADMIN — TRETINOIN 50 MG: 10 CAPSULE ORAL at 04:10

## 2020-10-02 RX ADMIN — ACYCLOVIR 400 MG: 200 CAPSULE ORAL at 07:10

## 2020-10-02 RX ADMIN — NYSTATIN: 100000 POWDER TOPICAL at 08:10

## 2020-10-02 RX ADMIN — BENZONATATE 100 MG: 100 CAPSULE ORAL at 07:10

## 2020-10-02 RX ADMIN — POTASSIUM CHLORIDE 20 MEQ: 750 CAPSULE, EXTENDED RELEASE ORAL at 08:10

## 2020-10-02 RX ADMIN — GUAIFENESIN 100 MG: 200 SOLUTION ORAL at 08:10

## 2020-10-02 RX ADMIN — SODIUM CHLORIDE, SODIUM LACTATE, POTASSIUM CHLORIDE, AND CALCIUM CHLORIDE 500 ML: .6; .31; .03; .02 INJECTION, SOLUTION INTRAVENOUS at 02:10

## 2020-10-02 RX ADMIN — BENZONATATE 100 MG: 100 CAPSULE ORAL at 02:10

## 2020-10-02 RX ADMIN — ACYCLOVIR 400 MG: 200 CAPSULE ORAL at 08:10

## 2020-10-02 RX ADMIN — TRAMADOL HYDROCHLORIDE 25 MG: 50 TABLET, FILM COATED ORAL at 05:10

## 2020-10-02 RX ADMIN — Medication 400 MG: at 12:10

## 2020-10-02 NOTE — SUBJECTIVE & OBJECTIVE
Subjective:     Interval History: ATRA day 15, ASO day 12. ASO held yesterday due to prolonged QTc.Continues to complain of headache and vomiting that is brought on by cough. Symptoms improve with Tylenol. Has not tried the Benzonatate yet to prevent cough, encouraged use this morning. WBC increased to 19 this AM, remains afebrile. Plts 53. Received a 250cc bolus overnight for BP in the 90s/50s with improvement. Pt reports not eating or drinking very much.    Objective:     Vital Signs (Most Recent):  Temp: 98.6 °F (37 °C) (10/02/20 0422)  Pulse: 109 (10/02/20 0422)  Resp: 18 (10/02/20 0538)  BP: 125/66 (10/02/20 0422)  SpO2: 99 % (10/02/20 0422) Vital Signs (24h Range):  Temp:  [97.5 °F (36.4 °C)-98.7 °F (37.1 °C)] 98.6 °F (37 °C)  Pulse:  [109-126] 109  Resp:  [18-20] 18  SpO2:  [94 %-99 %] 99 %  BP: ()/(52-77) 125/66     Weight: (!) 141 kg (310 lb 13.6 oz)  Body mass index is 60.71 kg/m².  Body surface area is 2.44 meters squared.      Intake/Output - Last 3 Shifts       09/30 0700 - 10/01 0659 10/01 0700 - 10/02 0659 10/02 0700 - 10/03 0659    P.O. 1200 120     Blood 700 257     IV Piggyback 150      Total Intake(mL/kg) 2050 (14.6) 377 (2.7)     Urine (mL/kg/hr) 750 (0.2) 150 (0)     Emesis/NG output 0      Stool  0     Total Output 750 150     Net +1300 +227            Urine Occurrence 3 x 3 x     Stool Occurrence 3 x 5 x     Emesis Occurrence 1 x            Physical Exam  Vitals signs reviewed.   Constitutional:       Appearance: Normal appearance. She is obese.   HENT:      Head: Normocephalic and atraumatic.      Nose: Nose normal.      Mouth/Throat:      Comments: Fever blister noted.  Eyes:      Extraocular Movements: Extraocular movements intact.   Neck:      Musculoskeletal: Normal range of motion and neck supple.   Cardiovascular:      Rate and Rhythm: Normal rate and regular rhythm.      Heart sounds: No murmur.   Pulmonary:      Effort: Pulmonary effort is normal. No respiratory distress.    Abdominal:      General: There is no distension.      Palpations: Abdomen is soft.      Tenderness: There is no abdominal tenderness.   Musculoskeletal: Normal range of motion.         General: No swelling.   Skin:     General: Skin is warm and dry.   Neurological:      General: No focal deficit present.      Mental Status: She is alert and oriented to person, place, and time.   Psychiatric:         Mood and Affect: Mood normal.         Behavior: Behavior normal.         Significant Labs:   All pertinent labs from the last 24 hours have been reviewed.    Diagnostic Results:  I have reviewed and interpreted all pertinent imaging results/findings within the past 24 hours.

## 2020-10-02 NOTE — PLAN OF CARE
POC reviewed with patient; understanding verbalized. Pt slept through majority of shift. Magnesium and Potassium replacements administered. Pt C/O cough and received PRN cough syrup and scheduled Tessalon pearls. EKG done. 500mL LR administered for decreased oral intake. Pediatric diet with poor intake. She voids using the BSC; one loose BM this shift. Pt remains up with assist. Pt. with nonskid footwear on, bed in lowest position, and locked with bed rails up x2.  Pt. instructed to call prior to getting OOB.  Pt. has call light and personal items within reach.  VSS and afebrile this shift. All questions and concerns addressed at this time. Will continue to monitor.

## 2020-10-02 NOTE — CARE UPDATE
Rapid Response Nurse Chart Check     Chart check completed, abnormal VS noted. Bedside RNRoss contacted. No concerns verbalized at this time. Pt has decreased PO intake and multiple loose stools. Reccommended suggesting maintenace IVF. Instructed to call [phone] for further concerns or assistance.

## 2020-10-02 NOTE — PROGRESS NOTES
[facility]  Hematology  Bone Marrow Transplant  Progress Note    Patient Name: Erika Saini  Admission Date: 9/18/2020  Hospital Length of Stay: 14 days  Code Status: Full Code    Subjective:     Interval History: ATRA day 15, ASO day 12. ASO held yesterday due to prolonged QTc.Continues to complain of headache and vomiting that is brought on by cough. Symptoms improve with Tylenol. Has not tried the Benzonatate yet to prevent cough, encouraged use this morning. WBC increased to 19 this AM, remains afebrile. Plts 53. Received a 250cc bolus overnight for BP in the 90s/50s with improvement. Pt reports not eating or drinking very much.    Objective:     Vital Signs (Most Recent):  Temp: 98.6 °F (37 °C) (10/02/20 0422)  Pulse: 109 (10/02/20 0422)  Resp: 18 (10/02/20 0538)  BP: 125/66 (10/02/20 0422)  SpO2: 99 % (10/02/20 0422) Vital Signs (24h Range):  Temp:  [97.5 °F (36.4 °C)-98.7 °F (37.1 °C)] 98.6 °F (37 °C)  Pulse:  [109-126] 109  Resp:  [18-20] 18  SpO2:  [94 %-99 %] 99 %  BP: ()/(52-77) 125/66     Weight: (!) 141 kg (310 lb 13.6 oz)  Body mass index is 60.71 kg/m².  Body surface area is 2.44 meters squared.      Intake/Output - Last 3 Shifts       09/30 0700 - 10/01 0659 10/01 0700 - 10/02 0659 10/02 0700 - 10/03 0659    P.O. 1200 120     Blood 700 257     IV Piggyback 150      Total Intake(mL/kg) 2050 (14.6) 377 (2.7)     Urine (mL/kg/hr) 750 (0.2) 150 (0)     Emesis/NG output 0      Stool  0     Total Output 750 150     Net +1300 +227            Urine Occurrence 3 x 3 x     Stool Occurrence 3 x 5 x     Emesis Occurrence 1 x            Physical Exam  Vitals signs reviewed.   Constitutional:       Appearance: Normal appearance. She is obese.   HENT:      Head: Normocephalic and atraumatic.      Nose: Nose normal.      Mouth/Throat:      Comments: Fever blister noted.  Eyes:      Extraocular Movements: Extraocular movements intact.   Neck:      Musculoskeletal: Normal range of motion  and neck supple.   Cardiovascular:      Rate and Rhythm: Normal rate and regular rhythm.      Heart sounds: No murmur.   Pulmonary:      Effort: Pulmonary effort is normal. No respiratory distress.   Abdominal:      General: There is no distension.      Palpations: Abdomen is soft.      Tenderness: There is no abdominal tenderness.   Musculoskeletal: Normal range of motion.         General: No swelling.   Skin:     General: Skin is warm and dry.   Neurological:      General: No focal deficit present.      Mental Status: She is alert and oriented to person, place, and time.   Psychiatric:         Mood and Affect: Mood normal.         Behavior: Behavior normal.         Significant Labs:   All pertinent labs from the last 24 hours have been reviewed.    Diagnostic Results:  I have reviewed and interpreted all pertinent imaging results/findings within the past 24 hours.    Assessment/Plan:     * APL (acute promyelocytic leukemia)  Patient is a 25 year old woman presenting with concern for APL.    Plan:  - Monitor TLS and DIC labs daily, monitoring for differentiation syndrome  - 2D ECHO - EF 58%  - Continue tretinoin (start 9/18, today is Day 15) BID w/ meals,  - Continue ASO (start 09/21, today is day 12), monitor Qtc (EKG weekly, last 10/1), LFTs  - Holding ASO on day 11 due to QTc >500  - Daily EKG until improvement QTc, ASO dose today depending on QTc  - Continue allopurinol 300mg daily  - Transfuse cyroglobulin if fibrinogen <150    - Platelet goal > 50k.   - INR < 1.5  - Monitor I/O and daily weights twice daily    Pancytopenia  Secondary to APL   -- Transfuse if platelets < 50, Hgb< 7  -- Plt transfusion 10/1 for plt of 36      Neutropenic fever  Patient presenting with possible APL and neutropenic fever.   Found to have strep B agalactiae blood cultures (+ at OSH). She was started on vancomycin and cefepime at the OSH.   - cultures and sensitivities faxed to us by [facility] show organism sensitive to pcn.   - changed  to Ceftriaxone 09/24, End date 10/3  - Follow up repeat cultures - ngtd       Retinopathy  On 9/27 complained of vision changes present even before admission    - Opthalmology evaluated, appreciate assistance   - Secondary to subretinal hemorrhage?   - Per optho patient's vision should improve with time as pre-retinal hemorrhages resolve.   - May need surgical intervention with vitrectomy if hemorrhages do not resolve in a few weeks.   - Return to retina clinic in 6 weeks    Sinus tachycardia  -- EKGs on admission with HR in 80s/90s  -- Reports poor oral intake, possible dehydration? Check orthostatic vitals  -- Check TSH with next set of labs to r/o hyperthyroidism  -- Has had low plt count and wearing SCDs, also not hypoxic, PE seems less likely, will hold off on CTA  -- ASO can result in tachycardia, more common ventricular tachycardia but sinus tach is possible  -- Replete electrolytes PRN to K >4 and Mag >2    Adjustment reaction with anxiety  - seen by oncology psychology     Shortness of breath  On RA  S/p 3days of dexamethasone 10mg bid for mild symptoms of differentiation syndrome.   - Denies any symptoms now, continue to monitor     Hypokalemia  - Monitor and replace PRN, keep K >4 and Mg >2 ( Given qt prolongation risk with treatment)       Morbid obesity with BMI of 50.0-59.9, adult  Patient with BMI 57.91        VTE Risk Mitigation (From admission, onward)         Ordered     heparin, porcine (PF) 100 unit/mL injection flush 300 Units  As needed (PRN)      09/21/20 1554     IP VTE HIGH RISK PATIENT  Once      09/18/20 0148     Place sequential compression device  Until discontinued      09/18/20 0148                Disposition: Continue with inpatient care    Jorge A Ferrer MD  Bone Marrow Transplant  [facility]

## 2020-10-02 NOTE — ASSESSMENT & PLAN NOTE
Patient presenting with possible APL and neutropenic fever.   Found to have strep B agalactiae blood cultures (+ at OSH). She was started on vancomycin and cefepime at the OSH.   - cultures and sensitivities faxed to us by [facility] show organism sensitive to pcn.   - changed to Ceftriaxone 09/24, End date 10/3  - Follow up repeat cultures - ngtd

## 2020-10-02 NOTE — PLAN OF CARE
Plan of care reviewed. Patient is oriented X4. Stand by assist. Bedside commode in room. DAT PICC and midline flushed and saline locked. PICC dressing changed. Complained of H/A; One time dose of tramadol administered as ordered. Antibiotics continued. Remained afebrile. Phenergan administered for nausea. Patient had hypotensive episode; MD was notified and all orders were implemented. Patient has been calm and cooperative this shift. All questions and concerns addressed. All safety precautions in place. Remains free of injury. Patient is stable. Will continue to monitor.

## 2020-10-02 NOTE — PROGRESS NOTES
10/01/20 1930   Vital Signs   Temp 98.5 °F (36.9 °C)   Temp src Oral   Pulse (!) 116   Heart Rate Source Monitor   Resp 20   SpO2 95 %   Pulse Oximetry Type Intermittent   O2 Device (Oxygen Therapy) room air   BP (!) 94/53   BP Location Left arm   BP Method Manual   Patient Position Lying   Assessments (Pre/Post)   Level of Consciousness (AVPU) alert   MD made aware. Charge nurse updated.

## 2020-10-02 NOTE — ASSESSMENT & PLAN NOTE
Patient is a 25 year old woman presenting with concern for APL.    Plan:  - Monitor TLS and DIC labs daily, monitoring for differentiation syndrome  - 2D ECHO - EF 58%  - Continue tretinoin (start 9/18, today is Day 15) BID w/ meals,  - Continue ASO (start 09/21, today is day 12), monitor Qtc (EKG weekly, last 10/1), LFTs  - Holding ASO on day 11 due to QTc >500  - Daily EKG until improvement QTc, ASO dose today depending on QTc  - Continue allopurinol 300mg daily  - Transfuse cyroglobulin if fibrinogen <150    - Platelet goal > 50k.   - INR < 1.5  - Monitor I/O and daily weights twice daily

## 2020-10-02 NOTE — ASSESSMENT & PLAN NOTE
-- EKGs on admission with HR in 80s/90s  -- Reports poor oral intake, possible dehydration? Check orthostatic vitals  -- Check TSH with next set of labs to r/o hyperthyroidism  -- Has had low plt count and wearing SCDs, also not hypoxic, PE seems less likely, will hold off on CTA  -- ASO can result in tachycardia, more common ventricular tachycardia but sinus tach is possible  -- Replete electrolytes PRN to K >4 and Mag >2

## 2020-10-03 LAB
ALBUMIN SERPL BCP-MCNC: 2.4 G/DL (ref 3.5–5.2)
ALP SERPL-CCNC: 76 U/L (ref 55–135)
ALT SERPL W/O P-5'-P-CCNC: 142 U/L (ref 10–44)
ANION GAP SERPL CALC-SCNC: 10 MMOL/L (ref 8–16)
ANISOCYTOSIS BLD QL SMEAR: SLIGHT
APTT BLDCRRT: 34.2 SEC (ref 21–32)
AST SERPL-CCNC: 95 U/L (ref 10–40)
BASOPHILS NFR BLD: 0 % (ref 0–1.9)
BILIRUB SERPL-MCNC: 0.4 MG/DL (ref 0.1–1)
BLD PROD TYP BPU: NORMAL
BLOOD UNIT EXPIRATION DATE: NORMAL
BLOOD UNIT TYPE CODE: 5100
BLOOD UNIT TYPE: NORMAL
BUN SERPL-MCNC: 8 MG/DL (ref 6–20)
CALCIUM SERPL-MCNC: 8.1 MG/DL (ref 8.7–10.5)
CHLORIDE SERPL-SCNC: 109 MMOL/L (ref 95–110)
CO2 SERPL-SCNC: 23 MMOL/L (ref 23–29)
CODING SYSTEM: NORMAL
CREAT SERPL-MCNC: 0.5 MG/DL (ref 0.5–1.4)
DIFFERENTIAL METHOD: ABNORMAL
DISPENSE STATUS: NORMAL
EOSINOPHIL NFR BLD: 0 % (ref 0–8)
ERYTHROCYTE [DISTWIDTH] IN BLOOD BY AUTOMATED COUNT: 16 % (ref 11.5–14.5)
EST. GFR  (AFRICAN AMERICAN): >60 ML/MIN/1.73 M^2
EST. GFR  (NON AFRICAN AMERICAN): >60 ML/MIN/1.73 M^2
FIBRINOGEN PPP-MCNC: 470 MG/DL (ref 182–366)
GLUCOSE SERPL-MCNC: 120 MG/DL (ref 70–110)
HCT VFR BLD AUTO: 19.9 % (ref 37–48.5)
HGB BLD-MCNC: 6.6 G/DL (ref 12–16)
IMM GRANULOCYTES # BLD AUTO: ABNORMAL K/UL (ref 0–0.04)
IMM GRANULOCYTES NFR BLD AUTO: ABNORMAL % (ref 0–0.5)
INR PPP: 1.1 (ref 0.8–1.2)
LYMPHOCYTES NFR BLD: 13 % (ref 18–48)
MAGNESIUM SERPL-MCNC: 1.7 MG/DL (ref 1.6–2.6)
MCH RBC QN AUTO: 30.1 PG (ref 27–31)
MCHC RBC AUTO-ENTMCNC: 33.2 G/DL (ref 32–36)
MCV RBC AUTO: 91 FL (ref 82–98)
METAMYELOCYTES NFR BLD MANUAL: 8 %
MONOCYTES NFR BLD: 1 % (ref 4–15)
MYELOCYTES NFR BLD MANUAL: 4 %
NEUTROPHILS NFR BLD: 59 % (ref 38–73)
NEUTS BAND NFR BLD MANUAL: 12 %
NRBC BLD-RTO: 0 /100 WBC
NUM UNITS TRANS PACKED RBC: NORMAL
PHOSPHATE SERPL-MCNC: 3.5 MG/DL (ref 2.7–4.5)
PLATELET # BLD AUTO: 36 K/UL (ref 150–350)
PLATELET BLD QL SMEAR: ABNORMAL
PMV BLD AUTO: 12.2 FL (ref 9.2–12.9)
POTASSIUM SERPL-SCNC: 3.5 MMOL/L (ref 3.5–5.1)
PROMYELOCYTES NFR BLD MANUAL: 3 %
PROT SERPL-MCNC: 5.4 G/DL (ref 6–8.4)
PROTHROMBIN TIME: 12.2 SEC (ref 9–12.5)
RBC # BLD AUTO: 2.19 M/UL (ref 4–5.4)
SODIUM SERPL-SCNC: 142 MMOL/L (ref 136–145)
TSH SERPL DL<=0.005 MIU/L-ACNC: 1.42 UIU/ML (ref 0.4–4)
WBC # BLD AUTO: 19.75 K/UL (ref 3.9–12.7)

## 2020-10-03 PROCEDURE — 25000003 PHARM REV CODE 250: Performed by: STUDENT IN AN ORGANIZED HEALTH CARE EDUCATION/TRAINING PROGRAM

## 2020-10-03 PROCEDURE — 63600175 PHARM REV CODE 636 W HCPCS: Performed by: INTERNAL MEDICINE

## 2020-10-03 PROCEDURE — 36415 COLL VENOUS BLD VENIPUNCTURE: CPT

## 2020-10-03 PROCEDURE — 25000003 PHARM REV CODE 250: Performed by: INTERNAL MEDICINE

## 2020-10-03 PROCEDURE — 84100 ASSAY OF PHOSPHORUS: CPT

## 2020-10-03 PROCEDURE — 20600001 HC STEP DOWN PRIVATE ROOM

## 2020-10-03 PROCEDURE — 93010 ELECTROCARDIOGRAM REPORT: CPT | Mod: ,,, | Performed by: INTERNAL MEDICINE

## 2020-10-03 PROCEDURE — 80053 COMPREHEN METABOLIC PANEL: CPT

## 2020-10-03 PROCEDURE — 83735 ASSAY OF MAGNESIUM: CPT

## 2020-10-03 PROCEDURE — 85384 FIBRINOGEN ACTIVITY: CPT

## 2020-10-03 PROCEDURE — 85610 PROTHROMBIN TIME: CPT

## 2020-10-03 PROCEDURE — 85007 BL SMEAR W/DIFF WBC COUNT: CPT

## 2020-10-03 PROCEDURE — P9040 RBC LEUKOREDUCED IRRADIATED: HCPCS

## 2020-10-03 PROCEDURE — 36430 TRANSFUSION BLD/BLD COMPNT: CPT

## 2020-10-03 PROCEDURE — 85730 THROMBOPLASTIN TIME PARTIAL: CPT

## 2020-10-03 PROCEDURE — 63600175 PHARM REV CODE 636 W HCPCS: Performed by: STUDENT IN AN ORGANIZED HEALTH CARE EDUCATION/TRAINING PROGRAM

## 2020-10-03 PROCEDURE — 93010 EKG 12-LEAD: ICD-10-PCS | Mod: ,,, | Performed by: INTERNAL MEDICINE

## 2020-10-03 PROCEDURE — 84443 ASSAY THYROID STIM HORMONE: CPT

## 2020-10-03 PROCEDURE — 99233 PR SUBSEQUENT HOSPITAL CARE,LEVL III: ICD-10-PCS | Mod: ,,, | Performed by: INTERNAL MEDICINE

## 2020-10-03 PROCEDURE — 85027 COMPLETE CBC AUTOMATED: CPT

## 2020-10-03 PROCEDURE — 93005 ELECTROCARDIOGRAM TRACING: CPT

## 2020-10-03 PROCEDURE — 25000003 PHARM REV CODE 250: Performed by: NURSE PRACTITIONER

## 2020-10-03 PROCEDURE — 99233 SBSQ HOSP IP/OBS HIGH 50: CPT | Mod: ,,, | Performed by: INTERNAL MEDICINE

## 2020-10-03 RX ORDER — POTASSIUM CHLORIDE 20 MEQ/1
40 TABLET, EXTENDED RELEASE ORAL ONCE
Status: DISCONTINUED | OUTPATIENT
Start: 2020-10-03 | End: 2020-10-03

## 2020-10-03 RX ORDER — HYDROCODONE BITARTRATE AND ACETAMINOPHEN 500; 5 MG/1; MG/1
TABLET ORAL
Status: DISCONTINUED | OUTPATIENT
Start: 2020-10-03 | End: 2020-10-05

## 2020-10-03 RX ORDER — PROMETHAZINE HYDROCHLORIDE AND CODEINE PHOSPHATE 6.25; 1 MG/5ML; MG/5ML
5 SOLUTION ORAL ONCE
Status: COMPLETED | OUTPATIENT
Start: 2020-10-03 | End: 2020-10-04

## 2020-10-03 RX ORDER — MAGNESIUM SULFATE HEPTAHYDRATE 40 MG/ML
2 INJECTION, SOLUTION INTRAVENOUS ONCE
Status: DISCONTINUED | OUTPATIENT
Start: 2020-10-03 | End: 2020-10-03

## 2020-10-03 RX ORDER — PROCHLORPERAZINE EDISYLATE 5 MG/ML
10 INJECTION INTRAMUSCULAR; INTRAVENOUS
Status: DISCONTINUED | OUTPATIENT
Start: 2020-10-03 | End: 2020-10-05

## 2020-10-03 RX ORDER — LOPERAMIDE HYDROCHLORIDE 2 MG/1
2 CAPSULE ORAL 4 TIMES DAILY PRN
Status: DISCONTINUED | OUTPATIENT
Start: 2020-10-03 | End: 2020-10-18 | Stop reason: HOSPADM

## 2020-10-03 RX ADMIN — ARSENIC TRIOXIDE 10 MG: 1 INJECTION, SOLUTION INTRAVENOUS at 08:10

## 2020-10-03 RX ADMIN — GUAIFENESIN 100 MG: 200 SOLUTION ORAL at 02:10

## 2020-10-03 RX ADMIN — ACYCLOVIR 400 MG: 200 CAPSULE ORAL at 08:10

## 2020-10-03 RX ADMIN — LOPERAMIDE HYDROCHLORIDE 2 MG: 2 CAPSULE ORAL at 02:10

## 2020-10-03 RX ADMIN — Medication 400 MG: at 08:10

## 2020-10-03 RX ADMIN — CEFTRIAXONE 2 G: 2 INJECTION, SOLUTION INTRAVENOUS at 12:10

## 2020-10-03 RX ADMIN — DIPHENHYDRAMINE HYDROCHLORIDE 25 MG: 25 CAPSULE ORAL at 08:10

## 2020-10-03 RX ADMIN — BENZONATATE 100 MG: 100 CAPSULE ORAL at 02:10

## 2020-10-03 RX ADMIN — TRETINOIN 50 MG: 10 CAPSULE ORAL at 05:10

## 2020-10-03 RX ADMIN — Medication 400 MG: at 12:10

## 2020-10-03 RX ADMIN — BENZONATATE 100 MG: 100 CAPSULE ORAL at 08:10

## 2020-10-03 RX ADMIN — LOPERAMIDE HYDROCHLORIDE 2 MG: 2 CAPSULE ORAL at 08:10

## 2020-10-03 RX ADMIN — NYSTATIN: 100000 POWDER TOPICAL at 08:10

## 2020-10-03 RX ADMIN — NYSTATIN: 100000 POWDER TOPICAL at 10:10

## 2020-10-03 RX ADMIN — ACETAMINOPHEN 650 MG: 325 TABLET ORAL at 08:10

## 2020-10-03 RX ADMIN — POTASSIUM CHLORIDE 20 MEQ: 750 CAPSULE, EXTENDED RELEASE ORAL at 08:10

## 2020-10-03 RX ADMIN — PROCHLORPERAZINE EDISYLATE 10 MG: 5 INJECTION INTRAMUSCULAR; INTRAVENOUS at 07:10

## 2020-10-03 RX ADMIN — ACETAMINOPHEN 650 MG: 325 TABLET ORAL at 12:10

## 2020-10-03 RX ADMIN — TRAMADOL HYDROCHLORIDE 25 MG: 50 TABLET, FILM COATED ORAL at 03:10

## 2020-10-03 RX ADMIN — TRETINOIN 50 MG: 10 CAPSULE ORAL at 08:10

## 2020-10-03 RX ADMIN — POTASSIUM CHLORIDE 20 MEQ: 750 CAPSULE, EXTENDED RELEASE ORAL at 12:10

## 2020-10-03 RX ADMIN — GUAIFENESIN 100 MG: 200 SOLUTION ORAL at 08:10

## 2020-10-03 NOTE — PROGRESS NOTES
[facility]  Hematology  Bone Marrow Transplant  Progress Note    Patient Name: Erika Saini  Admission Date: 9/18/2020  Hospital Length of Stay: 15 days  Code Status: Full Code    Subjective:     Interval History: ATRA day 16, ASO day 13 (but ASO has been held since day 11 due to prolonged QTc). EKG this AM pending. WBC stable at 19 this AM, remains afebrile.    Objective:     Vital Signs (Most Recent):  Temp: 98.6 °F (37 °C) (10/03/20 0400)  Pulse: (!) 119 (10/03/20 0400)  Resp: 18 (10/03/20 0400)  BP: (!) 107/55 (10/03/20 0400)  SpO2: 95 % (10/03/20 0400) Vital Signs (24h Range):  Temp:  [98.1 °F (36.7 °C)-98.9 °F (37.2 °C)] 98.6 °F (37 °C)  Pulse:  [110-119] 119  Resp:  [18-20] 18  SpO2:  [93 %-97 %] 95 %  BP: (102-125)/(55-60) 107/55     Weight: (!) 141.3 kg (311 lb 9.6 oz)  Body mass index is 60.86 kg/m².  Body surface area is 2.45 meters squared.      Intake/Output - Last 3 Shifts       10/01 0700 - 10/02 0659 10/02 0700 - 10/03 0659 10/03 0700 - 10/04 0659    P.O. 120 720     Blood 257      IV Piggyback  550     Total Intake(mL/kg) 377 (2.7) 1270 (9)     Urine (mL/kg/hr) 150 (0) 0 (0)     Emesis/NG output  0     Stool 0 0     Total Output 150 0     Net +227 +1270            Urine Occurrence 3 x 4 x     Stool Occurrence 5 x 4 x     Emesis Occurrence  0 x           Physical Exam  Vitals signs reviewed.   Constitutional:       Appearance: Normal appearance. She is obese.   HENT:      Head: Normocephalic and atraumatic.      Nose: Nose normal.      Mouth/Throat:      Comments: Fever blister noted.  Eyes:      Extraocular Movements: Extraocular movements intact.   Neck:      Musculoskeletal: Normal range of motion and neck supple.   Cardiovascular:      Rate and Rhythm: Normal rate and regular rhythm.      Heart sounds: No murmur.   Pulmonary:      Effort: Pulmonary effort is normal. No respiratory distress.   Abdominal:      General: There is no distension.      Palpations: Abdomen is  soft.      Tenderness: There is no abdominal tenderness.   Musculoskeletal: Normal range of motion.         General: No swelling.   Skin:     General: Skin is warm and dry.   Neurological:      General: No focal deficit present.      Mental Status: She is alert and oriented to person, place, and time.   Psychiatric:         Mood and Affect: Mood normal.         Behavior: Behavior normal.         Significant Labs:   All pertinent labs from the last 24 hours have been reviewed.    Diagnostic Results:  I have reviewed and interpreted all pertinent imaging results/findings within the past 24 hours.    Assessment/Plan:     * APL (acute promyelocytic leukemia)  Patient is a 25 year old woman presenting with concern for APL.    Plan:  - Monitor TLS and DIC labs daily, monitoring for differentiation syndrome  - 2D ECHO - EF 58%  - Continue tretinoin (start 9/18, today is Day 16) BID w/ meals,  - Continue ASO (start 09/21, today is day 13), monitor Qtc (EKG weekly, last 10/1), LFTs  - Holding ASO since day 11 due to QTc >500  - Daily EKG until improvement QTc, ASO dose today depending on QTc. If QTc is < 460 we will resume ASO at 50% dose.  - Continue allopurinol 300mg daily  - Transfuse cyroglobulin if fibrinogen <150    - Platelet goal > 50k.   - INR < 1.5  - Monitor I/O and daily weights twice daily    Sinus tachycardia  -- EKGs on admission with HR in 80s/90s  -- Reports poor oral intake, possible dehydration? Check orthostatic vitals  -- Check TSH with next set of labs to r/o hyperthyroidism  -- Has had low plt count and wearing SCDs, also not hypoxic, PE seems less likely, will hold off on CTA  -- ASO can result in tachycardia, more common ventricular tachycardia but sinus tach is possible  -- Replete electrolytes PRN to K >4 and Mag >2    Retinopathy  On 9/27 complained of vision changes present even before admission    - Opthalmology evaluated, appreciate assistance   - Secondary to subretinal hemorrhage?   - Per  optho patient's vision should improve with time as pre-retinal hemorrhages resolve.   - May need surgical intervention with vitrectomy if hemorrhages do not resolve in a few weeks.   - Return to retina clinic in 6 weeks    Shortness of breath  On RA  S/p 3days of dexamethasone 10mg bid for mild symptoms of differentiation syndrome.   - Denies any symptoms now, continue to monitor     Hypokalemia  - Monitor and replace PRN, keep K >4 and Mg >2 ( Given qt prolongation risk with treatment)       Pancytopenia  Secondary to APL   -- Transfuse if platelets < 50, Hgb< 7  -- Plt transfusion 10/1 for plt of 36      Adjustment reaction with anxiety  - seen by oncology psychology     Neutropenic fever  Patient presenting with possible APL and neutropenic fever.   Found to have strep B agalactiae blood cultures (+ at OSH). She was started on vancomycin and cefepime at the OSH.   - cultures and sensitivities faxed to us by [facility] show organism sensitive to pcn.   - changed to Ceftriaxone 09/24, End date 10/3, today  - Follow up repeat cultures - ngtd       Morbid obesity with BMI of 50.0-59.9, adult  Patient with BMI 57.91        VTE Risk Mitigation (From admission, onward)         Ordered     heparin, porcine (PF) 100 unit/mL injection flush 300 Units  As needed (PRN)      09/21/20 1554     IP VTE HIGH RISK PATIENT  Once      09/18/20 0148     Place sequential compression device  Until discontinued      09/18/20 0148                Disposition: bmt unit    Ethel Uribe MD  Bone Marrow Transplant  [facility]

## 2020-10-03 NOTE — ASSESSMENT & PLAN NOTE
Patient presenting with possible APL and neutropenic fever.   Found to have strep B agalactiae blood cultures (+ at OSH). She was started on vancomycin and cefepime at the OSH.   - cultures and sensitivities faxed to us by [facility] show organism sensitive to pcn.   - changed to Ceftriaxone 09/24, End date 10/3, today  - Follow up repeat cultures - ngtd

## 2020-10-03 NOTE — ASSESSMENT & PLAN NOTE
Patient is a 25 year old woman presenting with concern for APL.    Plan:  - Monitor TLS and DIC labs daily, monitoring for differentiation syndrome  - 2D ECHO - EF 58%  - Continue tretinoin (start 9/18, today is Day 16) BID w/ meals,  - Continue ASO (start 09/21, today is day 13), monitor Qtc (EKG weekly, last 10/1), LFTs  - Holding ASO since day 11 due to QTc >500  - Daily EKG until improvement QTc, ASO dose today depending on QTc. If QTc is < 460 we will resume ASO at 50% dose.  - Continue allopurinol 300mg daily  - Transfuse cyroglobulin if fibrinogen <150    - Platelet goal > 50k.   - INR < 1.5  - Monitor I/O and daily weights twice daily

## 2020-10-03 NOTE — PLAN OF CARE
POC reviewed with patient; understanding verbalized. 1U blood as well as Magnesium and Potassium replacements administered. Pt C/O cough and HA and received PRN cough syrup X2 and Tramadol X1. Pediatric diet with poor intake. She voids using the BSC; four liquid BM's this shift - PRN Imodium administered X1. Pt remains up with assist. Pt. with nonskid footwear on, bed in lowest position, and locked with bed rails up x2.  Pt. instructed to call prior to getting OOB.  Pt. has call light and personal items within reach.  VSS and afebrile this shift. All questions and concerns addressed at this time. Will continue to monitor.

## 2020-10-03 NOTE — PLAN OF CARE
Patient AAOx4, VSS on RA. HR tachycardic (110's). Afebrile. DAT Midline&PICC in place- dsg CDI. Skin intact- no breakdown noted. Mild nonproductive cough noted- tessalon pearls given per orders w/ minimal relief. Pt c/o headache, controlled by PRN medications. Up w/ SBA to BSC- multiple loose BM noted o/n- see I&O for details. Bed locked and lowered, call bell in reach, nonskid socks on when out of bed. Reviewed fall precautions w/ pt; reminded pt to call for assistance, pt verbalized understanding. Hand hygiene performed before and after care. Continuing to hold IV chemo per orders due to prolonged QT. NAD noted, BERNY.

## 2020-10-03 NOTE — SUBJECTIVE & OBJECTIVE
Subjective:     Interval History: ATRA day 16, ASO day 13 (but ASO has been held since day 11 due to prolonged QTc). EKG this AM pending. WBC stable at 19 this AM, remains afebrile.    Objective:     Vital Signs (Most Recent):  Temp: 98.6 °F (37 °C) (10/03/20 0400)  Pulse: (!) 119 (10/03/20 0400)  Resp: 18 (10/03/20 0400)  BP: (!) 107/55 (10/03/20 0400)  SpO2: 95 % (10/03/20 0400) Vital Signs (24h Range):  Temp:  [98.1 °F (36.7 °C)-98.9 °F (37.2 °C)] 98.6 °F (37 °C)  Pulse:  [110-119] 119  Resp:  [18-20] 18  SpO2:  [93 %-97 %] 95 %  BP: (102-125)/(55-60) 107/55     Weight: (!) 141.3 kg (311 lb 9.6 oz)  Body mass index is 60.86 kg/m².  Body surface area is 2.45 meters squared.      Intake/Output - Last 3 Shifts       10/01 0700 - 10/02 0659 10/02 0700 - 10/03 0659 10/03 0700 - 10/04 0659    P.O. 120 720     Blood 257      IV Piggyback  550     Total Intake(mL/kg) 377 (2.7) 1270 (9)     Urine (mL/kg/hr) 150 (0) 0 (0)     Emesis/NG output  0     Stool 0 0     Total Output 150 0     Net +227 +1270            Urine Occurrence 3 x 4 x     Stool Occurrence 5 x 4 x     Emesis Occurrence  0 x           Physical Exam  Vitals signs reviewed.   Constitutional:       Appearance: Normal appearance. She is obese.   HENT:      Head: Normocephalic and atraumatic.      Nose: Nose normal.      Mouth/Throat:      Comments: Fever blister noted.  Eyes:      Extraocular Movements: Extraocular movements intact.   Neck:      Musculoskeletal: Normal range of motion and neck supple.   Cardiovascular:      Rate and Rhythm: Normal rate and regular rhythm.      Heart sounds: No murmur.   Pulmonary:      Effort: Pulmonary effort is normal. No respiratory distress.   Abdominal:      General: There is no distension.      Palpations: Abdomen is soft.      Tenderness: There is no abdominal tenderness.   Musculoskeletal: Normal range of motion.         General: No swelling.   Skin:     General: Skin is warm and dry.   Neurological:      General: No  focal deficit present.      Mental Status: She is alert and oriented to person, place, and time.   Psychiatric:         Mood and Affect: Mood normal.         Behavior: Behavior normal.         Significant Labs:   All pertinent labs from the last 24 hours have been reviewed.    Diagnostic Results:  I have reviewed and interpreted all pertinent imaging results/findings within the past 24 hours.

## 2020-10-04 LAB
ABO + RH BLD: NORMAL
ALBUMIN SERPL BCP-MCNC: 2.7 G/DL (ref 3.5–5.2)
ALBUMIN SERPL BCP-MCNC: 2.8 G/DL (ref 3.5–5.2)
ALLENS TEST: ABNORMAL
ALP SERPL-CCNC: 86 U/L (ref 55–135)
ALP SERPL-CCNC: 86 U/L (ref 55–135)
ALT SERPL W/O P-5'-P-CCNC: 124 U/L (ref 10–44)
ALT SERPL W/O P-5'-P-CCNC: 140 U/L (ref 10–44)
ANION GAP SERPL CALC-SCNC: 11 MMOL/L (ref 8–16)
ANION GAP SERPL CALC-SCNC: 12 MMOL/L (ref 8–16)
ANISOCYTOSIS BLD QL SMEAR: SLIGHT
APTT BLDCRRT: 32.2 SEC (ref 21–32)
AST SERPL-CCNC: 74 U/L (ref 10–40)
AST SERPL-CCNC: 89 U/L (ref 10–40)
BASOPHILS NFR BLD: 0 % (ref 0–1.9)
BASOPHILS NFR BLD: 0 % (ref 0–1.9)
BILIRUB SERPL-MCNC: 0.5 MG/DL (ref 0.1–1)
BILIRUB SERPL-MCNC: 0.6 MG/DL (ref 0.1–1)
BLASTS NFR BLD MANUAL: 1 %
BLD GP AB SCN CELLS X3 SERPL QL: NORMAL
BLD PROD TYP BPU: NORMAL
BLOOD UNIT EXPIRATION DATE: NORMAL
BLOOD UNIT TYPE CODE: 9500
BLOOD UNIT TYPE: NORMAL
BUN SERPL-MCNC: 8 MG/DL (ref 6–20)
BUN SERPL-MCNC: 8 MG/DL (ref 6–20)
CALCIUM SERPL-MCNC: 8.1 MG/DL (ref 8.7–10.5)
CALCIUM SERPL-MCNC: 8.3 MG/DL (ref 8.7–10.5)
CHLORIDE SERPL-SCNC: 109 MMOL/L (ref 95–110)
CHLORIDE SERPL-SCNC: 110 MMOL/L (ref 95–110)
CO2 SERPL-SCNC: 21 MMOL/L (ref 23–29)
CO2 SERPL-SCNC: 22 MMOL/L (ref 23–29)
CODING SYSTEM: NORMAL
CREAT SERPL-MCNC: 0.5 MG/DL (ref 0.5–1.4)
CREAT SERPL-MCNC: 0.6 MG/DL (ref 0.5–1.4)
DELSYS: ABNORMAL
DIFFERENTIAL METHOD: ABNORMAL
DIFFERENTIAL METHOD: ABNORMAL
DISPENSE STATUS: NORMAL
EOSINOPHIL NFR BLD: 0 % (ref 0–8)
EOSINOPHIL NFR BLD: 0 % (ref 0–8)
ERYTHROCYTE [DISTWIDTH] IN BLOOD BY AUTOMATED COUNT: 16.2 % (ref 11.5–14.5)
ERYTHROCYTE [DISTWIDTH] IN BLOOD BY AUTOMATED COUNT: 16.2 % (ref 11.5–14.5)
EST. GFR  (AFRICAN AMERICAN): >60 ML/MIN/1.73 M^2
EST. GFR  (AFRICAN AMERICAN): >60 ML/MIN/1.73 M^2
EST. GFR  (NON AFRICAN AMERICAN): >60 ML/MIN/1.73 M^2
EST. GFR  (NON AFRICAN AMERICAN): >60 ML/MIN/1.73 M^2
FIBRINOGEN PPP-MCNC: 470 MG/DL (ref 182–366)
FIO2: 50
FLOW: 15
GLUCOSE SERPL-MCNC: 120 MG/DL (ref 70–110)
GLUCOSE SERPL-MCNC: 131 MG/DL (ref 70–110)
HCO3 UR-SCNC: 22.8 MMOL/L (ref 24–28)
HCT VFR BLD AUTO: 23.4 % (ref 37–48.5)
HCT VFR BLD AUTO: 24.6 % (ref 37–48.5)
HGB BLD-MCNC: 7.6 G/DL (ref 12–16)
HGB BLD-MCNC: 7.9 G/DL (ref 12–16)
IMM GRANULOCYTES # BLD AUTO: ABNORMAL K/UL (ref 0–0.04)
IMM GRANULOCYTES # BLD AUTO: ABNORMAL K/UL (ref 0–0.04)
IMM GRANULOCYTES NFR BLD AUTO: ABNORMAL % (ref 0–0.5)
IMM GRANULOCYTES NFR BLD AUTO: ABNORMAL % (ref 0–0.5)
INR PPP: 1.1 (ref 0.8–1.2)
LACTATE SERPL-SCNC: 1.1 MMOL/L (ref 0.5–2.2)
LDH SERPL L TO P-CCNC: 1.16 MMOL/L (ref 0.36–1.25)
LYMPHOCYTES NFR BLD: 21 % (ref 18–48)
LYMPHOCYTES NFR BLD: 6 % (ref 18–48)
MAGNESIUM SERPL-MCNC: 1.9 MG/DL (ref 1.6–2.6)
MCH RBC QN AUTO: 28.9 PG (ref 27–31)
MCH RBC QN AUTO: 29.7 PG (ref 27–31)
MCHC RBC AUTO-ENTMCNC: 32.1 G/DL (ref 32–36)
MCHC RBC AUTO-ENTMCNC: 32.5 G/DL (ref 32–36)
MCV RBC AUTO: 89 FL (ref 82–98)
MCV RBC AUTO: 93 FL (ref 82–98)
METAMYELOCYTES NFR BLD MANUAL: 4 %
METAMYELOCYTES NFR BLD MANUAL: 5 %
MODE: ABNORMAL
MONOCYTES NFR BLD: 0 % (ref 4–15)
MONOCYTES NFR BLD: 4 % (ref 4–15)
MYELOCYTES NFR BLD MANUAL: 2 %
MYELOCYTES NFR BLD MANUAL: 8 %
NEUTROPHILS NFR BLD: 44 % (ref 38–73)
NEUTROPHILS NFR BLD: 79 % (ref 38–73)
NEUTS BAND NFR BLD MANUAL: 14 %
NEUTS BAND NFR BLD MANUAL: 9 %
NRBC BLD-RTO: 0 /100 WBC
NRBC BLD-RTO: 0 /100 WBC
NUM UNITS TRANS WBC-POOR PLATPHERESIS: NORMAL
OVALOCYTES BLD QL SMEAR: ABNORMAL
PCO2 BLDA: 28 MMHG (ref 35–45)
PH SMN: 7.52 [PH] (ref 7.35–7.45)
PHOSPHATE SERPL-MCNC: 3.7 MG/DL (ref 2.7–4.5)
PLATELET # BLD AUTO: 33 K/UL (ref 150–350)
PLATELET # BLD AUTO: 79 K/UL (ref 150–350)
PLATELET BLD QL SMEAR: ABNORMAL
PMV BLD AUTO: 10.3 FL (ref 9.2–12.9)
PMV BLD AUTO: 11.7 FL (ref 9.2–12.9)
PO2 BLDA: 86 MMHG (ref 80–100)
POC BE: 0 MMOL/L
POC SATURATED O2: 98 % (ref 95–100)
POC TCO2: 24 MMOL/L (ref 23–27)
POIKILOCYTOSIS BLD QL SMEAR: SLIGHT
POLYCHROMASIA BLD QL SMEAR: ABNORMAL
POTASSIUM SERPL-SCNC: 3.7 MMOL/L (ref 3.5–5.1)
POTASSIUM SERPL-SCNC: 3.8 MMOL/L (ref 3.5–5.1)
PROMYELOCYTES NFR BLD MANUAL: 3 %
PROT SERPL-MCNC: 6.1 G/DL (ref 6–8.4)
PROT SERPL-MCNC: 6.1 G/DL (ref 6–8.4)
PROTHROMBIN TIME: 12.1 SEC (ref 9–12.5)
RBC # BLD AUTO: 2.63 M/UL (ref 4–5.4)
RBC # BLD AUTO: 2.66 M/UL (ref 4–5.4)
SAMPLE: ABNORMAL
SITE: ABNORMAL
SODIUM SERPL-SCNC: 142 MMOL/L (ref 136–145)
SODIUM SERPL-SCNC: 143 MMOL/L (ref 136–145)
SP02: 98
WBC # BLD AUTO: 20.31 K/UL (ref 3.9–12.7)
WBC # BLD AUTO: 24.19 K/UL (ref 3.9–12.7)

## 2020-10-04 PROCEDURE — 25000003 PHARM REV CODE 250: Performed by: STUDENT IN AN ORGANIZED HEALTH CARE EDUCATION/TRAINING PROGRAM

## 2020-10-04 PROCEDURE — 63600175 PHARM REV CODE 636 W HCPCS: Performed by: STUDENT IN AN ORGANIZED HEALTH CARE EDUCATION/TRAINING PROGRAM

## 2020-10-04 PROCEDURE — 85730 THROMBOPLASTIN TIME PARTIAL: CPT

## 2020-10-04 PROCEDURE — 85007 BL SMEAR W/DIFF WBC COUNT: CPT

## 2020-10-04 PROCEDURE — 25000003 PHARM REV CODE 250: Performed by: INTERNAL MEDICINE

## 2020-10-04 PROCEDURE — 94761 N-INVAS EAR/PLS OXIMETRY MLT: CPT

## 2020-10-04 PROCEDURE — 25500020 PHARM REV CODE 255: Performed by: INTERNAL MEDICINE

## 2020-10-04 PROCEDURE — 27000221 HC OXYGEN, UP TO 24 HOURS

## 2020-10-04 PROCEDURE — 86920 COMPATIBILITY TEST SPIN: CPT

## 2020-10-04 PROCEDURE — 83605 ASSAY OF LACTIC ACID: CPT

## 2020-10-04 PROCEDURE — 25000003 PHARM REV CODE 250: Performed by: NURSE PRACTITIONER

## 2020-10-04 PROCEDURE — 80053 COMPREHEN METABOLIC PANEL: CPT

## 2020-10-04 PROCEDURE — 94799 UNLISTED PULMONARY SVC/PX: CPT

## 2020-10-04 PROCEDURE — 20600001 HC STEP DOWN PRIVATE ROOM

## 2020-10-04 PROCEDURE — P9037 PLATE PHERES LEUKOREDU IRRAD: HCPCS

## 2020-10-04 PROCEDURE — 84100 ASSAY OF PHOSPHORUS: CPT

## 2020-10-04 PROCEDURE — 36415 COLL VENOUS BLD VENIPUNCTURE: CPT

## 2020-10-04 PROCEDURE — 63600175 PHARM REV CODE 636 W HCPCS: Performed by: INTERNAL MEDICINE

## 2020-10-04 PROCEDURE — 85610 PROTHROMBIN TIME: CPT

## 2020-10-04 PROCEDURE — 86644 CMV ANTIBODY: CPT

## 2020-10-04 PROCEDURE — 27100171 HC OXYGEN HIGH FLOW UP TO 24 HOURS

## 2020-10-04 PROCEDURE — 99900035 HC TECH TIME PER 15 MIN (STAT)

## 2020-10-04 PROCEDURE — 99233 PR SUBSEQUENT HOSPITAL CARE,LEVL III: ICD-10-PCS | Mod: ,,, | Performed by: INTERNAL MEDICINE

## 2020-10-04 PROCEDURE — 83735 ASSAY OF MAGNESIUM: CPT

## 2020-10-04 PROCEDURE — 82803 BLOOD GASES ANY COMBINATION: CPT

## 2020-10-04 PROCEDURE — 36600 WITHDRAWAL OF ARTERIAL BLOOD: CPT

## 2020-10-04 PROCEDURE — 86901 BLOOD TYPING SEROLOGIC RH(D): CPT

## 2020-10-04 PROCEDURE — 99233 SBSQ HOSP IP/OBS HIGH 50: CPT | Mod: ,,, | Performed by: INTERNAL MEDICINE

## 2020-10-04 PROCEDURE — 85027 COMPLETE CBC AUTOMATED: CPT

## 2020-10-04 PROCEDURE — 85384 FIBRINOGEN ACTIVITY: CPT

## 2020-10-04 RX ORDER — DEXAMETHASONE SODIUM PHOSPHATE 4 MG/ML
4 INJECTION, SOLUTION INTRA-ARTICULAR; INTRALESIONAL; INTRAMUSCULAR; INTRAVENOUS; SOFT TISSUE EVERY 12 HOURS
Status: COMPLETED | OUTPATIENT
Start: 2020-10-04 | End: 2020-10-04

## 2020-10-04 RX ORDER — FUROSEMIDE 10 MG/ML
40 INJECTION INTRAMUSCULAR; INTRAVENOUS ONCE
Status: COMPLETED | OUTPATIENT
Start: 2020-10-04 | End: 2020-10-04

## 2020-10-04 RX ORDER — HYDROCODONE BITARTRATE AND ACETAMINOPHEN 500; 5 MG/1; MG/1
TABLET ORAL
Status: DISCONTINUED | OUTPATIENT
Start: 2020-10-04 | End: 2020-10-05

## 2020-10-04 RX ORDER — POTASSIUM CHLORIDE 20 MEQ/1
40 TABLET, EXTENDED RELEASE ORAL ONCE
Status: DISCONTINUED | OUTPATIENT
Start: 2020-10-04 | End: 2020-10-13

## 2020-10-04 RX ORDER — HYDROCODONE BITARTRATE AND ACETAMINOPHEN 500; 5 MG/1; MG/1
TABLET ORAL
Status: DISCONTINUED | OUTPATIENT
Start: 2020-10-04 | End: 2020-10-07

## 2020-10-04 RX ADMIN — PROMETHAZINE HYDROCHLORIDE 6.25 MG: 25 INJECTION INTRAMUSCULAR; INTRAVENOUS at 08:10

## 2020-10-04 RX ADMIN — POTASSIUM CHLORIDE 20 MEQ: 750 CAPSULE, EXTENDED RELEASE ORAL at 05:10

## 2020-10-04 RX ADMIN — GUAIFENESIN 100 MG: 200 SOLUTION ORAL at 08:10

## 2020-10-04 RX ADMIN — PROCHLORPERAZINE EDISYLATE 10 MG: 5 INJECTION INTRAMUSCULAR; INTRAVENOUS at 07:10

## 2020-10-04 RX ADMIN — ARSENIC TRIOXIDE 10 MG: 1 INJECTION, SOLUTION INTRAVENOUS at 07:10

## 2020-10-04 RX ADMIN — BENZONATATE 100 MG: 100 CAPSULE ORAL at 02:10

## 2020-10-04 RX ADMIN — DEXAMETHASONE SODIUM PHOSPHATE 4 MG: 4 INJECTION, SOLUTION INTRA-ARTICULAR; INTRALESIONAL; INTRAMUSCULAR; INTRAVENOUS; SOFT TISSUE at 12:10

## 2020-10-04 RX ADMIN — ACETAMINOPHEN 650 MG: 325 TABLET ORAL at 05:10

## 2020-10-04 RX ADMIN — DEXAMETHASONE SODIUM PHOSPHATE 4 MG: 4 INJECTION, SOLUTION INTRA-ARTICULAR; INTRALESIONAL; INTRAMUSCULAR; INTRAVENOUS; SOFT TISSUE at 08:10

## 2020-10-04 RX ADMIN — TRETINOIN 50 MG: 10 CAPSULE ORAL at 08:10

## 2020-10-04 RX ADMIN — GUAIFENESIN 100 MG: 200 SOLUTION ORAL at 05:10

## 2020-10-04 RX ADMIN — TRETINOIN 50 MG: 10 CAPSULE ORAL at 04:10

## 2020-10-04 RX ADMIN — PROMETHAZINE HYDROCHLORIDE 6.25 MG: 25 INJECTION INTRAMUSCULAR; INTRAVENOUS at 03:10

## 2020-10-04 RX ADMIN — FUROSEMIDE 40 MG: 10 INJECTION, SOLUTION INTRAMUSCULAR; INTRAVENOUS at 03:10

## 2020-10-04 RX ADMIN — TRAMADOL HYDROCHLORIDE 25 MG: 50 TABLET, FILM COATED ORAL at 08:10

## 2020-10-04 RX ADMIN — BENZONATATE 100 MG: 100 CAPSULE ORAL at 08:10

## 2020-10-04 RX ADMIN — LOPERAMIDE HYDROCHLORIDE 2 MG: 2 CAPSULE ORAL at 01:10

## 2020-10-04 RX ADMIN — DIPHENHYDRAMINE HYDROCHLORIDE 25 MG: 25 CAPSULE ORAL at 05:10

## 2020-10-04 RX ADMIN — TRAMADOL HYDROCHLORIDE 25 MG: 50 TABLET, FILM COATED ORAL at 03:10

## 2020-10-04 RX ADMIN — ACYCLOVIR 400 MG: 200 CAPSULE ORAL at 08:10

## 2020-10-04 RX ADMIN — IOHEXOL 100 ML: 350 INJECTION, SOLUTION INTRAVENOUS at 03:10

## 2020-10-04 RX ADMIN — LOPERAMIDE HYDROCHLORIDE 2 MG: 2 CAPSULE ORAL at 11:10

## 2020-10-04 RX ADMIN — PROMETHAZINE HYDROCHLORIDE AND CODEINE PHOSPHATE 5 ML: 10; 6.25 SOLUTION ORAL at 12:10

## 2020-10-04 NOTE — PROGRESS NOTES
[facility]  Hematology  Bone Marrow Transplant  Progress Note    Patient Name: Erika Saini  Admission Date: 9/18/2020  Hospital Length of Stay: 16 days  Code Status: Full Code    Subjective:     Interval History: ATRA day 17, ASO day 14 (ASO held day 11 - 12 due to prolonged QTc). EKG this AM pending. WBC increased to 24 this AM, remains afebrile. Complaining of nausea and persistent cough this AM. In the afternoon her O2 sats dropped to 64%, put on NRB. CXR and stat labs ordered. Given Dexamethasone 10mg IV.     Objective:     Vital Signs (Most Recent):  Temp: 98.6 °F (37 °C) (10/04/20 0722)  Pulse: (!) 119 (10/04/20 0722)  Resp: 16 (10/04/20 0835)  BP: 119/72 (10/04/20 0722)  SpO2: (!) 92 % (10/04/20 0722) Vital Signs (24h Range):  Temp:  [96.4 °F (35.8 °C)-98.8 °F (37.1 °C)] 98.6 °F (37 °C)  Pulse:  [112-126] 119  Resp:  [16-22] 16  SpO2:  [92 %-97 %] 92 %  BP: ()/(53-94) 119/72     Weight: (!) 139 kg (306 lb 7 oz)  Body mass index is 59.85 kg/m².  Body surface area is 2.43 meters squared.      Intake/Output - Last 3 Shifts       10/02 0700 - 10/03 0659 10/03 0700 - 10/04 0659 10/04 0700 - 10/05 0659    P.O. 720 1870     Blood  327     IV Piggyback 550 410     Total Intake(mL/kg) 1270 (9) 2607 (18.8)     Urine (mL/kg/hr) 0 (0)      Emesis/NG output 0      Stool 0      Total Output 0      Net +1270 +2607            Urine Occurrence 4 x 8 x     Stool Occurrence 4 x 8 x     Emesis Occurrence 0 x 3 x           Physical Exam  Vitals signs reviewed.   Constitutional:       Appearance: Normal appearance. She is obese.   HENT:      Head: Normocephalic and atraumatic.      Nose: Nose normal.      Mouth/Throat:      Comments: Fever blister noted.  Eyes:      Extraocular Movements: Extraocular movements intact.   Neck:      Musculoskeletal: Normal range of motion and neck supple.   Cardiovascular:      Rate and Rhythm: Normal rate and regular rhythm.      Heart sounds: No murmur.   Pulmonary:       Effort: Pulmonary effort is normal. No respiratory distress.   Abdominal:      General: There is no distension.      Palpations: Abdomen is soft.      Tenderness: There is no abdominal tenderness.   Musculoskeletal: Normal range of motion.         General: No swelling.   Skin:     General: Skin is warm and dry.   Neurological:      General: No focal deficit present.      Mental Status: She is alert and oriented to person, place, and time.   Psychiatric:         Mood and Affect: Mood normal.         Behavior: Behavior normal.         Significant Labs:   All pertinent labs from the last 24 hours have been reviewed.    Diagnostic Results:  I have reviewed and interpreted all pertinent imaging results/findings within the past 24 hours.    Assessment/Plan:     * APL (acute promyelocytic leukemia)  - Monitor TLS and DIC labs daily, monitoring for differentiation syndrome  - 2D ECHO - EF 58%  - Continue tretinoin (start 9/18, today is Day 17) BID w/ meals,  - Continue ASO (start 09/21, today is day 14), monitor Qtc (EKG weekly, last 10/1), LFTs  - ASO held on days 11 and 12 due to QTc >500, resumed night of day 13 with 50% dose reduction.  - Continue allopurinol 300mg daily  - Transfuse cyroglobulin if fibrinogen <150    - Platelet goal > 50k.   - INR < 1.5  - Monitor I/O and daily weights twice daily    Sinus tachycardia  -- EKGs on admission with HR in 80s/90s  -- Reports poor oral intake, possible dehydration? Check orthostatic vitals  -- Check TSH with next set of labs to r/o hyperthyroidism  -- Has had low plt count and wearing SCDs, also not hypoxic, PE seems less likely, will hold off on CTA  -- ASO can result in tachycardia, more common ventricular tachycardia but sinus tach is possible  -- Replete electrolytes PRN to K >4 and Mag >2    Retinopathy  On 9/27 complained of vision changes present even before admission    - Opthalmology evaluated, appreciate assistance   - Secondary to subretinal hemorrhage?   -  Per optho patient's vision should improve with time as pre-retinal hemorrhages resolve.   - May need surgical intervention with vitrectomy if hemorrhages do not resolve in a few weeks.   - Return to retina clinic in 6 weeks    Shortness of breath  - Per RN pt desatted to 64% after 'moving'  - Put on NRB, given 10mg IV dexamethasone  - will check CBC, CMP, lactic acid, ABG and CXR.  - No fever.   - Late in course for differentiation syndrome, but will keep dex on for tonight and re assess tomorrow  - consider pe if hypoxia persists.     Hypokalemia  - Monitor and replace PRN, keep K >4 and Mg >2 ( Given qt prolongation risk with treatment)       Pancytopenia  - Secondary to APL   - Transfuse if platelets < 50, Hgb< 7      Adjustment reaction with anxiety  - seen by oncology psychology     Neutropenic fever  - Gound to have strep B agalactiae blood cultures (+ at OSH).  - Cultures and sensitivities faxed to us by [facility] show organism sensitive to pcn.   - Changed to Ceftriaxone 09/24, ended  End date 10/3  - Repeat cultures negative.         Morbid obesity with BMI of 50.0-59.9, adult  Patient with BMI 57.91        VTE Risk Mitigation (From admission, onward)         Ordered     heparin, porcine (PF) 100 unit/mL injection flush 300 Units  As needed (PRN)      09/21/20 1554     IP VTE HIGH RISK PATIENT  Once      09/18/20 0148     Place sequential compression device  Until discontinued      09/18/20 0148                Disposition: bmt unit    Ethel Uribe MD  Bone Marrow Transplant  [facility]

## 2020-10-04 NOTE — CODE/ RAPID DOCUMENTATION
RAPID RESPONSE NURSE NOTE     Admit Date: 2020  LOS: 16  Code Status: Full Code   Date of Consult: 10/04/2020  : 1995  Age: 25 y.o.  Weight:   Wt Readings from Last 1 Encounters:   10/04/20 (!) 139 kg (306 lb 7 oz)     Sex: female  Race: Unknown   Bed: [address] [place] [address] A:   MRN: [id number]  Time Rapid Response Team page Received: 11:49  Time Rapid Response Team at Bedside: 11:53  Time Rapid Response Team left Bedside: 12:12  Was the patient discharged from an ICU this admission?   no  Was the patient discharged from a PACU within last 24 hours?  no  Did the patient receive conscious sedation/general anesthesia within last 24 hours?  no  Was the patient in the ED within the past 24 hours?  no  Was the patient started on NIPPV within the past 24 hours?  no  Did this progress into an ARC or CPA:  no  Attending Physician: Sourav Recinos MD  Primary Service: [facility] HEMATOLOGY BMT  Consult Requested By: Sourav Recinos MD     SITUATION     Notified by code pager.  Reason for alert: hypoxia  Called to evaluate the patient for Respiratory    BACKGROUND     Why is the patient in the hospital?: APL (acute promyelocytic leukemia)    Patient has no past medical history on file.    ASSESSMENT/INTERVENTIONS     BP (!) 138/91 (BP Location: Right arm, Patient Position: Sitting)   Pulse (!) 120   Temp 98.6 °F (37 °C) (Oral)   Resp (!) 42   Ht 5' (1.524 m)   Wt (!) 139 kg (306 lb 7 oz)   LMP 2020 (Approximate)   SpO2 (!) 94%   Breastfeeding No Comment: pt reports being a virgin  BMI 59.85 kg/m²     What did you find: The patient was AAOX4. Per primary Rn the patient became hypoxic after a bath. Her initial SpO2 was 64% and 15LPM via NRB was applied as the patient transitioned back to bed. Her SpO2 then returned to normal limits. ABG was, EKG was, Lactic, CMP, and CBC obtained. Chest Xray pending, dexamethasone 4 mg given per primary team. Patient transitioned to Venti mask at 50% FiO2 and tolerated well. The patient  denies shortness of breath at this time. Vitals continued to remain wnl.    RECOMMENDATIONS     We recommend: continue current plan of care, continuous pulse oximetry.     FOLLOW-UP/CONTINGENCY PLAN     Call the Rapid Response Nurse, Hemanth Epps RN at x [phone] for additional questions or concerns.    PHYSICIAN ESCALATION     Orders received and case discussed by charge RN with Dr. Uribe.    Disposition: Remain in room 803.

## 2020-10-04 NOTE — PROGRESS NOTES
10/04/20 1508   Vital Signs   Pulse (!) 132   SpO2 (!) 86 %   Flow (L/min) 5   O2 Device (Oxygen Therapy) nasal cannula w/ humidification     Charge and MD notified. Pt placed on NRB mask and respiratory called. Lasix administered and CT ordered. Will continue to monitor.

## 2020-10-04 NOTE — CARE UPDATE
Rapid Response Respiratory Therapy Note      Code Status: Full Code   : 1995  Bed: 803/803 A:   MRN: [id number]  Time page Received: 1148  Time Rapid Response RT at Bedside: 1149  Time Rapid Response RT left Bedside: 1216  Report given to:     SITUATION     Evaluated patient for: SOB and low O2 saturations    BACKGROUND     Patient has no past medical history on file.    ASSESSMENT/INTERVENTIONS     Upon arrival in room patient found with RN's on NRB 15L with mild WOB and O2 saturations of 100%. Patient reportedly was on RA and had chest pain with SOB and desaturated. ABG with lac was obtained. Patient weaned to a Venturi Mask 15L/50% with acceptable O2 saturations of 98%. Patient then weaned to 5LNC with O2 saturations of 93%. CXR ordered and Jojo LOVE reported to bedside. Patient reports feeling better.  Pulse: 115 Respiratory rate: 40 BP: BP: (!) 138/91 SpO2:99  Level of Consciousness: Level of Consciousness (AVPU): alert  Respiratory Effort: Respiratory Effort: Normal, Unlabored  Expansion/Accessory Muscle Usage: Expansion/Accessory Muscles/Retractions: expansion symmetric, no retractions, no use of accessory muscles  All Lung Field Breath Sounds: All Lung Fields Breath Sounds: Anterior:, Posterior:, clear, diminished  VERONICA Breath Sounds: diminished  LLL Breath Sounds: diminished  RUL Breath Sounds: diminished  RML Breath Sounds: diminished  RLL Breath Sounds: diminished  O2 Device/Concentration: 15L NRB  Most recent blood gas:   Recent Labs     10/04/20  1207   PH 7.519*   PCO2 28.0*   PO2 86   HCO3 22.8*   POCSATURATED 98   BE 0     Current Respiratory Care Orders:   Start   Ordered   20 0400  Incentive spirometry Every 4 hours (78 of 95 released)    Release   References: [facility] Clinical Practice Guidelines    20 0101   Unscheduled  Oxygen PRN Use PRN (0 of 86349 released)    Release   References: Oxygen Titration Protocol   Question Answer Comment   Device type: Low flow    Device: Nasal  Cannula (1- 5 Liters)    LPM: 3    Titrate O2 per Oxygen Titration Protocol: Yes    To maintain SpO2 goal of: >= 90%    Notify MD of: Inability to achieve desired SpO2; Sudden change in patient status and requires 20% increase in FiO2; Patient requires >60% FiO2        09/22/20 1644   Unscheduled  Inhalation Treatment Q4H PRN Every 4 hours PRN (0 of 37732 released)    Release    09/24/20 0825       NIPPV: No Surgical airway: No Vent: No  ETCO2 monitored:    Ambu at bedside: Ambu bag with the patient?: Yes, Adult Ambu    RECOMMENDATIONS   ?  We recommend: Continual monitoring of patient ventilation and oxygenation status. Weaning of O2 back to RA as tolerated.    ESCALATION      Discussed plan of care with Dr. Uribe and primary RT, ANANDA Sheridan RRT.     FOLLOW-UP     Disposition: Remain in room 803.    Please call back the Rapid Response RT, Chau Ponce, RRT at x [phone] for any questions or concerns.

## 2020-10-04 NOTE — ASSESSMENT & PLAN NOTE
- Per RN pt desatted to 64% after 'moving'  - Put on NRB, given 10mg IV dexamethasone  - will check CBC, CMP, lactic acid, ABG and CXR.  - No fever.   - Late in course for differentiation syndrome, but will keep dex on for tonight and re assess tomorrow  - consider pe if hypoxia persists.

## 2020-10-04 NOTE — PROGRESS NOTES
10/04/20 1141   Vital Signs   Temp 98.6 °F (37 °C)   Temp src Oral   Pulse (!) 131   Heart Rate Source Monitor   SpO2 (!) 64 %   Pulse Oximetry Type Intermittent   O2 Device (Oxygen Therapy) room air   BP (!) 138/91   BP Location Right arm   BP Method Automatic   Patient Position Sitting     Rapid response called and MD notified. RR and charge nurse to bedside. Pt placed on NRB. Labs, blood gas, CXR, EKG ordered. Will continue to monitor.

## 2020-10-04 NOTE — PLAN OF CARE
"Patient remained free from falls throughout shift, call bell within reach. Arsenic infused through PICC overnight, without any issues. Vitals stable throughout infusion. Patient initially refusing chemotherapy, stating "I don't feel good, I just want to do it tomorrow." However, after speaking with her mother, father, and resident, patient agreed to start arsenic tonight, but requesting it be given earlier the next day. 1x dose codeine cough syrup given once with with mild relief of dry cough. Phenergan given for nausea and vomiting. 3 episodes of vomiting overnight after continuous coughing and gaging. Tramadol given for headache. Imodium given twice for diarrhea. Very poor appetite. Vitals stable, will continue to monitor.   "

## 2020-10-04 NOTE — CARE UPDATE
Rapid Response Nurse Chart Check     Chart check completed, abnormal VS noted. Tachycardic up to 124bpm, BP stable. Please call [phone] for further concerns or assistance.

## 2020-10-04 NOTE — PLAN OF CARE
POC reviewed with patient; understanding verbalized. Pt desaturated twice this shift; see previous notes. EKG, CXR, ABG, and CTA done. Lasix administered as ordered; good urine output. Pt now on 70% 22L comfort flow.  in place. She remains up with assist. Regular diet with improving appetite. One episode of diarrhea; PRN imodium administered. Pt C/O HA nausea R/T coughing and received PRN Tramadol and Phenergan. Pt. with nonskid footwear on, bed in lowest position, and locked with bed rails up x2.  Pt. instructed to call prior to getting OOB.  Pt. has call light and personal items within reach.  VSS and afebrile this shift. All questions and concerns addressed at this time. Will continue to monitor.

## 2020-10-04 NOTE — CARE UPDATE
Rapid Response Nurse Follow-up Note     Followed up with patient for proactive rounding.   The patient is now on 22 LPM an 70% FiO2 maintaining SpO2 WNL. The patient has received lasix 40 mg IV secondary to CT and Chest Xray results:    1. No evidence of acute pulmonary embolism although evaluation is limited due to patient's body habitus.  2. Diffuse bilateral patchy areas of consolidation and ground glass with a small left pleural effusion and small pericardial effusion.  Differential diagnosis includes pulmonary edema and multifocal pneumonia.      Spoke with Dr. Eliseo Uribe about his threshold for the patient requiring a higher level of care secondary to her respiratory status change today. He stated he would reach out to critical care about this patient.   Reviewed plan of care with Bedside RN, Sadie RAMIRES.   Team will continue to follow.  Please call Rapid Response RN, Hemanth Epps RN with any questions or concerns at [phone].

## 2020-10-04 NOTE — CARE UPDATE
Rapid Response Respiratory Follow Up Therapy Note     Followed up with patient for proactive rounding. Patient has been escalated to CF 20L/50%. O2 saturations were low at 86% and settings increased to 22L/70% with coarse breath sounds and O2 increasing to 93%. RN reports Lasix given and patient has produced urine. Patient states they feel fine now.  Will continue to monitor. Plan of care reviewed with primary RT, ANANDA Sheridan RRT.  Please call Rapid Response RT, Chau Ponce RRT at [phone] with any questions or concerns.

## 2020-10-04 NOTE — ASSESSMENT & PLAN NOTE
- Monitor TLS and DIC labs daily, monitoring for differentiation syndrome  - 2D ECHO - EF 58%  - Continue tretinoin (start 9/18, today is Day 17) BID w/ meals,  - Continue ASO (start 09/21, today is day 14), monitor Qtc (EKG weekly, last 10/1), LFTs  - ASO held on days 11 and 12 due to QTc >500, resumed night of day 13 with 50% dose reduction.  - Continue allopurinol 300mg daily  - Transfuse cyroglobulin if fibrinogen <150    - Platelet goal > 50k.   - INR < 1.5  - Monitor I/O and daily weights twice daily

## 2020-10-04 NOTE — ASSESSMENT & PLAN NOTE
- Gound to have strep B agalactiae blood cultures (+ at OSH).  - Cultures and sensitivities faxed to us by [facility] show organism sensitive to pcn.   - Changed to Ceftriaxone 09/24, ended  End date 10/3  - Repeat cultures negative.

## 2020-10-04 NOTE — SUBJECTIVE & OBJECTIVE
Subjective:     Interval History: ATRA day 17, ASO day 14 (ASO held day 11 - 12 due to prolonged QTc). EKG this AM pending. WBC increased to 24 this AM, remains afebrile. Complaining of nausea and persistent cough this AM. In the afternoon her O2 sats dropped to 64%, put on NRB. CXR and stat labs ordered. Given Dexamethasone 10mg IV.     Objective:     Vital Signs (Most Recent):  Temp: 98.6 °F (37 °C) (10/04/20 0722)  Pulse: (!) 119 (10/04/20 0722)  Resp: 16 (10/04/20 0835)  BP: 119/72 (10/04/20 0722)  SpO2: (!) 92 % (10/04/20 0722) Vital Signs (24h Range):  Temp:  [96.4 °F (35.8 °C)-98.8 °F (37.1 °C)] 98.6 °F (37 °C)  Pulse:  [112-126] 119  Resp:  [16-22] 16  SpO2:  [92 %-97 %] 92 %  BP: ()/(53-94) 119/72     Weight: (!) 139 kg (306 lb 7 oz)  Body mass index is 59.85 kg/m².  Body surface area is 2.43 meters squared.      Intake/Output - Last 3 Shifts       10/02 0700 - 10/03 0659 10/03 0700 - 10/04 0659 10/04 0700 - 10/05 0659    P.O. 720 1870     Blood  327     IV Piggyback 550 410     Total Intake(mL/kg) 1270 (9) 2607 (18.8)     Urine (mL/kg/hr) 0 (0)      Emesis/NG output 0      Stool 0      Total Output 0      Net +1270 +2607            Urine Occurrence 4 x 8 x     Stool Occurrence 4 x 8 x     Emesis Occurrence 0 x 3 x           Physical Exam  Vitals signs reviewed.   Constitutional:       Appearance: Normal appearance. She is obese.   HENT:      Head: Normocephalic and atraumatic.      Nose: Nose normal.      Mouth/Throat:      Comments: Fever blister noted.  Eyes:      Extraocular Movements: Extraocular movements intact.   Neck:      Musculoskeletal: Normal range of motion and neck supple.   Cardiovascular:      Rate and Rhythm: Normal rate and regular rhythm.      Heart sounds: No murmur.   Pulmonary:      Effort: Pulmonary effort is normal. No respiratory distress.   Abdominal:      General: There is no distension.      Palpations: Abdomen is soft.      Tenderness: There is no abdominal tenderness.    Musculoskeletal: Normal range of motion.         General: No swelling.   Skin:     General: Skin is warm and dry.   Neurological:      General: No focal deficit present.      Mental Status: She is alert and oriented to person, place, and time.   Psychiatric:         Mood and Affect: Mood normal.         Behavior: Behavior normal.         Significant Labs:   All pertinent labs from the last 24 hours have been reviewed.    Diagnostic Results:  I have reviewed and interpreted all pertinent imaging results/findings within the past 24 hours.

## 2020-10-05 LAB
ALBUMIN SERPL BCP-MCNC: 2.8 G/DL (ref 3.5–5.2)
ALP SERPL-CCNC: 86 U/L (ref 55–135)
ALT SERPL W/O P-5'-P-CCNC: 110 U/L (ref 10–44)
ANION GAP SERPL CALC-SCNC: 10 MMOL/L (ref 8–16)
ANISOCYTOSIS BLD QL SMEAR: SLIGHT
APTT BLDCRRT: 32.2 SEC (ref 21–32)
AST SERPL-CCNC: 56 U/L (ref 10–40)
BASOPHILS # BLD AUTO: ABNORMAL K/UL (ref 0–0.2)
BASOPHILS NFR BLD: 0 % (ref 0–1.9)
BILIRUB SERPL-MCNC: 0.6 MG/DL (ref 0.1–1)
BUN SERPL-MCNC: 8 MG/DL (ref 6–20)
CALCIUM SERPL-MCNC: 8.3 MG/DL (ref 8.7–10.5)
CHLORIDE SERPL-SCNC: 107 MMOL/L (ref 95–110)
CO2 SERPL-SCNC: 23 MMOL/L (ref 23–29)
CREAT SERPL-MCNC: 0.5 MG/DL (ref 0.5–1.4)
DIFFERENTIAL METHOD: ABNORMAL
EOSINOPHIL # BLD AUTO: ABNORMAL K/UL (ref 0–0.5)
EOSINOPHIL NFR BLD: 0.5 % (ref 0–8)
ERYTHROCYTE [DISTWIDTH] IN BLOOD BY AUTOMATED COUNT: 15.8 % (ref 11.5–14.5)
EST. GFR  (AFRICAN AMERICAN): >60 ML/MIN/1.73 M^2
EST. GFR  (NON AFRICAN AMERICAN): >60 ML/MIN/1.73 M^2
FIBRINOGEN PPP-MCNC: 445 MG/DL (ref 182–366)
GLUCOSE SERPL-MCNC: 153 MG/DL (ref 70–110)
HCT VFR BLD AUTO: 22 % (ref 37–48.5)
HGB BLD-MCNC: 7.2 G/DL (ref 12–16)
IMM GRANULOCYTES # BLD AUTO: ABNORMAL K/UL (ref 0–0.04)
IMM GRANULOCYTES NFR BLD AUTO: ABNORMAL % (ref 0–0.5)
INR PPP: 1.1 (ref 0.8–1.2)
LYMPHOCYTES # BLD AUTO: ABNORMAL K/UL (ref 1–4.8)
LYMPHOCYTES NFR BLD: 3.5 % (ref 18–48)
MAGNESIUM SERPL-MCNC: 1.7 MG/DL (ref 1.6–2.6)
MCH RBC QN AUTO: 29.6 PG (ref 27–31)
MCHC RBC AUTO-ENTMCNC: 32.7 G/DL (ref 32–36)
MCV RBC AUTO: 91 FL (ref 82–98)
METAMYELOCYTES NFR BLD MANUAL: 3 %
MONOCYTES # BLD AUTO: ABNORMAL K/UL (ref 0.3–1)
MONOCYTES NFR BLD: 3 % (ref 4–15)
MYELOCYTES NFR BLD MANUAL: 1.5 %
NEUTROPHILS NFR BLD: 81 % (ref 38–73)
NEUTS BAND NFR BLD MANUAL: 7 %
NRBC BLD-RTO: 1 /100 WBC
OVALOCYTES BLD QL SMEAR: ABNORMAL
PHOSPHATE SERPL-MCNC: 4.2 MG/DL (ref 2.7–4.5)
PLATELET # BLD AUTO: 64 K/UL (ref 150–350)
PLATELET BLD QL SMEAR: ABNORMAL
PMV BLD AUTO: 11.4 FL (ref 9.2–12.9)
POIKILOCYTOSIS BLD QL SMEAR: SLIGHT
POTASSIUM SERPL-SCNC: 4 MMOL/L (ref 3.5–5.1)
PROMYELOCYTES NFR BLD MANUAL: 0.5 %
PROT SERPL-MCNC: 6.1 G/DL (ref 6–8.4)
PROTHROMBIN TIME: 12 SEC (ref 9–12.5)
RBC # BLD AUTO: 2.43 M/UL (ref 4–5.4)
SODIUM SERPL-SCNC: 140 MMOL/L (ref 136–145)
WBC # BLD AUTO: 20.68 K/UL (ref 3.9–12.7)

## 2020-10-05 PROCEDURE — 63600175 PHARM REV CODE 636 W HCPCS: Performed by: INTERNAL MEDICINE

## 2020-10-05 PROCEDURE — 85730 THROMBOPLASTIN TIME PARTIAL: CPT

## 2020-10-05 PROCEDURE — 85610 PROTHROMBIN TIME: CPT

## 2020-10-05 PROCEDURE — 27100171 HC OXYGEN HIGH FLOW UP TO 24 HOURS

## 2020-10-05 PROCEDURE — 25000003 PHARM REV CODE 250: Performed by: INTERNAL MEDICINE

## 2020-10-05 PROCEDURE — 99233 SBSQ HOSP IP/OBS HIGH 50: CPT | Mod: ,,, | Performed by: INTERNAL MEDICINE

## 2020-10-05 PROCEDURE — 83735 ASSAY OF MAGNESIUM: CPT

## 2020-10-05 PROCEDURE — 94761 N-INVAS EAR/PLS OXIMETRY MLT: CPT

## 2020-10-05 PROCEDURE — 85027 COMPLETE CBC AUTOMATED: CPT

## 2020-10-05 PROCEDURE — 99900035 HC TECH TIME PER 15 MIN (STAT)

## 2020-10-05 PROCEDURE — 25000003 PHARM REV CODE 250: Performed by: STUDENT IN AN ORGANIZED HEALTH CARE EDUCATION/TRAINING PROGRAM

## 2020-10-05 PROCEDURE — 20600001 HC STEP DOWN PRIVATE ROOM

## 2020-10-05 PROCEDURE — 80053 COMPREHEN METABOLIC PANEL: CPT

## 2020-10-05 PROCEDURE — 93010 ELECTROCARDIOGRAM REPORT: CPT | Mod: ,,, | Performed by: INTERNAL MEDICINE

## 2020-10-05 PROCEDURE — 84100 ASSAY OF PHOSPHORUS: CPT

## 2020-10-05 PROCEDURE — 25000003 PHARM REV CODE 250: Performed by: NURSE PRACTITIONER

## 2020-10-05 PROCEDURE — 93010 EKG 12-LEAD: ICD-10-PCS | Mod: ,,, | Performed by: INTERNAL MEDICINE

## 2020-10-05 PROCEDURE — 99233 PR SUBSEQUENT HOSPITAL CARE,LEVL III: ICD-10-PCS | Mod: ,,, | Performed by: INTERNAL MEDICINE

## 2020-10-05 PROCEDURE — 85384 FIBRINOGEN ACTIVITY: CPT

## 2020-10-05 PROCEDURE — 85007 BL SMEAR W/DIFF WBC COUNT: CPT

## 2020-10-05 PROCEDURE — 27000221 HC OXYGEN, UP TO 24 HOURS

## 2020-10-05 PROCEDURE — 93005 ELECTROCARDIOGRAM TRACING: CPT

## 2020-10-05 RX ORDER — FUROSEMIDE 10 MG/ML
40 INJECTION INTRAMUSCULAR; INTRAVENOUS ONCE
Status: COMPLETED | OUTPATIENT
Start: 2020-10-05 | End: 2020-10-05

## 2020-10-05 RX ORDER — DEXAMETHASONE SODIUM PHOSPHATE 4 MG/ML
10 INJECTION, SOLUTION INTRA-ARTICULAR; INTRALESIONAL; INTRAMUSCULAR; INTRAVENOUS; SOFT TISSUE EVERY 12 HOURS
Status: DISCONTINUED | OUTPATIENT
Start: 2020-10-05 | End: 2020-10-06

## 2020-10-05 RX ORDER — DEXAMETHASONE SODIUM PHOSPHATE 4 MG/ML
4 INJECTION, SOLUTION INTRA-ARTICULAR; INTRALESIONAL; INTRAMUSCULAR; INTRAVENOUS; SOFT TISSUE EVERY 12 HOURS
Status: DISCONTINUED | OUTPATIENT
Start: 2020-10-05 | End: 2020-10-05

## 2020-10-05 RX ADMIN — DEXAMETHASONE SODIUM PHOSPHATE 10 MG: 4 INJECTION, SOLUTION INTRA-ARTICULAR; INTRALESIONAL; INTRAMUSCULAR; INTRAVENOUS; SOFT TISSUE at 08:10

## 2020-10-05 RX ADMIN — ACYCLOVIR 400 MG: 200 CAPSULE ORAL at 08:10

## 2020-10-05 RX ADMIN — FUROSEMIDE 40 MG: 10 INJECTION, SOLUTION INTRAMUSCULAR; INTRAVENOUS at 09:10

## 2020-10-05 RX ADMIN — GUAIFENESIN 100 MG: 200 SOLUTION ORAL at 04:10

## 2020-10-05 RX ADMIN — DEXAMETHASONE SODIUM PHOSPHATE 10 MG: 4 INJECTION, SOLUTION INTRA-ARTICULAR; INTRALESIONAL; INTRAMUSCULAR; INTRAVENOUS; SOFT TISSUE at 09:10

## 2020-10-05 RX ADMIN — BENZONATATE 100 MG: 100 CAPSULE ORAL at 08:10

## 2020-10-05 RX ADMIN — BENZONATATE 100 MG: 100 CAPSULE ORAL at 03:10

## 2020-10-05 RX ADMIN — TRETINOIN 50 MG: 10 CAPSULE ORAL at 07:10

## 2020-10-05 NOTE — ASSESSMENT & PLAN NOTE
-- EKGs on admission with HR in 80s/90s  -- Orthostatics negative  -- TSH WNL  -- CTA engative however limited by body habitus  -- ASO can result in tachycardia, more common ventricular tachycardia but sinus tach is possible  -- Replete electrolytes PRN to K >4 and Mag >2

## 2020-10-05 NOTE — CARE UPDATE
Rapid Response Nurse Chart Check     Chart check completed, abnormal VS noted. Spoke with  Charge RNMannie on rounds. No concerns verbalized at this time. Instructed to call [phone] for further concerns or assistance.

## 2020-10-05 NOTE — ASSESSMENT & PLAN NOTE
- Monitor TLS and DIC labs daily, monitoring for differentiation syndrome  - 2D ECHO - EF 58%  - Continue tretinoin (start 9/18, today is Day 18) BID w/ meals,  - Continue ASO (start 09/21, today is day 15), monitor Qtc (daily EKG until improved, weekly thereafter), LFTs  - ASO held on days 11 and 12 due to QTc >500, resumed day 13 and 14 with 50% dose reduction.  - Will hold ATRA (D18) and ASO (D15) with prolonged QTc and concern for differentiation syndrome  - Continue Dex 4mg q12hr for cocnern for differentiation syndrome (started 10/4)  - Continue allopurinol 300mg daily  - Transfuse cyroglobulin if fibrinogen <150    - Platelet goal > 50k.   - INR < 1.5  - Monitor I/O and daily weights twice daily

## 2020-10-05 NOTE — SUBJECTIVE & OBJECTIVE
Subjective:     Interval History: ATRA day 18, ASO day 15 (ASO held day 11 - 12 due to prolonged QTc). EKG this AM with QTc 496. Remains on Comfort Flow 22L 70% FIO2. Only complains of cough this morning. Otherwise feels comfortable and denies complaints. States headache resolved Saturday and has not returned. Will hold ATRA and ASO today given pleural effusion/pulmonary edema with oxygen requirements and QTc.     Objective:     Vital Signs (Most Recent):  Temp: 97.1 °F (36.2 °C) (10/05/20 1109)  Pulse: 100 (10/05/20 1109)  Resp: (!) 24 (10/05/20 1109)  BP: (!) 125/95 (10/05/20 1109)  SpO2: 97 % (10/05/20 1109) Vital Signs (24h Range):  Temp:  [96.7 °F (35.9 °C)-98.7 °F (37.1 °C)] 97.1 °F (36.2 °C)  Pulse:  [100-132] 100  Resp:  [18-50] 24  SpO2:  [64 %-100 %] 97 %  BP: (103-138)/(57-95) 125/95     Weight: (!) 137.9 kg (304 lb)  Body mass index is 59.37 kg/m².  Body surface area is 2.42 meters squared.      Intake/Output - Last 3 Shifts       10/03 0700 - 10/04 0659 10/04 0700 - 10/05 0659 10/05 0700 - 10/06 0659    P.O. 1870 740 580    Blood 327      IV Piggyback 410 260     Total Intake(mL/kg) 2607 (18.8) 1000 (7.3) 580 (4.2)    Urine (mL/kg/hr)  2700 (0.8) 600 (0.9)    Emesis/NG output       Stool  0 0    Total Output  2700 600    Net +2607 -1700 -20           Urine Occurrence 8 x 2 x 1 x    Stool Occurrence 8 x 2 x 1 x    Emesis Occurrence 3 x            Physical Exam  Vitals signs reviewed.   Constitutional:       Appearance: Normal appearance. She is obese.   HENT:      Head: Normocephalic and atraumatic.      Nose: Nose normal.      Mouth/Throat:      Comments: Fever blister noted.  Eyes:      Extraocular Movements: Extraocular movements intact.   Neck:      Musculoskeletal: Normal range of motion and neck supple.   Cardiovascular:      Rate and Rhythm: Normal rate and regular rhythm.      Heart sounds: No murmur.   Pulmonary:      Effort: Pulmonary effort is normal. No respiratory distress.      Comments:  No crackles however poor inspiratory effort  Abdominal:      General: There is no distension.      Palpations: Abdomen is soft.      Tenderness: There is no abdominal tenderness.   Musculoskeletal: Normal range of motion.         General: No swelling.   Skin:     General: Skin is warm and dry.   Neurological:      General: No focal deficit present.      Mental Status: She is alert and oriented to person, place, and time.   Psychiatric:         Mood and Affect: Mood normal.         Behavior: Behavior normal.         Significant Labs:   All pertinent labs from the last 24 hours have been reviewed.    Diagnostic Results:  I have reviewed and interpreted all pertinent imaging results/findings within the past 24 hours.

## 2020-10-05 NOTE — PLAN OF CARE
Patient remained free from falls throughout shift, call bell within reach. Arsenic infused through PICC line overnight without any issues. Currently on 22L at 60% comfort flow, pulse ox 93-97%. Tachypneic and very SOB with activity. 1 loose BM overnight. Vitals stable, will continue to monitor.

## 2020-10-05 NOTE — PROGRESS NOTES
[facility]  Hematology  Bone Marrow Transplant  Progress Note    Patient Name: Erika Saini  Admission Date: 9/18/2020  Hospital Length of Stay: 17 days  Code Status: Full Code    Subjective:     Interval History: ATRA day 18, ASO day 15 (ASO held day 11 - 12 due to prolonged QTc). EKG this AM with QTc 496. Remains on Comfort Flow 22L 70% FIO2. Only complains of cough this morning. Otherwise feels comfortable and denies complaints. States headache resolved Saturday and has not returned. Will hold ATRA and ASO today given pleural effusion/pulmonary edema with oxygen requirements and QTc.     Objective:     Vital Signs (Most Recent):  Temp: 97.1 °F (36.2 °C) (10/05/20 1109)  Pulse: 100 (10/05/20 1109)  Resp: (!) 24 (10/05/20 1109)  BP: (!) 125/95 (10/05/20 1109)  SpO2: 97 % (10/05/20 1109) Vital Signs (24h Range):  Temp:  [96.7 °F (35.9 °C)-98.7 °F (37.1 °C)] 97.1 °F (36.2 °C)  Pulse:  [100-132] 100  Resp:  [18-50] 24  SpO2:  [64 %-100 %] 97 %  BP: (103-138)/(57-95) 125/95     Weight: (!) 137.9 kg (304 lb)  Body mass index is 59.37 kg/m².  Body surface area is 2.42 meters squared.      Intake/Output - Last 3 Shifts       10/03 0700 - 10/04 0659 10/04 0700 - 10/05 0659 10/05 0700 - 10/06 0659    P.O. 1870 740 580    Blood 327      IV Piggyback 410 260     Total Intake(mL/kg) 2607 (18.8) 1000 (7.3) 580 (4.2)    Urine (mL/kg/hr)  2700 (0.8) 600 (0.9)    Emesis/NG output       Stool  0 0    Total Output  2700 600    Net +2607 -1700 -20           Urine Occurrence 8 x 2 x 1 x    Stool Occurrence 8 x 2 x 1 x    Emesis Occurrence 3 x            Physical Exam  Vitals signs reviewed.   Constitutional:       Appearance: Normal appearance. She is obese.   HENT:      Head: Normocephalic and atraumatic.      Nose: Nose normal.   Eyes:      Extraocular Movements: Extraocular movements intact.   Neck:      Musculoskeletal: Normal range of motion and neck supple.   Cardiovascular:      Rate and Rhythm: Normal  Our goal is to have forms completed with 72 hours, however some forms may require a visit or additional information.    Who is the form from?: [facility] (if other please explain)  Where the form came from: form was faxed in  What clinic location was the form placed at?: [place]  Where the form was placed: placed in [facility] inbox     What number is listed as a contact on the form?: [phone]  Fax number to return form to:  [phone]        Call taken on 1/14/2020 at 4:09 PM by Floridalma Merino       rate and regular rhythm.      Heart sounds: No murmur.   Pulmonary:      Effort: Pulmonary effort is normal. No respiratory distress.      Comments: No crackles however poor inspiratory effort  Abdominal:      General: There is no distension.      Palpations: Abdomen is soft.      Tenderness: There is no abdominal tenderness.   Musculoskeletal: Normal range of motion.         General: No swelling.   Skin:     General: Skin is warm and dry.   Neurological:      General: No focal deficit present.      Mental Status: She is alert and oriented to person, place, and time.   Psychiatric:         Mood and Affect: Mood normal.         Behavior: Behavior normal.         Significant Labs:   All pertinent labs from the last 24 hours have been reviewed.    Diagnostic Results:  I have reviewed and interpreted all pertinent imaging results/findings within the past 24 hours.    Assessment/Plan:     * APL (acute promyelocytic leukemia)  - Monitor TLS and DIC labs daily, monitoring for differentiation syndrome  - 2D ECHO - EF 58%  - Continue tretinoin (start 9/18, today is Day 18) BID w/ meals,  - Continue ASO (start 09/21, today is day 15), monitor Qtc (daily EKG until improved, weekly thereafter), LFTs  - ASO held on days 11 and 12 due to QTc >500, resumed day 13 and 14 with 50% dose reduction.  - Will hold ATRA (D18) and ASO (D15) with prolonged QTc and concern for differentiation syndrome  - Continue Dex 4mg q12hr for cocnern for differentiation syndrome (started 10/4)  - Continue allopurinol 300mg daily  - Transfuse cyroglobulin if fibrinogen <150    - Platelet goal > 50k.   - INR < 1.5  - Monitor I/O and daily weights twice daily    Shortness of breath  - Concern for differentiation syndrome  - Desaturated to 64% after moving  - CTA showed b/l pulmonary edema with left sided pleural effusion and small pericardial effusion, no PE however limited by body habitus  - Currently on Comfort Flow 22L 70% FIO2  - Continue Dex 4mg  q12hrs  - Lasix 40mg IV once to optimized volume status  - Wean O2 as tolerated    Pancytopenia  - Secondary to APL   - Transfuse if platelets < 50, Hgb< 7      Neutropenic fever  - Gound to have strep B agalactiae blood cultures (+ at OSH).  - Cultures and sensitivities faxed to us by [facility] show organism sensitive to pcn.   - Repeat cultures negative.    - Completed Ceftriaxone 10/3       Sinus tachycardia  -- EKGs on admission with HR in 80s/90s  -- Orthostatics negative  -- TSH WNL  -- CTA engative however limited by body habitus  -- ASO can result in tachycardia, more common ventricular tachycardia but sinus tach is possible  -- Replete electrolytes PRN to K >4 and Mag >2    Retinopathy  On 9/27 complained of vision changes present even before admission    - Opthalmology evaluated, appreciate assistance   - Secondary to subretinal hemorrhage?   - Per optho patient's vision should improve with time as pre-retinal hemorrhages resolve.   - May need surgical intervention with vitrectomy if hemorrhages do not resolve in a few weeks.   - Return to retina clinic in 6 weeks    Adjustment reaction with anxiety  - seen by oncology psychology     Hypokalemia  - Monitor and replace PRN, keep K >4 and Mg >2 ( Given qt prolongation risk with treatment)       Morbid obesity with BMI of 50.0-59.9, adult  Patient with BMI 57.91        VTE Risk Mitigation (From admission, onward)         Ordered     heparin, porcine (PF) 100 unit/mL injection flush 300 Units  As needed (PRN)      09/21/20 1554     IP VTE HIGH RISK PATIENT  Once      09/18/20 0148     Place sequential compression device  Until discontinued      09/18/20 0148                Disposition: Continue with inpatient care.    Jorge A Ferrer MD  Bone Marrow Transplant  [facility]

## 2020-10-05 NOTE — PHYSICIAN QUERY
PT Name: Erika Saini  MR #: [id number]     ACUITY OF CONDITION CLARIFICATION      CDS/: Renay Peoples RN              Contact information:[email or web address]  This form is a permanent document in the medical record.     Query Date: October 5, 2020    By submitting this query, we are merely seeking further clarification of documentation to reflect the severity of illness of your patient. Please utilize your independent clinical judgment when addressing the question(s) below.    The Medical record reflects the following:     Indicators   Supporting Clinical Findings Location in Medical Record   X Documentation of condition Pleural effusion/pulmonary edema BMT 10/05    Lab Value(s)     X Radiology Findings Diffuse bilateral patchy areas of consolidation and ground glass with a small left pleural effusion and small pericardial effusion.  Differential diagnosis includes pulmonary edema and multifocal pneumonia.    CTA chest 10/4   X Treatment/Medication Will hold ATRA and ASO today given pleural effusion/pulmonary edema with oxygen requirements and QTc.     The patient is now on 22 LPM an 70% FiO2 maintaining SpO2 WNL. The patient has received lasix 40 mg IV secondary to CT and Chest Xray results: BMT 10/05        Rapid response nurse note 10/04    Other        Provider, please specify the acuity/chronicity of _pulmonary edema___:    [ x  ] Acute   [   ] Other (please explain)__________________   [   ]  Clinically Undetermined                Please document in your progress notes daily for the duration of treatment until resolved, and include in your discharge summary.

## 2020-10-05 NOTE — ASSESSMENT & PLAN NOTE
- Gound to have strep B agalactiae blood cultures (+ at OSH).  - Cultures and sensitivities faxed to us by [facility] show organism sensitive to pcn.   - Repeat cultures negative.    - Completed Ceftriaxone 10/3

## 2020-10-05 NOTE — ASSESSMENT & PLAN NOTE
- Concern for differentiation syndrome  - Desaturated to 64% after moving  - CTA showed b/l pulmonary edema with left sided pleural effusion and small pericardial effusion, no PE however limited by body habitus  - Currently on Comfort Flow 22L 70% FIO2  - Continue Dex 4mg q12hrs  - Lasix 40mg IV once to optimized volume status  - Wean O2 as tolerated

## 2020-10-06 LAB
ALBUMIN SERPL BCP-MCNC: 2.8 G/DL (ref 3.5–5.2)
ALP SERPL-CCNC: 89 U/L (ref 55–135)
ALT SERPL W/O P-5'-P-CCNC: 169 U/L (ref 10–44)
ANION GAP SERPL CALC-SCNC: 11 MMOL/L (ref 8–16)
ANISOCYTOSIS BLD QL SMEAR: SLIGHT
APTT BLDCRRT: 27.9 SEC (ref 21–32)
ASCENDING AORTA: 2.78 CM
AST SERPL-CCNC: 137 U/L (ref 10–40)
AV INDEX (PROSTH): 0.64
AV MEAN GRADIENT: 6 MMHG
AV PEAK GRADIENT: 12 MMHG
AV VALVE AREA: 1.89 CM2
AV VELOCITY RATIO: 0.63
BASOPHILS # BLD AUTO: ABNORMAL K/UL (ref 0–0.2)
BASOPHILS NFR BLD: 0 % (ref 0–1.9)
BILIRUB SERPL-MCNC: 0.6 MG/DL (ref 0.1–1)
BLD PROD TYP BPU: NORMAL
BLD PROD TYP BPU: NORMAL
BLOOD UNIT EXPIRATION DATE: NORMAL
BLOOD UNIT EXPIRATION DATE: NORMAL
BLOOD UNIT TYPE CODE: 5100
BLOOD UNIT TYPE CODE: 6200
BLOOD UNIT TYPE: NORMAL
BLOOD UNIT TYPE: NORMAL
BSA FOR ECHO PROCEDURE: 2.42 M2
BUN SERPL-MCNC: 13 MG/DL (ref 6–20)
CALCIUM SERPL-MCNC: 8.3 MG/DL (ref 8.7–10.5)
CHLORIDE SERPL-SCNC: 105 MMOL/L (ref 95–110)
CO2 SERPL-SCNC: 24 MMOL/L (ref 23–29)
CODING SYSTEM: NORMAL
CODING SYSTEM: NORMAL
CREAT SERPL-MCNC: 0.6 MG/DL (ref 0.5–1.4)
CV ECHO LV RWT: 0.34 CM
DIFFERENTIAL METHOD: ABNORMAL
DISPENSE STATUS: NORMAL
DISPENSE STATUS: NORMAL
DOP CALC AO PEAK VEL: 1.74 M/S
DOP CALC AO VTI: 37.03 CM
DOP CALC LVOT AREA: 3 CM2
DOP CALC LVOT DIAMETER: 1.94 CM
DOP CALC LVOT PEAK VEL: 1.1 M/S
DOP CALC LVOT STROKE VOLUME: 70.11 CM3
DOP CALCLVOT PEAK VEL VTI: 23.73 CM
E WAVE DECELERATION TIME: 170.18 MSEC
E/A RATIO: 1.79
E/E' RATIO: 17.86 M/S
ECHO LV POSTERIOR WALL: 0.95 CM (ref 0.6–1.1)
EOSINOPHIL # BLD AUTO: ABNORMAL K/UL (ref 0–0.5)
EOSINOPHIL NFR BLD: 0 % (ref 0–8)
ERYTHROCYTE [DISTWIDTH] IN BLOOD BY AUTOMATED COUNT: 15.6 % (ref 11.5–14.5)
EST. GFR  (AFRICAN AMERICAN): >60 ML/MIN/1.73 M^2
EST. GFR  (NON AFRICAN AMERICAN): >60 ML/MIN/1.73 M^2
FIBRINOGEN PPP-MCNC: 387 MG/DL (ref 182–366)
FRACTIONAL SHORTENING: 31 % (ref 28–44)
GLUCOSE SERPL-MCNC: 167 MG/DL (ref 70–110)
HCT VFR BLD AUTO: 22 % (ref 37–48.5)
HGB BLD-MCNC: 6.9 G/DL (ref 12–16)
HYPOCHROMIA BLD QL SMEAR: ABNORMAL
IMM GRANULOCYTES # BLD AUTO: ABNORMAL K/UL (ref 0–0.04)
IMM GRANULOCYTES NFR BLD AUTO: ABNORMAL % (ref 0–0.5)
INR PPP: 1.1 (ref 0.8–1.2)
INTERVENTRICULAR SEPTUM: 0.94 CM (ref 0.6–1.1)
LA MAJOR: 5.28 CM
LA MINOR: 5.26 CM
LA WIDTH: 3.72 CM
LEFT ATRIUM SIZE: 3.66 CM
LEFT ATRIUM VOLUME INDEX: 27.3 ML/M2
LEFT ATRIUM VOLUME: 60.99 CM3
LEFT INTERNAL DIMENSION IN SYSTOLE: 3.85 CM (ref 2.1–4)
LEFT VENTRICLE DIASTOLIC VOLUME INDEX: 67.63 ML/M2
LEFT VENTRICLE DIASTOLIC VOLUME: 150.82 ML
LEFT VENTRICLE MASS INDEX: 90 G/M2
LEFT VENTRICLE SYSTOLIC VOLUME INDEX: 28.7 ML/M2
LEFT VENTRICLE SYSTOLIC VOLUME: 64.03 ML
LEFT VENTRICULAR INTERNAL DIMENSION IN DIASTOLE: 5.55 CM (ref 3.5–6)
LEFT VENTRICULAR MASS: 201.01 G
LV LATERAL E/E' RATIO: 17.86 M/S
LV SEPTAL E/E' RATIO: 17.86 M/S
LYMPHOCYTES # BLD AUTO: ABNORMAL K/UL (ref 1–4.8)
LYMPHOCYTES NFR BLD: 3 % (ref 18–48)
MAGNESIUM SERPL-MCNC: 1.8 MG/DL (ref 1.6–2.6)
MCH RBC QN AUTO: 29 PG (ref 27–31)
MCHC RBC AUTO-ENTMCNC: 31.4 G/DL (ref 32–36)
MCV RBC AUTO: 92 FL (ref 82–98)
METAMYELOCYTES NFR BLD MANUAL: 1 %
MONOCYTES # BLD AUTO: ABNORMAL K/UL (ref 0.3–1)
MONOCYTES NFR BLD: 2 % (ref 4–15)
MV PEAK A VEL: 0.7 M/S
MV PEAK E VEL: 1.25 M/S
MV STENOSIS PRESSURE HALF TIME: 49.35 MS
MV VALVE AREA P 1/2 METHOD: 4.46 CM2
NEUTROPHILS NFR BLD: 90 % (ref 38–73)
NEUTS BAND NFR BLD MANUAL: 4 %
NRBC BLD-RTO: 0 /100 WBC
NUM UNITS TRANS PACKED RBC: NORMAL
NUM UNITS TRANS WBC-POOR PLATPHERESIS: NORMAL
PHOSPHATE SERPL-MCNC: 4.2 MG/DL (ref 2.7–4.5)
PISA TR MAX VEL: 2.49 M/S
PLATELET # BLD AUTO: 48 K/UL (ref 150–350)
PLATELET BLD QL SMEAR: ABNORMAL
PMV BLD AUTO: 10.8 FL (ref 9.2–12.9)
POTASSIUM SERPL-SCNC: 4 MMOL/L (ref 3.5–5.1)
PROT SERPL-MCNC: 6 G/DL (ref 6–8.4)
PROTHROMBIN TIME: 12.1 SEC (ref 9–12.5)
PULM VEIN S/D RATIO: 1.05
PV PEAK D VEL: 0.75 M/S
PV PEAK S VEL: 0.79 M/S
RA MAJOR: 4.72 CM
RA PRESSURE: 8 MMHG
RA WIDTH: 3.03 CM
RBC # BLD AUTO: 2.38 M/UL (ref 4–5.4)
RETIRED EF AND QEF - SEE NOTES: 48 %
RIGHT VENTRICULAR END-DIASTOLIC DIMENSION: 3.9 CM
RV TISSUE DOPPLER FREE WALL SYSTOLIC VELOCITY 1 (APICAL 4 CHAMBER VIEW): 13.03 CM/S
SINUS: 2.53 CM
SODIUM SERPL-SCNC: 140 MMOL/L (ref 136–145)
STJ: 2.25 CM
TDI LATERAL: 0.07 M/S
TDI SEPTAL: 0.07 M/S
TDI: 0.07 M/S
TR MAX PG: 25 MMHG
TRICUSPID ANNULAR PLANE SYSTOLIC EXCURSION: 2.29 CM
TV REST PULMONARY ARTERY PRESSURE: 33 MMHG
WBC # BLD AUTO: 24.88 K/UL (ref 3.9–12.7)

## 2020-10-06 PROCEDURE — 25000003 PHARM REV CODE 250: Performed by: STUDENT IN AN ORGANIZED HEALTH CARE EDUCATION/TRAINING PROGRAM

## 2020-10-06 PROCEDURE — 83735 ASSAY OF MAGNESIUM: CPT

## 2020-10-06 PROCEDURE — 25000003 PHARM REV CODE 250: Performed by: INTERNAL MEDICINE

## 2020-10-06 PROCEDURE — 36430 TRANSFUSION BLD/BLD COMPNT: CPT

## 2020-10-06 PROCEDURE — P9040 RBC LEUKOREDUCED IRRADIATED: HCPCS

## 2020-10-06 PROCEDURE — 99233 SBSQ HOSP IP/OBS HIGH 50: CPT | Mod: ,,, | Performed by: INTERNAL MEDICINE

## 2020-10-06 PROCEDURE — 90832 PSYTX W PT 30 MINUTES: CPT | Mod: ,,, | Performed by: PSYCHOLOGIST

## 2020-10-06 PROCEDURE — 80053 COMPREHEN METABOLIC PANEL: CPT

## 2020-10-06 PROCEDURE — 27100171 HC OXYGEN HIGH FLOW UP TO 24 HOURS

## 2020-10-06 PROCEDURE — 94761 N-INVAS EAR/PLS OXIMETRY MLT: CPT

## 2020-10-06 PROCEDURE — 85610 PROTHROMBIN TIME: CPT

## 2020-10-06 PROCEDURE — 84100 ASSAY OF PHOSPHORUS: CPT

## 2020-10-06 PROCEDURE — 99233 PR SUBSEQUENT HOSPITAL CARE,LEVL III: ICD-10-PCS | Mod: ,,, | Performed by: INTERNAL MEDICINE

## 2020-10-06 PROCEDURE — P9037 PLATE PHERES LEUKOREDU IRRAD: HCPCS

## 2020-10-06 PROCEDURE — 63600175 PHARM REV CODE 636 W HCPCS: Performed by: INTERNAL MEDICINE

## 2020-10-06 PROCEDURE — 85384 FIBRINOGEN ACTIVITY: CPT

## 2020-10-06 PROCEDURE — 20600001 HC STEP DOWN PRIVATE ROOM

## 2020-10-06 PROCEDURE — 85027 COMPLETE CBC AUTOMATED: CPT

## 2020-10-06 PROCEDURE — 93010 ELECTROCARDIOGRAM REPORT: CPT | Mod: ,,, | Performed by: INTERNAL MEDICINE

## 2020-10-06 PROCEDURE — 93005 ELECTROCARDIOGRAM TRACING: CPT

## 2020-10-06 PROCEDURE — 90832 PR PSYCHOTHERAPY W/PATIENT, 30 MIN: ICD-10-PCS | Mod: ,,, | Performed by: PSYCHOLOGIST

## 2020-10-06 PROCEDURE — 63600175 PHARM REV CODE 636 W HCPCS: Performed by: STUDENT IN AN ORGANIZED HEALTH CARE EDUCATION/TRAINING PROGRAM

## 2020-10-06 PROCEDURE — 27000221 HC OXYGEN, UP TO 24 HOURS

## 2020-10-06 PROCEDURE — 93010 EKG 12-LEAD: ICD-10-PCS | Mod: ,,, | Performed by: INTERNAL MEDICINE

## 2020-10-06 PROCEDURE — 94799 UNLISTED PULMONARY SVC/PX: CPT

## 2020-10-06 PROCEDURE — 97803 MED NUTRITION INDIV SUBSEQ: CPT

## 2020-10-06 PROCEDURE — 85007 BL SMEAR W/DIFF WBC COUNT: CPT

## 2020-10-06 PROCEDURE — 85730 THROMBOPLASTIN TIME PARTIAL: CPT

## 2020-10-06 PROCEDURE — 99900035 HC TECH TIME PER 15 MIN (STAT)

## 2020-10-06 RX ORDER — DEXAMETHASONE SODIUM PHOSPHATE 4 MG/ML
4 INJECTION, SOLUTION INTRA-ARTICULAR; INTRALESIONAL; INTRAMUSCULAR; INTRAVENOUS; SOFT TISSUE EVERY 12 HOURS
Status: DISCONTINUED | OUTPATIENT
Start: 2020-10-06 | End: 2020-10-06

## 2020-10-06 RX ORDER — MUPIROCIN 20 MG/G
OINTMENT TOPICAL 2 TIMES DAILY
Status: DISCONTINUED | OUTPATIENT
Start: 2020-10-06 | End: 2020-10-06

## 2020-10-06 RX ORDER — FUROSEMIDE 10 MG/ML
40 INJECTION INTRAMUSCULAR; INTRAVENOUS ONCE AS NEEDED
Status: COMPLETED | OUTPATIENT
Start: 2020-10-06 | End: 2020-10-06

## 2020-10-06 RX ORDER — FUROSEMIDE 10 MG/ML
40 INJECTION INTRAMUSCULAR; INTRAVENOUS ONCE
Status: DISCONTINUED | OUTPATIENT
Start: 2020-10-06 | End: 2020-10-06

## 2020-10-06 RX ORDER — DEXAMETHASONE SODIUM PHOSPHATE 4 MG/ML
10 INJECTION, SOLUTION INTRA-ARTICULAR; INTRALESIONAL; INTRAMUSCULAR; INTRAVENOUS; SOFT TISSUE EVERY 12 HOURS
Status: DISCONTINUED | OUTPATIENT
Start: 2020-10-06 | End: 2020-10-09

## 2020-10-06 RX ORDER — LANOLIN ALCOHOL/MO/W.PET/CERES
400 CREAM (GRAM) TOPICAL ONCE
Status: COMPLETED | OUTPATIENT
Start: 2020-10-06 | End: 2020-10-06

## 2020-10-06 RX ORDER — MUPIROCIN 20 MG/G
OINTMENT TOPICAL 2 TIMES DAILY
Status: DISPENSED | OUTPATIENT
Start: 2020-10-06 | End: 2020-10-11

## 2020-10-06 RX ADMIN — DEXAMETHASONE SODIUM PHOSPHATE 10 MG: 4 INJECTION, SOLUTION INTRA-ARTICULAR; INTRALESIONAL; INTRAMUSCULAR; INTRAVENOUS; SOFT TISSUE at 08:10

## 2020-10-06 RX ADMIN — DEXAMETHASONE SODIUM PHOSPHATE 10 MG: 4 INJECTION, SOLUTION INTRA-ARTICULAR; INTRALESIONAL; INTRAMUSCULAR; INTRAVENOUS; SOFT TISSUE at 09:10

## 2020-10-06 RX ADMIN — FUROSEMIDE 40 MG: 10 INJECTION, SOLUTION INTRAMUSCULAR; INTRAVENOUS at 05:10

## 2020-10-06 RX ADMIN — ACYCLOVIR 400 MG: 200 CAPSULE ORAL at 08:10

## 2020-10-06 RX ADMIN — BENZONATATE 100 MG: 100 CAPSULE ORAL at 08:10

## 2020-10-06 RX ADMIN — Medication 400 MG: at 09:10

## 2020-10-06 RX ADMIN — Medication 400 MG: at 04:10

## 2020-10-06 RX ADMIN — BENZONATATE 100 MG: 100 CAPSULE ORAL at 04:10

## 2020-10-06 RX ADMIN — ACETAMINOPHEN 650 MG: 325 TABLET ORAL at 05:10

## 2020-10-06 NOTE — PROGRESS NOTES
[facility]  Hematology  Bone Marrow Transplant  Progress Note    Patient Name: Erika Saini  Admission Date: 9/18/2020  Hospital Length of Stay: 18 days  Code Status: Full Code    Subjective:     Interval History: ATRA day 19, ASO day 16 (both held yesterday with QTc 493 and concern for differentiation syndrome). Comfort flow titrated down, currently 20L 50%. Pt comfortable in bed on those settings however desaturates and becomes short of breath with movement to the bedside commode. Hgb 6.9 and plts 48 this AM so received a unit of both with lasix prior to transfusion. Net negative 870cc over last 24hrs. Pt denies complaints this morning other than shortness of breath with significant movement.    Objective:     Vital Signs (Most Recent):  Temp: 98.3 °F (36.8 °C) (10/06/20 0624)  Pulse: 95 (10/06/20 0655)  Resp: (!) 22 (10/06/20 0624)  BP: 139/82 (10/06/20 0624)  SpO2: (!) 93 % (10/06/20 0624) Vital Signs (24h Range):  Temp:  [97.1 °F (36.2 °C)-99.3 °F (37.4 °C)] 98.3 °F (36.8 °C)  Pulse:  [] 95  Resp:  [20-24] 22  SpO2:  [91 %-98 %] 93 %  BP: (120-151)/(59-95) 139/82     Weight: (!) 137.9 kg (304 lb)  Body mass index is 59.37 kg/m².  Body surface area is 2.42 meters squared.      Intake/Output - Last 3 Shifts       10/04 0700 - 10/05 0659 10/05 0700 - 10/06 0659 10/06 0700 - 10/07 0659    P.O. 740 680     Blood  563     IV Piggyback 260      Total Intake(mL/kg) 1000 (7.3) 1243 (9)     Urine (mL/kg/hr) 2700 (0.8) 1775 (0.5)     Stool 0 0     Total Output 2700 1775     Net -1700 -532            Urine Occurrence 2 x 3 x     Stool Occurrence 2 x 2 x           Physical Exam  Vitals signs reviewed.   Constitutional:       Appearance: Normal appearance. She is obese.   HENT:      Head: Normocephalic and atraumatic.      Nose: Nose normal.      Mouth/Throat:      Comments: Fever blister noted.  Eyes:      Extraocular Movements: Extraocular movements intact.   Neck:      Musculoskeletal:  Normal range of motion and neck supple.   Cardiovascular:      Rate and Rhythm: Normal rate and regular rhythm.      Heart sounds: No murmur.   Pulmonary:      Effort: Pulmonary effort is normal. No respiratory distress.      Comments: No crackles however poor inspiratory effort  Abdominal:      General: There is no distension.      Palpations: Abdomen is soft.      Tenderness: There is no abdominal tenderness.   Musculoskeletal: Normal range of motion.         General: No swelling.   Skin:     General: Skin is warm and dry.   Neurological:      General: No focal deficit present.      Mental Status: She is alert and oriented to person, place, and time.   Psychiatric:         Mood and Affect: Mood normal.         Behavior: Behavior normal.         Significant Labs:   All pertinent labs from the last 24 hours have been reviewed.    Diagnostic Results:  I have reviewed and interpreted all pertinent imaging results/findings within the past 24 hours.    Assessment/Plan:     * APL (acute promyelocytic leukemia)  - Monitor TLS and DIC labs daily, monitoring for differentiation syndrome  - 2D ECHO - EF 58%  - Continue tretinoin (start 9/18, today is Day 19) BID w/ meals,  - Continue ASO (start 09/21, today is day 16), monitor Qtc (daily EKG until improved, weekly thereafter), LFTs  - ASO held on days 11 and 12 due to QTc >500, resumed day 13 and 14 with 50% dose reduction.  - Hold ATRA (D18-19) and ASO (D15-16) with prolonged QTc, elevated LFTs, and concern for differentiation syndrome  - Continue Dex 10mg q12hr for cocnern for differentiation syndrome (started 10/4)  - TTE ordered to assess pericardial effusion and for potential worsening cardiac function in setting of above therapy   - Continue allopurinol 300mg daily  - Transfuse cyroglobulin if fibrinogen <150    - Platelet goal > 50k.   - Hgb goal >7  - INR < 1.5  - Monitor I/O and daily weights twice daily    Shortness of breath  - Concern for differentiation  syndrome  - CTA showed b/l pulmonary edema with left sided pleural effusion and small pericardial effusion, no PE however limited by body habitus  - Currently on Comfort Flow 20L 50% FIO2, mainly desaturates and becomes short of breath with movement  - Continue Dex 4mg q12hrs  - Lasix 40mg IV once to optimized volume status, plan to keep net negative  - Wean O2 as tolerated, goal >92%    Pancytopenia  - Secondary to APL   - Transfuse if platelets < 50, Hgb< 7      Neutropenic fever  - Gound to have strep B agalactiae blood cultures (+ at OSH).  - Cultures and sensitivities faxed to us by [facility] show organism sensitive to pcn.   - Repeat cultures negative.    - Completed Ceftriaxone 10/3       Sinus tachycardia  -- EKGs on admission with HR in 80s/90s  -- Orthostatics negative  -- TSH WNL  -- CTA engative however limited by body habitus  -- ASO can result in tachycardia, more common ventricular tachycardia but sinus tach is possible  -- Replete electrolytes PRN to K >4 and Mag >2    Retinopathy  On 9/27 complained of vision changes present even before admission    - Opthalmology evaluated, appreciate assistance   - Secondary to subretinal hemorrhage?   - Per optho patient's vision should improve with time as pre-retinal hemorrhages resolve.   - May need surgical intervention with vitrectomy if hemorrhages do not resolve in a few weeks.   - Return to retina clinic in 6 weeks    Adjustment reaction with anxiety  - seen by oncology psychology     Hypokalemia  - Monitor and replace PRN, keep K >4 and Mg >2 ( Given qt prolongation risk with treatment)       Morbid obesity with BMI of 50.0-59.9, adult  Patient with BMI 57.91      VTE Risk Mitigation (From admission, onward)         Ordered     heparin, porcine (PF) 100 unit/mL injection flush 300 Units  As needed (PRN)      09/21/20 1554     IP VTE HIGH RISK PATIENT  Once      09/18/20 0148     Place sequential compression device  Until discontinued      09/18/20 0148                 Disposition: Continue with inpatient management    Jorge A Ferrer MD  Bone Marrow Transplant  [facility]

## 2020-10-06 NOTE — SUBJECTIVE & OBJECTIVE
Subjective:     Interval History: ATRA day 19, ASO day 16 (both held yesterday with QTc 493 and concern for differentiation syndrome). Comfort flow titrated down, currently 20L 50%. Pt comfortable in bed on those settings however desaturates and becomes short of breath with movement to the bedside commode. Hgb 6.9 and plts 48 this AM so received a unit of both with lasix prior to transfusion. Net negative 870cc over last 24hrs. Pt denies complaints this morning other than shortness of breath with significant movement.    Objective:     Vital Signs (Most Recent):  Temp: 98.3 °F (36.8 °C) (10/06/20 0624)  Pulse: 95 (10/06/20 0655)  Resp: (!) 22 (10/06/20 0624)  BP: 139/82 (10/06/20 0624)  SpO2: (!) 93 % (10/06/20 0624) Vital Signs (24h Range):  Temp:  [97.1 °F (36.2 °C)-99.3 °F (37.4 °C)] 98.3 °F (36.8 °C)  Pulse:  [] 95  Resp:  [20-24] 22  SpO2:  [91 %-98 %] 93 %  BP: (120-151)/(59-95) 139/82     Weight: (!) 137.9 kg (304 lb)  Body mass index is 59.37 kg/m².  Body surface area is 2.42 meters squared.      Intake/Output - Last 3 Shifts       10/04 0700 - 10/05 0659 10/05 0700 - 10/06 0659 10/06 0700 - 10/07 0659    P.O. 740 680     Blood  563     IV Piggyback 260      Total Intake(mL/kg) 1000 (7.3) 1243 (9)     Urine (mL/kg/hr) 2700 (0.8) 1775 (0.5)     Stool 0 0     Total Output 2700 1775     Net -1700 -532            Urine Occurrence 2 x 3 x     Stool Occurrence 2 x 2 x           Physical Exam  Vitals signs reviewed.   Constitutional:       Appearance: Normal appearance. She is obese.   HENT:      Head: Normocephalic and atraumatic.      Nose: Nose normal.      Mouth/Throat:      Comments: Fever blister noted.  Eyes:      Extraocular Movements: Extraocular movements intact.   Neck:      Musculoskeletal: Normal range of motion and neck supple.   Cardiovascular:      Rate and Rhythm: Normal rate and regular rhythm.      Heart sounds: No murmur.   Pulmonary:      Effort: Pulmonary effort is normal. No  respiratory distress.      Comments: No crackles however poor inspiratory effort  Abdominal:      General: There is no distension.      Palpations: Abdomen is soft.      Tenderness: There is no abdominal tenderness.   Musculoskeletal: Normal range of motion.         General: No swelling.   Skin:     General: Skin is warm and dry.   Neurological:      General: No focal deficit present.      Mental Status: She is alert and oriented to person, place, and time.   Psychiatric:         Mood and Affect: Mood normal.         Behavior: Behavior normal.         Significant Labs:   All pertinent labs from the last 24 hours have been reviewed.    Diagnostic Results:  I have reviewed and interpreted all pertinent imaging results/findings within the past 24 hours.

## 2020-10-06 NOTE — SUBJECTIVE & OBJECTIVE
Therapeutic Intervention: Met with patient and mother.  Inpatient/Partial Hospital - Supportive psychotherapy 30 min - CPT Code 42173    Chief Complaint/Reason for Encounter: anxiety and interpersonal     Interval History and Content of Current Session: Patient and mother discussed patient's coping with prolonged hospitalization. Patient appears to feel much better when mother is in town. (Mother plans to leave again on 10/8 due to impending potential hurricane.)  Discussed psychosocial stressors and ways to improve interpersonal interactions.    Risk Parameters:  Patient reports no suicidal ideation  Patient reports no homicidal ideation  Patient reports no self-injurious behavior  Patient reports no violent behavior    Verbal Deficits: None    Patient's response to intervention:  The patient's response to intervention is guarded.    Progress toward goals and other mental status changes:  The patient's progress toward goals is limited.

## 2020-10-06 NOTE — ASSESSMENT & PLAN NOTE
- Monitor TLS and DIC labs daily, monitoring for differentiation syndrome  - 2D ECHO - EF 58%  - Continue tretinoin (start 9/18, today is Day 19) BID w/ meals,  - Continue ASO (start 09/21, today is day 16), monitor Qtc (daily EKG until improved, weekly thereafter), LFTs  - ASO held on days 11 and 12 due to QTc >500, resumed day 13 and 14 with 50% dose reduction.  - Hold ATRA (D18-19) and ASO (D15-16) with prolonged QTc, elevated LFTs, and concern for differentiation syndrome  - Continue Dex 10mg q12hr for cocnern for differentiation syndrome (started 10/4)  - TTE ordered to assess pericardial effusion and for potential worsening cardiac function in setting of above therapy   - Continue allopurinol 300mg daily  - Transfuse cyroglobulin if fibrinogen <150    - Platelet goal > 50k.   - Hgb goal >7  - INR < 1.5  - Monitor I/O and daily weights twice daily

## 2020-10-06 NOTE — PROGRESS NOTES
[facility]  Psychology  Progress Note  Individual Psychotherapy (PhD/LCSW)    Patient Name: Erika Saini  MRN: [id number]    Patient Class: IP- Inpatient  Admission Date: 9/18/2020  Hospital Length of Stay: 18 days  Attending Physician: Sourav Recinos MD  Primary Care Provider: Provider [employer]    Therapeutic Intervention: Met with patient and mother.  Inpatient/Partial Hospital - Supportive psychotherapy 30 min - CPT Code 97943    Chief Complaint/Reason for Encounter: anxiety and interpersonal     Interval History and Content of Current Session: Patient and mother discussed patient's coping with prolonged hospitalization. Patient appears to feel much better when mother is in town. (Mother plans to leave again on 10/8 due to impending potential hurricane.)  Discussed psychosocial stressors and ways to improve interpersonal interactions.    Risk Parameters:  Patient reports no suicidal ideation  Patient reports no homicidal ideation  Patient reports no self-injurious behavior  Patient reports no violent behavior    Verbal Deficits: None    Patient's response to intervention:  The patient's response to intervention is guarded.    Progress toward goals and other mental status changes:  The patient's progress toward goals is limited.    Diagnostic Impression - Plan:     Adjustment reaction with anxiety  Patient with current significant anxiety symptoms interfering with social, occupational and functional activities    Anxiety over being away from home is significant and continues to fuel dissatisfaction with stay. Mother does appear to be encouraging patient to do more for herself than has been the case in the past.    Patient reports to be trying to be more pleasant to staff members (with reinforcement by mother) to improve her interactions.    Daily relaxation training practice encouraged. Patient has not followed through with this and does not commit to future practice.    She is still  not willing to consider medications at this time.           Treatment Plan:  · Target symptoms: anxiety and interpersonal  · Why chosen therapy is appropriate versus another modality: relevant to diagnosis, evidence based practice  · Outcome monitoring methods: self-report, observation  · Therapeutic intervention type: behavior modifying psychotherapy, supportive psychotherapy    Plan:  individual psychotherapy      Length of Service (minutes): 30    Tejas Munoz, PhD  Psychology  [facility]

## 2020-10-06 NOTE — ASSESSMENT & PLAN NOTE
Patient with current significant anxiety symptoms interfering with social, occupational and functional activities    Anxiety over being away from home is significant and continues to fuel dissatisfaction with stay. Mother does appear to be encouraging patient to do more for herself than has been the case in the past.    Patient reports to be trying to be more pleasant to staff members (with reinforcement by mother) to improve her interactions.    Daily relaxation training practice encouraged. Patient has not followed through with this and does not commit to future practice.    She is still not willing to consider medications at this time.

## 2020-10-06 NOTE — PLAN OF CARE
Patient involved with plan of care and communicated needs throughout shift. AAOx4. VSS. No c/o pain. Patient up to bedside commode with assistance and instructed to call for additional mobility. Nonskid socks in use, bed alarmed and locked in lowest position, and pt belongings as well as call light within reach. Will continue to monitor with hourly rounds and clustered care to promote rest.

## 2020-10-06 NOTE — ASSESSMENT & PLAN NOTE
- Concern for differentiation syndrome  - CTA showed b/l pulmonary edema with left sided pleural effusion and small pericardial effusion, no PE however limited by body habitus  - Currently on Comfort Flow 20L 50% FIO2, mainly desaturates and becomes short of breath with movement  - Continue Dex 4mg q12hrs  - Lasix 40mg IV once to optimized volume status, plan to keep net negative  - Wean O2 as tolerated, goal >92%

## 2020-10-06 NOTE — PLAN OF CARE
Plan of care reviewed with patient and family.  Fall precautions maintained, side rails upx2, call light in reach, bed in low position and locked.  Patient had 2decho done today at the bedside.  Still on comfort flow 20 liters 50%.  No compaints voiced.

## 2020-10-06 NOTE — PLAN OF CARE
Recommendations  1. Continue pediatric diet + Boost Plus TID as tolerated. Encourage PO intake.   2. RD to monitor.     Goals: 1. Pt's intake meals >75% x 7 days.  Nutrition Goal Status: progressing towards goal  Communication of RD Recs: (POC)

## 2020-10-06 NOTE — PROGRESS NOTES
[facility]  Adult Nutrition  Progress Note    SUMMARY       Recommendations  1. Continue pediatric diet + Boost Plus TID as tolerated. Encourage PO intake.   2. RD to monitor.    Goals: 1. Pt's intake meals >75% x 7 days.  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 Pt reports ok appetite, 25% of breakfast consumed this AM - pt dislikes hospital food. Variable intake % per chart. Mother reports she has been bringing food for pt and she has been eating well with outside food; consumed 100% of crawfish brought for dinner yesterday. Mother has also brought seasoning for pt, as pt states the hospital food is bland. Boost Plus ordered, pt has not yet consumed it today; encouraged increased intake of meals + ONS as tolerated. Wt remains stable since admission. NFPE 9/28 with no physical s/s of malnutrition at this time.  Nutrition Discharge Planning: Discharge on general healthy diet.    Nutrition Risk Screen    Nutrition Risk Screen: no indicators present    Nutrition/Diet History    Spiritual, Cultural Beliefs, Spiritism Practices, Values that Affect Care: no    Anthropometrics    Temp: 98.3 °F (36.8 °C)  Height Method: Stated  Height: 5' (152.4 cm)  Height (inches): 60 in  Weight Method: Standard Scale  Weight: (!) 137.9 kg (304 lb)  Weight (lb): (!) 304 lb  Ideal Body Weight (IBW), Female: 100 lb  % Ideal Body Weight, Female (lb): 296 %  BMI (Calculated): 59.4    Lab/Procedures/Meds    Pertinent Labs Reviewed: reviewed  Pertinent Labs Comments: Glu 167, Ca 8.3, Alb 2.8, ,   Pertinent Medications Reviewed: reviewed  Pertinent  Medications Comments: noted    Estimated/Assessed Needs    Weight Used For Calorie Calculations: (!) 141.3 kg (311 lb 8.2 oz)  Energy Calorie Requirements (kcal): 2120  Energy Need Method: Kcal/kg(15 kcal/kg)  Protein Requirements: 113-127g  Weight Used For Protein Calculations: (!) 141.3 kg (311 lb 8.2 oz)  Fluid Requirements (mL): per MD or 1 mL/kcal     RDA Method (mL): 2120    Nutrition Prescription Ordered    Current Diet Order: Pediatric  Oral Nutrition Supplement: Boost Plus    Evaluation of Received Nutrient/Fluid Intake    Comments: LBM 10/4  % Intake of Estimated Energy Needs: 50 - 75 % (pt eating well w/ outside food per mother)  % Meal Intake: variable %    Nutrition Risk    Level of Risk/Frequency of Follow-up: low     Assessment and Plan    Nutrition Problem  Inadequate oral intake     Related to (etiology):   Pt is picky eater     Signs and Symptoms (as evidenced by):   Pt reports not liking the hospital meal options, did not eat breakfast this morning.     Interventions(treatment strategy):  Collaboration of care with providers.  Commercial beverage    Nutrition Diagnosis Status:   Continues    Monitor and Evaluation    Food and Nutrient Intake: energy intake, food and beverage intake  Food and Nutrient Adminstration: diet order  Knowledge/Beliefs/Attitudes: food and nutrition knowledge/skill  Anthropometric Measurements: weight, weight change, body mass index  Biochemical Data, Medical Tests and Procedures: electrolyte and renal panel, gastrointestinal profile, glucose/endocrine profile, inflammatory profile, lipid profile  Nutrition-Focused Physical Findings: overall appearance     Malnutrition Assessment  Orbital Region (Subcutaneous Fat Loss): well nourished  Upper Arm Region (Subcutaneous Fat Loss): well nourished  Thoracic and Lumbar Region: well nourished   Spiritism Region (Muscle Loss): well nourished  Clavicle Bone Region (Muscle Loss): well nourished  Clavicle and Acromion Bone Region  (Muscle Loss): well nourished  Scapular Bone Region (Muscle Loss): well nourished  Dorsal Hand (Muscle Loss): mild depletion  Patellar Region (Muscle Loss): well nourished  Anterior Thigh Region (Muscle Loss): well nourished  Posterior Calf Region (Muscle Loss): well nourished     Nutrition Follow-Up    RD Follow-up?: Yes

## 2020-10-07 PROBLEM — R74.01 TRANSAMINITIS: Status: ACTIVE | Noted: 2020-10-07

## 2020-10-07 LAB
ALBUMIN SERPL BCP-MCNC: 3 G/DL (ref 3.5–5.2)
ALP SERPL-CCNC: 90 U/L (ref 55–135)
ALT SERPL W/O P-5'-P-CCNC: 293 U/L (ref 10–44)
ANION GAP SERPL CALC-SCNC: 11 MMOL/L (ref 8–16)
ANISOCYTOSIS BLD QL SMEAR: SLIGHT
APTT BLDCRRT: 24.5 SEC (ref 21–32)
AST SERPL-CCNC: 218 U/L (ref 10–40)
BASOPHILS # BLD AUTO: ABNORMAL K/UL (ref 0–0.2)
BASOPHILS NFR BLD: 0 % (ref 0–1.9)
BILIRUB SERPL-MCNC: 0.8 MG/DL (ref 0.1–1)
BLD PROD TYP BPU: NORMAL
BLOOD UNIT EXPIRATION DATE: NORMAL
BLOOD UNIT TYPE CODE: 5100
BLOOD UNIT TYPE: NORMAL
BUN SERPL-MCNC: 19 MG/DL (ref 6–20)
CALCIUM SERPL-MCNC: 8.2 MG/DL (ref 8.7–10.5)
CHLORIDE SERPL-SCNC: 105 MMOL/L (ref 95–110)
CO2 SERPL-SCNC: 24 MMOL/L (ref 23–29)
CODING SYSTEM: NORMAL
CREAT SERPL-MCNC: 0.6 MG/DL (ref 0.5–1.4)
DIFFERENTIAL METHOD: ABNORMAL
DISPENSE STATUS: NORMAL
EOSINOPHIL # BLD AUTO: ABNORMAL K/UL (ref 0–0.5)
EOSINOPHIL NFR BLD: 0 % (ref 0–8)
ERYTHROCYTE [DISTWIDTH] IN BLOOD BY AUTOMATED COUNT: 15.3 % (ref 11.5–14.5)
EST. GFR  (AFRICAN AMERICAN): >60 ML/MIN/1.73 M^2
EST. GFR  (NON AFRICAN AMERICAN): >60 ML/MIN/1.73 M^2
FIBRINOGEN PPP-MCNC: 341 MG/DL (ref 182–366)
GLUCOSE SERPL-MCNC: 201 MG/DL (ref 70–110)
HCT VFR BLD AUTO: 24.3 % (ref 37–48.5)
HGB BLD-MCNC: 7.9 G/DL (ref 12–16)
HYPOCHROMIA BLD QL SMEAR: ABNORMAL
IMM GRANULOCYTES # BLD AUTO: ABNORMAL K/UL (ref 0–0.04)
IMM GRANULOCYTES NFR BLD AUTO: ABNORMAL % (ref 0–0.5)
INR PPP: 1.1 (ref 0.8–1.2)
LYMPHOCYTES # BLD AUTO: ABNORMAL K/UL (ref 1–4.8)
LYMPHOCYTES NFR BLD: 2 % (ref 18–48)
MAGNESIUM SERPL-MCNC: 2 MG/DL (ref 1.6–2.6)
MCH RBC QN AUTO: 29.4 PG (ref 27–31)
MCHC RBC AUTO-ENTMCNC: 32.5 G/DL (ref 32–36)
MCV RBC AUTO: 90 FL (ref 82–98)
METAMYELOCYTES NFR BLD MANUAL: 1 %
MONOCYTES # BLD AUTO: ABNORMAL K/UL (ref 0.3–1)
MONOCYTES NFR BLD: 5 % (ref 4–15)
NEUTROPHILS NFR BLD: 92 % (ref 38–73)
NRBC BLD-RTO: 0 /100 WBC
NUM UNITS TRANS WBC-POOR PLATPHERESIS: NORMAL
OVALOCYTES BLD QL SMEAR: ABNORMAL
PHOSPHATE SERPL-MCNC: 4.1 MG/DL (ref 2.7–4.5)
PLATELET # BLD AUTO: 44 K/UL (ref 150–350)
PLATELET BLD QL SMEAR: ABNORMAL
PMV BLD AUTO: 11.4 FL (ref 9.2–12.9)
POIKILOCYTOSIS BLD QL SMEAR: SLIGHT
POLYCHROMASIA BLD QL SMEAR: ABNORMAL
POTASSIUM SERPL-SCNC: 4.1 MMOL/L (ref 3.5–5.1)
PROT SERPL-MCNC: 6.1 G/DL (ref 6–8.4)
PROTHROMBIN TIME: 12.5 SEC (ref 9–12.5)
RBC # BLD AUTO: 2.69 M/UL (ref 4–5.4)
SODIUM SERPL-SCNC: 140 MMOL/L (ref 136–145)
WBC # BLD AUTO: 16.55 K/UL (ref 3.9–12.7)

## 2020-10-07 PROCEDURE — 93010 EKG 12-LEAD: ICD-10-PCS | Mod: ,,, | Performed by: INTERNAL MEDICINE

## 2020-10-07 PROCEDURE — 85027 COMPLETE CBC AUTOMATED: CPT

## 2020-10-07 PROCEDURE — 25000003 PHARM REV CODE 250: Performed by: INTERNAL MEDICINE

## 2020-10-07 PROCEDURE — 84100 ASSAY OF PHOSPHORUS: CPT

## 2020-10-07 PROCEDURE — 25000003 PHARM REV CODE 250: Performed by: STUDENT IN AN ORGANIZED HEALTH CARE EDUCATION/TRAINING PROGRAM

## 2020-10-07 PROCEDURE — 80053 COMPREHEN METABOLIC PANEL: CPT

## 2020-10-07 PROCEDURE — 94761 N-INVAS EAR/PLS OXIMETRY MLT: CPT

## 2020-10-07 PROCEDURE — 20600001 HC STEP DOWN PRIVATE ROOM

## 2020-10-07 PROCEDURE — 85610 PROTHROMBIN TIME: CPT

## 2020-10-07 PROCEDURE — P9037 PLATE PHERES LEUKOREDU IRRAD: HCPCS

## 2020-10-07 PROCEDURE — 85384 FIBRINOGEN ACTIVITY: CPT

## 2020-10-07 PROCEDURE — 93010 ELECTROCARDIOGRAM REPORT: CPT | Mod: ,,, | Performed by: INTERNAL MEDICINE

## 2020-10-07 PROCEDURE — 99233 PR SUBSEQUENT HOSPITAL CARE,LEVL III: ICD-10-PCS | Mod: ,,, | Performed by: INTERNAL MEDICINE

## 2020-10-07 PROCEDURE — 93005 ELECTROCARDIOGRAM TRACING: CPT

## 2020-10-07 PROCEDURE — 63600175 PHARM REV CODE 636 W HCPCS: Performed by: INTERNAL MEDICINE

## 2020-10-07 PROCEDURE — 27000221 HC OXYGEN, UP TO 24 HOURS

## 2020-10-07 PROCEDURE — 27100171 HC OXYGEN HIGH FLOW UP TO 24 HOURS

## 2020-10-07 PROCEDURE — 85007 BL SMEAR W/DIFF WBC COUNT: CPT

## 2020-10-07 PROCEDURE — 83735 ASSAY OF MAGNESIUM: CPT

## 2020-10-07 PROCEDURE — 99233 SBSQ HOSP IP/OBS HIGH 50: CPT | Mod: ,,, | Performed by: INTERNAL MEDICINE

## 2020-10-07 PROCEDURE — 85730 THROMBOPLASTIN TIME PARTIAL: CPT

## 2020-10-07 PROCEDURE — 99900035 HC TECH TIME PER 15 MIN (STAT)

## 2020-10-07 RX ORDER — POTASSIUM CHLORIDE 20 MEQ/1
20 TABLET, EXTENDED RELEASE ORAL ONCE
Status: COMPLETED | OUTPATIENT
Start: 2020-10-07 | End: 2020-10-07

## 2020-10-07 RX ORDER — FUROSEMIDE 10 MG/ML
40 INJECTION INTRAMUSCULAR; INTRAVENOUS ONCE
Status: COMPLETED | OUTPATIENT
Start: 2020-10-07 | End: 2020-10-07

## 2020-10-07 RX ORDER — HYDROCODONE BITARTRATE AND ACETAMINOPHEN 500; 5 MG/1; MG/1
TABLET ORAL
Status: DISCONTINUED | OUTPATIENT
Start: 2020-10-07 | End: 2020-10-18 | Stop reason: HOSPADM

## 2020-10-07 RX ADMIN — DEXAMETHASONE SODIUM PHOSPHATE 10 MG: 4 INJECTION, SOLUTION INTRA-ARTICULAR; INTRALESIONAL; INTRAMUSCULAR; INTRAVENOUS; SOFT TISSUE at 08:10

## 2020-10-07 RX ADMIN — FUROSEMIDE 40 MG: 10 INJECTION, SOLUTION INTRAMUSCULAR; INTRAVENOUS at 08:10

## 2020-10-07 RX ADMIN — ACETAMINOPHEN 650 MG: 325 TABLET ORAL at 08:10

## 2020-10-07 RX ADMIN — BENZONATATE 100 MG: 100 CAPSULE ORAL at 02:10

## 2020-10-07 RX ADMIN — BENZONATATE 100 MG: 100 CAPSULE ORAL at 08:10

## 2020-10-07 RX ADMIN — GUAIFENESIN 100 MG: 200 SOLUTION ORAL at 10:10

## 2020-10-07 RX ADMIN — FUROSEMIDE 40 MG: 10 INJECTION, SOLUTION INTRAMUSCULAR; INTRAVENOUS at 02:10

## 2020-10-07 RX ADMIN — ACYCLOVIR 400 MG: 200 CAPSULE ORAL at 08:10

## 2020-10-07 RX ADMIN — DIPHENHYDRAMINE HYDROCHLORIDE 25 MG: 25 CAPSULE ORAL at 08:10

## 2020-10-07 RX ADMIN — POTASSIUM CHLORIDE 20 MEQ: 1500 TABLET, EXTENDED RELEASE ORAL at 02:10

## 2020-10-07 NOTE — SUBJECTIVE & OBJECTIVE
Subjective:     Interval History: ATRA 20 day, ASO day 17 (both held the last two days). Slept well overnight, was awoken by cough this morning. Still feels short of breath with getting up to use the cammode but is comfortable in bed. Remains on comfort flow 20L 50%. Denies any new complaints. Left blurry vision remains the same and has had no recurrence of her headache. Encouraged to use incentive spirometer.    Objective:     Vital Signs (Most Recent):  Temp: 98.6 °F (37 °C) (10/07/20 0320)  Pulse: 67 (10/07/20 0710)  Resp: (!) 22 (10/07/20 0320)  BP: 137/70 (10/07/20 0320)  SpO2: 98 % (10/07/20 0710) Vital Signs (24h Range):  Temp:  [98.2 °F (36.8 °C)-98.6 °F (37 °C)] 98.6 °F (37 °C)  Pulse:  [] 67  Resp:  [19-22] 22  SpO2:  [90 %-98 %] 98 %  BP: (112-138)/(56-86) 137/70     Weight: (!) 136.3 kg (300 lb 8 oz)  Body mass index is 58.69 kg/m².  Body surface area is 2.4 meters squared.      Intake/Output - Last 3 Shifts       10/05 0700 - 10/06 0659 10/06 0700 - 10/07 0659 10/07 0700 - 10/08 0659    P.O. 680 640     Blood 563 339     IV Piggyback       Total Intake(mL/kg) 1243 (9) 979 (7.2)     Urine (mL/kg/hr) 1775 (0.5) 800 (0.2) 150 (1.9)    Stool 0      Total Output 1775 800 150    Net -532 +179 -150           Urine Occurrence 3 x 1 x     Stool Occurrence 2 x            Physical Exam  Vitals signs reviewed.   Constitutional:       Appearance: Normal appearance. She is obese.   HENT:      Head: Normocephalic and atraumatic.      Nose: Nose normal.      Mouth/Throat:      Comments: Fever blister noted.  Eyes:      Extraocular Movements: Extraocular movements intact.   Neck:      Musculoskeletal: Normal range of motion and neck supple.   Cardiovascular:      Rate and Rhythm: Normal rate and regular rhythm.      Heart sounds: No murmur.   Pulmonary:      Effort: Pulmonary effort is normal. No respiratory distress.      Comments: No crackles however poor inspiratory effort  Abdominal:      General: There is  no distension.      Palpations: Abdomen is soft.      Tenderness: There is no abdominal tenderness.   Musculoskeletal: Normal range of motion.         General: No swelling.   Skin:     General: Skin is warm and dry.   Neurological:      General: No focal deficit present.      Mental Status: She is alert and oriented to person, place, and time.   Psychiatric:         Mood and Affect: Mood normal.         Behavior: Behavior normal.         Significant Labs:   All pertinent labs from the last 24 hours have been reviewed.    Diagnostic Results:  I have reviewed and interpreted all pertinent imaging results/findings within the past 24 hours.

## 2020-10-07 NOTE — PLAN OF CARE
Plan of care reviewed with patient and family this shift. Remains on Comfort Flow 20L 50%. Platelets administered this shift. 80mg IV Lasix administered this shift. K replaced. PRN mucinex and tessalon pearls administered. VS stable. Bed alarm in use. All questions and concerns addressed. Will continue to monitor.

## 2020-10-07 NOTE — ASSESSMENT & PLAN NOTE
- Concern for differentiation syndrome  - CTA showed b/l pulmonary edema with left sided pleural effusion and small pericardial effusion, no PE however limited by body habitus  - Currently on Comfort Flow 20L 50% FIO2, mainly desaturates and becomes short of breath with movement  - Continue Dex 10mg q12hrs  - Per echo, CVP 8 from 3 previously during admission  - Continue IV Lasix with plan to keep net negative  - Wean O2 as tolerated, goal >92%

## 2020-10-07 NOTE — PROGRESS NOTES
[facility]  Hematology  Bone Marrow Transplant  Progress Note    Patient Name: Erika Saini  Admission Date: 9/18/2020  Hospital Length of Stay: 19 days  Code Status: Full Code  No new subjective & objective note has been filed under this hospital service since the last note was generated.    Assessment/Plan:     * APL (acute promyelocytic leukemia)  - Monitor TLS and DIC labs daily, monitoring for differentiation syndrome  - 2D ECHO - EF 58%  - Continue tretinoin (start 9/18, today is Day 20) BID w/ meals,  - Continue ASO (start 09/21, today is day 17), monitor Qtc (daily EKG until improved, weekly thereafter), LFTs  - ASO held on days 11 and 12 due to QTc >500, resumed day 13 and 14 with 50% dose reduction.  - Hold ATRA (D18-20) and ASO (D15-17) with prolonged QTc, elevated LFTs, and concern for differentiation syndrome  - Continue Dex 10mg q12hr for concern for differentiation syndrome (started 10/4)  - TTE showing EF 55% with small pericardial effusion, mild TR, CVP 8 from 3 earlier in the admission  - Continue allopurinol 300mg daily  - Transfuse cyroglobulin if fibrinogen <150    - Platelet goal > 50k.   - Hgb goal >7  - INR < 1.5  - Monitor I/O and daily weights twice daily    Shortness of breath  - Concern for differentiation syndrome  - CTA showed b/l pulmonary edema with left sided pleural effusion and small pericardial effusion, no PE however limited by body habitus  - Currently on Comfort Flow 20L 50% FIO2, mainly desaturates and becomes short of breath with movement  - Continue Dex 10mg q12hrs  - Per echo, CVP 8 from 3 previously during admission  - Continue IV Lasix with plan to keep net negative  - Wean O2 as tolerated, goal >92%    Transaminitis  - Elevated LFTs  - Etiology likely mixed with ASO administration and congestive hepatopathy  - Diuresing as above    Pancytopenia  - Secondary to APL   - Transfuse if platelets < 50, Hgb< 7      Sinus tachycardia  -- EKGs on  admission with HR in 80s/90s  -- Orthostatics negative  -- TSH WNL  -- CTA engative however limited by body habitus  -- ASO can result in tachycardia, more common ventricular tachycardia but sinus tach is possible  -- Replete electrolytes PRN to K >4 and Mag >2    Retinopathy  On 9/27 complained of vision changes present even before admission    - Opthalmology evaluated, appreciate assistance   - Secondary to subretinal hemorrhage?   - Per optho patient's vision should improve with time as pre-retinal hemorrhages resolve.   - May need surgical intervention with vitrectomy if hemorrhages do not resolve in a few weeks.   - Return to retina clinic in 6 weeks    Adjustment reaction with anxiety  - seen by oncology psychology     Neutropenic fever  - Gound to have strep B agalactiae blood cultures (+ at OSH).  - Cultures and sensitivities faxed to us by [facility] show organism sensitive to pcn.   - Repeat cultures negative.    - Completed Ceftriaxone 10/3       Hypokalemia  - Monitor and replace PRN, keep K >4 and Mg >2 ( Given qt prolongation risk with treatment)       Morbid obesity with BMI of 50.0-59.9, adult  Patient with BMI 57.91      VTE Risk Mitigation (From admission, onward)         Ordered     heparin, porcine (PF) 100 unit/mL injection flush 300 Units  As needed (PRN)      09/21/20 1554     IP VTE HIGH RISK PATIENT  Once      09/18/20 0148     Place sequential compression device  Until discontinued      09/18/20 0148                Disposition: Continue with inpatient management.    Jorge A Ferrer MD  Bone Marrow Transplant  [facility]

## 2020-10-07 NOTE — ASSESSMENT & PLAN NOTE
- Elevated LFTs  - Etiology likely mixed with ASO administration and congestive hepatopathy  - Diuresing as above

## 2020-10-07 NOTE — RESPIRATORY THERAPY
Rapid Response Respiratory Therapy Proactive Rounding Note      Time of visit: 0853     Code Status: Full Code   : 1995  Bed: 803/803 A:   MRN: [id number]    SITUATION     Evaluated patient for: HFNC Compliance     BACKGROUND     Patient has no past medical history on file.    ASSESSMENT/INTERVENTIONS     Upon arrival in room pt alert and awake on comfort flow NC. No reapiratory concerns at this time.    Pulse: 76 Respiratory rate: 28 SpO2: 96%  Level of Consciousness: Level of Consciousness (AVPU): alert  Respiratory Effort: Respiratory Effort: Normal, Unlabored(SOB upon exertion pt stated) Expansion/Accessory Muscle Usage: Expansion/Accessory Muscles/Retractions: expansion symmetric, no retractions, no use of accessory muscles  All Lung Field Breath Sounds: All Lung Fields Breath Sounds: Anterior:, Lateral:, diminished  VERONICA Breath Sounds: diminished  LLL Breath Sounds: diminished  RUL Breath Sounds: diminished  RML Breath Sounds: diminished  RLL Breath Sounds: diminished  O2 Device/Concentration: comfort flow NC 20L 50% O2  Was the O2 device able to be weaned? (Yes/No): no    Ambu at bedside: Ambu bag with the patient?: Yes, Adult Ambu    Current Respiratory Care Orders:   10/06/20 1411  Oxygen Continuous Continuous     References: Oxygen Titration Protocol   Question Answer Comment   Device type: High flow    Device: Comfort Flow    FiO2%: 50    LPM: 20    Titrate O2 per Oxygen Titration Protocol: Yes    To maintain SpO2 goal of: >= 90%    Notify MD of: Inability to achieve desired SpO2; Sudden change in patient status and requires 20% increase in FiO2; Patient requires >60% FiO2        10/06/20 1411   10/04/20 1621  Pulse Oximetry Continuous Continuous      10/04/20 1621   20 0400  Incentive spirometry Every 4 hours (96 of 113 released)    Release   References: [facility] Clinical Practice Guidelines    20 0101   Unscheduled  Inhalation Treatment Q4H PRN Every 4 hours PRN (0 of 20414 released)     Release             RECOMMENDATIONS     We recommend: continue with current POC. Wean oxygen as tolerated.    ESCALATION      Physician Escalation (Yes/No) no     Discussed plan of care primary RT, A Carballo     FOLLOW-UP     Please call back the Rapid Response RT, Mariposa Thomas, CRT at x [phone] for any questions or concerns.

## 2020-10-07 NOTE — ASSESSMENT & PLAN NOTE
- Monitor TLS and DIC labs daily, monitoring for differentiation syndrome  - 2D ECHO - EF 58%  - Continue tretinoin (start 9/18, today is Day 20) BID w/ meals,  - Continue ASO (start 09/21, today is day 17), monitor Qtc (daily EKG until improved, weekly thereafter), LFTs  - ASO held on days 11 and 12 due to QTc >500, resumed day 13 and 14 with 50% dose reduction.  - Hold ATRA (D18-20) and ASO (D15-17) with prolonged QTc, elevated LFTs, and concern for differentiation syndrome  - Continue Dex 10mg q12hr for concern for differentiation syndrome (started 10/4)  - TTE showing EF 55% with small pericardial effusion, mild TR, CVP 8 from 3 earlier in the admission  - Continue allopurinol 300mg daily  - Transfuse cyroglobulin if fibrinogen <150    - Platelet goal > 50k.   - Hgb goal >7  - INR < 1.5  - Monitor I/O and daily weights twice daily

## 2020-10-08 LAB
ABO + RH BLD: NORMAL
ALBUMIN SERPL BCP-MCNC: 3 G/DL (ref 3.5–5.2)
ALP SERPL-CCNC: 92 U/L (ref 55–135)
ALT SERPL W/O P-5'-P-CCNC: 332 U/L (ref 10–44)
ANION GAP SERPL CALC-SCNC: 9 MMOL/L (ref 8–16)
ANISOCYTOSIS BLD QL SMEAR: SLIGHT
APTT BLDCRRT: 22.8 SEC (ref 21–32)
AST SERPL-CCNC: 159 U/L (ref 10–40)
BASOPHILS # BLD AUTO: ABNORMAL K/UL (ref 0–0.2)
BASOPHILS NFR BLD: 0 % (ref 0–1.9)
BILIRUB SERPL-MCNC: 0.9 MG/DL (ref 0.1–1)
BLD GP AB SCN CELLS X3 SERPL QL: NORMAL
BUN SERPL-MCNC: 23 MG/DL (ref 6–20)
CALCIUM SERPL-MCNC: 8.6 MG/DL (ref 8.7–10.5)
CHLORIDE SERPL-SCNC: 102 MMOL/L (ref 95–110)
CO2 SERPL-SCNC: 26 MMOL/L (ref 23–29)
CREAT SERPL-MCNC: 0.6 MG/DL (ref 0.5–1.4)
DIFFERENTIAL METHOD: ABNORMAL
EOSINOPHIL # BLD AUTO: ABNORMAL K/UL (ref 0–0.5)
EOSINOPHIL NFR BLD: 0 % (ref 0–8)
ERYTHROCYTE [DISTWIDTH] IN BLOOD BY AUTOMATED COUNT: 14.6 % (ref 11.5–14.5)
EST. GFR  (AFRICAN AMERICAN): >60 ML/MIN/1.73 M^2
EST. GFR  (NON AFRICAN AMERICAN): >60 ML/MIN/1.73 M^2
FIBRINOGEN PPP-MCNC: 293 MG/DL (ref 182–366)
GLUCOSE SERPL-MCNC: 238 MG/DL (ref 70–110)
HCT VFR BLD AUTO: 26 % (ref 37–48.5)
HGB BLD-MCNC: 8.3 G/DL (ref 12–16)
HYPOCHROMIA BLD QL SMEAR: ABNORMAL
IMM GRANULOCYTES # BLD AUTO: ABNORMAL K/UL (ref 0–0.04)
IMM GRANULOCYTES NFR BLD AUTO: ABNORMAL % (ref 0–0.5)
INR PPP: 1.1 (ref 0.8–1.2)
LYMPHOCYTES # BLD AUTO: ABNORMAL K/UL (ref 1–4.8)
LYMPHOCYTES NFR BLD: 6 % (ref 18–48)
MAGNESIUM SERPL-MCNC: 1.9 MG/DL (ref 1.6–2.6)
MCH RBC QN AUTO: 29.5 PG (ref 27–31)
MCHC RBC AUTO-ENTMCNC: 31.9 G/DL (ref 32–36)
MCV RBC AUTO: 93 FL (ref 82–98)
MONOCYTES # BLD AUTO: ABNORMAL K/UL (ref 0.3–1)
MONOCYTES NFR BLD: 2 % (ref 4–15)
NEUTROPHILS NFR BLD: 92 % (ref 38–73)
NRBC BLD-RTO: 1 /100 WBC
OVALOCYTES BLD QL SMEAR: ABNORMAL
PHOSPHATE SERPL-MCNC: 3.5 MG/DL (ref 2.7–4.5)
PLATELET # BLD AUTO: 68 K/UL (ref 150–350)
PMV BLD AUTO: 11.5 FL (ref 9.2–12.9)
POCT GLUCOSE: 246 MG/DL (ref 70–110)
POCT GLUCOSE: 284 MG/DL (ref 70–110)
POCT GLUCOSE: 296 MG/DL (ref 70–110)
POCT GLUCOSE: 300 MG/DL (ref 70–110)
POIKILOCYTOSIS BLD QL SMEAR: SLIGHT
POLYCHROMASIA BLD QL SMEAR: ABNORMAL
POTASSIUM SERPL-SCNC: 4.4 MMOL/L (ref 3.5–5.1)
PROT SERPL-MCNC: 6.3 G/DL (ref 6–8.4)
PROTHROMBIN TIME: 12.4 SEC (ref 9–12.5)
RBC # BLD AUTO: 2.81 M/UL (ref 4–5.4)
SODIUM SERPL-SCNC: 137 MMOL/L (ref 136–145)
WBC # BLD AUTO: 11.26 K/UL (ref 3.9–12.7)

## 2020-10-08 PROCEDURE — 84100 ASSAY OF PHOSPHORUS: CPT

## 2020-10-08 PROCEDURE — 83735 ASSAY OF MAGNESIUM: CPT

## 2020-10-08 PROCEDURE — 93005 ELECTROCARDIOGRAM TRACING: CPT

## 2020-10-08 PROCEDURE — 25000003 PHARM REV CODE 250: Performed by: INTERNAL MEDICINE

## 2020-10-08 PROCEDURE — 94761 N-INVAS EAR/PLS OXIMETRY MLT: CPT

## 2020-10-08 PROCEDURE — 93010 ELECTROCARDIOGRAM REPORT: CPT | Mod: ,,, | Performed by: INTERNAL MEDICINE

## 2020-10-08 PROCEDURE — 85730 THROMBOPLASTIN TIME PARTIAL: CPT

## 2020-10-08 PROCEDURE — 85027 COMPLETE CBC AUTOMATED: CPT

## 2020-10-08 PROCEDURE — 85384 FIBRINOGEN ACTIVITY: CPT

## 2020-10-08 PROCEDURE — 27100171 HC OXYGEN HIGH FLOW UP TO 24 HOURS

## 2020-10-08 PROCEDURE — 25000003 PHARM REV CODE 250: Performed by: STUDENT IN AN ORGANIZED HEALTH CARE EDUCATION/TRAINING PROGRAM

## 2020-10-08 PROCEDURE — 85007 BL SMEAR W/DIFF WBC COUNT: CPT

## 2020-10-08 PROCEDURE — 85610 PROTHROMBIN TIME: CPT

## 2020-10-08 PROCEDURE — 99900035 HC TECH TIME PER 15 MIN (STAT)

## 2020-10-08 PROCEDURE — 99233 SBSQ HOSP IP/OBS HIGH 50: CPT | Mod: ,,, | Performed by: INTERNAL MEDICINE

## 2020-10-08 PROCEDURE — 86850 RBC ANTIBODY SCREEN: CPT

## 2020-10-08 PROCEDURE — 20600001 HC STEP DOWN PRIVATE ROOM

## 2020-10-08 PROCEDURE — 99233 PR SUBSEQUENT HOSPITAL CARE,LEVL III: ICD-10-PCS | Mod: ,,, | Performed by: INTERNAL MEDICINE

## 2020-10-08 PROCEDURE — 63600175 PHARM REV CODE 636 W HCPCS: Performed by: INTERNAL MEDICINE

## 2020-10-08 PROCEDURE — 80053 COMPREHEN METABOLIC PANEL: CPT

## 2020-10-08 PROCEDURE — 93010 EKG 12-LEAD: ICD-10-PCS | Mod: ,,, | Performed by: INTERNAL MEDICINE

## 2020-10-08 RX ORDER — FUROSEMIDE 10 MG/ML
40 INJECTION INTRAMUSCULAR; INTRAVENOUS ONCE
Status: COMPLETED | OUTPATIENT
Start: 2020-10-08 | End: 2020-10-08

## 2020-10-08 RX ORDER — TRETINOIN 10 MG/1
45 CAPSULE ORAL 2 TIMES DAILY WITH MEALS
Status: DISCONTINUED | OUTPATIENT
Start: 2020-10-08 | End: 2020-10-10

## 2020-10-08 RX ORDER — INSULIN ASPART 100 [IU]/ML
0-5 INJECTION, SOLUTION INTRAVENOUS; SUBCUTANEOUS
Status: DISCONTINUED | OUTPATIENT
Start: 2020-10-08 | End: 2020-10-09

## 2020-10-08 RX ADMIN — BENZONATATE 100 MG: 100 CAPSULE ORAL at 02:10

## 2020-10-08 RX ADMIN — ACYCLOVIR 400 MG: 200 CAPSULE ORAL at 08:10

## 2020-10-08 RX ADMIN — DEXAMETHASONE SODIUM PHOSPHATE 10 MG: 4 INJECTION, SOLUTION INTRA-ARTICULAR; INTRALESIONAL; INTRAMUSCULAR; INTRAVENOUS; SOFT TISSUE at 08:10

## 2020-10-08 RX ADMIN — BENZONATATE 100 MG: 100 CAPSULE ORAL at 08:10

## 2020-10-08 RX ADMIN — INSULIN ASPART 3 UNITS: 100 INJECTION, SOLUTION INTRAVENOUS; SUBCUTANEOUS at 06:10

## 2020-10-08 RX ADMIN — Medication 400 MG: at 06:10

## 2020-10-08 RX ADMIN — INSULIN ASPART 2 UNITS: 100 INJECTION, SOLUTION INTRAVENOUS; SUBCUTANEOUS at 08:10

## 2020-10-08 RX ADMIN — INSULIN ASPART 3 UNITS: 100 INJECTION, SOLUTION INTRAVENOUS; SUBCUTANEOUS at 12:10

## 2020-10-08 RX ADMIN — FUROSEMIDE 40 MG: 10 INJECTION, SOLUTION INTRAMUSCULAR; INTRAVENOUS at 08:10

## 2020-10-08 RX ADMIN — TRETINOIN 50 MG: 10 CAPSULE ORAL at 07:10

## 2020-10-08 RX ADMIN — Medication 400 MG: at 10:10

## 2020-10-08 RX ADMIN — INSULIN ASPART 2 UNITS: 100 INJECTION, SOLUTION INTRAVENOUS; SUBCUTANEOUS at 09:10

## 2020-10-08 RX ADMIN — FUROSEMIDE 40 MG: 10 INJECTION, SOLUTION INTRAVENOUS at 02:10

## 2020-10-08 RX ADMIN — TRETINOIN 50 MG: 10 CAPSULE ORAL at 01:10

## 2020-10-08 NOTE — PROGRESS NOTES
pts HR dropped as low as 37 on telemetry. MD notified. Stat EKG ordered. Will continue to monitor.

## 2020-10-08 NOTE — SUBJECTIVE & OBJECTIVE
Subjective:     Interval History: ATRA 21 day, ASO day 18 (both held the last three days). UOP 2L yesterday with net negative 1L. Comfort flow down to 15L and 40% this morning. Was able to get up and use the commode without shortness of breath. Continues to have a cough but denies any other complaints this morning. Bradycardia noted into the 30s while pt was asleep, EKG showed pt with HR in the 70's once awake with QTc 504.    Objective:     Vital Signs (Most Recent):  Temp: 98.4 °F (36.9 °C) (10/08/20 0739)  Pulse: 75 (10/08/20 0739)  Resp: 19 (10/08/20 0739)  BP: 124/81 (10/08/20 0739)  SpO2: 97 % (10/08/20 0739) Vital Signs (24h Range):  Temp:  [97.8 °F (36.6 °C)-98.4 °F (36.9 °C)] 98.4 °F (36.9 °C)  Pulse:  [70-97] 75  Resp:  [17-28] 19  SpO2:  [92 %-99 %] 97 %  BP: (112-148)/(66-86) 124/81     Weight: 134.5 kg (296 lb 6.5 oz)  Body mass index is 57.89 kg/m².  Body surface area is 2.39 meters squared.      Intake/Output - Last 3 Shifts       10/06 0700 - 10/07 0659 10/07 0700 - 10/08 0659 10/08 0700 - 10/09 0659    P.O. 640 1220     I.V. (mL/kg)  50 (0.4)     Blood 339 246     Total Intake(mL/kg) 979 (7.2) 1516 (11.3)     Urine (mL/kg/hr) 800 (0.2) 2150 (0.7)     Other  600     Stool  0     Total Output 800 2750     Net +179 -1234            Urine Occurrence 1 x 5 x     Stool Occurrence  1 x           Physical Exam  Vitals signs reviewed.   Constitutional:       Appearance: Normal appearance. She is obese.   HENT:      Head: Normocephalic and atraumatic.      Nose: Nose normal.      Mouth/Throat:      Comments: Fever blister noted.  Eyes:      Extraocular Movements: Extraocular movements intact.   Neck:      Musculoskeletal: Normal range of motion and neck supple.   Cardiovascular:      Rate and Rhythm: Normal rate and regular rhythm.      Heart sounds: No murmur.   Pulmonary:      Effort: Pulmonary effort is normal. No respiratory distress.      Comments: No crackles however poor inspiratory  effort  Abdominal:      General: There is no distension.      Palpations: Abdomen is soft.      Tenderness: There is no abdominal tenderness.   Musculoskeletal: Normal range of motion.         General: No swelling.   Skin:     General: Skin is warm and dry.   Neurological:      General: No focal deficit present.      Mental Status: She is alert and oriented to person, place, and time.   Psychiatric:         Mood and Affect: Mood normal.         Behavior: Behavior normal.         Significant Labs:   All pertinent labs from the last 24 hours have been reviewed.    Diagnostic Results:  I have reviewed and interpreted all pertinent imaging results/findings within the past 24 hours.
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
## 2020-10-08 NOTE — RESPIRATORY THERAPY
Rapid Response Respiratory Therapy Proactive Rounding Note      Time of visit: 0810    Code Status: Full Code   : 1995  Bed: 803/803 A:   MRN: [id number]    SITUATION     Evaluated patient for: HFNC Compliance     BACKGROUND     Patient has no past medical history on file.    ASSESSMENT/INTERVENTIONS     Upon arrival in room pt laying on side slightly SOB on excertion. Will monitor closely     Pulse: 75 Respiratory rate: 23 SpO2: 97  Level of Consciousness: Level of Consciousness (AVPU): alert  Respiratory Effort: Respiratory Effort: Slight Expansion/Accessory Muscle Usage: Expansion/Accessory Muscles/Retractions: no use of accessory muscles  All Lung Field Breath Sounds: All Lung Fields Breath Sounds: Anterior:, Posterior:, Lateral:, diminished  VERONICA Breath Sounds: diminished  LLL Breath Sounds: diminished  RUL Breath Sounds: diminished  RML Breath Sounds: diminished  RLL Breath Sounds: diminished  O2 Device/Concentration: 30%  Was the O2 device able to be weaned? (Yes/No): yes  Ambu at bedside: Ambu bag with the patient?: Yes, Adult Ambu    Current Respiratory Care Orders:    Oxygen Continuous Continuous     References: Oxygen Titration Protocol   Question Answer Comment   Device type: High flow    Device: Comfort Flow    FiO2%: 50    LPM: 20    Titrate O2 per Oxygen Titration Protocol: Yes    To maintain SpO2 goal of: >= 90%    Notify MD of: Inability to achieve desired SpO2; Sudden change in patient status and requires 20% increase in FiO2; Patient requires >60% FiO2        10/06/20 1411    10/04/20 1621  Pulse Oximetry Continuous Continuous      10/04/20 1621   20 0400  Incentive spirometry Every 4 hours (102 of 131 released)    Release   References: [facility] Clinical Practice Guidelines    20 0101   Unscheduled  Inhalation Treatment Q4H PRN Every 4 hours PRN (0 of 03903 released)    Release    20 0825      Cardiographics Orders  (From admission, onward)  Start   Ordered   10/05/20  0400  EKG 12-lead Daily (4 of 7 released)    Release   Question: Diagnosis Answer: Abnormal QT interval present on electrocardiogram    10/04/20 0744      IP Meds - Nasal and Inhaled  Comment  Hide  (From admission, onward)     Last edited by  on  at    Start   Stop Status Route Frequency Ordered   10/06/20 2100  mupirocin 2 % ointment    Discontinue    10/11 2059 Dispensed Nasl 2 times daily 10/06/20 1056   09/22/20 0201  albuterol sulfate nebulizer solution 2.5 mg (albuterol sulfate nebulizer solution panel)    Discontinue    -- Dispensed NEBULIZATION Every 4 hours PRN          RECOMMENDATIONS     We recommend: wean HFNC as tolerated    ESCALATION      Physician Escalation (Yes/No) no   Discussed plan of care primary RT, Ramana Gama RT     FOLLOW-UP     Please call back the Rapid Response RT, Stephanie Dior, RRT at x [phone] for any questions or concerns.

## 2020-10-08 NOTE — ASSESSMENT & PLAN NOTE
- Monitor TLS and DIC labs daily, monitoring for differentiation syndrome  - 2D ECHO - EF 58%  - Continue tretinoin (start 9/18, today is Day 21) BID w/ meals,  - Continue ASO (start 09/21, today is day 18), monitor Qtc (daily EKG until improved, weekly thereafter), LFTs  - ASO held on days 11 and 12 due to QTc >500, resumed day 13 and 14 with 50% dose reduction.  - Hold ATRA (D18-20) and ASO (D15-17) with prolonged QTc, elevated LFTs, and concern for differentiation syndrome  - Give ATRA today and hold ASO due to QTc and LFTs   - Continue Dex 10mg q12hr for concern for differentiation syndrome (started 10/4)  - TTE showing EF 55% with small pericardial effusion, mild TR, CVP 8 from 3 earlier in the admission  - Continue allopurinol 300mg daily  - Transfuse cyroglobulin if fibrinogen <150    - Platelet goal > 50k.   - Hgb goal >7  - INR < 1.5  - Monitor I/O and daily weights twice daily

## 2020-10-08 NOTE — PLAN OF CARE
POC reviewed with patient; understanding verbalized. Pt maintaining saturations on comfort flow 20L, 50%. Tele and continuous pulse ox continued. Pt became lurdes in the 30s overnight while pt was asleep. MD notified and EKG ordered. Pt voiding via BSCC. Pt with nonskid footwear on, bed in lowest position, and locked with bed rails up x2. Pt instructed to call prior to getting OOB. Pt has call light and personal items within reach. Patient ambulates in room with assistance. All questions and concerns addressed at this time. Mag to be replaced. Will continue to monitor.

## 2020-10-08 NOTE — PLAN OF CARE
Patient AAOx4. Sats 97-98% on 15L 30% comfort flow. Accu-checks ACHS; correction dose insulin administered as ordered. Magnesium replacement administered. Lasix administered today. Bed in low locked position, call light in reach. Instructed to call if needed.

## 2020-10-08 NOTE — PROGRESS NOTES
[facility]  Hematology  Bone Marrow Transplant  Progress Note    Patient Name: Erika Saini  Admission Date: 9/18/2020  Hospital Length of Stay: 20 days  Code Status: Full Code    Subjective:     Interval History: ATRA 21 day, ASO day 18 (both held the last three days). UOP 2L yesterday with net negative 1L. Comfort flow down to 15L and 40% this morning. Was able to get up and use the commode without shortness of breath. Continues to have a cough but denies any other complaints this morning. Bradycardia noted into the 30s while pt was asleep, EKG showed pt with HR in the 70's once awake with QTc 504.    Objective:     Vital Signs (Most Recent):  Temp: 98.4 °F (36.9 °C) (10/08/20 0739)  Pulse: 75 (10/08/20 0739)  Resp: 19 (10/08/20 0739)  BP: 124/81 (10/08/20 0739)  SpO2: 97 % (10/08/20 0739) Vital Signs (24h Range):  Temp:  [97.8 °F (36.6 °C)-98.4 °F (36.9 °C)] 98.4 °F (36.9 °C)  Pulse:  [70-97] 75  Resp:  [17-28] 19  SpO2:  [92 %-99 %] 97 %  BP: (112-148)/(66-86) 124/81     Weight: 134.5 kg (296 lb 6.5 oz)  Body mass index is 57.89 kg/m².  Body surface area is 2.39 meters squared.      Intake/Output - Last 3 Shifts       10/06 0700 - 10/07 0659 10/07 0700 - 10/08 0659 10/08 0700 - 10/09 0659    P.O. 640 1220     I.V. (mL/kg)  50 (0.4)     Blood 339 246     Total Intake(mL/kg) 979 (7.2) 1516 (11.3)     Urine (mL/kg/hr) 800 (0.2) 2150 (0.7)     Other  600     Stool  0     Total Output 800 2750     Net +179 -1234            Urine Occurrence 1 x 5 x     Stool Occurrence  1 x           Physical Exam  Vitals signs reviewed.   Constitutional:       Appearance: Normal appearance. She is obese.   HENT:      Head: Normocephalic and atraumatic.      Nose: Nose normal.      Mouth/Throat:      Comments: Fever blister noted.  Eyes:      Extraocular Movements: Extraocular movements intact.   Neck:      Musculoskeletal: Normal range of motion and neck supple.   Cardiovascular:      Rate and Rhythm: Normal  rate and regular rhythm.      Heart sounds: No murmur.   Pulmonary:      Effort: Pulmonary effort is normal. No respiratory distress.      Comments: No crackles however poor inspiratory effort  Abdominal:      General: There is no distension.      Palpations: Abdomen is soft.      Tenderness: There is no abdominal tenderness.   Musculoskeletal: Normal range of motion.         General: No swelling.   Skin:     General: Skin is warm and dry.   Neurological:      General: No focal deficit present.      Mental Status: She is alert and oriented to person, place, and time.   Psychiatric:         Mood and Affect: Mood normal.         Behavior: Behavior normal.         Significant Labs:   All pertinent labs from the last 24 hours have been reviewed.    Diagnostic Results:  I have reviewed and interpreted all pertinent imaging results/findings within the past 24 hours.    Assessment/Plan:     * APL (acute promyelocytic leukemia)  - Monitor TLS and DIC labs daily, monitoring for differentiation syndrome  - 2D ECHO - EF 58%  - Continue tretinoin (start 9/18, today is Day 21) BID w/ meals,  - Continue ASO (start 09/21, today is day 18), monitor Qtc (daily EKG until improved, weekly thereafter), LFTs  - ASO held on days 11 and 12 due to QTc >500, resumed day 13 and 14 with 50% dose reduction.  - Hold ATRA (D18-20) and ASO (D15-17) with prolonged QTc, elevated LFTs, and concern for differentiation syndrome  - Continue to hold ATRA and ASO due to QTc and LFTs   - Continue Dex 10mg q12hr for concern for differentiation syndrome (started 10/4)  - TTE showing EF 55% with small pericardial effusion, mild TR, CVP 8 from 3 earlier in the admission  - Continue allopurinol 300mg daily  - Transfuse cyroglobulin if fibrinogen <150    - Platelet goal > 50k.   - Hgb goal >7  - INR < 1.5  - Monitor I/O and daily weights twice daily    Shortness of breath  - Concern for differentiation syndrome  - CTA showed b/l pulmonary edema with left sided  pleural effusion and small pericardial effusion, no PE however limited by body habitus  - Currently on Comfort Flow 20L 50% FIO2, mainly desaturates and becomes short of breath with movement  - Continue Dex 10mg q12hrs  - Per echo, CVP 8 from 3 previously during admission  - Continue IV Lasix with plan to keep net negative  - Wean O2 as tolerated, goal >92%    Transaminitis  - Elevated LFTs  - Etiology likely mixed with ASO administration and congestive hepatopathy  - Diuresing as above    Pancytopenia  - Secondary to APL   - Transfuse if platelets < 50, Hgb< 7      Sinus tachycardia  -- EKGs on admission with HR in 80s/90s  -- Orthostatics negative  -- TSH WNL  -- CTA engative however limited by body habitus  -- ASO can result in tachycardia, more common ventricular tachycardia but sinus tach is possible  -- Replete electrolytes PRN to K >4 and Mag >2    Retinopathy  On 9/27 complained of vision changes present even before admission    - Opthalmology evaluated, appreciate assistance   - Secondary to subretinal hemorrhage?   - Per optho patient's vision should improve with time as pre-retinal hemorrhages resolve.   - May need surgical intervention with vitrectomy if hemorrhages do not resolve in a few weeks.   - Return to retina clinic in 6 weeks    Adjustment reaction with anxiety  - seen by oncology psychology     Neutropenic fever  - Gound to have strep B agalactiae blood cultures (+ at OSH).  - Cultures and sensitivities faxed to us by [facility] show organism sensitive to pcn.   - Repeat cultures negative.    - Completed Ceftriaxone 10/3       Hypokalemia  - Monitor and replace PRN, keep K >4 and Mg >2 ( Given qt prolongation risk with treatment)       Morbid obesity with BMI of 50.0-59.9, adult  Patient with BMI 57.91        VTE Risk Mitigation (From admission, onward)         Ordered     heparin, porcine (PF) 100 unit/mL injection flush 300 Units  As needed (PRN)      09/21/20 3174     IP VTE HIGH RISK PATIENT  Once       09/18/20 0148     Place sequential compression device  Until discontinued      09/18/20 0148                Disposition: Continue with inpatient management    Jorge A Ferrer MD  Bone Marrow Transplant  [facility]

## 2020-10-09 LAB
ALBUMIN SERPL BCP-MCNC: 3.3 G/DL (ref 3.5–5.2)
ALP SERPL-CCNC: 91 U/L (ref 55–135)
ALT SERPL W/O P-5'-P-CCNC: 457 U/L (ref 10–44)
ANION GAP SERPL CALC-SCNC: 14 MMOL/L (ref 8–16)
APTT BLDCRRT: 22.4 SEC (ref 21–32)
AST SERPL-CCNC: 180 U/L (ref 10–40)
BASOPHILS # BLD AUTO: ABNORMAL K/UL (ref 0–0.2)
BASOPHILS NFR BLD: 0 % (ref 0–1.9)
BILIRUB SERPL-MCNC: 1.2 MG/DL (ref 0.1–1)
BLASTS NFR BLD MANUAL: 3 %
BUN SERPL-MCNC: 27 MG/DL (ref 6–20)
CALCIUM SERPL-MCNC: 8.7 MG/DL (ref 8.7–10.5)
CHLORIDE SERPL-SCNC: 98 MMOL/L (ref 95–110)
CO2 SERPL-SCNC: 25 MMOL/L (ref 23–29)
CREAT SERPL-MCNC: 0.7 MG/DL (ref 0.5–1.4)
DIFFERENTIAL METHOD: ABNORMAL
EOSINOPHIL # BLD AUTO: ABNORMAL K/UL (ref 0–0.5)
EOSINOPHIL NFR BLD: 0 % (ref 0–8)
ERYTHROCYTE [DISTWIDTH] IN BLOOD BY AUTOMATED COUNT: 13.9 % (ref 11.5–14.5)
EST. GFR  (AFRICAN AMERICAN): >60 ML/MIN/1.73 M^2
EST. GFR  (NON AFRICAN AMERICAN): >60 ML/MIN/1.73 M^2
FIBRINOGEN PPP-MCNC: 283 MG/DL (ref 182–366)
GLUCOSE SERPL-MCNC: 237 MG/DL (ref 70–110)
HCT VFR BLD AUTO: 27.3 % (ref 37–48.5)
HGB BLD-MCNC: 8.8 G/DL (ref 12–16)
HYPOCHROMIA BLD QL SMEAR: ABNORMAL
IMM GRANULOCYTES # BLD AUTO: ABNORMAL K/UL (ref 0–0.04)
IMM GRANULOCYTES NFR BLD AUTO: ABNORMAL % (ref 0–0.5)
INR PPP: 1.1 (ref 0.8–1.2)
LYMPHOCYTES # BLD AUTO: ABNORMAL K/UL (ref 1–4.8)
LYMPHOCYTES NFR BLD: 4 % (ref 18–48)
MAGNESIUM SERPL-MCNC: 2 MG/DL (ref 1.6–2.6)
MCH RBC QN AUTO: 29.3 PG (ref 27–31)
MCHC RBC AUTO-ENTMCNC: 32.2 G/DL (ref 32–36)
MCV RBC AUTO: 91 FL (ref 82–98)
METAMYELOCYTES NFR BLD MANUAL: 1 %
MONOCYTES # BLD AUTO: ABNORMAL K/UL (ref 0.3–1)
MONOCYTES NFR BLD: 1 % (ref 4–15)
NEUTROPHILS NFR BLD: 91 % (ref 38–73)
NRBC BLD-RTO: 1 /100 WBC
PHOSPHATE SERPL-MCNC: 4 MG/DL (ref 2.7–4.5)
PLATELET # BLD AUTO: 61 K/UL (ref 150–350)
PLATELET BLD QL SMEAR: ABNORMAL
PMV BLD AUTO: 10.9 FL (ref 9.2–12.9)
POCT GLUCOSE: 215 MG/DL (ref 70–110)
POCT GLUCOSE: 253 MG/DL (ref 70–110)
POCT GLUCOSE: 254 MG/DL (ref 70–110)
POCT GLUCOSE: 254 MG/DL (ref 70–110)
POLYCHROMASIA BLD QL SMEAR: ABNORMAL
POTASSIUM SERPL-SCNC: 4.3 MMOL/L (ref 3.5–5.1)
PROT SERPL-MCNC: 6.6 G/DL (ref 6–8.4)
PROTHROMBIN TIME: 12.6 SEC (ref 9–12.5)
RBC # BLD AUTO: 3 M/UL (ref 4–5.4)
SODIUM SERPL-SCNC: 137 MMOL/L (ref 136–145)
WBC # BLD AUTO: 8.61 K/UL (ref 3.9–12.7)

## 2020-10-09 PROCEDURE — 85007 BL SMEAR W/DIFF WBC COUNT: CPT

## 2020-10-09 PROCEDURE — 63600175 PHARM REV CODE 636 W HCPCS: Performed by: INTERNAL MEDICINE

## 2020-10-09 PROCEDURE — 94799 UNLISTED PULMONARY SVC/PX: CPT

## 2020-10-09 PROCEDURE — 85730 THROMBOPLASTIN TIME PARTIAL: CPT

## 2020-10-09 PROCEDURE — 85384 FIBRINOGEN ACTIVITY: CPT

## 2020-10-09 PROCEDURE — 25000003 PHARM REV CODE 250: Performed by: INTERNAL MEDICINE

## 2020-10-09 PROCEDURE — 93010 EKG 12-LEAD: ICD-10-PCS | Mod: ,,, | Performed by: INTERNAL MEDICINE

## 2020-10-09 PROCEDURE — 20600001 HC STEP DOWN PRIVATE ROOM

## 2020-10-09 PROCEDURE — 85027 COMPLETE CBC AUTOMATED: CPT

## 2020-10-09 PROCEDURE — 84100 ASSAY OF PHOSPHORUS: CPT

## 2020-10-09 PROCEDURE — 99900035 HC TECH TIME PER 15 MIN (STAT)

## 2020-10-09 PROCEDURE — 80053 COMPREHEN METABOLIC PANEL: CPT

## 2020-10-09 PROCEDURE — 83735 ASSAY OF MAGNESIUM: CPT

## 2020-10-09 PROCEDURE — 99233 PR SUBSEQUENT HOSPITAL CARE,LEVL III: ICD-10-PCS | Mod: ,,, | Performed by: INTERNAL MEDICINE

## 2020-10-09 PROCEDURE — 85610 PROTHROMBIN TIME: CPT

## 2020-10-09 PROCEDURE — 93010 ELECTROCARDIOGRAM REPORT: CPT | Mod: ,,, | Performed by: INTERNAL MEDICINE

## 2020-10-09 PROCEDURE — 94761 N-INVAS EAR/PLS OXIMETRY MLT: CPT

## 2020-10-09 PROCEDURE — 99233 SBSQ HOSP IP/OBS HIGH 50: CPT | Mod: ,,, | Performed by: INTERNAL MEDICINE

## 2020-10-09 PROCEDURE — 27100171 HC OXYGEN HIGH FLOW UP TO 24 HOURS

## 2020-10-09 PROCEDURE — 93005 ELECTROCARDIOGRAM TRACING: CPT

## 2020-10-09 PROCEDURE — 25000003 PHARM REV CODE 250: Performed by: STUDENT IN AN ORGANIZED HEALTH CARE EDUCATION/TRAINING PROGRAM

## 2020-10-09 RX ORDER — DEXAMETHASONE SODIUM PHOSPHATE 4 MG/ML
6 INJECTION, SOLUTION INTRA-ARTICULAR; INTRALESIONAL; INTRAMUSCULAR; INTRAVENOUS; SOFT TISSUE EVERY 12 HOURS
Status: DISCONTINUED | OUTPATIENT
Start: 2020-10-09 | End: 2020-10-12

## 2020-10-09 RX ORDER — FUROSEMIDE 10 MG/ML
40 INJECTION INTRAMUSCULAR; INTRAVENOUS ONCE
Status: COMPLETED | OUTPATIENT
Start: 2020-10-09 | End: 2020-10-09

## 2020-10-09 RX ORDER — INSULIN ASPART 100 [IU]/ML
1-10 INJECTION, SOLUTION INTRAVENOUS; SUBCUTANEOUS
Status: DISCONTINUED | OUTPATIENT
Start: 2020-10-09 | End: 2020-10-18 | Stop reason: HOSPADM

## 2020-10-09 RX ADMIN — INSULIN ASPART 3 UNITS: 100 INJECTION, SOLUTION INTRAVENOUS; SUBCUTANEOUS at 12:10

## 2020-10-09 RX ADMIN — FUROSEMIDE 40 MG: 10 INJECTION, SOLUTION INTRAMUSCULAR; INTRAVENOUS at 04:10

## 2020-10-09 RX ADMIN — BENZONATATE 100 MG: 100 CAPSULE ORAL at 09:10

## 2020-10-09 RX ADMIN — TRETINOIN 50 MG: 10 CAPSULE ORAL at 09:10

## 2020-10-09 RX ADMIN — BENZONATATE 100 MG: 100 CAPSULE ORAL at 08:10

## 2020-10-09 RX ADMIN — BENZONATATE 100 MG: 100 CAPSULE ORAL at 04:10

## 2020-10-09 RX ADMIN — DEXAMETHASONE SODIUM PHOSPHATE 6 MG: 4 INJECTION, SOLUTION INTRA-ARTICULAR; INTRALESIONAL; INTRAMUSCULAR; INTRAVENOUS; SOFT TISSUE at 08:10

## 2020-10-09 RX ADMIN — INSULIN ASPART 6 UNITS: 100 INJECTION, SOLUTION INTRAVENOUS; SUBCUTANEOUS at 05:10

## 2020-10-09 RX ADMIN — ACYCLOVIR 400 MG: 200 CAPSULE ORAL at 09:10

## 2020-10-09 RX ADMIN — ACYCLOVIR 400 MG: 200 CAPSULE ORAL at 08:10

## 2020-10-09 RX ADMIN — INSULIN ASPART 2 UNITS: 100 INJECTION, SOLUTION INTRAVENOUS; SUBCUTANEOUS at 09:10

## 2020-10-09 RX ADMIN — INSULIN ASPART 3 UNITS: 100 INJECTION, SOLUTION INTRAVENOUS; SUBCUTANEOUS at 09:10

## 2020-10-09 RX ADMIN — DEXAMETHASONE SODIUM PHOSPHATE 6 MG: 4 INJECTION, SOLUTION INTRA-ARTICULAR; INTRALESIONAL; INTRAMUSCULAR; INTRAVENOUS; SOFT TISSUE at 09:10

## 2020-10-09 RX ADMIN — TRETINOIN 50 MG: 10 CAPSULE ORAL at 06:10

## 2020-10-09 NOTE — PLAN OF CARE
Patient AAOx4. Sats 95-96% on 10L 30% comfort flow. Accu-checks ACHS; correction dose insulin administered as ordered. Lasix administered today. ATRA continued.  Bed in low locked position, call light in reach. Instructed to call if needed.

## 2020-10-09 NOTE — SUBJECTIVE & OBJECTIVE
Hospital course:  26 yo F transferred from [facility] with concern for APL (presented pancytopenic with nonspecific sx and fever). 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 Treated with ATRA (held day 18-20 d/t differentiation syndrome) and ASO (held day 11, 12, 15-18 d/t QTc prolongation, transaminitis, differentiation syndrome). 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     Subjective:     Interval History: ATRA 22 day, ASO day 19. Received ATRA but not ASO yesterday due to LFTs and QTc. UOP 1.9L with net negative 1.4L over last 24hrs. Afebrile overnight and comfortably asleep this morning on comfort flow 15L 30%. Pt denies any complaints this morning. 3% blasts on diff.    Objective:     Vital Signs (Most Recent):  Temp: 97 °F (36.1 °C) (10/09/20 0719)  Pulse: 68 (10/09/20 0719)  Resp: 18 (10/09/20 0719)  BP: 118/75 (10/09/20 0719)  SpO2: (!) 92 % (10/09/20 0731) Vital Signs (24h Range):  Temp:  [97 °F (36.1 °C)-98.5 °F (36.9 °C)] 97 °F (36.1 °C)  Pulse:  [68-92] 68  Resp:  [16-23] 18  SpO2:  [92 %-98 %] 92 %  BP: (105-135)/(59-85) 118/75     Weight: 129.9 kg (286 lb 6.8 oz)  Body mass index is 55.94 kg/m².  Body surface area is 2.35 meters squared.      Intake/Output - Last 3 Shifts       10/07 0700 - 10/08 0659 10/08 0700 - 10/09 0659 10/09 0700 - 10/10 0659    P.O. 1220 480     I.V. (mL/kg) 50 (0.4)      Blood 246      Total Intake(mL/kg) 1516 (11.3) 480 (3.7)     Urine (mL/kg/hr) 2150 (0.7) 1900 (0.6)     Other 600      Stool 0 0     Total Output 2750 1900     Net -1234 -1420            Urine Occurrence 5 x 4 x     Stool Occurrence 1 x 2 x           Physical Exam  Vitals signs  reviewed.   Constitutional:       Appearance: Normal appearance. She is obese.   HENT:      Head: Normocephalic and atraumatic.      Nose: Nose normal.      Mouth/Throat:      Comments: Fever blister noted.  Eyes:      Extraocular Movements: Extraocular movements intact.   Neck:      Musculoskeletal: Normal range of motion and neck supple.   Cardiovascular:      Rate and Rhythm: Normal rate and regular rhythm.      Heart sounds: No murmur.   Pulmonary:      Effort: Pulmonary effort is normal. No respiratory distress.      Comments: No crackles however poor inspiratory effort  Abdominal:      General: There is no distension.      Palpations: Abdomen is soft.      Tenderness: There is no abdominal tenderness.   Musculoskeletal: Normal range of motion.         General: No swelling.   Skin:     General: Skin is warm and dry.   Neurological:      General: No focal deficit present.      Mental Status: She is alert and oriented to person, place, and time.   Psychiatric:         Mood and Affect: Mood normal.         Behavior: Behavior normal.         Significant Labs:   All pertinent labs from the last 24 hours have been reviewed.    Diagnostic Results:  I have reviewed and interpreted all pertinent imaging results/findings within the past 24 hours.

## 2020-10-09 NOTE — ASSESSMENT & PLAN NOTE
- Due to differentiation syndrome  - CTA showed b/l pulmonary edema with left sided pleural effusion and small pericardial effusion, no PE however limited by body habitus  - Per echo, CVP 8 from 3 previously during admission  - Currently on Comfort Flow 15L 30% FIO2, was desaturating and becoming short of breath with movement  - 4 days of Dex 10mg q12hrs, wean to 6mg q12hrs  - Improved with IV lasix, uptrending BUN indicating pt is reaching a euvolemic state, will give single dose today  - Wean O2 as tolerated, goal >92%

## 2020-10-09 NOTE — ASSESSMENT & PLAN NOTE
-- EKGs on admission with HR in 80s/90s  -- Orthostatics negative  -- TSH WNL  -- CTA engative however limited by body habitus  -- ASO can result in tachycardia, more common ventricular tachycardia but sinus tach is possible  -- Replete electrolytes PRN to K >4 and Mag >2  -- Seems to have improved after several days with ASo held

## 2020-10-09 NOTE — PLAN OF CARE
POC reviewed with patient; understanding verbalized. HARRYON. Pt maintaining saturations on comfort flow 15L 30%. Tele and continuous pulse ox continued. Pt voiding via BSCC. Menses noted. Pt with nonskid footwear on, bed in lowest position, and locked with bed rails up x2. Pt instructed to call prior to getting OOB. Pt has call light and personal items within reach. Patient ambulates in room with assistance. All questions and concerns addressed at this time. Mag to be replaced. Will continue to monitor.

## 2020-10-09 NOTE — PROGRESS NOTES
[facility]  Hematology  Bone Marrow Transplant  Progress Note    Patient Name: Erika Saini  Admission Date: 9/18/2020  Hospital Length of Stay: 21 days  Code Status: Full Code    Hospital course:  24 yo F transferred from [facility] with concern for APL (presented pancytopenic with nonspecific sx and fever). 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 Treated with ATRA (held day 18-20 d/t differentiation syndrome) and ASO (held day 11, 12, 15-18 d/t QTc prolongation, transaminitis, differentiation syndrome). 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     Subjective:     Interval History: ATRA 22 day, ASO day 19. Received ATRA but not ASO yesterday due to LFTs and QTc. UOP 1.9L with net negative 1.4L over last 24hrs. Afebrile overnight and comfortably asleep this morning on comfort flow 15L 30%. Pt denies any complaints this morning. 3% blasts on diff.    Objective:     Vital Signs (Most Recent):  Temp: 97 °F (36.1 °C) (10/09/20 0719)  Pulse: 68 (10/09/20 0719)  Resp: 18 (10/09/20 0719)  BP: 118/75 (10/09/20 0719)  SpO2: (!) 92 % (10/09/20 0731) Vital Signs (24h Range):  Temp:  [97 °F (36.1 °C)-98.5 °F (36.9 °C)] 97 °F (36.1 °C)  Pulse:  [68-92] 68  Resp:  [16-23] 18  SpO2:  [92 %-98 %] 92 %  BP: (105-135)/(59-85) 118/75     Weight: 129.9 kg (286 lb 6.8 oz)  Body mass index is 55.94 kg/m².  Body surface area is 2.35 meters squared.      Intake/Output - Last 3 Shifts       10/07 0700 - 10/08 0659 10/08 0700 - 10/09 0659 10/09 0700 - 10/10 0659    P.O. 1220 480     I.V. (mL/kg) 50 (0.4)      Blood 246      Total Intake(mL/kg) 1516 (11.3) 480 (3.7)     Urine  (mL/kg/hr) 2150 (0.7) 1900 (0.6)     Other 600      Stool 0 0     Total Output 2750 1900     Net -1234 -1420            Urine Occurrence 5 x 4 x     Stool Occurrence 1 x 2 x           Physical Exam  Vitals signs reviewed.   Constitutional:       Appearance: Normal appearance. She is obese.   HENT:      Head: Normocephalic and atraumatic.      Nose: Nose normal.      Mouth/Throat:      Comments: Fever blister noted.  Eyes:      Extraocular Movements: Extraocular movements intact.   Neck:      Musculoskeletal: Normal range of motion and neck supple.   Cardiovascular:      Rate and Rhythm: Normal rate and regular rhythm.      Heart sounds: No murmur.   Pulmonary:      Effort: Pulmonary effort is normal. No respiratory distress.      Comments: No crackles however poor inspiratory effort  Abdominal:      General: There is no distension.      Palpations: Abdomen is soft.      Tenderness: There is no abdominal tenderness.   Musculoskeletal: Normal range of motion.         General: No swelling.   Skin:     General: Skin is warm and dry.   Neurological:      General: No focal deficit present.      Mental Status: She is alert and oriented to person, place, and time.   Psychiatric:         Mood and Affect: Mood normal.         Behavior: Behavior normal.         Significant Labs:   All pertinent labs from the last 24 hours have been reviewed.    Diagnostic Results:  I have reviewed and interpreted all pertinent imaging results/findings within the past 24 hours.    Assessment/Plan:     * APL (acute promyelocytic leukemia)  - Monitor TLS and DIC labs daily, monitoring for differentiation syndrome  - 2D ECHO - EF 58%, repeat EF 55% with small pericardial effusion, mild TR, CVP 8 from 3 earlier in the admission  - Continue tretinoin (start 9/18, today is Day 22) BID w/ meals,  - Holding ASO (start 09/21, today is day 19), monitor Qtc (daily EKG until improved, weekly thereafter), LFTs  - ASO held on days 11 and 12 due to QTc >500,  resumed day 13 and 14 with 50% dose reduction.  - ATRA held day 18-20 due to differentiation syndrome  - ASO held day 11, 12, 15, 16, 17, 18 due to prolonged QTc and elevated LFTs, dose reduced 50% day 13 and 14  - Give ATRA today and hold ASO due to LFTs (QTc 479 today)  - 4 days of Dex 10mg q12hr for differentiation syndrome, wean to 6mg q12hrs  - Continue allopurinol 300mg daily  - Transfuse cyroglobulin if fibrinogen <150    - Platelet goal > 50k.   - Hgb goal >7  - INR < 1.5  - Monitor I/O and daily weights twice daily    Shortness of breath  - Due to differentiation syndrome  - CTA showed b/l pulmonary edema with left sided pleural effusion and small pericardial effusion, no PE however limited by body habitus  - Per echo, CVP 8 from 3 previously during admission  - Currently on Comfort Flow 15L 30% FIO2, was desaturating and becoming short of breath with movement  - 4 days of Dex 10mg q12hrs, wean to 6mg q12hrs  - Improved with IV lasix, uptrending BUN indicating pt is reaching a euvolemic state, will give single dose today  - Wean O2 as tolerated, goal >92%    Transaminitis  - Elevated LFTs  - Etiology likely mixed with ASO administration and congestive hepatopathy  - Diuresing as above  - Trend daily    Pancytopenia  - Secondary to APL   - Transfuse if platelets < 50, Hgb< 7      Sinus tachycardia  -- EKGs on admission with HR in 80s/90s  -- Orthostatics negative  -- TSH WNL  -- CTA engative however limited by body habitus  -- ASO can result in tachycardia, more common ventricular tachycardia but sinus tach is possible  -- Replete electrolytes PRN to K >4 and Mag >2  -- Seems to have improved after several days with ASo held    Retinopathy  On 9/27 complained of vision changes present even before admission    - Opthalmology evaluated, appreciate assistance   - Secondary to subretinal hemorrhage?   - Per optho patient's vision should improve with time as pre-retinal hemorrhages resolve.   - May need surgical  intervention with vitrectomy if hemorrhages do not resolve in a few weeks.   - Return to retina clinic in 6 weeks    Adjustment reaction with anxiety  - seen by oncology psychology     Neutropenic fever  - Gound to have strep B agalactiae blood cultures (+ at OSH).  - Cultures and sensitivities faxed to us by [facility] show organism sensitive to pcn.   - Repeat cultures negative.    - Completed Ceftriaxone 10/3       Hypokalemia  - Monitor and replace PRN, keep K >4 and Mg >2 ( Given qt prolongation risk with treatment)       Morbid obesity with BMI of 50.0-59.9, adult  Patient with BMI 57.91      VTE Risk Mitigation (From admission, onward)         Ordered     heparin, porcine (PF) 100 unit/mL injection flush 300 Units  As needed (PRN)      09/21/20 1554     IP VTE HIGH RISK PATIENT  Once      09/18/20 0148     Place sequential compression device  Until discontinued      09/18/20 0148                Disposition: Continue with inpatient management    Jorge A Ferrer MD  Bone Marrow Transplant  [facility]

## 2020-10-09 NOTE — ASSESSMENT & PLAN NOTE
- Monitor TLS and DIC labs daily, monitoring for differentiation syndrome  - 2D ECHO - EF 58%, repeat EF 55% with small pericardial effusion, mild TR, CVP 8 from 3 earlier in the admission  - Continue tretinoin (start 9/18, today is Day 22) BID w/ meals,  - Holding ASO (start 09/21, today is day 19), monitor Qtc (daily EKG until improved, weekly thereafter), LFTs  - ASO held on days 11 and 12 due to QTc >500, resumed day 13 and 14 with 50% dose reduction.  - ATRA held day 18-20 due to differentiation syndrome  - ASO held day 11, 12, 15, 16, 17, 18 due to prolonged QTc and elevated LFTs, dose reduced 50% day 13 and 14  - Give ATRA today and hold ASO due to LFTs (QTc 479 today)  - 4 days of Dex 10mg q12hr for differentiation syndrome, wean to 6mg q12hrs  - Continue allopurinol 300mg daily  - Transfuse cyroglobulin if fibrinogen <150    - Platelet goal > 50k.   - Hgb goal >7  - INR < 1.5  - Monitor I/O and daily weights twice daily

## 2020-10-09 NOTE — ASSESSMENT & PLAN NOTE
- Elevated LFTs  - Etiology likely mixed with ASO administration and congestive hepatopathy  - Diuresing as above  - Trend daily

## 2020-10-10 LAB
ALBUMIN SERPL BCP-MCNC: 3.3 G/DL (ref 3.5–5.2)
ALP SERPL-CCNC: 86 U/L (ref 55–135)
ALT SERPL W/O P-5'-P-CCNC: 585 U/L (ref 10–44)
ANION GAP SERPL CALC-SCNC: 11 MMOL/L (ref 8–16)
APTT BLDCRRT: 22.7 SEC (ref 21–32)
AST SERPL-CCNC: 188 U/L (ref 10–40)
BASOPHILS # BLD AUTO: 0 K/UL (ref 0–0.2)
BASOPHILS NFR BLD: 0 % (ref 0–1.9)
BILIRUB SERPL-MCNC: 1 MG/DL (ref 0.1–1)
BUN SERPL-MCNC: 28 MG/DL (ref 6–20)
CALCIUM SERPL-MCNC: 8.8 MG/DL (ref 8.7–10.5)
CHLORIDE SERPL-SCNC: 97 MMOL/L (ref 95–110)
CO2 SERPL-SCNC: 28 MMOL/L (ref 23–29)
CREAT SERPL-MCNC: 0.7 MG/DL (ref 0.5–1.4)
DIFFERENTIAL METHOD: ABNORMAL
EOSINOPHIL # BLD AUTO: 0 K/UL (ref 0–0.5)
EOSINOPHIL NFR BLD: 0 % (ref 0–8)
ERYTHROCYTE [DISTWIDTH] IN BLOOD BY AUTOMATED COUNT: 13.5 % (ref 11.5–14.5)
EST. GFR  (AFRICAN AMERICAN): >60 ML/MIN/1.73 M^2
EST. GFR  (NON AFRICAN AMERICAN): >60 ML/MIN/1.73 M^2
FIBRINOGEN PPP-MCNC: 253 MG/DL (ref 182–366)
GLUCOSE SERPL-MCNC: 273 MG/DL (ref 70–110)
HCT VFR BLD AUTO: 26.7 % (ref 37–48.5)
HGB BLD-MCNC: 9 G/DL (ref 12–16)
IMM GRANULOCYTES # BLD AUTO: 0.31 K/UL (ref 0–0.04)
IMM GRANULOCYTES NFR BLD AUTO: 3 % (ref 0–0.5)
INR PPP: 1.2 (ref 0.8–1.2)
LYMPHOCYTES # BLD AUTO: 0.4 K/UL (ref 1–4.8)
LYMPHOCYTES NFR BLD: 3.8 % (ref 18–48)
MAGNESIUM SERPL-MCNC: 2.2 MG/DL (ref 1.6–2.6)
MCH RBC QN AUTO: 30 PG (ref 27–31)
MCHC RBC AUTO-ENTMCNC: 33.7 G/DL (ref 32–36)
MCV RBC AUTO: 89 FL (ref 82–98)
MONOCYTES # BLD AUTO: 0.2 K/UL (ref 0.3–1)
MONOCYTES NFR BLD: 2.2 % (ref 4–15)
NEUTROPHILS # BLD AUTO: 9.4 K/UL (ref 1.8–7.7)
NEUTROPHILS NFR BLD: 91 % (ref 38–73)
NRBC BLD-RTO: 0 /100 WBC
PHOSPHATE SERPL-MCNC: 4.3 MG/DL (ref 2.7–4.5)
PLATELET # BLD AUTO: 61 K/UL (ref 150–350)
PMV BLD AUTO: 12.1 FL (ref 9.2–12.9)
POCT GLUCOSE: 198 MG/DL (ref 70–110)
POCT GLUCOSE: 201 MG/DL (ref 70–110)
POCT GLUCOSE: 212 MG/DL (ref 70–110)
POCT GLUCOSE: 241 MG/DL (ref 70–110)
POTASSIUM SERPL-SCNC: 4.2 MMOL/L (ref 3.5–5.1)
PROT SERPL-MCNC: 6.5 G/DL (ref 6–8.4)
PROTHROMBIN TIME: 13 SEC (ref 9–12.5)
RBC # BLD AUTO: 3 M/UL (ref 4–5.4)
SODIUM SERPL-SCNC: 136 MMOL/L (ref 136–145)
WBC # BLD AUTO: 10.31 K/UL (ref 3.9–12.7)

## 2020-10-10 PROCEDURE — 93010 EKG 12-LEAD: ICD-10-PCS | Mod: ,,, | Performed by: INTERNAL MEDICINE

## 2020-10-10 PROCEDURE — 27100171 HC OXYGEN HIGH FLOW UP TO 24 HOURS

## 2020-10-10 PROCEDURE — 99233 SBSQ HOSP IP/OBS HIGH 50: CPT | Mod: ,,, | Performed by: INTERNAL MEDICINE

## 2020-10-10 PROCEDURE — 84100 ASSAY OF PHOSPHORUS: CPT

## 2020-10-10 PROCEDURE — 93005 ELECTROCARDIOGRAM TRACING: CPT

## 2020-10-10 PROCEDURE — 99900035 HC TECH TIME PER 15 MIN (STAT)

## 2020-10-10 PROCEDURE — 93010 ELECTROCARDIOGRAM REPORT: CPT | Mod: ,,, | Performed by: INTERNAL MEDICINE

## 2020-10-10 PROCEDURE — 94761 N-INVAS EAR/PLS OXIMETRY MLT: CPT

## 2020-10-10 PROCEDURE — 85610 PROTHROMBIN TIME: CPT

## 2020-10-10 PROCEDURE — 85025 COMPLETE CBC W/AUTO DIFF WBC: CPT

## 2020-10-10 PROCEDURE — C9399 UNCLASSIFIED DRUGS OR BIOLOG: HCPCS | Performed by: INTERNAL MEDICINE

## 2020-10-10 PROCEDURE — 99233 PR SUBSEQUENT HOSPITAL CARE,LEVL III: ICD-10-PCS | Mod: ,,, | Performed by: INTERNAL MEDICINE

## 2020-10-10 PROCEDURE — 25000003 PHARM REV CODE 250: Performed by: STUDENT IN AN ORGANIZED HEALTH CARE EDUCATION/TRAINING PROGRAM

## 2020-10-10 PROCEDURE — 25000003 PHARM REV CODE 250: Performed by: INTERNAL MEDICINE

## 2020-10-10 PROCEDURE — 27000221 HC OXYGEN, UP TO 24 HOURS

## 2020-10-10 PROCEDURE — 85730 THROMBOPLASTIN TIME PARTIAL: CPT

## 2020-10-10 PROCEDURE — 80053 COMPREHEN METABOLIC PANEL: CPT

## 2020-10-10 PROCEDURE — 63600175 PHARM REV CODE 636 W HCPCS: Performed by: INTERNAL MEDICINE

## 2020-10-10 PROCEDURE — 83735 ASSAY OF MAGNESIUM: CPT

## 2020-10-10 PROCEDURE — 85384 FIBRINOGEN ACTIVITY: CPT

## 2020-10-10 PROCEDURE — 20600001 HC STEP DOWN PRIVATE ROOM

## 2020-10-10 RX ORDER — FUROSEMIDE 40 MG/1
40 TABLET ORAL DAILY
Status: DISCONTINUED | OUTPATIENT
Start: 2020-10-10 | End: 2020-10-10

## 2020-10-10 RX ADMIN — TRETINOIN 50 MG: 10 CAPSULE ORAL at 08:10

## 2020-10-10 RX ADMIN — BENZONATATE 100 MG: 100 CAPSULE ORAL at 03:10

## 2020-10-10 RX ADMIN — ACYCLOVIR 400 MG: 200 CAPSULE ORAL at 08:10

## 2020-10-10 RX ADMIN — INSULIN ASPART 1 UNITS: 100 INJECTION, SOLUTION INTRAVENOUS; SUBCUTANEOUS at 09:10

## 2020-10-10 RX ADMIN — INSULIN ASPART 4 UNITS: 100 INJECTION, SOLUTION INTRAVENOUS; SUBCUTANEOUS at 06:10

## 2020-10-10 RX ADMIN — BENZONATATE 100 MG: 100 CAPSULE ORAL at 09:10

## 2020-10-10 RX ADMIN — ACYCLOVIR 400 MG: 200 CAPSULE ORAL at 09:10

## 2020-10-10 RX ADMIN — DEXAMETHASONE SODIUM PHOSPHATE 6 MG: 4 INJECTION, SOLUTION INTRA-ARTICULAR; INTRALESIONAL; INTRAMUSCULAR; INTRAVENOUS; SOFT TISSUE at 08:10

## 2020-10-10 RX ADMIN — DEXAMETHASONE SODIUM PHOSPHATE 6 MG: 4 INJECTION, SOLUTION INTRA-ARTICULAR; INTRALESIONAL; INTRAMUSCULAR; INTRAVENOUS; SOFT TISSUE at 09:10

## 2020-10-10 RX ADMIN — BENZONATATE 100 MG: 100 CAPSULE ORAL at 08:10

## 2020-10-10 RX ADMIN — INSULIN ASPART 4 UNITS: 100 INJECTION, SOLUTION INTRAVENOUS; SUBCUTANEOUS at 12:10

## 2020-10-10 RX ADMIN — INSULIN ASPART 4 UNITS: 100 INJECTION, SOLUTION INTRAVENOUS; SUBCUTANEOUS at 08:10

## 2020-10-10 RX ADMIN — FUROSEMIDE 40 MG: 40 TABLET ORAL at 12:10

## 2020-10-10 RX ADMIN — INSULIN DETEMIR 6 UNITS: 100 INJECTION, SOLUTION SUBCUTANEOUS at 12:10

## 2020-10-10 NOTE — RESPIRATORY THERAPY
Rapid Response Respiratory Therapy Proactive Rounding Note      Time of visit: 823      Code Status: Full Code   : 1995  Bed: 803/803 A:   MRN: [id number]    SITUATION     Evaluated patient for: HFNC Compliance     BACKGROUND     Patient has no past medical history on file.    ASSESSMENT/INTERVENTIONS     Upon arrival in room pt resting.  Has no issues at this time.  I was able to take her off HFNC and she is now on 2L NC.  The flowmeter was not working properly so it has been changed out for a new one    Pulse:  Respiratory rate:  SpO2:   Level of Consciousness: Level of Consciousness (AVPU): alert  Respiratory Effort: Respiratory Effort: Normal, Unlabored Expansion/Accessory Muscle Usage: Expansion/Accessory Muscles/Retractions: no retractions, expansion symmetric, no use of accessory muscles  All Lung Field Breath Sounds: All Lung Fields Breath Sounds: Anterior:, Lateral:, diminished  VERONICA Breath Sounds: diminished  LLL Breath Sounds: diminished  RUL Breath Sounds: diminished  RML Breath Sounds: diminished  RLL Breath Sounds: diminished  O2 Device/Concentration: NC  Was the O2 device able to be weaned? (Yes/No): yes  Ambu at bedside: Ambu bag with the patient?: Yes, Adult Ambu    Current Respiratory Care Orders:   Oxygen Continuous Continuous     References: Oxygen Titration Protocol   Question Answer Comment   Device type: Low flow    Device: Nasal Cannula (1- 5 Liters)    LPM: 1-5    Titrate O2 per Oxygen Titration Protocol: Yes    To maintain SpO2 goal of: >= 90%    Notify MD of: Inability to achieve desired SpO2; Sudden change in patient status and requires 20% increase in FiO2; Patient requires >60% FiO2        10/10/20 0844     10/04/20 1621  Pulse Oximetry Continuous Continuous      10/04/20 1621   20 0400  Incentive spirometry Every 4 hours (114 of 131 released)    Release   References: [facility] Clinical Practice Guidelines    20 0101   Unscheduled  Inhalation Treatment Q4H PRN Every  4 hours PRN (0 of 32107 released)    Release             RECOMMENDATIONS     We recommend:     ESCALATION      Physician Escalation (Yes/No)    Care discussed with:   Discussed plan of care primary RT, AGay     FOLLOW-UP     Please call back the Rapid Response RT, Kaylynn Villaseñor, RRT at x [phone] for any questions or concerns.

## 2020-10-10 NOTE — PLAN OF CARE
POC reviewed with patient at beginning of shift. Patient remained AAOx3 throughout the shift. Assessment completed & no alarming findings found. VS remained stable. All schedules/PRN medications administered as ordered. Tolerating a pediatric diet without any difficulty. Assisted with activity; Voids per commode. Fall/safety reviewed with patient: side rails up x2; call bell in place; bed in lowest, locked position; skid proof socks on; no evidence of skin breakdown; care plan explained to patient; no additional complaints at this time. Will continue routine plan of care.

## 2020-10-10 NOTE — ASSESSMENT & PLAN NOTE
- Due to differentiation syndrome  - CTA showed b/l pulmonary edema with left sided pleural effusion and small pericardial effusion, no PE however limited by body habitus  - Per echo, CVP 8 from 3 previously during admission  - Max weight 141.3kg, currently around baseline  - Currently on HFNC, was desaturating and becoming short of breath with movement  - 4 days of Dex 10mg q12hrs, wean to 6mg q12hrs (day 2)  - Improved with IV lasix, uptrending BUN indicating pt is reaching a euvolemic state, will give single dose oral today  - Wean O2 as tolerated, goal >92%

## 2020-10-10 NOTE — PLAN OF CARE
Uneventful shift. Pt has had no c/o pain this shift. Pt blood sugar monitored ac/hs. SSI administered as ordered. Pt remains on O2. Cardiac monitoring and continuous pulse ox in place. Pt has remained free from injury this shift. Bed in low locked position. Call light and personal belongings within reach. Side rails up x2. Nonskid socks in place. Pt instructed to call with any needs. Will continue to monitor.

## 2020-10-10 NOTE — ASSESSMENT & PLAN NOTE
- Monitor TLS and DIC labs daily, monitoring for differentiation syndrome  - 2D ECHO - EF 58%, repeat EF 55% with small pericardial effusion, mild TR, CVP 8 from 3 earlier in the admission  - Continue tretinoin (start 9/18, today is Day 23) BID w/ meals,  - Holding ASO (start 09/21, today is day 20), monitor Qtc (daily EKG until improved, weekly thereafter), LFTs  - ASO held on days 11 and 12 due to QTc >500, resumed day 13 and 14 with 50% dose reduction.  - ATRA held day 18-20 due to differentiation syndrome  - ASO held day 11, 12, 15, 16, 17, 18, 19 due to prolonged QTc and elevated LFTs, dose reduced 50% day 13 and 14  - Morning ATRA given, will hold evening and hold ASO due to LFTs (QTc 451 today)  - 4 days of Dex 10mg q12hr for differentiation syndrome, wean to 6mg q12hrs (day 2)  - Continue allopurinol 300mg daily  - Transfuse cyroglobulin if fibrinogen <150    - Platelet goal > 50k.   - Hgb goal >7  - INR < 1.5  - Monitor I/O and daily weights twice daily

## 2020-10-10 NOTE — SUBJECTIVE & OBJECTIVE
Hospital course:  26 [facility] for [facility] (presented pancytopenic with nonspecific sx and fever). 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 Treated with ATRA (held day 18-20 d/t differentiation syndrome) and ASO (held day 11, 12, 15-19 d/t QTc prolongation, transaminitis, differentiation syndrome). 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     Subjective:     Interval History: ATRA 23 day, ASO day 20. Received ATRA but not ASO yesterday. UOP 2.8L, in's not recorded. Titrated down from comfort flow to high flow overnight. Afebrile and comfortably asleep this morning. Denies complaints once awake.    Objective:     Vital Signs (Most Recent):  Temp: 98.7 °F (37.1 °C) (10/10/20 1452)  Pulse: 71 (10/10/20 1452)  Resp: 16 (10/10/20 1452)  BP: 122/74 (10/10/20 1452)  SpO2: 98 % (10/10/20 1452) Vital Signs (24h Range):  Temp:  [97.7 °F (36.5 °C)-99 °F (37.2 °C)] 98.7 °F (37.1 °C)  Pulse:  [54-92] 71  Resp:  [16-20] 16  SpO2:  [93 %-99 %] 98 %  BP: ()/(52-91) 122/74     Weight: 127.7 kg (281 lb 6.7 oz)  Body mass index is 54.96 kg/m².  Body surface area is 2.33 meters squared.      Intake/Output - Last 3 Shifts       10/08 0700 - 10/09 0659 10/09 0700 - 10/10 0659 10/10 0700 - 10/11 0659    P.O. 480 250 355    I.V. (mL/kg)       Blood       Total Intake(mL/kg) 480 (3.7) 250 (2) 355 (2.8)    Urine (mL/kg/hr) 1900 (0.6) 2850 (0.9) 500 (0.5)    Other       Stool 0 0     Total Output 1900 2850 500    Net -1420 -2600 -145           Urine Occurrence 4 x 1 x     Stool Occurrence 2 x 2 x           Physical Exam  Vitals signs reviewed.   Constitutional:        Appearance: Normal appearance. She is obese.   HENT:      Head: Normocephalic and atraumatic.      Nose: Nose normal.      Mouth/Throat:      Comments: Fever blister noted.  Eyes:      Extraocular Movements: Extraocular movements intact.   Neck:      Musculoskeletal: Normal range of motion and neck supple.   Cardiovascular:      Rate and Rhythm: Normal rate and regular rhythm.      Heart sounds: No murmur.   Pulmonary:      Effort: Pulmonary effort is normal. No respiratory distress.      Comments: No crackles however poor inspiratory effort  Abdominal:      General: There is no distension.      Palpations: Abdomen is soft.      Tenderness: There is no abdominal tenderness.   Musculoskeletal: Normal range of motion.         General: No swelling.   Skin:     General: Skin is warm and dry.   Neurological:      General: No focal deficit present.      Mental Status: She is alert and oriented to person, place, and time.   Psychiatric:         Mood and Affect: Mood normal.         Behavior: Behavior normal.         Significant Labs:   All pertinent labs from the last 24 hours have been reviewed.    Diagnostic Results:  I have reviewed and interpreted all pertinent imaging results/findings within the past 24 hours.

## 2020-10-10 NOTE — ASSESSMENT & PLAN NOTE
- Elevated LFTs  - Etiology likely mixed with ASO administration, congestive hepatopathy, and PASCUAL  - Diuresing as above  - Trend daily

## 2020-10-10 NOTE — PROGRESS NOTES
[facility]  Hematology  Bone Marrow Transplant  Progress Note    Patient Name: Erika Saini  Admission Date: 9/18/2020  Hospital Length of Stay: 22 days  Code Status: Full Code    Hospital course:  26 yo F transferred from [facility] with concern for APL (presented pancytopenic with nonspecific sx and fever). 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 Treated with ATRA (held day 18-20 d/t differentiation syndrome) and ASO (held day 11, 12, 15-19 d/t QTc prolongation, transaminitis, differentiation syndrome). 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     Subjective:     Interval History: ATRA 23 day, ASO day 20. Received ATRA but not ASO yesterday. UOP 2.8L, in's not recorded. Titrated down from comfort flow to high flow overnight. Afebrile and comfortably asleep this morning. Denies complaints once awake.    Objective:     Vital Signs (Most Recent):  Temp: 98.7 °F (37.1 °C) (10/10/20 1452)  Pulse: 71 (10/10/20 1452)  Resp: 16 (10/10/20 1452)  BP: 122/74 (10/10/20 1452)  SpO2: 98 % (10/10/20 1452) Vital Signs (24h Range):  Temp:  [97.7 °F (36.5 °C)-99 °F (37.2 °C)] 98.7 °F (37.1 °C)  Pulse:  [54-92] 71  Resp:  [16-20] 16  SpO2:  [93 %-99 %] 98 %  BP: ()/(52-91) 122/74     Weight: 127.7 kg (281 lb 6.7 oz)  Body mass index is 54.96 kg/m².  Body surface area is 2.33 meters squared.      Intake/Output - Last 3 Shifts       10/08 0700 - 10/09 0659 10/09 0700 - 10/10 0659 10/10 0700 - 10/11 0659    P.O. 480 250 355    I.V. (mL/kg)       Blood       Total Intake(mL/kg) 480 (3.7) 250 (2) 355 (2.8)    Urine (mL/kg/hr) 1900 (0.6) 2850 (0.9) 500 (0.5)    Other        Stool 0 0     Total Output 1900 2850 500    Net -1420 -2600 -145           Urine Occurrence 4 x 1 x     Stool Occurrence 2 x 2 x           Physical Exam  Vitals signs reviewed.   Constitutional:       Appearance: Normal appearance. She is obese.   HENT:      Head: Normocephalic and atraumatic.      Nose: Nose normal.      Mouth/Throat:   Eyes:      Extraocular Movements: Extraocular movements intact.   Neck:      Musculoskeletal: Normal range of motion and neck supple.   Cardiovascular:      Rate and Rhythm: Normal rate and regular rhythm.      Heart sounds: No murmur.   Pulmonary:      Effort: Pulmonary effort is normal. No respiratory distress.      Comments: No crackles however poor inspiratory effort  Abdominal:      General: There is no distension.      Palpations: Abdomen is soft.      Tenderness: There is no abdominal tenderness.   Musculoskeletal: Normal range of motion.         General: No swelling.   Skin:     General: Skin is warm and dry.   Neurological:      General: No focal deficit present.      Mental Status: She is alert and oriented to person, place, and time.   Psychiatric:         Mood and Affect: Mood normal.         Behavior: Behavior normal.         Significant Labs:   All pertinent labs from the last 24 hours have been reviewed.    Diagnostic Results:  I have reviewed and interpreted all pertinent imaging results/findings within the past 24 hours.    Assessment/Plan:     * APL (acute promyelocytic leukemia)  - Monitor TLS and DIC labs daily, monitoring for differentiation syndrome  - 2D ECHO - EF 58%, repeat EF 55% with small pericardial effusion, mild TR, CVP 8 from 3 earlier in the admission  - Continue tretinoin (start 9/18, today is Day 23) BID w/ meals,  - Holding ASO (start 09/21, today is day 20), monitor Qtc (daily EKG until improved, weekly thereafter), LFTs  - ASO held on days 11 and 12 due to QTc >500, resumed day 13 and 14 with 50% dose reduction.  - ATRA held day 18-20 due to  differentiation syndrome  - ASO held day 11, 12, 15, 16, 17, 18, 19 due to prolonged QTc and elevated LFTs, dose reduced 50% day 13 and 14  - Morning ATRA given, will hold evening and hold ASO due to LFTs (QTc 451 today)  - 4 days of Dex 10mg q12hr for differentiation syndrome, wean to 6mg q12hrs (day 2)  - Continue allopurinol 300mg daily  - Transfuse cyroglobulin if fibrinogen <150    - Platelet goal > 50k.   - Hgb goal >7  - INR < 1.5  - Monitor I/O and daily weights twice daily    Shortness of breath  - Due to differentiation syndrome  - CTA showed b/l pulmonary edema with left sided pleural effusion and small pericardial effusion, no PE however limited by body habitus  - Per echo, CVP 8 from 3 previously during admission  - Max weight 141.3kg, currently around baseline  - Currently on HFNC, was desaturating and becoming short of breath with movement  - 4 days of Dex 10mg q12hrs, wean to 6mg q12hrs (day 2)  - Improved with IV lasix, uptrending BUN indicating pt is reaching a euvolemic state, will give single dose oral today  - Wean O2 as tolerated, goal >92%    Transaminitis  - Elevated LFTs  - Etiology likely mixed with ASO administration, congestive hepatopathy, and PASCUAL  - Diuresing as above  - Trend daily    Pancytopenia  - Secondary to APL   - Transfuse if platelets < 50, Hgb< 7      Sinus tachycardia  -- EKGs on admission with HR in 80s/90s  -- Orthostatics negative  -- TSH WNL  -- CTA engative however limited by body habitus  -- ASO can result in tachycardia, more common ventricular tachycardia but sinus tach is possible  -- Replete electrolytes PRN to K >4 and Mag >2  -- Seems to have improved after several days with ASo held    Retinopathy  On 9/27 complained of vision changes present even before admission    - Opthalmology evaluated, appreciate assistance   - Secondary to subretinal hemorrhage?   - Per optho patient's vision should improve with time as pre-retinal hemorrhages resolve.   - May need  surgical intervention with vitrectomy if hemorrhages do not resolve in a few weeks.   - Return to retina clinic in 6 weeks    Adjustment reaction with anxiety  - seen by oncology psychology     Neutropenic fever  - Gound to have strep B agalactiae blood cultures (+ at OSH).  - Cultures and sensitivities faxed to us by [facility] show organism sensitive to pcn.   - Repeat cultures negative.    - Completed Ceftriaxone 10/3       Hypokalemia  - Monitor and replace PRN, keep K >4 and Mg >2 ( Given qt prolongation risk with treatment)       Morbid obesity with BMI of 50.0-59.9, adult  Patient with BMI 57.91        VTE Risk Mitigation (From admission, onward)         Ordered     heparin, porcine (PF) 100 unit/mL injection flush 300 Units  As needed (PRN)      09/21/20 1554     IP VTE HIGH RISK PATIENT  Once      09/18/20 0148     Place sequential compression device  Until discontinued      09/18/20 0148                Disposition: Continue with inpatient management    Jorge A Ferrer MD  Bone Marrow Transplant  [facility]

## 2020-10-11 PROBLEM — R73.9 STEROID-INDUCED HYPERGLYCEMIA: Status: ACTIVE | Noted: 2020-10-11

## 2020-10-11 PROBLEM — T88.7XXA DIFFERENTIATION SYNDROME: Status: ACTIVE | Noted: 2020-10-11

## 2020-10-11 PROBLEM — T38.0X5A STEROID-INDUCED HYPERGLYCEMIA: Status: ACTIVE | Noted: 2020-10-11

## 2020-10-11 PROBLEM — R06.02 SHORTNESS OF BREATH: Status: RESOLVED | Noted: 2020-09-22 | Resolved: 2020-10-11

## 2020-10-11 LAB
ALBUMIN SERPL BCP-MCNC: 3.3 G/DL (ref 3.5–5.2)
ALP SERPL-CCNC: 82 U/L (ref 55–135)
ALT SERPL W/O P-5'-P-CCNC: 640 U/L (ref 10–44)
ANION GAP SERPL CALC-SCNC: 10 MMOL/L (ref 8–16)
APTT BLDCRRT: 22.9 SEC (ref 21–32)
AST SERPL-CCNC: 177 U/L (ref 10–40)
BASOPHILS # BLD AUTO: 0.01 K/UL (ref 0–0.2)
BASOPHILS NFR BLD: 0.1 % (ref 0–1.9)
BILIRUB SERPL-MCNC: 0.8 MG/DL (ref 0.1–1)
BUN SERPL-MCNC: 28 MG/DL (ref 6–20)
CALCIUM SERPL-MCNC: 8.9 MG/DL (ref 8.7–10.5)
CHLORIDE SERPL-SCNC: 97 MMOL/L (ref 95–110)
CO2 SERPL-SCNC: 28 MMOL/L (ref 23–29)
CREAT SERPL-MCNC: 0.7 MG/DL (ref 0.5–1.4)
DIFFERENTIAL METHOD: ABNORMAL
EOSINOPHIL # BLD AUTO: 0 K/UL (ref 0–0.5)
EOSINOPHIL NFR BLD: 0 % (ref 0–8)
ERYTHROCYTE [DISTWIDTH] IN BLOOD BY AUTOMATED COUNT: 13.4 % (ref 11.5–14.5)
EST. GFR  (AFRICAN AMERICAN): >60 ML/MIN/1.73 M^2
EST. GFR  (NON AFRICAN AMERICAN): >60 ML/MIN/1.73 M^2
FIBRINOGEN PPP-MCNC: 249 MG/DL (ref 182–366)
GLUCOSE SERPL-MCNC: 298 MG/DL (ref 70–110)
HCT VFR BLD AUTO: 28.2 % (ref 37–48.5)
HGB BLD-MCNC: 9.3 G/DL (ref 12–16)
IMM GRANULOCYTES # BLD AUTO: 0.16 K/UL (ref 0–0.04)
IMM GRANULOCYTES NFR BLD AUTO: 1.8 % (ref 0–0.5)
INR PPP: 1.2 (ref 0.8–1.2)
LYMPHOCYTES # BLD AUTO: 0.3 K/UL (ref 1–4.8)
LYMPHOCYTES NFR BLD: 3 % (ref 18–48)
MAGNESIUM SERPL-MCNC: 2.4 MG/DL (ref 1.6–2.6)
MCH RBC QN AUTO: 29.6 PG (ref 27–31)
MCHC RBC AUTO-ENTMCNC: 33 G/DL (ref 32–36)
MCV RBC AUTO: 90 FL (ref 82–98)
MONOCYTES # BLD AUTO: 0.1 K/UL (ref 0.3–1)
MONOCYTES NFR BLD: 1.4 % (ref 4–15)
NEUTROPHILS # BLD AUTO: 8.4 K/UL (ref 1.8–7.7)
NEUTROPHILS NFR BLD: 93.7 % (ref 38–73)
NRBC BLD-RTO: 0 /100 WBC
PHOSPHATE SERPL-MCNC: 4.4 MG/DL (ref 2.7–4.5)
PLATELET # BLD AUTO: 70 K/UL (ref 150–350)
PMV BLD AUTO: 12.9 FL (ref 9.2–12.9)
POCT GLUCOSE: 218 MG/DL (ref 70–110)
POCT GLUCOSE: 219 MG/DL (ref 70–110)
POCT GLUCOSE: 276 MG/DL (ref 70–110)
POTASSIUM SERPL-SCNC: 4.4 MMOL/L (ref 3.5–5.1)
PROT SERPL-MCNC: 6.5 G/DL (ref 6–8.4)
PROTHROMBIN TIME: 12.8 SEC (ref 9–12.5)
RBC # BLD AUTO: 3.14 M/UL (ref 4–5.4)
SODIUM SERPL-SCNC: 135 MMOL/L (ref 136–145)
WBC # BLD AUTO: 9 K/UL (ref 3.9–12.7)

## 2020-10-11 PROCEDURE — 99233 SBSQ HOSP IP/OBS HIGH 50: CPT | Mod: ,,, | Performed by: INTERNAL MEDICINE

## 2020-10-11 PROCEDURE — 85384 FIBRINOGEN ACTIVITY: CPT

## 2020-10-11 PROCEDURE — 85610 PROTHROMBIN TIME: CPT

## 2020-10-11 PROCEDURE — 93005 ELECTROCARDIOGRAM TRACING: CPT

## 2020-10-11 PROCEDURE — 93010 EKG 12-LEAD: ICD-10-PCS | Mod: ,,, | Performed by: INTERNAL MEDICINE

## 2020-10-11 PROCEDURE — 85730 THROMBOPLASTIN TIME PARTIAL: CPT

## 2020-10-11 PROCEDURE — 80053 COMPREHEN METABOLIC PANEL: CPT

## 2020-10-11 PROCEDURE — 25000003 PHARM REV CODE 250: Performed by: INTERNAL MEDICINE

## 2020-10-11 PROCEDURE — 85025 COMPLETE CBC W/AUTO DIFF WBC: CPT

## 2020-10-11 PROCEDURE — 63600175 PHARM REV CODE 636 W HCPCS: Performed by: INTERNAL MEDICINE

## 2020-10-11 PROCEDURE — 94799 UNLISTED PULMONARY SVC/PX: CPT

## 2020-10-11 PROCEDURE — 94761 N-INVAS EAR/PLS OXIMETRY MLT: CPT

## 2020-10-11 PROCEDURE — 25000003 PHARM REV CODE 250: Performed by: STUDENT IN AN ORGANIZED HEALTH CARE EDUCATION/TRAINING PROGRAM

## 2020-10-11 PROCEDURE — 83735 ASSAY OF MAGNESIUM: CPT

## 2020-10-11 PROCEDURE — 27000221 HC OXYGEN, UP TO 24 HOURS

## 2020-10-11 PROCEDURE — 93010 ELECTROCARDIOGRAM REPORT: CPT | Mod: ,,, | Performed by: INTERNAL MEDICINE

## 2020-10-11 PROCEDURE — 84100 ASSAY OF PHOSPHORUS: CPT

## 2020-10-11 PROCEDURE — 20600001 HC STEP DOWN PRIVATE ROOM

## 2020-10-11 PROCEDURE — 99233 PR SUBSEQUENT HOSPITAL CARE,LEVL III: ICD-10-PCS | Mod: ,,, | Performed by: INTERNAL MEDICINE

## 2020-10-11 RX ORDER — POLYETHYLENE GLYCOL 3350 17 G/17G
17 POWDER, FOR SOLUTION ORAL DAILY
Status: DISCONTINUED | OUTPATIENT
Start: 2020-10-12 | End: 2020-10-17

## 2020-10-11 RX ORDER — POLYETHYLENE GLYCOL 3350 17 G/17G
17 POWDER, FOR SOLUTION ORAL ONCE
Status: DISCONTINUED | OUTPATIENT
Start: 2020-10-11 | End: 2020-10-13

## 2020-10-11 RX ADMIN — ACYCLOVIR 400 MG: 200 CAPSULE ORAL at 09:10

## 2020-10-11 RX ADMIN — INSULIN ASPART 6 UNITS: 100 INJECTION, SOLUTION INTRAVENOUS; SUBCUTANEOUS at 09:10

## 2020-10-11 RX ADMIN — MUPIROCIN: 20 OINTMENT TOPICAL at 09:10

## 2020-10-11 RX ADMIN — DEXAMETHASONE SODIUM PHOSPHATE 6 MG: 4 INJECTION, SOLUTION INTRA-ARTICULAR; INTRALESIONAL; INTRAMUSCULAR; INTRAVENOUS; SOFT TISSUE at 09:10

## 2020-10-11 RX ADMIN — BENZONATATE 100 MG: 100 CAPSULE ORAL at 09:10

## 2020-10-11 RX ADMIN — BENZONATATE 100 MG: 100 CAPSULE ORAL at 02:10

## 2020-10-11 RX ADMIN — INSULIN ASPART 2 UNITS: 100 INJECTION, SOLUTION INTRAVENOUS; SUBCUTANEOUS at 09:10

## 2020-10-11 NOTE — SUBJECTIVE & OBJECTIVE
Hospital course:  26 yo F transferred from [facility] with concern for APL (presented pancytopenic with nonspecific sx and fever). 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 Treated with ATRA (held day 18-20, 23 d/t differentiation syndrome) and ASO (held day 11, 12, 15-20 d/t QTc prolongation, transaminitis, differentiation syndrome). Hospital course complicated by differentiation syndrome requiring comfort flow NC on 10/4 treated with 4 days of dexamethasone 10mg IV q12hrs followed by taper and diuresis. 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     Subjective:     Interval History: ATRA 24 day, ASO day 21. ATRA and ASO held yesterday with increasing ALT. UOP 1.8L with PO 40mg lasix. Currently on 2L NC with no complaints. Had a headache yesterday evening which has since resolved. Denies any complaints and is questioning when she will have her bone marrow done.    Objective:     Vital Signs (Most Recent):  Temp: 98.1 °F (36.7 °C) (10/11/20 0712)  Pulse: 65 (10/11/20 0712)  Resp: 18 (10/11/20 0712)  BP: 111/73 (10/11/20 0712)  SpO2: 96 % (10/11/20 0712) Vital Signs (24h Range):  Temp:  [97.6 °F (36.4 °C)-98.7 °F (37.1 °C)] 98.1 °F (36.7 °C)  Pulse:  [54-87] 65  Resp:  [16-18] 18  SpO2:  [94 %-99 %] 96 %  BP: (105-124)/(60-82) 111/73     Weight: 125.6 kg (276 lb 15.8 oz)  Body mass index is 54.1 kg/m².  Body surface area is 2.31 meters squared.      Intake/Output - Last 3 Shifts       10/09 0700 - 10/10 0659 10/10 0700 - 10/11 0659 10/11 0700 - 10/12 0659    P.O. 250 355     Total Intake(mL/kg) 250 (2) 355 (2.8)     Urine (mL/kg/hr) 2850 (0.9) 1800 (0.6)     Stool 0      Total Output 2850 1800     Net -2600 -1445            Urine Occurrence 1 x      Stool Occurrence 2 x            Physical Exam  Vitals signs reviewed.   Constitutional:        Appearance: Normal appearance. She is obese.      Comments: Laying in bed covered by blanket   HENT:      Head: Normocephalic and atraumatic.      Nose: Nose normal.      Mouth/Throat:      Comments: Fever blister noted.  Eyes:      Extraocular Movements: Extraocular movements intact.   Neck:      Musculoskeletal: Normal range of motion and neck supple.   Cardiovascular:      Rate and Rhythm: Normal rate and regular rhythm.      Heart sounds: No murmur.   Pulmonary:      Effort: Pulmonary effort is normal. No respiratory distress.      Comments: No crackles however poor inspiratory effort  Abdominal:      General: There is no distension.      Palpations: Abdomen is soft.      Tenderness: There is no abdominal tenderness.   Musculoskeletal: Normal range of motion.         General: No swelling.   Skin:     General: Skin is warm and dry.   Neurological:      General: No focal deficit present.      Mental Status: She is alert and oriented to person, place, and time.   Psychiatric:         Mood and Affect: Mood normal.         Behavior: Behavior normal.         Significant Labs:   All pertinent labs from the last 24 hours have been reviewed.    Diagnostic Results:  I have reviewed and interpreted all pertinent imaging results/findings within the past 24 hours.

## 2020-10-11 NOTE — ASSESSMENT & PLAN NOTE
- Monitor TLS and DIC labs daily, monitoring for differentiation syndrome  - 2D ECHO - EF 58%, repeat EF 55% with small pericardial effusion, mild TR, CVP 8 from 3 earlier in the admission  - Continue tretinoin (start 9/18, today is Day 24) BID w/ meals,  - Holding ASO (start 09/21, today is day 21), monitor Qtc (daily EKG until improved, weekly thereafter), LFTs  - ATRA held day 18-20, 23 due to differentiation syndrome and transaminitis  - ASO held day 11, 12, 15-20 due to prolonged QTc and elevated LFTs, dose reduced 50% day 13 and 14  - Will hold both today due to transaminitis (QTc 451 today)  - 3 days of Dex 10mg q12hr for differentiation syndrome, wean to 6mg q12hrs (day 3)  - Continue allopurinol 300mg daily  - Transfuse cyroglobulin if fibrinogen <150    - Platelet goal > 50k.   - Hgb goal >7  - INR < 1.5  - Monitor I/O and daily weights twice daily

## 2020-10-11 NOTE — PLAN OF CARE
POC reviewed with patient at beginning of shift. Assessment completed & no alarming findings noted. AAOX4 throughout shift. Vital signs remained stable; MD aware of intermittent bradycardia.All schedules/PRN medications administered as ordered. Midline dressing changed. Tolerating a pediatric diet without any difficulty; poor intake/apatite. Assisted up to commode; Zero BMs noted today. Fall/safety reviewed with patient:  side rails up x3; call bell in place; bed in lowest, locked position; skid proof socks on; no evidence of skin breakdown; care plan explained to patient; no additional complaints at this time. Will continue routine plan of care.

## 2020-10-11 NOTE — PROGRESS NOTES
[facility]  Hematology  Bone Marrow Transplant  Progress Note    Patient Name: Erika Saini  Admission Date: 9/18/2020  Hospital Length of Stay: 23 days  Code Status: Full Code    Hospital course:  26 yo F transferred from [facility] with concern for APL (presented pancytopenic with nonspecific sx and fever). 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 Treated with ATRA (held day 18-20, 23 d/t differentiation syndrome) and ASO (held day 11, 12, 15-20 d/t QTc prolongation, transaminitis, differentiation syndrome). Hospital course complicated by differentiation syndrome requiring comfort flow NC on 10/4 treated with 4 days of dexamethasone 10mg IV q12hrs followed by taper and diuresis. 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     Subjective:     Interval History: ATRA 24 day, ASO day 21. ATRA and ASO held yesterday with increasing ALT. UOP 1.8L with PO 40mg lasix. Currently on 2L NC with no complaints. Had a headache yesterday evening which has since resolved. Denies any complaints and is questioning when she will have her bone marrow done.    Objective:     Vital Signs (Most Recent):  Temp: 98.1 °F (36.7 °C) (10/11/20 0712)  Pulse: 65 (10/11/20 0712)  Resp: 18 (10/11/20 0712)  BP: 111/73 (10/11/20 0712)  SpO2: 96 % (10/11/20 0712) Vital Signs (24h Range):  Temp:  [97.6 °F (36.4 °C)-98.7 °F (37.1 °C)] 98.1 °F (36.7 °C)  Pulse:  [54-87] 65  Resp:  [16-18] 18  SpO2:  [94 %-99 %] 96 %  BP: (105-124)/(60-82) 111/73     Weight: 125.6 kg (276 lb 15.8 oz)  Body mass index is 54.1 kg/m².  Body surface area is 2.31 meters squared.      Intake/Output - Last 3 Shifts       10/09 0700 - 10/10 0659 10/10 0700 - 10/11 0659 10/11 0700 - 10/12 0659    P.O. 250 355     Total Intake(mL/kg) 250 (2) 355 (2.8)     Urine (mL/kg/hr) 2850  (0.9) 1800 (0.6)     Stool 0      Total Output 2850 1800     Net -2600 -1445            Urine Occurrence 1 x      Stool Occurrence 2 x            Physical Exam  Vitals signs reviewed.   Constitutional:       Appearance: Normal appearance. She is obese.      Comments: Laying in bed covered by blanket   HENT:      Head: Normocephalic and atraumatic.      Nose: Nose normal.      Mouth/Throat:   Eyes:      Extraocular Movements: Extraocular movements intact.   Neck:      Musculoskeletal: Normal range of motion and neck supple.   Cardiovascular:      Rate and Rhythm: Normal rate and regular rhythm.      Heart sounds: No murmur.   Pulmonary:      Effort: Pulmonary effort is normal. No respiratory distress.      Comments: No crackles however poor inspiratory effort  Abdominal:      General: There is no distension.      Palpations: Abdomen is soft.      Tenderness: There is no abdominal tenderness.   Musculoskeletal: Normal range of motion.         General: No swelling.   Skin:     General: Skin is warm and dry.   Neurological:      General: No focal deficit present.      Mental Status: She is alert and oriented to person, place, and time.   Psychiatric:         Mood and Affect: Mood normal.         Behavior: Behavior normal.         Significant Labs:   All pertinent labs from the last 24 hours have been reviewed.    Diagnostic Results:  I have reviewed and interpreted all pertinent imaging results/findings within the past 24 hours.    Assessment/Plan:     * APL (acute promyelocytic leukemia)  - Monitor TLS and DIC labs daily, monitoring for differentiation syndrome  - 2D ECHO - EF 58%, repeat EF 55% with small pericardial effusion, mild TR, CVP 8 from 3 earlier in the admission  - Continue tretinoin (start 9/18, today is Day 24) BID w/ meals,  - Holding ASO (start 09/21, today is day 21), monitor Qtc (daily EKG until improved, weekly thereafter), LFTs  - ATRA held day 18-20, 23 due to differentiation syndrome and  transaminitis  - ASO held day 11, 12, 15-20 due to prolonged QTc and elevated LFTs, dose reduced 50% day 13 and 14  - Will hold both today due to transaminitis (QTc 451 today)  - 3 days of Dex 10mg q12hr for differentiation syndrome, wean to 6mg q12hrs (day 3)  - Continue allopurinol 300mg daily  - Transfuse cyroglobulin if fibrinogen <150    - Platelet goal > 50k.   - Hgb goal >7  - INR < 1.5  - Monitor I/O and daily weights twice daily    Differentiation syndrome  - Was desaturing tot he 60's and becoming sob with movement  - CTA showed b/l pulmonary edema with left sided pleural effusion and small pericardial effusion, no PE however limited by body habitus  - Per echo, CVP 8 from 3 previously during admission  - Max weight 141.3kg, currently below admission weight at 125.6kg  - Completed 3 days of Dex 10mg q12hrs, wean to 6mg q12hrs (day 3)  - Improved with IV lasix, uptrending BUN indicating pt is reaching a euvolemic state, will give single dose oral today  - Wean O2 as tolerated, goal >92%    Transaminitis  - Elevated LFTs  - Etiology likely mixed with ASO administration, congestive hepatopathy, and PASCUAL  - Diuresing as above  - Trend daily    Steroid-induced hyperglycemia  - BG normal when off steroids  - With steroids CBGs have been significantly elevated  - Detemir increased to 10 units today with moderate SSI  - Check A1C to ensure not diabetic    Sinus tachycardia  -- Improved, possibly a combination of ASO administration with differentiation syndrome    Retinopathy  On 9/27 complained of vision changes present even before admission    - Opthalmology evaluated, appreciate assistance   - Secondary to subretinal hemorrhage?   - Per optho patient's vision should improve with time as pre-retinal hemorrhages resolve.   - May need surgical intervention with vitrectomy if hemorrhages do not resolve in a few weeks.   - Return to retina clinic in 6 weeks    Pancytopenia  - Secondary to APL   - Transfuse if  platelets < 50, Hgb< 7      Adjustment reaction with anxiety  - seen by oncology psychology     Neutropenic fever  - Gound to have strep B agalactiae blood cultures (+ at OSH).  - Cultures and sensitivities faxed to us by [facility] show organism sensitive to pcn.   - Repeat cultures negative.    - Completed Ceftriaxone 10/3       Hypokalemia  - Monitor and replace PRN, keep K >4 and Mg >2 ( Given qt prolongation risk with treatment)       Morbid obesity with BMI of 50.0-59.9, adult  Patient with BMI 57.91        VTE Risk Mitigation (From admission, onward)         Ordered     heparin, porcine (PF) 100 unit/mL injection flush 300 Units  As needed (PRN)      09/21/20 1554     IP VTE HIGH RISK PATIENT  Once      09/18/20 0148     Place sequential compression device  Until discontinued      09/18/20 0148                Disposition: Continue with inpatient management    Jorge A Ferrer MD  Bone Marrow Transplant  [facility]

## 2020-10-11 NOTE — PLAN OF CARE
Uneventful shift. Pt has had no c/o pain this shift. Pt blood sugar monitored ac/hs. SSI administered as ordered. Pt remains on 2L O2 NC. Pt remains on cardiac monitoring and continuous pulse ox. Pt has remained free from injury this shift. Bed in low locked position. Call light and personal belongings within reach. Side rails up x2. Nonskid socks in place. Pt instructed to call with any needs. Will continue to monitor.

## 2020-10-11 NOTE — ASSESSMENT & PLAN NOTE
- Was desaturing tot he 60's and becoming sob with movement  - CTA showed b/l pulmonary edema with left sided pleural effusion and small pericardial effusion, no PE however limited by body habitus  - Per echo, CVP 8 from 3 previously during admission  - Max weight 141.3kg, currently below admission weight at 125.6kg  - Completed 3 days of Dex 10mg q12hrs, wean to 6mg q12hrs (day 3)  - Improved with IV lasix, uptrending BUN indicating pt is reaching a euvolemic state, will give single dose oral today  - Wean O2 as tolerated, goal >92%

## 2020-10-11 NOTE — ASSESSMENT & PLAN NOTE
- Gound to have strep B agalactiae blood cultures (+ at OSH).  - Cultures and sensitivities faxed to us by [facility] show organism sensitive to pcn.   - Repeat cultures negative.    - Completed Ceftriaxone 10/3

## 2020-10-11 NOTE — ASSESSMENT & PLAN NOTE
- BG normal when off steroids  - With steroids CBGs have been significantly elevated  - Detemir increased to 10 units today with moderate SSI  - Check A1C to ensure not diabetic

## 2020-10-12 LAB
ALBUMIN SERPL BCP-MCNC: 3.3 G/DL (ref 3.5–5.2)
ALP SERPL-CCNC: 80 U/L (ref 55–135)
ALT SERPL W/O P-5'-P-CCNC: 632 U/L (ref 10–44)
ANION GAP SERPL CALC-SCNC: 10 MMOL/L (ref 8–16)
APTT BLDCRRT: 22.9 SEC (ref 21–32)
AST SERPL-CCNC: 140 U/L (ref 10–40)
BASOPHILS # BLD AUTO: 0 K/UL (ref 0–0.2)
BASOPHILS NFR BLD: 0 % (ref 0–1.9)
BILIRUB SERPL-MCNC: 0.8 MG/DL (ref 0.1–1)
BUN SERPL-MCNC: 26 MG/DL (ref 6–20)
CALCIUM SERPL-MCNC: 8.9 MG/DL (ref 8.7–10.5)
CHLORIDE SERPL-SCNC: 98 MMOL/L (ref 95–110)
CO2 SERPL-SCNC: 27 MMOL/L (ref 23–29)
CREAT SERPL-MCNC: 0.7 MG/DL (ref 0.5–1.4)
DIFFERENTIAL METHOD: ABNORMAL
EOSINOPHIL # BLD AUTO: 0 K/UL (ref 0–0.5)
EOSINOPHIL NFR BLD: 0 % (ref 0–8)
ERYTHROCYTE [DISTWIDTH] IN BLOOD BY AUTOMATED COUNT: 13.2 % (ref 11.5–14.5)
EST. GFR  (AFRICAN AMERICAN): >60 ML/MIN/1.73 M^2
EST. GFR  (NON AFRICAN AMERICAN): >60 ML/MIN/1.73 M^2
ESTIMATED AVG GLUCOSE: 126 MG/DL (ref 68–131)
FIBRINOGEN PPP-MCNC: 253 MG/DL (ref 182–366)
GLUCOSE SERPL-MCNC: 297 MG/DL (ref 70–110)
HBA1C MFR BLD HPLC: 6 % (ref 4–5.6)
HCT VFR BLD AUTO: 29.8 % (ref 37–48.5)
HGB BLD-MCNC: 9.8 G/DL (ref 12–16)
IMM GRANULOCYTES # BLD AUTO: 0.05 K/UL (ref 0–0.04)
IMM GRANULOCYTES NFR BLD AUTO: 1.2 % (ref 0–0.5)
INR PPP: 1.1 (ref 0.8–1.2)
LYMPHOCYTES # BLD AUTO: 0.2 K/UL (ref 1–4.8)
LYMPHOCYTES NFR BLD: 3.5 % (ref 18–48)
MAGNESIUM SERPL-MCNC: 2.1 MG/DL (ref 1.6–2.6)
MCH RBC QN AUTO: 29.8 PG (ref 27–31)
MCHC RBC AUTO-ENTMCNC: 32.9 G/DL (ref 32–36)
MCV RBC AUTO: 91 FL (ref 82–98)
MONOCYTES # BLD AUTO: 0.2 K/UL (ref 0.3–1)
MONOCYTES NFR BLD: 4.9 % (ref 4–15)
NEUTROPHILS # BLD AUTO: 3.9 K/UL (ref 1.8–7.7)
NEUTROPHILS NFR BLD: 90.4 % (ref 38–73)
NRBC BLD-RTO: 0 /100 WBC
PHOSPHATE SERPL-MCNC: 3.7 MG/DL (ref 2.7–4.5)
PLATELET # BLD AUTO: 84 K/UL (ref 150–350)
PMV BLD AUTO: 12.7 FL (ref 9.2–12.9)
POCT GLUCOSE: 209 MG/DL (ref 70–110)
POCT GLUCOSE: 228 MG/DL (ref 70–110)
POCT GLUCOSE: 249 MG/DL (ref 70–110)
POCT GLUCOSE: 262 MG/DL (ref 70–110)
POTASSIUM SERPL-SCNC: 4.6 MMOL/L (ref 3.5–5.1)
PROT SERPL-MCNC: 6.5 G/DL (ref 6–8.4)
PROTHROMBIN TIME: 12.5 SEC (ref 9–12.5)
RBC # BLD AUTO: 3.29 M/UL (ref 4–5.4)
SODIUM SERPL-SCNC: 135 MMOL/L (ref 136–145)
WBC # BLD AUTO: 4.26 K/UL (ref 3.9–12.7)

## 2020-10-12 PROCEDURE — 25000003 PHARM REV CODE 250: Performed by: STUDENT IN AN ORGANIZED HEALTH CARE EDUCATION/TRAINING PROGRAM

## 2020-10-12 PROCEDURE — 84100 ASSAY OF PHOSPHORUS: CPT

## 2020-10-12 PROCEDURE — 85730 THROMBOPLASTIN TIME PARTIAL: CPT

## 2020-10-12 PROCEDURE — 93010 ELECTROCARDIOGRAM REPORT: CPT | Mod: ,,, | Performed by: INTERNAL MEDICINE

## 2020-10-12 PROCEDURE — 99233 PR SUBSEQUENT HOSPITAL CARE,LEVL III: ICD-10-PCS | Mod: ,,, | Performed by: INTERNAL MEDICINE

## 2020-10-12 PROCEDURE — 83036 HEMOGLOBIN GLYCOSYLATED A1C: CPT

## 2020-10-12 PROCEDURE — 27000221 HC OXYGEN, UP TO 24 HOURS

## 2020-10-12 PROCEDURE — 80053 COMPREHEN METABOLIC PANEL: CPT

## 2020-10-12 PROCEDURE — 93010 EKG 12-LEAD: ICD-10-PCS | Mod: ,,, | Performed by: INTERNAL MEDICINE

## 2020-10-12 PROCEDURE — 85384 FIBRINOGEN ACTIVITY: CPT

## 2020-10-12 PROCEDURE — 85610 PROTHROMBIN TIME: CPT

## 2020-10-12 PROCEDURE — 93005 ELECTROCARDIOGRAM TRACING: CPT

## 2020-10-12 PROCEDURE — 97803 MED NUTRITION INDIV SUBSEQ: CPT

## 2020-10-12 PROCEDURE — 20600001 HC STEP DOWN PRIVATE ROOM

## 2020-10-12 PROCEDURE — 83735 ASSAY OF MAGNESIUM: CPT

## 2020-10-12 PROCEDURE — 94761 N-INVAS EAR/PLS OXIMETRY MLT: CPT

## 2020-10-12 PROCEDURE — 85025 COMPLETE CBC W/AUTO DIFF WBC: CPT

## 2020-10-12 PROCEDURE — 25000003 PHARM REV CODE 250: Performed by: INTERNAL MEDICINE

## 2020-10-12 PROCEDURE — 99233 SBSQ HOSP IP/OBS HIGH 50: CPT | Mod: ,,, | Performed by: INTERNAL MEDICINE

## 2020-10-12 PROCEDURE — 63600175 PHARM REV CODE 636 W HCPCS: Performed by: INTERNAL MEDICINE

## 2020-10-12 RX ORDER — ENOXAPARIN SODIUM 100 MG/ML
40 INJECTION SUBCUTANEOUS EVERY 24 HOURS
Status: DISCONTINUED | OUTPATIENT
Start: 2020-10-12 | End: 2020-10-18 | Stop reason: HOSPADM

## 2020-10-12 RX ORDER — DEXAMETHASONE SODIUM PHOSPHATE 4 MG/ML
4 INJECTION, SOLUTION INTRA-ARTICULAR; INTRALESIONAL; INTRAMUSCULAR; INTRAVENOUS; SOFT TISSUE EVERY 12 HOURS
Status: DISCONTINUED | OUTPATIENT
Start: 2020-10-12 | End: 2020-10-14

## 2020-10-12 RX ORDER — FUROSEMIDE 40 MG/1
40 TABLET ORAL ONCE
Status: COMPLETED | OUTPATIENT
Start: 2020-10-12 | End: 2020-10-12

## 2020-10-12 RX ADMIN — DEXAMETHASONE SODIUM PHOSPHATE 4 MG: 4 INJECTION, SOLUTION INTRA-ARTICULAR; INTRALESIONAL; INTRAMUSCULAR; INTRAVENOUS; SOFT TISSUE at 09:10

## 2020-10-12 RX ADMIN — BENZONATATE 100 MG: 100 CAPSULE ORAL at 09:10

## 2020-10-12 RX ADMIN — INSULIN ASPART 4 UNITS: 100 INJECTION, SOLUTION INTRAVENOUS; SUBCUTANEOUS at 08:10

## 2020-10-12 RX ADMIN — BENZONATATE 100 MG: 100 CAPSULE ORAL at 08:10

## 2020-10-12 RX ADMIN — ACYCLOVIR 400 MG: 200 CAPSULE ORAL at 09:10

## 2020-10-12 RX ADMIN — ENOXAPARIN SODIUM 40 MG: 40 INJECTION SUBCUTANEOUS at 05:10

## 2020-10-12 RX ADMIN — INSULIN ASPART 2 UNITS: 100 INJECTION, SOLUTION INTRAVENOUS; SUBCUTANEOUS at 09:10

## 2020-10-12 RX ADMIN — FUROSEMIDE 40 MG: 40 TABLET ORAL at 08:10

## 2020-10-12 RX ADMIN — ACYCLOVIR 400 MG: 200 CAPSULE ORAL at 08:10

## 2020-10-12 RX ADMIN — INSULIN ASPART 4 UNITS: 100 INJECTION, SOLUTION INTRAVENOUS; SUBCUTANEOUS at 05:10

## 2020-10-12 RX ADMIN — INSULIN ASPART 6 UNITS: 100 INJECTION, SOLUTION INTRAVENOUS; SUBCUTANEOUS at 01:10

## 2020-10-12 RX ADMIN — BENZONATATE 100 MG: 100 CAPSULE ORAL at 03:10

## 2020-10-12 NOTE — SUBJECTIVE & OBJECTIVE
Hospital course:  26 yo F transferred from [facility] with concern for APL (presented pancytopenic with nonspecific sx and fever). 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 Treated with ATRA (held day 18-20, 23-24 d/t differentiation syndrome) and ASO (held day 11, 12, 15-22 d/t QTc prolongation, transaminitis, differentiation syndrome). Hospital course complicated by differentiation syndrome requiring comfort flow NC on 10/4 treated with 4 days of dexamethasone 10mg IV q12hrs followed by taper and diuresis. 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     Subjective:     Interval History: ATRA 25 day, ASO day 22. ATRA and ASO held yesterday with increasing ALT. Net negative 360cc without diuretics yesterday. Remains on 2L NC. Will give another dose of oral lasix to help remove oxygen. Weight 125kg from 134kg on admission. Encouraged movement as pt lays in bed all day. Denies any complaints this morning. Slept well. Has been persistently bradycardic when laying in bed/sleeping but HR will increase into 60s when pt sit up.    Objective:     Vital Signs (Most Recent):  Temp: 97.9 °F (36.6 °C) (10/12/20 0344)  Pulse: (!) 58 (10/12/20 0346)  Resp: 16 (10/12/20 0344)  BP: 120/79 (10/12/20 0344)  SpO2: 97 % (10/12/20 0346) Vital Signs (24h Range):  Temp:  [97.6 °F (36.4 °C)-98.3 °F (36.8 °C)] 97.9 °F (36.6 °C)  Pulse:  [48-95] 58  Resp:  [16-20] 16  SpO2:  [97 %-99 %] 97 %  BP: (111-135)/(60-79) 120/79     Weight: 125.6 kg (276 lb 15.8 oz)  Body mass index is 54.1 kg/m².  Body surface area is 2.31 meters squared.      Intake/Output - Last 3 Shifts       10/10 0700 - 10/11 0659 10/11 0700 - 10/12 0659 10/12 0700 - 10/13 0659    P.O. 355 358     Total Intake(mL/kg) 355 (2.8) 358 (2.9)     Urine (mL/kg/hr) 1800 (0.6) 600 (0.2)     Stool       Total  Output 1800 600     Net -1445 -242            Urine Occurrence  3 x           Physical Exam  Vitals signs reviewed.   Constitutional:       Appearance: Normal appearance. She is obese.      Comments: Laying in bed covered by blanket   HENT:      Head: Normocephalic and atraumatic.      Nose: Nose normal.      Mouth/Throat:      Comments: Fever blister noted.  Eyes:      Extraocular Movements: Extraocular movements intact.   Neck:      Musculoskeletal: Normal range of motion and neck supple.   Cardiovascular:      Rate and Rhythm: Normal rate and regular rhythm.      Heart sounds: No murmur.   Pulmonary:      Effort: Pulmonary effort is normal. No respiratory distress.      Comments: No crackles however poor inspiratory effort  Abdominal:      General: There is no distension.      Palpations: Abdomen is soft.      Tenderness: There is no abdominal tenderness.   Musculoskeletal: Normal range of motion.         General: No swelling.   Skin:     General: Skin is warm and dry.   Neurological:      General: No focal deficit present.      Mental Status: She is alert and oriented to person, place, and time.   Psychiatric:         Mood and Affect: Mood normal.         Behavior: Behavior normal.         Significant Labs:   All pertinent labs from the last 24 hours have been reviewed.    Diagnostic Results:  I have reviewed and interpreted all pertinent imaging results/findings within the past 24 hours.

## 2020-10-12 NOTE — ASSESSMENT & PLAN NOTE
- Monitor TLS and DIC labs daily, monitoring for differentiation syndrome  - 2D ECHO - EF 58%, repeat EF 55% with small pericardial effusion, mild TR, CVP 8 from 3 earlier in the admission  - Continue tretinoin (start 9/18, today is Day 25) BID w/ meals,  - Holding ASO (start 09/21, today is day 22), monitor Qtc (daily EKG until improved, weekly thereafter), LFTs  - ATRA held day 18-20, 23-23 due to differentiation syndrome and transaminitis  - ASO held day 11, 12, 15-21 due to prolonged QTc and elevated LFTs, dose reduced 50% day 13 and 14  - Will hold both today due to transaminitis (QTc 451 today)  - 3 days of Dex 10mg q12hr for differentiation syndrome, weaned to 6mg q12hrs for 3 days, wean to 4mg q12hrs (day 1)  - Continue allopurinol 300mg daily  - Transfuse cyroglobulin if fibrinogen <150    - Platelet goal > 50k.   - Hgb goal >7  - INR < 1.5  - Monitor I/O and daily weights twice daily

## 2020-10-12 NOTE — ASSESSMENT & PLAN NOTE
- BG normal when off steroids  - With steroids CBGs have been significantly elevated  - Detemir increased to 14 units today with moderate SSI  - Check A1C to ensure not diabetic

## 2020-10-12 NOTE — PROGRESS NOTES
[facility]  Adult Nutrition  Progress Note    SUMMARY       Recommendations    1. Continue pediatric diet + Boost Plus TID as tolerated.    Encourage PO intake.     2. RD to monitor.    Goals: 1. Pt's intake meals >75% x 7 days.  Nutrition Goal Status: progressing towards goal  Communication of RD Recs: (POC)    Reason for Assessment    Reason For Assessment: RD follow-up  Diagnosis: cancer diagnosis/related complications(acute promyelocytic leukemia)  Relevant Medical History: APL, severe obesity  Interdisciplinary Rounds: did not attend  General Information Comments: ATRA 25 day, ASO day 22. Pt resting in bed, RN providing care at bedside. Pt reports good appetite, usually always skips lunch. Pt still receiving outside food and eats most of all meals. Pt had pizza last night. Pt states she will try ONS. Denies N/V. Noted significant wt loss, but possible error. NFPE 9/28 with no physical s/s of malnutrition at this time.  Nutrition Discharge Planning: Discharge on general healthy diet.    Nutrition Risk Screen    Nutrition Risk Screen: no indicators present    Nutrition/Diet History    Spiritual, Cultural Beliefs, Presybeterian Practices, Values that Affect Care: no  Factors Affecting Nutritional Intake: None identified at this time    Anthropometrics    Temp: 98.3 °F (36.8 °C)  Height Method: Stated  Height: 5' (152.4 cm)  Height (inches): 60 in  Weight Method: Bed Scale  Weight: 125.6 kg (276 lb 15.8 oz)  Weight (lb): 276.99 lb  Ideal Body Weight (IBW), Female: 100 lb  % Ideal Body Weight, Female (lb): 304 %  BMI (Calculated): 54.1     Lab/Procedures/Meds    Pertinent Labs Reviewed: reviewed  Pertinent Labs Comments: Na 135, BUN 26, glucose 297, ,   Pertinent Medications Reviewed: reviewed  Pertinent Medications Comments: acyclovir, lasix, insulin, polethylene glycol    Estimated/Assessed Needs    Weight Used For Calorie Calculations: (!) 141.3 kg (311 lb 8.2 oz)  Energy Calorie  Requirements (kcal): 2120  Energy Need Method: Kcal/kg(15 kcal/kg)  Protein Requirements: 113-127g  Weight Used For Protein Calculations: (!) 141.3 kg (311 lb 8.2 oz)  Fluid Requirements (mL): per MD or 1 mL/kcal     RDA Method (mL): 2120     Nutrition Prescription Ordered    Current Diet Order: Pediatric  Oral Nutrition Supplement: Boost Plus    Evaluation of Received Nutrient/Fluid Intake    Comments: LBM 10/9  Tolerance: tolerating  % Intake of Estimated Energy Needs: 50 - 75 %  % Meal Intake: Other: ~75%    Nutrition Risk    Level of Risk/Frequency of Follow-up: low     Assessment and Plan    Nutrition Problem  Inadequate oral intake     Related to (etiology):   Pt is picky eater     Signs and Symptoms (as evidenced by):   Pt reports not liking the hospital meal options. Pt meeting ~50-75% EEN/EPN.      Interventions (treatment strategy):  Collaboration of care with other provider  Commercial beverage - Boost Plus      Nutrition Diagnosis Status:   Improving      Monitor and Evaluation    Food and Nutrient Intake: energy intake, food and beverage intake  Food and Nutrient Adminstration: diet order  Knowledge/Beliefs/Attitudes: food and nutrition knowledge/skill  Anthropometric Measurements: weight, weight change, body mass index  Biochemical Data, Medical Tests and Procedures: electrolyte and renal panel, gastrointestinal profile, glucose/endocrine profile, inflammatory profile, lipid profile  Nutrition-Focused Physical Findings: overall appearance     Malnutrition Assessment                 Orbital Region (Subcutaneous Fat Loss): well nourished  Upper Arm Region (Subcutaneous Fat Loss): well nourished  Thoracic and Lumbar Region: well nourished   Mormon Region (Muscle Loss): well nourished  Clavicle Bone Region (Muscle Loss): well nourished  Clavicle and Acromion Bone Region (Muscle Loss): well nourished  Scapular Bone Region (Muscle Loss): well nourished  Dorsal Hand (Muscle Loss): mild depletion  Patellar  Region (Muscle Loss): well nourished  Anterior Thigh Region (Muscle Loss): well nourished  Posterior Calf Region (Muscle Loss): well nourished                 Nutrition Follow-Up    RD Follow-up?: Yes

## 2020-10-12 NOTE — PLAN OF CARE
Plan of care reviewed with patient at beginning of shift. Patient C/O of no pain or nausea. Patient dexamethasone has been lowered to 4mg IV. Pt received a one time dose of lasix 40mg PO. Patient had to be covered with insulin for all three accuchecks. Detemir was raised to 14 U. PT was ordered due to patient being in bed for a while. VSS. Afebrile. Bed locked in lowest position. Side rails up x2. Call bell within reach. BADL within reach. Rounding Q1H. Will continue to monitor.

## 2020-10-12 NOTE — NURSING
Frequent asymptomatic bradycardia 45-55bpm noted per telemetry monitor throughout shift. Satinder Ferrer MD notified. Daily EKG completed. No additional orders provided. Will continue to monitor.

## 2020-10-12 NOTE — PLAN OF CARE
Uneventful shift. Pt has had no c/o pain. Pt blood sugars monitored ac/hs. SSI administered as ordered. Pt remains on cardia monitoring and continuous pulse ox. Pt HR continues to intermittently drop into the mid 40s. Pt questioning when bone marrow biopsy will be performed. Pt has remained free from injury this shift. Bed in low locked position. Call light and personal belongings within reach. Side rails up x2. Nonskid socks in place. Pt instructed to call with any needs. Will continue to monitor.

## 2020-10-12 NOTE — ASSESSMENT & PLAN NOTE
- Elevated LFTs, downtrending today  - Etiology likely mixed with ASO administration, congestive hepatopathy, and PASCUAL  - Diuresing as above  - Trend daily

## 2020-10-12 NOTE — PROGRESS NOTES
[facility]  Hematology  Bone Marrow Transplant  Progress Note    Patient Name: Erika Saini  Admission Date: 9/18/2020  Hospital Length of Stay: 24 days  Code Status: Full Code    Hospital course:  26 yo F transferred from [facility] with concern for APL (presented pancytopenic with nonspecific sx and fever). 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 Treated with ATRA (held day 18-20, 23-24 d/t differentiation syndrome) and ASO (held day 11, 12, 15-22 d/t QTc prolongation, transaminitis, differentiation syndrome). Hospital course complicated by differentiation syndrome requiring comfort flow NC on 10/4 treated with 4 days of dexamethasone 10mg IV q12hrs followed by taper and diuresis. 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     Subjective:     Interval History: ATRA 25 day, ASO day 22. ATRA and ASO held yesterday with increasing ALT. Net negative 360cc without diuretics yesterday. Remains on 2L NC. Will give another dose of oral lasix to help remove oxygen. Weight 125kg from 134kg on admission. Encouraged movement as pt lays in bed all day. Denies any complaints this morning. Slept well. Has been persistently bradycardic when laying in bed/sleeping but HR will increase into 60s when pt sit up.    Objective:     Vital Signs (Most Recent):  Temp: 97.9 °F (36.6 °C) (10/12/20 0344)  Pulse: (!) 58 (10/12/20 0346)  Resp: 16 (10/12/20 0344)  BP: 120/79 (10/12/20 0344)  SpO2: 97 % (10/12/20 0346) Vital Signs (24h Range):  Temp:  [97.6 °F (36.4 °C)-98.3 °F (36.8 °C)] 97.9 °F (36.6 °C)  Pulse:  [48-95] 58  Resp:  [16-20] 16  SpO2:  [97 %-99 %] 97 %  BP: (111-135)/(60-79) 120/79     Weight: 125.6 kg (276 lb 15.8 oz)  Body mass index is 54.1 kg/m².  Body surface area is 2.31 meters squared.      Intake/Output - Last 3  Shifts       10/10 0700 - 10/11 0659 10/11 0700 - 10/12 0659 10/12 0700 - 10/13 0659    P.O. 355 358     Total Intake(mL/kg) 355 (2.8) 358 (2.9)     Urine (mL/kg/hr) 1800 (0.6) 600 (0.2)     Stool       Total Output 1800 600     Net -1445 -242            Urine Occurrence  3 x           Physical Exam  Vitals signs reviewed.   Constitutional:       Appearance: Normal appearance. She is obese.      Comments: Laying in bed covered by blanket   HENT:      Head: Normocephalic and atraumatic.      Nose: Nose normal.      Mouth/Throat:   Eyes:      Extraocular Movements: Extraocular movements intact.   Neck:      Musculoskeletal: Normal range of motion and neck supple.   Cardiovascular:      Rate and Rhythm: Normal rate and regular rhythm.      Heart sounds: No murmur.   Pulmonary:      Effort: Pulmonary effort is normal. No respiratory distress.      Comments: No crackles however poor inspiratory effort  Abdominal:      General: There is no distension.      Palpations: Abdomen is soft.      Tenderness: There is no abdominal tenderness.   Musculoskeletal: Normal range of motion.         General: No swelling.   Skin:     General: Skin is warm and dry.   Neurological:      General: No focal deficit present.      Mental Status: She is alert and oriented to person, place, and time.   Psychiatric:         Mood and Affect: Mood normal.         Behavior: Behavior normal.         Significant Labs:   All pertinent labs from the last 24 hours have been reviewed.    Diagnostic Results:  I have reviewed and interpreted all pertinent imaging results/findings within the past 24 hours.    Assessment/Plan:     * APL (acute promyelocytic leukemia)  - Monitor TLS and DIC labs daily, monitoring for differentiation syndrome  - 2D ECHO - EF 58%, repeat EF 55% with small pericardial effusion, mild TR, CVP 8 from 3 earlier in the admission  - Continue tretinoin (start 9/18, today is Day 25) BID w/ meals,  - Holding ASO (start 09/21, today is day  22), monitor Qtc (daily EKG until improved, weekly thereafter), LFTs  - ATRA held day 18-20, 23-23 due to differentiation syndrome and transaminitis  - ASO held day 11, 12, 15-21 due to prolonged QTc and elevated LFTs, dose reduced 50% day 13 and 14  - Will hold both today due to transaminitis (QTc 451 today)  - 3 days of Dex 10mg q12hr for differentiation syndrome, weaned to 6mg q12hrs for 3 days, wean to 4mg q12hrs (day 1)  - Continue allopurinol 300mg daily  - Transfuse cyroglobulin if fibrinogen <150    - Platelet goal > 50k.   - Hgb goal >7  - INR < 1.5  - Monitor I/O and daily weights twice daily    Differentiation syndrome  - Was desaturing tot he 60's and becoming sob with movement  - CTA showed b/l pulmonary edema with left sided pleural effusion and small pericardial effusion, no PE however limited by body habitus  - Per echo, CVP 8 from 3 previously during admission  - Max weight 141.3kg, currently below admission weight at 125.6kg  - Completed 3 days of Dex 10mg q12hrs, weaned to 6mg q12hrs for 3 days, currently 4mg q12hrs  - Improved with IV lasix, uptrending BUN indicating pt is reaching a euvolemic state, will give single dose oral today  - Wean O2 as tolerated, goal >92%    Transaminitis  - Elevated LFTs, downtrending today  - Etiology likely mixed with ASO administration, congestive hepatopathy, and PASCUAL  - Diuresing as above  - Trend daily    Steroid-induced hyperglycemia  - BG normal when off steroids  - With steroids CBGs have been significantly elevated  - Detemir increased to 14 units today with moderate SSI  - Check A1C to ensure not diabetic    Sinus tachycardia  -- Improved, possibly a combination of ASO administration with differentiation syndrome    Retinopathy  On 9/27 complained of vision changes present even before admission    - Opthalmology evaluated, appreciate assistance   - Secondary to subretinal hemorrhage?   - Per optho patient's vision should improve with time as pre-retinal  hemorrhages resolve.   - May need surgical intervention with vitrectomy if hemorrhages do not resolve in a few weeks.   - Return to retina clinic in 6 weeks    Pancytopenia  - Secondary to APL   - Transfuse if platelets < 50, Hgb< 7      Adjustment reaction with anxiety  - seen by oncology psychology     Neutropenic fever  - Gound to have strep B agalactiae blood cultures (+ at OSH).  - Cultures and sensitivities faxed to us by [facility] show organism sensitive to pcn.   - Repeat cultures negative.    - Completed Ceftriaxone 10/3       Hypokalemia  - Monitor and replace PRN, keep K >4 and Mg >2 ( Given qt prolongation risk with treatment)       Morbid obesity with BMI of 50.0-59.9, adult  Patient with BMI 57.91        VTE Risk Mitigation (From admission, onward)         Ordered     enoxaparin injection 40 mg  Every 24 hours      10/12/20 0839     heparin, porcine (PF) 100 unit/mL injection flush 300 Units  As needed (PRN)      09/21/20 1554     IP VTE HIGH RISK PATIENT  Once      09/18/20 0148     Place sequential compression device  Until discontinued      09/18/20 0148                Disposition: Continue with inpatient management    Jorge A Ferrer MD  Bone Marrow Transplant  [facility]

## 2020-10-13 LAB
ALBUMIN SERPL BCP-MCNC: 3.4 G/DL (ref 3.5–5.2)
ALP SERPL-CCNC: 73 U/L (ref 55–135)
ALT SERPL W/O P-5'-P-CCNC: 613 U/L (ref 10–44)
ANION GAP SERPL CALC-SCNC: 11 MMOL/L (ref 8–16)
APTT BLDCRRT: 24.8 SEC (ref 21–32)
AST SERPL-CCNC: 116 U/L (ref 10–40)
BASOPHILS # BLD AUTO: 0 K/UL (ref 0–0.2)
BASOPHILS NFR BLD: 0 % (ref 0–1.9)
BILIRUB SERPL-MCNC: 1 MG/DL (ref 0.1–1)
BUN SERPL-MCNC: 22 MG/DL (ref 6–20)
CALCIUM SERPL-MCNC: 9.2 MG/DL (ref 8.7–10.5)
CHLORIDE SERPL-SCNC: 96 MMOL/L (ref 95–110)
CO2 SERPL-SCNC: 27 MMOL/L (ref 23–29)
CREAT SERPL-MCNC: 0.6 MG/DL (ref 0.5–1.4)
DIFFERENTIAL METHOD: ABNORMAL
EOSINOPHIL # BLD AUTO: 0 K/UL (ref 0–0.5)
EOSINOPHIL NFR BLD: 0 % (ref 0–8)
ERYTHROCYTE [DISTWIDTH] IN BLOOD BY AUTOMATED COUNT: 13.5 % (ref 11.5–14.5)
EST. GFR  (AFRICAN AMERICAN): >60 ML/MIN/1.73 M^2
EST. GFR  (NON AFRICAN AMERICAN): >60 ML/MIN/1.73 M^2
FIBRINOGEN PPP-MCNC: 249 MG/DL (ref 182–366)
GLUCOSE SERPL-MCNC: 231 MG/DL (ref 70–110)
HCT VFR BLD AUTO: 29.8 % (ref 37–48.5)
HGB BLD-MCNC: 10 G/DL (ref 12–16)
IMM GRANULOCYTES # BLD AUTO: 0.03 K/UL (ref 0–0.04)
IMM GRANULOCYTES NFR BLD AUTO: 1 % (ref 0–0.5)
INR PPP: 1.2 (ref 0.8–1.2)
LYMPHOCYTES # BLD AUTO: 0.2 K/UL (ref 1–4.8)
LYMPHOCYTES NFR BLD: 5.1 % (ref 18–48)
MAGNESIUM SERPL-MCNC: 1.9 MG/DL (ref 1.6–2.6)
MCH RBC QN AUTO: 29.2 PG (ref 27–31)
MCHC RBC AUTO-ENTMCNC: 33.6 G/DL (ref 32–36)
MCV RBC AUTO: 87 FL (ref 82–98)
MONOCYTES # BLD AUTO: 0.2 K/UL (ref 0.3–1)
MONOCYTES NFR BLD: 8.1 % (ref 4–15)
NEUTROPHILS # BLD AUTO: 2.6 K/UL (ref 1.8–7.7)
NEUTROPHILS NFR BLD: 85.8 % (ref 38–73)
NRBC BLD-RTO: 0 /100 WBC
PHOSPHATE SERPL-MCNC: 3.8 MG/DL (ref 2.7–4.5)
PLATELET # BLD AUTO: 118 K/UL (ref 150–350)
PMV BLD AUTO: 12.7 FL (ref 9.2–12.9)
POCT GLUCOSE: 153 MG/DL (ref 70–110)
POCT GLUCOSE: 213 MG/DL (ref 70–110)
POCT GLUCOSE: 216 MG/DL (ref 70–110)
POTASSIUM SERPL-SCNC: 4.3 MMOL/L (ref 3.5–5.1)
PROT SERPL-MCNC: 6.6 G/DL (ref 6–8.4)
PROTHROMBIN TIME: 13.3 SEC (ref 9–12.5)
RBC # BLD AUTO: 3.42 M/UL (ref 4–5.4)
SODIUM SERPL-SCNC: 134 MMOL/L (ref 136–145)
WBC # BLD AUTO: 2.97 K/UL (ref 3.9–12.7)

## 2020-10-13 PROCEDURE — 93005 ELECTROCARDIOGRAM TRACING: CPT

## 2020-10-13 PROCEDURE — 90832 PR PSYCHOTHERAPY W/PATIENT, 30 MIN: ICD-10-PCS | Mod: ,,, | Performed by: PSYCHOLOGIST

## 2020-10-13 PROCEDURE — 99233 SBSQ HOSP IP/OBS HIGH 50: CPT | Mod: ,,, | Performed by: INTERNAL MEDICINE

## 2020-10-13 PROCEDURE — 20600001 HC STEP DOWN PRIVATE ROOM

## 2020-10-13 PROCEDURE — 90832 PSYTX W PT 30 MINUTES: CPT | Mod: ,,, | Performed by: PSYCHOLOGIST

## 2020-10-13 PROCEDURE — 63600175 PHARM REV CODE 636 W HCPCS: Performed by: NURSE PRACTITIONER

## 2020-10-13 PROCEDURE — 84100 ASSAY OF PHOSPHORUS: CPT

## 2020-10-13 PROCEDURE — 25000003 PHARM REV CODE 250: Performed by: STUDENT IN AN ORGANIZED HEALTH CARE EDUCATION/TRAINING PROGRAM

## 2020-10-13 PROCEDURE — 93010 ELECTROCARDIOGRAM REPORT: CPT | Mod: ,,, | Performed by: INTERNAL MEDICINE

## 2020-10-13 PROCEDURE — 97116 GAIT TRAINING THERAPY: CPT

## 2020-10-13 PROCEDURE — 85610 PROTHROMBIN TIME: CPT

## 2020-10-13 PROCEDURE — 83735 ASSAY OF MAGNESIUM: CPT

## 2020-10-13 PROCEDURE — 93010 EKG 12-LEAD: ICD-10-PCS | Mod: ,,, | Performed by: INTERNAL MEDICINE

## 2020-10-13 PROCEDURE — 85730 THROMBOPLASTIN TIME PARTIAL: CPT

## 2020-10-13 PROCEDURE — 63600175 PHARM REV CODE 636 W HCPCS: Performed by: INTERNAL MEDICINE

## 2020-10-13 PROCEDURE — 85384 FIBRINOGEN ACTIVITY: CPT

## 2020-10-13 PROCEDURE — 99233 PR SUBSEQUENT HOSPITAL CARE,LEVL III: ICD-10-PCS | Mod: ,,, | Performed by: INTERNAL MEDICINE

## 2020-10-13 PROCEDURE — 97164 PT RE-EVAL EST PLAN CARE: CPT

## 2020-10-13 PROCEDURE — 80053 COMPREHEN METABOLIC PANEL: CPT

## 2020-10-13 PROCEDURE — 85025 COMPLETE CBC W/AUTO DIFF WBC: CPT

## 2020-10-13 RX ORDER — INSULIN ASPART 100 [IU]/ML
3 INJECTION, SOLUTION INTRAVENOUS; SUBCUTANEOUS
Status: DISCONTINUED | OUTPATIENT
Start: 2020-10-13 | End: 2020-10-18 | Stop reason: HOSPADM

## 2020-10-13 RX ADMIN — INSULIN ASPART 2 UNITS: 100 INJECTION, SOLUTION INTRAVENOUS; SUBCUTANEOUS at 05:10

## 2020-10-13 RX ADMIN — INSULIN ASPART 2 UNITS: 100 INJECTION, SOLUTION INTRAVENOUS; SUBCUTANEOUS at 09:10

## 2020-10-13 RX ADMIN — Medication 400 MG: at 09:10

## 2020-10-13 RX ADMIN — ACYCLOVIR 400 MG: 200 CAPSULE ORAL at 08:10

## 2020-10-13 RX ADMIN — DEXAMETHASONE SODIUM PHOSPHATE 4 MG: 4 INJECTION, SOLUTION INTRA-ARTICULAR; INTRALESIONAL; INTRAMUSCULAR; INTRAVENOUS; SOFT TISSUE at 08:10

## 2020-10-13 RX ADMIN — INSULIN ASPART 3 UNITS: 100 INJECTION, SOLUTION INTRAVENOUS; SUBCUTANEOUS at 01:10

## 2020-10-13 RX ADMIN — INSULIN ASPART 4 UNITS: 100 INJECTION, SOLUTION INTRAVENOUS; SUBCUTANEOUS at 01:10

## 2020-10-13 RX ADMIN — INSULIN ASPART 4 UNITS: 100 INJECTION, SOLUTION INTRAVENOUS; SUBCUTANEOUS at 09:10

## 2020-10-13 RX ADMIN — INSULIN ASPART 3 UNITS: 100 INJECTION, SOLUTION INTRAVENOUS; SUBCUTANEOUS at 09:10

## 2020-10-13 RX ADMIN — ACYCLOVIR 400 MG: 200 CAPSULE ORAL at 09:10

## 2020-10-13 RX ADMIN — ENOXAPARIN SODIUM 40 MG: 40 INJECTION SUBCUTANEOUS at 05:10

## 2020-10-13 RX ADMIN — DEXAMETHASONE SODIUM PHOSPHATE 4 MG: 4 INJECTION, SOLUTION INTRA-ARTICULAR; INTRALESIONAL; INTRAMUSCULAR; INTRAVENOUS; SOFT TISSUE at 09:10

## 2020-10-13 RX ADMIN — Medication 400 MG: at 06:10

## 2020-10-13 RX ADMIN — INSULIN ASPART 3 UNITS: 100 INJECTION, SOLUTION INTRAVENOUS; SUBCUTANEOUS at 05:10

## 2020-10-13 NOTE — ASSESSMENT & PLAN NOTE
Patient set goal to increase time out of bed and participate fully with PT to improve chances at discharge.

## 2020-10-13 NOTE — PLAN OF CARE
Pt involved in plan of care and communicating needs throughout shift.  Up to BSC independently; assisted with ADLs/bath today.  Pt ambulated in hallway this afternoon with PT; tolerated well.  No c/o pain or discomfort today.  Tolerating diet, voiding without difficulty; had soft BM this morning.  Accuchecks as ordered; levemir and ss insulin admin as indicated; levemir dose increased today and prandial insulin started for continued hyperglycemia with IV steroids.  Telemetry and continuous pulse oximetry monitoring maintained; SB-NSR with HR 50s-70s.  All VSS; no acute events so far this shift.  Pt remaining free from falls or injury throughout shift; bed in lowest position; call light within reach.  Pt instructed to call for assistance as needed.  Q1H rounding done on pt.

## 2020-10-13 NOTE — PROGRESS NOTES
"[facility]  Psychology  Progress Note  Individual Psychotherapy (PhD/LCSW)    Patient Name: Erika Saini  MRN: [id number]    Patient Class: IP- Inpatient  Admission Date: 9/18/2020  Hospital Length of Stay: 25 days  Attending Physician: Sunshine Sanabria MD  Primary Care Provider: Provider [employer]    Therapeutic Intervention: Met with patient.  Inpatient/Partial Hospital - Supportive psychotherapy 30 min - CPT Code 12749    Chief Complaint/Reason for Encounter: anxiety and interpersonal     Interval History and Content of Current Session:  Discussed patient hopes to be transitioned to outpatient care next week. She is "scared to have hope" because of fear the discharge won't happen. Discussed behavioral goals in preparation for discharge and patient's identified goals once she gets home. She now recognizes the degree to which she was not participating in life prior to admission and is hoping to become more engaged ("getting a job") after discharge.      Risk Parameters:  Patient reports no suicidal ideation  Patient reports no homicidal ideation  Patient reports no self-injurious behavior  Patient reports no violent behavior    Verbal Deficits: None    Patient's response to intervention:  The patient's response to intervention is accepting.    Progress toward goals and other mental status changes:  The patient's progress toward goals is limited.    Diagnostic Impression - Plan:     Adjustment reaction with anxiety  Patient set goal to increase time out of bed and participate fully with PT to improve chances at discharge.          Treatment Plan:  · Target symptoms: anxiety and interpersonal  · Why chosen therapy is appropriate versus another modality: relevant to diagnosis, evidence based practice  · Outcome monitoring methods: observation  · Therapeutic intervention type: behavior modifying psychotherapy, supportive psychotherapy    Plan:  individual psychotherapy (behavioral activation " focused)    Return to Clinic: as needed    Length of Service (minutes): 30    Tejas Munoz, PhD  Psychology  [facility]

## 2020-10-13 NOTE — ASSESSMENT & PLAN NOTE
- BG normal when off steroids  - With steroids CBGs have been significantly elevated  - Detemir increased to 18 units today with moderate SSI  - Aspart 3 units TIDAC  - A1C 6%

## 2020-10-13 NOTE — SUBJECTIVE & OBJECTIVE
"Therapeutic Intervention: Met with patient.  Inpatient/Partial Hospital - Supportive psychotherapy 30 min - CPT Code 62355    Chief Complaint/Reason for Encounter: anxiety and interpersonal     Interval History and Content of Current Session:  Discussed patient hopes to be transitioned to outpatient care next week. She is "scared to have hope" because of fear the discharge won't happen. Discussed behavioral goals in preparation for discharge and patient's identified goals once she gets home. She now recognizes the degree to which she was not participating in life prior to admission and is hoping to become more engaged ("getting a job") after discharge.      Risk Parameters:  Patient reports no suicidal ideation  Patient reports no homicidal ideation  Patient reports no self-injurious behavior  Patient reports no violent behavior    Verbal Deficits: None    Patient's response to intervention:  The patient's response to intervention is accepting.    Progress toward goals and other mental status changes:  The patient's progress toward goals is limited.  "

## 2020-10-13 NOTE — PROGRESS NOTES
"[facility]  Hematology  Bone Marrow Transplant  Progress Note    Patient Name: Erika Saini  Admission Date: 9/18/2020  Hospital Length of Stay: 25 days  Code Status: Full Code    Hospital course:  24 yo F transferred from [facility] with concern for APL (presented pancytopenic with nonspecific sx and fever). 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 Treated with ATRA (held day 18-20, 23-24 d/t differentiation syndrome) and ASO (held day 11, 12, 15-22 d/t QTc prolongation, transaminitis, differentiation syndrome). Hospital course complicated by differentiation syndrome requiring comfort flow NC on 10/4 treated with 4 days of dexamethasone 10mg IV q12hrs followed by taper and diuresis. 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     Subjective:     Interval History: ATRA 26 day, ASO day 23 for APL. Afebrile. Weaned off oxygen overnight. Urinated "a lot" yesterday but only net negative -120cc documented with 800cc UOP so likely inaccurate recording. Denies any complaints this morning. States she got up to the couch several times yesterday "just no one saw me."    Objective:     Vital Signs (Most Recent):  Temp: 96.3 °F (35.7 °C) (10/13/20 1145)  Pulse: 85 (10/13/20 1145)  Resp: 17 (10/13/20 1145)  BP: 119/65 (10/13/20 1145)  SpO2: (!) 94 % (10/13/20 1145) Vital Signs (24h Range):  Temp:  [96.3 °F (35.7 °C)-98.7 °F (37.1 °C)] 96.3 °F (35.7 °C)  Pulse:  [55-85] 85  Resp:  [15-18] 17  SpO2:  [93 %-100 %] 94 %  BP: (113-124)/(65-86) 119/65     Weight: 124.7 kg (275 lb 0.4 oz)  Body mass index is 53.71 kg/m².  Body surface area is 2.3 meters squared.      Intake/Output - Last 3 Shifts       10/11 0700 - 10/12 0659 10/12 0700 - 10/13 0659 10/13 0700 - 10/14 0659    P.O. 358 680 240    Total Intake(mL/kg) 358 (2.9) 680 (5.5) " 240 (1.9)    Urine (mL/kg/hr) 600 (0.2) 800 (0.3)     Total Output 600 800     Net -242 -120 +240           Urine Occurrence 3 x 1 x 1 x          Physical Exam  Vitals signs reviewed.   Constitutional:       Appearance: Normal appearance. She is obese.      Comments: Laying in bed covered by blanket   HENT:      Head: Normocephalic and atraumatic.      Nose: Nose normal.      Mouth/Throat:      Comments: Fever blister noted.  Eyes:      Extraocular Movements: Extraocular movements intact.   Neck:      Musculoskeletal: Normal range of motion and neck supple.   Cardiovascular:      Rate and Rhythm: Normal rate and regular rhythm.      Heart sounds: No murmur.   Pulmonary:      Effort: Pulmonary effort is normal. No respiratory distress.      Comments: No crackles however poor inspiratory effort  Abdominal:      General: There is no distension.      Palpations: Abdomen is soft.      Tenderness: There is no abdominal tenderness.   Musculoskeletal: Normal range of motion.         General: No swelling.   Skin:     General: Skin is warm and dry.   Neurological:      General: No focal deficit present.      Mental Status: She is alert and oriented to person, place, and time.   Psychiatric:         Mood and Affect: Mood normal.         Behavior: Behavior normal.         Significant Labs:   All pertinent labs from the last 24 hours have been reviewed.    Diagnostic Results:  I have reviewed and interpreted all pertinent imaging results/findings within the past 24 hours.    Assessment/Plan:     * APL (acute promyelocytic leukemia)  - Monitor TLS and DIC labs daily, monitoring for differentiation syndrome  - 2D ECHO - EF 58%, repeat EF 55% with small pericardial effusion, mild TR, CVP 8 from 3 earlier in the admission  - Continue tretinoin (start 9/18, today is Day 26) BID w/ meals,  - Holding ASO (start 09/21, today is day 23), monitor Qtc (daily EKG until improved, weekly thereafter), LFTs  - ATRA held day 18-20, 23-26 due to  differentiation syndrome and transaminitis  - ASO held day 11, 12, 15-23 due to prolonged QTc and elevated LFTs, dose reduced 50% day 13 and 14  - Will hold both today due to transaminitis (QTc 448 today)  - Plan for repeat BMBx day 28 (Thursday)  - 3 days of Dex 10mg q12hr for differentiation syndrome, weaned to 6mg q12hrs for 3 days, wean to 4mg q12hrs (day 2)  - Continue allopurinol 300mg daily  - Transfuse cyroglobulin if fibrinogen <150    - Platelet goal > 50k.   - Hgb goal >7  - INR < 1.5  - Monitor I/O and daily weights twice daily  - Working on follow up, possibly with Dr. Mullins at [facility]    Differentiation syndrome  - Was desaturing tot he 60's and becoming sob with movement  - CTA showed b/l pulmonary edema with left sided pleural effusion and small pericardial effusion, no PE however limited by body habitus  - Per echo, CVP 8 from 3 previously during admission  - Max weight 141.3kg, currently below admission weight at 125.6kg  - Completed 3 days of Dex 10mg q12hrs, weaned to 6mg q12hrs for 3 days, currently 4mg q12hrs  - Improved with IV lasix, uptrending BUN indicating pt is reaching a euvolemic state, will give single dose oral today  - Wean O2 as tolerated, goal >92%    Transaminitis  - Elevated LFTs, downtrending today  - Etiology likely mixed with ASO administration, congestive hepatopathy, and PASCUAL  - Diuresing as above  - Trend daily    Steroid-induced hyperglycemia  - BG normal when off steroids  - With steroids CBGs have been significantly elevated  - Detemir increased to 18 units today with moderate SSI  - Aspart 3 units TIDAC  - A1C 6%    Sinus tachycardia  -- Improved, possibly a combination of ASO administration with differentiation syndrome    Retinopathy  On 9/27 complained of vision changes present even before admission    - Opthalmology evaluated, appreciate assistance   - Secondary to subretinal hemorrhage?   - Per optho patient's vision should improve with time as pre-retinal  hemorrhages resolve.   - May need surgical intervention with vitrectomy if hemorrhages do not resolve in a few weeks.   - Return to retina clinic in 6 weeks    Pancytopenia  - Secondary to APL   - Transfuse if platelets < 50, Hgb< 7      Adjustment reaction with anxiety  - seen by oncology psychology     Neutropenic fever  - Gound to have strep B agalactiae blood cultures (+ at OSH).  - Cultures and sensitivities faxed to us by [facility] show organism sensitive to pcn.   - Repeat cultures negative.    - Completed Ceftriaxone 10/3       Hypokalemia  - Monitor and replace PRN, keep K >4 and Mg >2 ( Given qt prolongation risk with treatment)       Morbid obesity with BMI of 50.0-59.9, adult  Patient with BMI 57.91        VTE Risk Mitigation (From admission, onward)         Ordered     enoxaparin injection 40 mg  Every 24 hours      10/12/20 0839     heparin, porcine (PF) 100 unit/mL injection flush 300 Units  As needed (PRN)      09/21/20 1554     IP VTE HIGH RISK PATIENT  Once      09/18/20 0148     Place sequential compression device  Until discontinued      09/18/20 0148                Disposition: Continue with inpatient management    Jorge A Ferrer MD  Bone Marrow Transplant  [facility]

## 2020-10-13 NOTE — PLAN OF CARE
Afebrile. Free from falls or injury. No complaints of pain. Dexamethasone given as scheduled. Tele and  continued. Pt continues to be bradycardic. Accu checks AC/HS.  Bed locked in lowest position, non skid socks on, call light within reach. Pt instructed to call if any assistance is needed. Vitals stable. Will cont to donald pt.

## 2020-10-13 NOTE — SUBJECTIVE & OBJECTIVE
"  Hospital course:  26 yo F transferred from [facility] with concern for APL (presented pancytopenic with nonspecific sx and fever). 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 Treated with ATRA (held day 18-20, 23-24 d/t differentiation syndrome) and ASO (held day 11, 12, 15-22 d/t QTc prolongation, transaminitis, differentiation syndrome). Hospital course complicated by differentiation syndrome requiring comfort flow NC on 10/4 treated with 4 days of dexamethasone 10mg IV q12hrs followed by taper and diuresis. 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     Subjective:     Interval History: ATRA 26 day, ASO day 23 for APL. Afebrile. Weaned off oxygen overnight. Urinated "a lot" yesterday but only net negative -120cc documented with 800cc UOP so likely inaccurate recording. Denies any complaints this morning. States she got up to the couch several times yesterday "just no one saw me."    Objective:     Vital Signs (Most Recent):  Temp: 96.3 °F (35.7 °C) (10/13/20 1145)  Pulse: 85 (10/13/20 1145)  Resp: 17 (10/13/20 1145)  BP: 119/65 (10/13/20 1145)  SpO2: (!) 94 % (10/13/20 1145) Vital Signs (24h Range):  Temp:  [96.3 °F (35.7 °C)-98.7 °F (37.1 °C)] 96.3 °F (35.7 °C)  Pulse:  [55-85] 85  Resp:  [15-18] 17  SpO2:  [93 %-100 %] 94 %  BP: (113-124)/(65-86) 119/65     Weight: 124.7 kg (275 lb 0.4 oz)  Body mass index is 53.71 kg/m².  Body surface area is 2.3 meters squared.      Intake/Output - Last 3 Shifts       10/11 0700 - 10/12 0659 10/12 0700 - 10/13 0659 10/13 0700 - 10/14 0659    P.O. 358 680 240    Total Intake(mL/kg) 358 (2.9) 680 (5.5) 240 (1.9)    Urine (mL/kg/hr) 600 (0.2) 800 (0.3)     Total Output 600 800     Net -242 -120 +240           Urine Occurrence 3 x 1 x 1 x          Physical Exam  Vitals signs reviewed.   Constitutional:  "      Appearance: Normal appearance. She is obese.      Comments: Laying in bed covered by blanket   HENT:      Head: Normocephalic and atraumatic.      Nose: Nose normal.      Mouth/Throat:      Comments: Fever blister noted.  Eyes:      Extraocular Movements: Extraocular movements intact.   Neck:      Musculoskeletal: Normal range of motion and neck supple.   Cardiovascular:      Rate and Rhythm: Normal rate and regular rhythm.      Heart sounds: No murmur.   Pulmonary:      Effort: Pulmonary effort is normal. No respiratory distress.      Comments: No crackles however poor inspiratory effort  Abdominal:      General: There is no distension.      Palpations: Abdomen is soft.      Tenderness: There is no abdominal tenderness.   Musculoskeletal: Normal range of motion.         General: No swelling.   Skin:     General: Skin is warm and dry.   Neurological:      General: No focal deficit present.      Mental Status: She is alert and oriented to person, place, and time.   Psychiatric:         Mood and Affect: Mood normal.         Behavior: Behavior normal.         Significant Labs:   All pertinent labs from the last 24 hours have been reviewed.    Diagnostic Results:  I have reviewed and interpreted all pertinent imaging results/findings within the past 24 hours.

## 2020-10-13 NOTE — PT/OT/SLP RE-EVAL
Physical Therapy Re-evaluation    Patient Name:  Erika Saini   MRN:  [id number]    Recommendations:     Discharge Recommendations:  home health PT   Discharge Equipment Recommendations: (possibly RW or Rollator)   Barriers to discharge: None    Assessment:     Erika Saini is a 25 y.o. female admitted with a medical diagnosis of APL (acute promyelocytic leukemia).  She presents with the following impairments/functional limitations:  weakness, impaired endurance, impaired functional mobilty, gait instability, impaired balance Pt. cooperative and tolerated treatment well. Pt. needs to increase endurance and stability with gait.    Rehab Prognosis:  good; patient would benefit from acute skilled PT services to address these deficits and reach maximum level of function.      Recent Surgery: * No surgery found *      Plan:     During this hospitalization, patient to be seen 3 x/week to address the above listed problems via gait training, therapeutic activities, therapeutic exercises  · Plan of Care Expires:  11/12/20   Plan of Care Reviewed with: patient    Subjective     Communicated with nursing prior to session.  Patient found supine with peripheral IV upon PT entry to room, agreeable to evaluation.      Chief Complaint: decreased endurance  Patient comments/goals: to get stronger  Pain/Comfort:  · Pain Rating 1: 0/10  · Pain Rating Post-Intervention 1: 0/10    Patients cultural, spiritual, Yarsanism conflicts given the current situation: no      Objective:     Patient found with: peripheral IV     General Precautions: Standard, fall   Orthopedic Precautions:N/A   Braces:       Exams:  · RLE ROM: WFL  · RLE Strength: WFL  · LLE ROM: WFL  · LLE Strength: WFL    Functional Mobility:  · Bed Mobility:     · Rolling Right: stand by assistance  · Scooting: stand by assistance  · Supine to Sit: stand by assistance  · Sit to Supine: stand by assistance  · Transfers:     · Sit to Stand:  stand by assistance with no  AD  · Gait: 150' with RW and SBA-CGA. Pt. with slight unsteadiness during at times, especially when turning around.   · Balance: fair    AM-PAC 6 CLICK MOBILITY  Total Score:22       Therapeutic Activities and Exercises:   Discussed therapy/DME needs, goals, and POC.    Patient left supine with all lines intact and call button in reach.    GOALS:   Multidisciplinary Problems     Physical Therapy Goals        Problem: Physical Therapy Goal    Goal Priority Disciplines Outcome Goal Variances Interventions   Physical Therapy Goal     PT, PT/OT                Multidisciplinary Problems (Resolved)        Problem: Physical Therapy Goal    Goal Priority Disciplines Outcome Goal Variances Interventions   Physical Therapy Goal   (Resolved)     PT, PT/OT Met                     History:     No past medical history on file.    No past surgical history on file.    Time Tracking:     PT Received On: 10/13/20  PT Start Time: 1519     PT Stop Time: 1537  PT Total Time (min): 18 min     Billable Minutes: Re-eval 10 and Gait Training 8      Alan Carias, PT  10/13/2020

## 2020-10-13 NOTE — ASSESSMENT & PLAN NOTE
- Was desaturing tot he 60's and becoming sob with movement  - CTA showed b/l pulmonary edema with left sided pleural effusion and small pericardial effusion, no PE however limited by body habitus  - Per echo, CVP 8 from 3 previously during admission  - Max weight 141.3kg, currently below admission weight at 125.6kg  - Completed 3 days of Dex 10mg q12hrs, weaned to 6mg q12hrs for 3 days, currently 4mg q12hrs  - Improved with IV lasix, uptrending BUN indicating pt is reaching a euvolemic state, will give single dose oral today  - Wean O2 as tolerated, goal >92%

## 2020-10-13 NOTE — ASSESSMENT & PLAN NOTE
- Monitor TLS and DIC labs daily, monitoring for differentiation syndrome  - 2D ECHO - EF 58%, repeat EF 55% with small pericardial effusion, mild TR, CVP 8 from 3 earlier in the admission  - Continue tretinoin (start 9/18, today is Day 26) BID w/ meals,  - Holding ASO (start 09/21, today is day 23), monitor Qtc (daily EKG until improved, weekly thereafter), LFTs  - ATRA held day 18-20, 23-26 due to differentiation syndrome and transaminitis  - ASO held day 11, 12, 15-23 due to prolonged QTc and elevated LFTs, dose reduced 50% day 13 and 14  - Will hold both today due to transaminitis (QTc 448 today)  - Plan for repeat BMBx day 28 (Thursday)  - 3 days of Dex 10mg q12hr for differentiation syndrome, weaned to 6mg q12hrs for 3 days, wean to 4mg q12hrs (day 2)  - Continue allopurinol 300mg daily  - Transfuse cyroglobulin if fibrinogen <150    - Platelet goal > 50k.   - Hgb goal >7  - INR < 1.5  - Monitor I/O and daily weights twice daily  - Working on follow up, possibly with Dr. Mullins at [facility]

## 2020-10-14 LAB
ALBUMIN SERPL BCP-MCNC: 3.5 G/DL (ref 3.5–5.2)
ALP SERPL-CCNC: 72 U/L (ref 55–135)
ALT SERPL W/O P-5'-P-CCNC: 657 U/L (ref 10–44)
ANION GAP SERPL CALC-SCNC: 11 MMOL/L (ref 8–16)
APTT BLDCRRT: 26.5 SEC (ref 21–32)
AST SERPL-CCNC: 128 U/L (ref 10–40)
BASOPHILS # BLD AUTO: 0 K/UL (ref 0–0.2)
BASOPHILS NFR BLD: 0 % (ref 0–1.9)
BILIRUB SERPL-MCNC: 1.1 MG/DL (ref 0.1–1)
BUN SERPL-MCNC: 22 MG/DL (ref 6–20)
CALCIUM SERPL-MCNC: 9.4 MG/DL (ref 8.7–10.5)
CHLORIDE SERPL-SCNC: 98 MMOL/L (ref 95–110)
CO2 SERPL-SCNC: 26 MMOL/L (ref 23–29)
CREAT SERPL-MCNC: 0.7 MG/DL (ref 0.5–1.4)
DIFFERENTIAL METHOD: ABNORMAL
EOSINOPHIL # BLD AUTO: 0 K/UL (ref 0–0.5)
EOSINOPHIL NFR BLD: 0 % (ref 0–8)
ERYTHROCYTE [DISTWIDTH] IN BLOOD BY AUTOMATED COUNT: 13.6 % (ref 11.5–14.5)
EST. GFR  (AFRICAN AMERICAN): >60 ML/MIN/1.73 M^2
EST. GFR  (NON AFRICAN AMERICAN): >60 ML/MIN/1.73 M^2
FIBRINOGEN PPP-MCNC: 249 MG/DL (ref 182–366)
GLUCOSE SERPL-MCNC: 214 MG/DL (ref 70–110)
HBV CORE AB SERPL QL IA: NEGATIVE
HBV SURFACE AB SER-ACNC: POSITIVE M[IU]/ML
HBV SURFACE AG SERPL QL IA: NEGATIVE
HCT VFR BLD AUTO: 31.7 % (ref 37–48.5)
HCV AB SERPL QL IA: NEGATIVE
HGB BLD-MCNC: 10.6 G/DL (ref 12–16)
IMM GRANULOCYTES # BLD AUTO: 0.04 K/UL (ref 0–0.04)
IMM GRANULOCYTES NFR BLD AUTO: 1.3 % (ref 0–0.5)
INR PPP: 1.2 (ref 0.8–1.2)
LYMPHOCYTES # BLD AUTO: 0.3 K/UL (ref 1–4.8)
LYMPHOCYTES NFR BLD: 8.3 % (ref 18–48)
MAGNESIUM SERPL-MCNC: 2.1 MG/DL (ref 1.6–2.6)
MCH RBC QN AUTO: 29.9 PG (ref 27–31)
MCHC RBC AUTO-ENTMCNC: 33.4 G/DL (ref 32–36)
MCV RBC AUTO: 90 FL (ref 82–98)
MONOCYTES # BLD AUTO: 0.3 K/UL (ref 0.3–1)
MONOCYTES NFR BLD: 9.3 % (ref 4–15)
NEUTROPHILS # BLD AUTO: 2.5 K/UL (ref 1.8–7.7)
NEUTROPHILS NFR BLD: 81.1 % (ref 38–73)
NRBC BLD-RTO: 0 /100 WBC
PHOSPHATE SERPL-MCNC: 3.9 MG/DL (ref 2.7–4.5)
PLATELET # BLD AUTO: 166 K/UL (ref 150–350)
PMV BLD AUTO: 12 FL (ref 9.2–12.9)
POCT GLUCOSE: 153 MG/DL (ref 70–110)
POCT GLUCOSE: 179 MG/DL (ref 70–110)
POCT GLUCOSE: 179 MG/DL (ref 70–110)
POCT GLUCOSE: 203 MG/DL (ref 70–110)
POCT GLUCOSE: 219 MG/DL (ref 70–110)
POTASSIUM SERPL-SCNC: 4.4 MMOL/L (ref 3.5–5.1)
PROT SERPL-MCNC: 6.9 G/DL (ref 6–8.4)
PROTHROMBIN TIME: 12.7 SEC (ref 9–12.5)
RBC # BLD AUTO: 3.54 M/UL (ref 4–5.4)
SODIUM SERPL-SCNC: 135 MMOL/L (ref 136–145)
WBC # BLD AUTO: 3.02 K/UL (ref 3.9–12.7)

## 2020-10-14 PROCEDURE — 88184 FLOWCYTOMETRY/ TC 1 MARKER: CPT | Performed by: PATHOLOGY

## 2020-10-14 PROCEDURE — 88305 TISSUE EXAM BY PATHOLOGIST: ICD-10-PCS | Mod: 26,,, | Performed by: PATHOLOGY

## 2020-10-14 PROCEDURE — 93010 EKG 12-LEAD: ICD-10-PCS | Mod: ,,, | Performed by: INTERNAL MEDICINE

## 2020-10-14 PROCEDURE — 85025 COMPLETE CBC W/AUTO DIFF WBC: CPT

## 2020-10-14 PROCEDURE — 38222 PR BONE MARROW BIOPSY(IES) W/ASPIRATION(S); DIAGNOSTIC: ICD-10-PCS | Mod: LT,,, | Performed by: NURSE PRACTITIONER

## 2020-10-14 PROCEDURE — 88313 SPECIAL STAINS GROUP 2: CPT | Mod: 26,,, | Performed by: PATHOLOGY

## 2020-10-14 PROCEDURE — 81315 PML/RARALPHA COM BREAKPOINTS: CPT

## 2020-10-14 PROCEDURE — 87340 HEPATITIS B SURFACE AG IA: CPT

## 2020-10-14 PROCEDURE — 86803 HEPATITIS C AB TEST: CPT

## 2020-10-14 PROCEDURE — 36415 COLL VENOUS BLD VENIPUNCTURE: CPT

## 2020-10-14 PROCEDURE — 38221 DX BONE MARROW BIOPSIES: CPT

## 2020-10-14 PROCEDURE — 88264 CHROMOSOME ANALYSIS 20-25: CPT

## 2020-10-14 PROCEDURE — 63600175 PHARM REV CODE 636 W HCPCS: Performed by: INTERNAL MEDICINE

## 2020-10-14 PROCEDURE — 88311 PR  DECALCIFY TISSUE: ICD-10-PCS | Mod: 26,,, | Performed by: PATHOLOGY

## 2020-10-14 PROCEDURE — 88237 TISSUE CULTURE BONE MARROW: CPT

## 2020-10-14 PROCEDURE — 25000003 PHARM REV CODE 250: Performed by: STUDENT IN AN ORGANIZED HEALTH CARE EDUCATION/TRAINING PROGRAM

## 2020-10-14 PROCEDURE — 88305 TISSUE EXAM BY PATHOLOGIST: CPT | Mod: 59 | Performed by: PATHOLOGY

## 2020-10-14 PROCEDURE — 84100 ASSAY OF PHOSPHORUS: CPT

## 2020-10-14 PROCEDURE — 88313 SPECIAL STAINS GROUP 2: CPT | Performed by: PATHOLOGY

## 2020-10-14 PROCEDURE — 85610 PROTHROMBIN TIME: CPT

## 2020-10-14 PROCEDURE — 86704 HEP B CORE ANTIBODY TOTAL: CPT

## 2020-10-14 PROCEDURE — 85097 PR  BONE MARROW,SMEAR INTERPRETATION: ICD-10-PCS | Mod: ,,, | Performed by: PATHOLOGY

## 2020-10-14 PROCEDURE — 85384 FIBRINOGEN ACTIVITY: CPT

## 2020-10-14 PROCEDURE — 88275 CYTOGENETICS 100-300: CPT

## 2020-10-14 PROCEDURE — 88189 FLOWCYTOMETRY/READ 16 & >: CPT | Mod: ,,, | Performed by: PATHOLOGY

## 2020-10-14 PROCEDURE — 88313 PR  SPECIAL STAINS,GROUP II: ICD-10-PCS | Mod: 26,,, | Performed by: PATHOLOGY

## 2020-10-14 PROCEDURE — 38222 DX BONE MARROW BX & ASPIR: CPT | Mod: LT,,, | Performed by: NURSE PRACTITIONER

## 2020-10-14 PROCEDURE — 20600001 HC STEP DOWN PRIVATE ROOM

## 2020-10-14 PROCEDURE — 99233 PR SUBSEQUENT HOSPITAL CARE,LEVL III: ICD-10-PCS | Mod: ,,, | Performed by: INTERNAL MEDICINE

## 2020-10-14 PROCEDURE — 85730 THROMBOPLASTIN TIME PARTIAL: CPT

## 2020-10-14 PROCEDURE — 93005 ELECTROCARDIOGRAM TRACING: CPT

## 2020-10-14 PROCEDURE — 99233 SBSQ HOSP IP/OBS HIGH 50: CPT | Mod: ,,, | Performed by: INTERNAL MEDICINE

## 2020-10-14 PROCEDURE — 88185 FLOWCYTOMETRY/TC ADD-ON: CPT | Performed by: PATHOLOGY

## 2020-10-14 PROCEDURE — 83735 ASSAY OF MAGNESIUM: CPT

## 2020-10-14 PROCEDURE — 88189 PR  FLOWCYTOMETRY/READ, 16 & > MARKERS: ICD-10-PCS | Mod: ,,, | Performed by: PATHOLOGY

## 2020-10-14 PROCEDURE — 93010 ELECTROCARDIOGRAM REPORT: CPT | Mod: ,,, | Performed by: INTERNAL MEDICINE

## 2020-10-14 PROCEDURE — 88311 DECALCIFY TISSUE: CPT | Performed by: PATHOLOGY

## 2020-10-14 PROCEDURE — 88305 TISSUE EXAM BY PATHOLOGIST: CPT | Mod: 26,,, | Performed by: PATHOLOGY

## 2020-10-14 PROCEDURE — 86706 HEP B SURFACE ANTIBODY: CPT

## 2020-10-14 PROCEDURE — 25000003 PHARM REV CODE 250: Performed by: INTERNAL MEDICINE

## 2020-10-14 PROCEDURE — 88311 DECALCIFY TISSUE: CPT | Mod: 26,,, | Performed by: PATHOLOGY

## 2020-10-14 PROCEDURE — 85097 BONE MARROW INTERPRETATION: CPT | Mod: ,,, | Performed by: PATHOLOGY

## 2020-10-14 PROCEDURE — 88271 CYTOGENETICS DNA PROBE: CPT

## 2020-10-14 PROCEDURE — 80053 COMPREHEN METABOLIC PANEL: CPT

## 2020-10-14 RX ORDER — LIDOCAINE HYDROCHLORIDE 20 MG/ML
15 INJECTION, SOLUTION EPIDURAL; INFILTRATION; INTRACAUDAL; PERINEURAL ONCE
Status: COMPLETED | OUTPATIENT
Start: 2020-10-14 | End: 2020-10-14

## 2020-10-14 RX ORDER — DEXAMETHASONE SODIUM PHOSPHATE 4 MG/ML
2 INJECTION, SOLUTION INTRA-ARTICULAR; INTRALESIONAL; INTRAMUSCULAR; INTRAVENOUS; SOFT TISSUE EVERY 12 HOURS
Status: DISCONTINUED | OUTPATIENT
Start: 2020-10-14 | End: 2020-10-16

## 2020-10-14 RX ORDER — HYDROMORPHONE HYDROCHLORIDE 1 MG/ML
0.5 INJECTION, SOLUTION INTRAMUSCULAR; INTRAVENOUS; SUBCUTANEOUS ONCE
Status: COMPLETED | OUTPATIENT
Start: 2020-10-14 | End: 2020-10-14

## 2020-10-14 RX ADMIN — INSULIN ASPART 2 UNITS: 100 INJECTION, SOLUTION INTRAVENOUS; SUBCUTANEOUS at 12:10

## 2020-10-14 RX ADMIN — ACYCLOVIR 400 MG: 200 CAPSULE ORAL at 08:10

## 2020-10-14 RX ADMIN — ENOXAPARIN SODIUM 40 MG: 40 INJECTION SUBCUTANEOUS at 04:10

## 2020-10-14 RX ADMIN — INSULIN ASPART 2 UNITS: 100 INJECTION, SOLUTION INTRAVENOUS; SUBCUTANEOUS at 04:10

## 2020-10-14 RX ADMIN — LIDOCAINE HYDROCHLORIDE 200 MG: 20 INJECTION, SOLUTION EPIDURAL; INFILTRATION; INTRACAUDAL; PERINEURAL at 02:10

## 2020-10-14 RX ADMIN — INSULIN ASPART 3 UNITS: 100 INJECTION, SOLUTION INTRAVENOUS; SUBCUTANEOUS at 04:10

## 2020-10-14 RX ADMIN — INSULIN ASPART 2 UNITS: 100 INJECTION, SOLUTION INTRAVENOUS; SUBCUTANEOUS at 10:10

## 2020-10-14 RX ADMIN — ACYCLOVIR 400 MG: 200 CAPSULE ORAL at 09:10

## 2020-10-14 RX ADMIN — DEXAMETHASONE SODIUM PHOSPHATE 2 MG: 4 INJECTION, SOLUTION INTRA-ARTICULAR; INTRALESIONAL; INTRAMUSCULAR; INTRAVENOUS; SOFT TISSUE at 09:10

## 2020-10-14 RX ADMIN — DEXAMETHASONE SODIUM PHOSPHATE 2 MG: 4 INJECTION, SOLUTION INTRA-ARTICULAR; INTRALESIONAL; INTRAMUSCULAR; INTRAVENOUS; SOFT TISSUE at 08:10

## 2020-10-14 RX ADMIN — HYDROMORPHONE HYDROCHLORIDE 0.5 MG: 1 INJECTION, SOLUTION INTRAMUSCULAR; INTRAVENOUS; SUBCUTANEOUS at 02:10

## 2020-10-14 RX ADMIN — INSULIN ASPART 3 UNITS: 100 INJECTION, SOLUTION INTRAVENOUS; SUBCUTANEOUS at 09:10

## 2020-10-14 RX ADMIN — INSULIN ASPART 2 UNITS: 100 INJECTION, SOLUTION INTRAVENOUS; SUBCUTANEOUS at 09:10

## 2020-10-14 RX ADMIN — INSULIN ASPART 3 UNITS: 100 INJECTION, SOLUTION INTRAVENOUS; SUBCUTANEOUS at 12:10

## 2020-10-14 NOTE — PLAN OF CARE
Pt AAOx4, no acute events overnight. Accuchecks AC HS, 2 units SSI admin as ordered. O2 sats 93-96% on room air. HR 50-60s on telemetry. No complaints at this time. Will continue to monitor.

## 2020-10-14 NOTE — PROGRESS NOTES
[facility]  Hematology  Bone Marrow Transplant  Progress Note    Patient Name: Erika Saini  Admission Date: 9/18/2020  Hospital Length of Stay: 26 days  Code Status: Full Code    Hospital course:  24 yo F transferred from [facility] with concern for APL (presented pancytopenic with nonspecific sx and fever). 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 Treated with ATRA (held day 18-20, 23-26 d/t differentiation syndrome) and ASO (held day 11, 12, 15-23 d/t QTc prolongation, transaminitis, differentiation syndrome). Hospital course complicated by differentiation syndrome requiring comfort flow NC on 10/4 treated with 4 days of dexamethasone 10mg IV q12hrs followed by taper and diuresis. 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     Subjective:     Interval History: ATRA 27 day, ASO day 24 for APL. ATRA and ASO continue to be held due to transaminitis. Walked with PT in halls yesterday. Denies any complaints this morning. Afebrile. Saturating 93-97% on RA.    Objective:     Vital Signs (Most Recent):  Temp: 97.5 °F (36.4 °C) (10/14/20 0302)  Pulse: 68 (10/14/20 0727)  Resp: 16 (10/14/20 0302)  BP: 100/60 (10/14/20 0302)  SpO2: 96 % (10/14/20 0727) Vital Signs (24h Range):  Temp:  [96.3 °F (35.7 °C)-98.3 °F (36.8 °C)] 97.5 °F (36.4 °C)  Pulse:  [55-85] 68  Resp:  [16-19] 16  SpO2:  [93 %-100 %] 96 %  BP: ()/(60-77) 100/60     Weight: 121.3 kg (267 lb 8.5 oz)  Body mass index is 52.25 kg/m².  Body surface area is 2.27 meters squared.      Intake/Output - Last 3 Shifts       10/12 0700 - 10/13 0659 10/13 0700 - 10/14 0659 10/14 0700 - 10/15 0659    P.O. 680 600     Total Intake(mL/kg) 680 (5.5) 600 (4.9)     Urine (mL/kg/hr) 800 (0.3) 1700 (0.6)     Stool  0     Total Output 800 1700     Net -120 -1100             Urine Occurrence 1 x 1 x     Stool Occurrence  1 x           Physical Exam  Vitals signs reviewed.   Constitutional:       Appearance: Normal appearance. She is obese.      Comments: Laying in bed completely covered by sheets this morning   HENT:      Head: Normocephalic and atraumatic.      Nose: Nose normal.      Mouth/Throat:      Comments: Fever blister noted.  Eyes:      Extraocular Movements: Extraocular movements intact.   Neck:      Musculoskeletal: Normal range of motion and neck supple.   Cardiovascular:      Rate and Rhythm: Normal rate and regular rhythm.      Heart sounds: No murmur.   Pulmonary:      Effort: Pulmonary effort is normal. No respiratory distress.      Comments: No crackles however poor inspiratory effort  Abdominal:      General: There is no distension.      Palpations: Abdomen is soft.      Tenderness: There is no abdominal tenderness.   Musculoskeletal: Normal range of motion.         General: No swelling.   Skin:     General: Skin is warm and dry.   Neurological:      General: No focal deficit present.      Mental Status: She is alert and oriented to person, place, and time.   Psychiatric:         Mood and Affect: Mood normal.         Behavior: Behavior normal.         Significant Labs:   All pertinent labs from the last 24 hours have been reviewed.    Diagnostic Results:  I have reviewed and interpreted all pertinent imaging results/findings within the past 24 hours.    Assessment/Plan:     * APL (acute promyelocytic leukemia)  - Monitor TLS and DIC labs daily, monitoring for differentiation syndrome  - 2D ECHO - EF 58%, repeat EF 55% with small pericardial effusion, mild TR, CVP 8 from 3 earlier in the admission  - Continue tretinoin (start 9/18, today is Day 27) BID w/ meals,  - Holding ASO (start 09/21, today is day 24), monitor Qtc (daily EKG until improved, weekly thereafter), LFTs  - ATRA held day 18-20, 23-27 due to differentiation syndrome and transaminitis  - ASO held day 11,  12, 15-24 due to prolonged QTc and elevated LFTs, dose reduced 50% day 13 and 14  - Will hold both today due to transaminitis (QTc 452 today)  - Plan for BMBx day 27 today  - 3 days of Dex 10mg q12hr for differentiation syndrome, weaned to 6mg q12hrs for 3 days, 4mg q12h for 2 days, weaned 2mg q12hrs (day 1)  - Continue allopurinol 300mg daily  - Transfuse cyroglobulin if fibrinogen <150    - Platelet goal > 50k.   - Hgb goal >7  - INR < 1.5  - Monitor I/O and daily weights twice daily  - Working on follow up, possibly with Dr. Mullins at [facility]    Differentiation syndrome  - Was desaturing tot he 60's and becoming sob with movement  - CTA showed b/l pulmonary edema with left sided pleural effusion and small pericardial effusion, no PE however limited by body habitus  - Per echo, CVP 8 from 3 previously during admission  - Max weight 141.3kg, currently below admission weight at 125.6kg  - Completed 3 days of Dex 10mg q12hrs, weaned to 6mg q12hrs for 3 days, 4mg q12hrs for 2 days, weaned to 2mg q12hrs  - Improved with IV lasix, uptrending BUN indicating pt is reaching a euvolemic state, will give single dose oral today  - Oxygen has been weaned, goal O2 sat >92%    Transaminitis  - Elevated LFTs, ALT uptrended today  - Etiology likely mixed with ASO administration, congestive hepatopathy, and PASCUAL  - Will check Hep B/C studies and an US today  - Trend daily    Steroid-induced hyperglycemia  - A1C 6%  - BG normal when off steroids  - With steroids CBGs have been significantly elevated  - Detemir 18 units today with moderate SSI  - Aspart 3 units TIDAC  - Better controlled on above regimen  - Monitor closely as steroids are weaned    Sinus tachycardia  -- Improved, possibly a combination of ASO administration with differentiation syndrome    Retinopathy  On 9/27 complained of vision changes present even before admission    - Opthalmology evaluated, appreciate assistance   - Secondary to subretinal hemorrhage?   - Per  optho patient's vision should improve with time as pre-retinal hemorrhages resolve.   - May need surgical intervention with vitrectomy if hemorrhages do not resolve in a few weeks.   - Return to retina clinic in 6 weeks    Pancytopenia  - Secondary to APL   - Transfuse if platelets < 50, Hgb< 7      Adjustment reaction with anxiety  - seen by oncology psychology     Neutropenic fever  - Gound to have strep B agalactiae blood cultures (+ at OSH).  - Cultures and sensitivities faxed to us by [facility] show organism sensitive to pcn.   - Repeat cultures negative.    - Completed Ceftriaxone 10/3       Hypokalemia  - Monitor and replace PRN, keep K >4 and Mg >2 ( Given qt prolongation risk with treatment)       Morbid obesity with BMI of 50.0-59.9, adult  Patient with BMI 57.91      VTE Risk Mitigation (From admission, onward)         Ordered     enoxaparin injection 40 mg  Every 24 hours      10/12/20 0839     heparin, porcine (PF) 100 unit/mL injection flush 300 Units  As needed (PRN)      09/21/20 1554     IP VTE HIGH RISK PATIENT  Once      09/18/20 0148     Place sequential compression device  Until discontinued      09/18/20 0148                Disposition: Continue with inpatient management. BMBx today    Jorge A Ferrer MD  Bone Marrow Transplant  [facility]

## 2020-10-14 NOTE — ASSESSMENT & PLAN NOTE
- Monitor TLS and DIC labs daily, monitoring for differentiation syndrome  - 2D ECHO - EF 58%, repeat EF 55% with small pericardial effusion, mild TR, CVP 8 from 3 earlier in the admission  - Continue tretinoin (start 9/18, today is Day 27) BID w/ meals,  - Holding ASO (start 09/21, today is day 24), monitor Qtc (daily EKG until improved, weekly thereafter), LFTs  - ATRA held day 18-20, 23-27 due to differentiation syndrome and transaminitis  - ASO held day 11, 12, 15-24 due to prolonged QTc and elevated LFTs, dose reduced 50% day 13 and 14  - Will hold both today due to transaminitis (QTc 452 today)  - Plan for BMBx day 27 today  - 3 days of Dex 10mg q12hr for differentiation syndrome, weaned to 6mg q12hrs for 3 days, 4mg q12h for 2 days, weaned 2mg q12hrs (day 1)  - Continue allopurinol 300mg daily  - Transfuse cyroglobulin if fibrinogen <150    - Platelet goal > 50k.   - Hgb goal >7  - INR < 1.5  - Monitor I/O and daily weights twice daily  - Working on follow up, possibly with Dr. Mullins at [facility]

## 2020-10-14 NOTE — ASSESSMENT & PLAN NOTE
- Was desaturing tot he 60's and becoming sob with movement  - CTA showed b/l pulmonary edema with left sided pleural effusion and small pericardial effusion, no PE however limited by body habitus  - Per echo, CVP 8 from 3 previously during admission  - Max weight 141.3kg, currently below admission weight at 125.6kg  - Completed 3 days of Dex 10mg q12hrs, weaned to 6mg q12hrs for 3 days, 4mg q12hrs for 2 days, weaned to 2mg q12hrs  - Improved with IV lasix, uptrending BUN indicating pt is reaching a euvolemic state, will give single dose oral today  - Oxygen has been weaned, goal O2 sat >92%

## 2020-10-14 NOTE — PROCEDURES
PROCEDURE NOTE:  Date of Procedure: 10/14/2020  Bone Marrow Biopsy and Aspiration  Indication: APL s/p ATRA and ASO  Consent: Informed consent was obtained from patient.  Timeout: Done and documented.  Position: Supine  Site: Left posterior illiac crest.  Prep: Betadine.  Needle used: 11 gauge Jamshidi needle.  Anesthetic: 2% lidocaine 10 cc.  Biopsy: The biopsy needle was introduced into the marrow cavity and an aspirate was obtained without complications and sent for flow cytometry, AML FISH, PML LORENA, and cytogenetics. Core biopsy obtained without difficulty and sent for routine histologic examination.  Complications: None.  Disposition: The patient was left in room with RN and instructed to remain on back for 20 minutes. Instructed to remove bandaid in 24 hours.  Blood loss: Minimal.     Jorge A Ferrer DO PGY-IV  Hematology/Oncology Fellow

## 2020-10-14 NOTE — PLAN OF CARE
Pt involved in plan of care and communicating needs throughout shift.  Up to BSC independently; assisted with ADLs as needed.  No c/o pain or discomfort today.  Tolerating diet, voiding without difficulty; last BM yesterday.  Pt had liver ultrasound this am as ordered.  Pt to have BMB at bedside this afternoon.  Accuchecks as ordered; levemir, prandial and ss insulin admin as indicated; tapering IV steroids as ordered.  Telemetry and continuous pulse oximetry monitoring maintained; SB-NSR with HR 60s-80s; O2 sats 94-97% on RA.  All VSS; no acute events so far this shift.  Pt remaining free from falls or injury throughout shift; bed in lowest position; call light within reach.  Pt instructed to call for assistance as needed.  Q1H rounding done on pt.

## 2020-10-14 NOTE — SUBJECTIVE & OBJECTIVE
Hospital course:  24 yo F transferred from [facility] with concern for APL (presented pancytopenic with nonspecific sx and fever). 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 Treated with ATRA (held day 18-20, 23-26 d/t differentiation syndrome) and ASO (held day 11, 12, 15-23 d/t QTc prolongation, transaminitis, differentiation syndrome). Hospital course complicated by differentiation syndrome requiring comfort flow NC on 10/4 treated with 4 days of dexamethasone 10mg IV q12hrs followed by taper and diuresis. 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     Subjective:     Interval History: ATRA 27 day, ASO day 24 for APL. ATRA and ASO continue to be held due to transaminitis. Walked with PT in halls yesterday. Denies any complaints this morning. Afebrile. Saturating 93-97% on RA.    Objective:     Vital Signs (Most Recent):  Temp: 97.5 °F (36.4 °C) (10/14/20 0302)  Pulse: 68 (10/14/20 0727)  Resp: 16 (10/14/20 0302)  BP: 100/60 (10/14/20 0302)  SpO2: 96 % (10/14/20 0727) Vital Signs (24h Range):  Temp:  [96.3 °F (35.7 °C)-98.3 °F (36.8 °C)] 97.5 °F (36.4 °C)  Pulse:  [55-85] 68  Resp:  [16-19] 16  SpO2:  [93 %-100 %] 96 %  BP: ()/(60-77) 100/60     Weight: 121.3 kg (267 lb 8.5 oz)  Body mass index is 52.25 kg/m².  Body surface area is 2.27 meters squared.      Intake/Output - Last 3 Shifts       10/12 0700 - 10/13 0659 10/13 0700 - 10/14 0659 10/14 0700 - 10/15 0659    P.O. 680 600     Total Intake(mL/kg) 680 (5.5) 600 (4.9)     Urine (mL/kg/hr) 800 (0.3) 1700 (0.6)     Stool  0     Total Output 800 1700     Net -120 -1100            Urine Occurrence 1 x 1 x     Stool Occurrence  1 x           Physical Exam  Vitals signs reviewed.   Constitutional:       Appearance: Normal appearance. She is obese.      Comments: Laying in bed  completely covered by sheets this morning   HENT:      Head: Normocephalic and atraumatic.      Nose: Nose normal.      Mouth/Throat:      Comments: Fever blister noted.  Eyes:      Extraocular Movements: Extraocular movements intact.   Neck:      Musculoskeletal: Normal range of motion and neck supple.   Cardiovascular:      Rate and Rhythm: Normal rate and regular rhythm.      Heart sounds: No murmur.   Pulmonary:      Effort: Pulmonary effort is normal. No respiratory distress.      Comments: No crackles however poor inspiratory effort  Abdominal:      General: There is no distension.      Palpations: Abdomen is soft.      Tenderness: There is no abdominal tenderness.   Musculoskeletal: Normal range of motion.         General: No swelling.   Skin:     General: Skin is warm and dry.   Neurological:      General: No focal deficit present.      Mental Status: She is alert and oriented to person, place, and time.   Psychiatric:         Mood and Affect: Mood normal.         Behavior: Behavior normal.         Significant Labs:   All pertinent labs from the last 24 hours have been reviewed.    Diagnostic Results:  I have reviewed and interpreted all pertinent imaging results/findings within the past 24 hours.

## 2020-10-14 NOTE — ASSESSMENT & PLAN NOTE
- A1C 6%  - BG normal when off steroids  - With steroids CBGs have been significantly elevated  - Detemir 18 units today with moderate SSI  - Aspart 3 units TIDAC  - Better controlled on above regimen  - Monitor closely as steroids are weaned

## 2020-10-14 NOTE — ASSESSMENT & PLAN NOTE
- Elevated LFTs, ALT uptrended today  - Etiology likely mixed with ASO administration, congestive hepatopathy, and PASCUAL  - Will check Hep B/C studies and an US today  - Trend daily

## 2020-10-15 LAB
ALBUMIN SERPL BCP-MCNC: 3.6 G/DL (ref 3.5–5.2)
ALP SERPL-CCNC: 72 U/L (ref 55–135)
ALT SERPL W/O P-5'-P-CCNC: 653 U/L (ref 10–44)
ANION GAP SERPL CALC-SCNC: 13 MMOL/L (ref 8–16)
APTT BLDCRRT: 26.1 SEC (ref 21–32)
AST SERPL-CCNC: 108 U/L (ref 10–40)
BASOPHILS # BLD AUTO: 0 K/UL (ref 0–0.2)
BASOPHILS NFR BLD: 0 % (ref 0–1.9)
BILIRUB SERPL-MCNC: 1.1 MG/DL (ref 0.1–1)
BUN SERPL-MCNC: 24 MG/DL (ref 6–20)
CALCIUM SERPL-MCNC: 9.4 MG/DL (ref 8.7–10.5)
CHLORIDE SERPL-SCNC: 100 MMOL/L (ref 95–110)
CO2 SERPL-SCNC: 24 MMOL/L (ref 23–29)
CREAT SERPL-MCNC: 0.7 MG/DL (ref 0.5–1.4)
DIFFERENTIAL METHOD: ABNORMAL
EOSINOPHIL # BLD AUTO: 0 K/UL (ref 0–0.5)
EOSINOPHIL NFR BLD: 0 % (ref 0–8)
ERYTHROCYTE [DISTWIDTH] IN BLOOD BY AUTOMATED COUNT: 14.1 % (ref 11.5–14.5)
EST. GFR  (AFRICAN AMERICAN): >60 ML/MIN/1.73 M^2
EST. GFR  (NON AFRICAN AMERICAN): >60 ML/MIN/1.73 M^2
FIBRINOGEN PPP-MCNC: 270 MG/DL (ref 182–366)
GLUCOSE SERPL-MCNC: 200 MG/DL (ref 70–110)
HCT VFR BLD AUTO: 32.3 % (ref 37–48.5)
HGB BLD-MCNC: 10.8 G/DL (ref 12–16)
IMM GRANULOCYTES # BLD AUTO: 0.02 K/UL (ref 0–0.04)
IMM GRANULOCYTES NFR BLD AUTO: 0.8 % (ref 0–0.5)
INR PPP: 1.1 (ref 0.8–1.2)
LYMPHOCYTES # BLD AUTO: 0.3 K/UL (ref 1–4.8)
LYMPHOCYTES NFR BLD: 14 % (ref 18–48)
MAGNESIUM SERPL-MCNC: 2.2 MG/DL (ref 1.6–2.6)
MCH RBC QN AUTO: 29.6 PG (ref 27–31)
MCHC RBC AUTO-ENTMCNC: 33.4 G/DL (ref 32–36)
MCV RBC AUTO: 89 FL (ref 82–98)
MONOCYTES # BLD AUTO: 0.4 K/UL (ref 0.3–1)
MONOCYTES NFR BLD: 16.1 % (ref 4–15)
NEUTROPHILS # BLD AUTO: 1.7 K/UL (ref 1.8–7.7)
NEUTROPHILS NFR BLD: 69.1 % (ref 38–73)
NRBC BLD-RTO: 0 /100 WBC
PHOSPHATE SERPL-MCNC: 3.4 MG/DL (ref 2.7–4.5)
PLATELET # BLD AUTO: 239 K/UL (ref 150–350)
PMV BLD AUTO: 11.9 FL (ref 9.2–12.9)
POCT GLUCOSE: 133 MG/DL (ref 70–110)
POCT GLUCOSE: 136 MG/DL (ref 70–110)
POCT GLUCOSE: 161 MG/DL (ref 70–110)
POCT GLUCOSE: 171 MG/DL (ref 70–110)
POTASSIUM SERPL-SCNC: 4.4 MMOL/L (ref 3.5–5.1)
PROT SERPL-MCNC: 6.9 G/DL (ref 6–8.4)
PROTHROMBIN TIME: 12.2 SEC (ref 9–12.5)
RBC # BLD AUTO: 3.65 M/UL (ref 4–5.4)
SODIUM SERPL-SCNC: 137 MMOL/L (ref 136–145)
WBC # BLD AUTO: 2.42 K/UL (ref 3.9–12.7)

## 2020-10-15 PROCEDURE — 93010 EKG 12-LEAD: ICD-10-PCS | Mod: ,,, | Performed by: INTERNAL MEDICINE

## 2020-10-15 PROCEDURE — 93005 ELECTROCARDIOGRAM TRACING: CPT

## 2020-10-15 PROCEDURE — 84100 ASSAY OF PHOSPHORUS: CPT

## 2020-10-15 PROCEDURE — 85025 COMPLETE CBC W/AUTO DIFF WBC: CPT

## 2020-10-15 PROCEDURE — 99900035 HC TECH TIME PER 15 MIN (STAT)

## 2020-10-15 PROCEDURE — 99233 PR SUBSEQUENT HOSPITAL CARE,LEVL III: ICD-10-PCS | Mod: ,,, | Performed by: INTERNAL MEDICINE

## 2020-10-15 PROCEDURE — 25000003 PHARM REV CODE 250: Performed by: STUDENT IN AN ORGANIZED HEALTH CARE EDUCATION/TRAINING PROGRAM

## 2020-10-15 PROCEDURE — 99233 SBSQ HOSP IP/OBS HIGH 50: CPT | Mod: ,,, | Performed by: INTERNAL MEDICINE

## 2020-10-15 PROCEDURE — 20600001 HC STEP DOWN PRIVATE ROOM

## 2020-10-15 PROCEDURE — 83735 ASSAY OF MAGNESIUM: CPT

## 2020-10-15 PROCEDURE — 63600175 PHARM REV CODE 636 W HCPCS: Performed by: INTERNAL MEDICINE

## 2020-10-15 PROCEDURE — 93010 ELECTROCARDIOGRAM REPORT: CPT | Mod: ,,, | Performed by: INTERNAL MEDICINE

## 2020-10-15 PROCEDURE — 85610 PROTHROMBIN TIME: CPT

## 2020-10-15 PROCEDURE — 38221 DX BONE MARROW BIOPSIES: CPT

## 2020-10-15 PROCEDURE — 94761 N-INVAS EAR/PLS OXIMETRY MLT: CPT

## 2020-10-15 PROCEDURE — 80053 COMPREHEN METABOLIC PANEL: CPT

## 2020-10-15 PROCEDURE — 85384 FIBRINOGEN ACTIVITY: CPT

## 2020-10-15 PROCEDURE — 85730 THROMBOPLASTIN TIME PARTIAL: CPT

## 2020-10-15 RX ADMIN — ENOXAPARIN SODIUM 40 MG: 40 INJECTION SUBCUTANEOUS at 05:10

## 2020-10-15 RX ADMIN — DEXAMETHASONE SODIUM PHOSPHATE 2 MG: 4 INJECTION, SOLUTION INTRA-ARTICULAR; INTRALESIONAL; INTRAMUSCULAR; INTRAVENOUS; SOFT TISSUE at 08:10

## 2020-10-15 RX ADMIN — DEXAMETHASONE SODIUM PHOSPHATE 2 MG: 4 INJECTION, SOLUTION INTRA-ARTICULAR; INTRALESIONAL; INTRAMUSCULAR; INTRAVENOUS; SOFT TISSUE at 09:10

## 2020-10-15 RX ADMIN — ACYCLOVIR 400 MG: 200 CAPSULE ORAL at 09:10

## 2020-10-15 RX ADMIN — ACYCLOVIR 400 MG: 200 CAPSULE ORAL at 08:10

## 2020-10-15 RX ADMIN — INSULIN ASPART 3 UNITS: 100 INJECTION, SOLUTION INTRAVENOUS; SUBCUTANEOUS at 05:10

## 2020-10-15 RX ADMIN — INSULIN ASPART 2 UNITS: 100 INJECTION, SOLUTION INTRAVENOUS; SUBCUTANEOUS at 01:10

## 2020-10-15 RX ADMIN — INSULIN ASPART 3 UNITS: 100 INJECTION, SOLUTION INTRAVENOUS; SUBCUTANEOUS at 01:10

## 2020-10-15 RX ADMIN — INSULIN ASPART 3 UNITS: 100 INJECTION, SOLUTION INTRAVENOUS; SUBCUTANEOUS at 09:10

## 2020-10-15 NOTE — SUBJECTIVE & OBJECTIVE
Hospital course:  26 yo F transferred from [facility] with concern for APL (presented pancytopenic with nonspecific sx and fever). 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 Treated with ATRA (held day 18-20, 23-28 d/t differentiation syndrome) and ASO (held day 11, 12, 15-25 d/t QTc prolongation, transaminitis, differentiation syndrome). Hospital course complicated by differentiation syndrome requiring comfort flow NC on 10/4 treated with 4 days of dexamethasone 10mg IV q12hrs followed by taper and diuresis. 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 Results of BMBx performed on 10/14 pending.     Subjective:     Interval History: ATRA 28 day, ASO day 25 for APL. ATRA and ASO continue to be held. Underwent BMBx yesterday without issue. Weights 121kg from 134.5 on admission. Is upset she's losing weight while in the hospital. Denies any complaints this morning.    Objective:     Vital Signs (Most Recent):  Temp: 97.8 °F (36.6 °C) (10/15/20 0315)  Pulse: 71 (10/15/20 0325)  Resp: 18 (10/15/20 0315)  BP: 110/60 (10/15/20 0315)  SpO2: 97 % (10/15/20 0529) Vital Signs (24h Range):  Temp:  [97.6 °F (36.4 °C)-97.9 °F (36.6 °C)] 97.8 °F (36.6 °C)  Pulse:  [63-88] 71  Resp:  [16-20] 18  SpO2:  [82 %-99 %] 97 %  BP: ()/(54-73) 110/60     Weight: 120.9 kg (266 lb 10.3 oz)  Body mass index is 52.08 kg/m².  Body surface area is 2.26 meters squared.    ECOG SCORE             Intake/Output - Last 3 Shifts       10/13 0700 - 10/14 0659 10/14 0700 - 10/15 0659    P.O. 600 1080    Total Intake(mL/kg) 600 (4.9) 1080 (8.9)    Urine (mL/kg/hr) 1700 (0.6) 1450 (0.5)    Stool 0 0    Total Output 1700 1450    Net -1100 -370          Urine Occurrence 1 x     Stool Occurrence 1 x 1 x          Physical Exam  Vitals signs reviewed.   Constitutional:        General: She is not in acute distress.     Appearance: Normal appearance. She is obese.      Comments: Laying in bed on left side   HENT:      Head: Normocephalic and atraumatic.      Mouth/Throat:      Mouth: Mucous membranes are moist.   Eyes:      Extraocular Movements: Extraocular movements intact.      Conjunctiva/sclera: Conjunctivae normal.   Neck:      Musculoskeletal: Normal range of motion and neck supple.   Cardiovascular:      Rate and Rhythm: Normal rate and regular rhythm.   Pulmonary:      Effort: Pulmonary effort is normal.      Breath sounds: Normal breath sounds.      Comments: Difficult to appreciate given poor effort and body habitus  Abdominal:      General: Bowel sounds are normal.      Palpations: Abdomen is soft.   Skin:     General: Skin is warm and dry.   Neurological:      General: No focal deficit present.      Mental Status: She is alert and oriented to person, place, and time.   Psychiatric:         Mood and Affect: Affect normal.         Behavior: Behavior is cooperative.         Significant Labs:   CBC:   Recent Labs   Lab 10/14/20  0320 10/15/20  0345   WBC 3.02* 2.42*   HGB 10.6* 10.8*   HCT 31.7* 32.3*    239   , CMP:   Recent Labs   Lab 10/14/20  0320 10/15/20  0345   * 137   K 4.4 4.4   CL 98 100   CO2 26 24   * 200*   BUN 22* 24*   CREATININE 0.7 0.7   CALCIUM 9.4 9.4   PROT 6.9 6.9   ALBUMIN 3.5 3.6   BILITOT 1.1* 1.1*   ALKPHOS 72 72   * 108*   * 653*   ANIONGAP 11 13   EGFRNONAA >60.0 >60.0    and All pertinent labs from the last 24 hours have been reviewed.    Diagnostic Results:  I have reviewed all pertinent imaging results/findings within the past 24 hours.

## 2020-10-15 NOTE — PROGRESS NOTES
[facility]  Hematology  Bone Marrow Transplant  Progress Note    Patient Name: Erika Saini  Admission Date: 9/18/2020  Hospital Length of Stay: 27 days  Code Status: Full Code    Hospital course:  26 yo F transferred from [facility] with concern for APL (presented pancytopenic with nonspecific sx and fever). 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 Treated with ATRA (held day 18-20, 23-28 d/t differentiation syndrome) and ASO (held day 11, 12, 15-25 d/t QTc prolongation, transaminitis, differentiation syndrome). Hospital course complicated by differentiation syndrome requiring comfort flow NC on 10/4 treated with 4 days of dexamethasone 10mg IV q12hrs followed by taper and diuresis. 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 Results of BMBx performed on 10/14 pending.     Subjective:     Interval History: ATRA 28 day, ASO day 25 for APL. ATRA and ASO continue to be held. Underwent BMBx yesterday without issue. Weights 121kg from 134.5 on admission. Is upset she's losing weight while in the hospital. Denies any complaints this morning.    Objective:     Vital Signs (Most Recent):  Temp: 97.8 °F (36.6 °C) (10/15/20 0315)  Pulse: 71 (10/15/20 0325)  Resp: 18 (10/15/20 0315)  BP: 110/60 (10/15/20 0315)  SpO2: 97 % (10/15/20 0529) Vital Signs (24h Range):  Temp:  [97.6 °F (36.4 °C)-97.9 °F (36.6 °C)] 97.8 °F (36.6 °C)  Pulse:  [63-88] 71  Resp:  [16-20] 18  SpO2:  [82 %-99 %] 97 %  BP: ()/(54-73) 110/60     Weight: 120.9 kg (266 lb 10.3 oz)  Body mass index is 52.08 kg/m².  Body surface area is 2.26 meters squared.    ECOG SCORE             Intake/Output - Last 3 Shifts       10/13 0700 - 10/14 0659 10/14 0700 - 10/15 0659    P.O. 600 1080    Total Intake(mL/kg) 600 (4.9) 1080 (8.9)    Urine (mL/kg/hr) 1700  (0.6) 1450 (0.5)    Stool 0 0    Total Output 1700 1450    Net -1100 -370          Urine Occurrence 1 x     Stool Occurrence 1 x 1 x          Physical Exam  Vitals signs reviewed.   Constitutional:       General: She is not in acute distress.     Appearance: Normal appearance. She is obese.      Comments: Laying in bed on left side   HENT:      Head: Normocephalic and atraumatic.      Mouth/Throat:      Mouth: Mucous membranes are moist.   Eyes:      Extraocular Movements: Extraocular movements intact.      Conjunctiva/sclera: Conjunctivae normal.   Neck:      Musculoskeletal: Normal range of motion and neck supple.   Cardiovascular:      Rate and Rhythm: Normal rate and regular rhythm.   Pulmonary:      Effort: Pulmonary effort is normal.      Breath sounds: Normal breath sounds.      Comments: Difficult to appreciate given poor effort and body habitus  Abdominal:      General: Bowel sounds are normal.      Palpations: Abdomen is soft.   Skin:     General: Skin is warm and dry.   Neurological:      General: No focal deficit present.      Mental Status: She is alert and oriented to person, place, and time.   Psychiatric:         Mood and Affect: Affect normal.         Behavior: Behavior is cooperative.         Significant Labs:   CBC:   Recent Labs   Lab 10/14/20  0320 10/15/20  0345   WBC 3.02* 2.42*   HGB 10.6* 10.8*   HCT 31.7* 32.3*    239   , CMP:   Recent Labs   Lab 10/14/20  0320 10/15/20  0345   * 137   K 4.4 4.4   CL 98 100   CO2 26 24   * 200*   BUN 22* 24*   CREATININE 0.7 0.7   CALCIUM 9.4 9.4   PROT 6.9 6.9   ALBUMIN 3.5 3.6   BILITOT 1.1* 1.1*   ALKPHOS 72 72   * 108*   * 653*   ANIONGAP 11 13   EGFRNONAA >60.0 >60.0    and All pertinent labs from the last 24 hours have been reviewed.    Diagnostic Results:  I have reviewed all pertinent imaging results/findings within the past 24 hours.    Assessment/Plan:     * APL (acute promyelocytic leukemia)  - Monitor TLS and  DIC labs daily, monitoring for differentiation syndrome  - 2D ECHO - EF 58%, repeat EF 55% with small pericardial effusion, mild TR, CVP 8 from 3 earlier in the admission  - Continue tretinoin (start 9/18, today is Day 28) BID w/ meals,  - Holding ASO (start 09/21, today is day 25), monitor Qtc (daily EKG until improved, weekly thereafter), LFTs  - ATRA held day 18-20, 23-28 due to differentiation syndrome and transaminitis  - ASO held day 11, 12, 15-25 due to prolonged QTc and elevated LFTs, dose reduced 50% day 13 and 14  - Will hold both today due to transaminitis (QTc 452 today)  - BMBx day 27, results pending  - 3 days of Dex 10mg q12hr for differentiation syndrome, weaned to 6mg q12hrs for 3 days, 4mg q12h for 2 days, weaned 2mg q12hrs (day 2)  - Continue allopurinol 300mg daily  - Transfuse cyroglobulin if fibrinogen <150    - Platelet goal > 50k.   - Hgb goal >7  - INR < 1.5  - Monitor I/O and daily weights twice daily  - Working on follow up, possibly with Dr[facility] Mullins at [facility]    Differentiation syndrome  - Was desaturing tot he 60's and becoming sob with movement  - CTA showed b/l pulmonary edema with left sided pleural effusion and small pericardial effusion, no PE however limited by body habitus  - Per echo, CVP 8 from 3 previously during admission  - Max weight 141.3kg, currently below admission weight at 125.6kg  - Completed 3 days of Dex 10mg q12hrs, weaned to 6mg q12hrs for 3 days, 4mg q12hrs for 2 days, weaned to 2mg q12hrs  - Improved with IV lasix, uptrending BUN indicating pt is reaching a euvolemic state, will give single dose oral today  - Oxygen has been weaned, goal O2 sat >92%    Transaminitis  - Elevated LFTs, ALT uptrended today  - Etiology likely mixed with ASO administration, congestive hepatopathy, and PASCUAL  - Hepatitis studies consistent with hep B vaccine  - US liver showed no abnormalities  - Trend daily    Steroid-induced hyperglycemia  - A1C 6%  - BG normal when off steroids  - With  steroids CBGs have been significantly elevated  - Detemir 18 units today with moderate SSI  - Aspart 3 units TIDAC  - Better controlled on above regimen  - Monitor closely as steroids are weaned    Sinus tachycardia  -- Improved, possibly a combination of ASO administration with differentiation syndrome    Retinopathy  On 9/27 complained of vision changes present even before admission    - Opthalmology evaluated, appreciate assistance   - Secondary to subretinal hemorrhage?   - Per optho patient's vision should improve with time as pre-retinal hemorrhages resolve.   - May need surgical intervention with vitrectomy if hemorrhages do not resolve in a few weeks.   - Return to retina clinic in 6 weeks    Pancytopenia  - Secondary to APL   - Transfuse if platelets < 50, Hgb< 7      Adjustment reaction with anxiety  - seen by oncology psychology     Neutropenic fever  - Gound to have strep B agalactiae blood cultures (+ at OSH).  - Cultures and sensitivities faxed to us by [facility] show organism sensitive to pcn.   - Repeat cultures negative.    - Completed Ceftriaxone 10/3       Hypokalemia  - Monitor and replace PRN, keep K >4 and Mg >2 ( Given qt prolongation risk with treatment)       Morbid obesity with BMI of 50.0-59.9, adult  Patient with BMI 57.91        VTE Risk Mitigation (From admission, onward)         Ordered     enoxaparin injection 40 mg  Every 24 hours      10/12/20 0839     heparin, porcine (PF) 100 unit/mL injection flush 300 Units  As needed (PRN)      09/21/20 1554     IP VTE HIGH RISK PATIENT  Once      09/18/20 0148     Place sequential compression device  Until discontinued      09/18/20 0148                Disposition: Continue with inpatient management    Jorge A Ferrer MD  Bone Marrow Transplant  [facility]

## 2020-10-15 NOTE — ASSESSMENT & PLAN NOTE
- Monitor TLS and DIC labs daily, monitoring for differentiation syndrome  - 2D ECHO - EF 58%, repeat EF 55% with small pericardial effusion, mild TR, CVP 8 from 3 earlier in the admission  - Continue tretinoin (start 9/18, today is Day 28) BID w/ meals,  - Holding ASO (start 09/21, today is day 25), monitor Qtc (daily EKG until improved, weekly thereafter), LFTs  - ATRA held day 18-20, 23-28 due to differentiation syndrome and transaminitis  - ASO held day 11, 12, 15-25 due to prolonged QTc and elevated LFTs, dose reduced 50% day 13 and 14  - Will hold both today due to transaminitis (QTc 452 today)  - BMBx day 27, results pending  - 3 days of Dex 10mg q12hr for differentiation syndrome, weaned to 6mg q12hrs for 3 days, 4mg q12h for 2 days, weaned 2mg q12hrs (day 2)  - Continue allopurinol 300mg daily  - Transfuse cyroglobulin if fibrinogen <150    - Platelet goal > 50k.   - Hgb goal >7  - INR < 1.5  - Monitor I/O and daily weights twice daily  - Working on follow up, possibly with Dr. Mullins at [facility]

## 2020-10-15 NOTE — PLAN OF CARE
Pt AAOx4, no acute events overnight. Dex taper continued. Accuchecks AC HS, SSI admin as ordered. O2 sats 93-96% on room air. NSR on telemetry. No complaints at this time. Will continue to monitor.

## 2020-10-15 NOTE — PLAN OF CARE
POC reviewed with patient at beginning of shift.encouraged patient to sit up in chair & ambulate, but patient remained in bed throughout the day. Assessment completed and no alarming findings noted. Biopsy site from yesterday:clean & intact.Vital signs remained stable; All schedules/PRN medications administered as ordered. Tolerating a pediatric diet without any difficulty; very poor intake. Ambulates to commode; stand-by assistance provided. Fall/safety reviewed with patient:side rails up x3; call bell in place; bed in lowest, locked position; skid proof socks on; no evidence of skin breakdown; care plan explained to patient; no additional complaints at this time. Will continue routine plan of care.

## 2020-10-15 NOTE — PROGRESS NOTES
Attempted to see pt follow up.  Receiving injection from nurse; will re-attempt. Spoke to mother who requests assistance with records being sent to Dr Limon ([phone]) for eye issues/vision follow-up; will confirm with patient.  Possible DME need for walker at discharge.  Will continue to follow and assist as needed.

## 2020-10-15 NOTE — ASSESSMENT & PLAN NOTE
- Elevated LFTs, ALT uptrended today  - Etiology likely mixed with ASO administration, congestive hepatopathy, and PASCUAL  - Hepatitis studies consistent with hep B vaccine  - US liver showed no abnormalities  - Trend daily

## 2020-10-16 LAB
ALBUMIN SERPL BCP-MCNC: 3.4 G/DL (ref 3.5–5.2)
ALP SERPL-CCNC: 64 U/L (ref 55–135)
ALT SERPL W/O P-5'-P-CCNC: 558 U/L (ref 10–44)
ANION GAP SERPL CALC-SCNC: 10 MMOL/L (ref 8–16)
APTT BLDCRRT: 26.8 SEC (ref 21–32)
AST SERPL-CCNC: 89 U/L (ref 10–40)
BASOPHILS # BLD AUTO: 0 K/UL (ref 0–0.2)
BASOPHILS NFR BLD: 0 % (ref 0–1.9)
BILIRUB SERPL-MCNC: 1.2 MG/DL (ref 0.1–1)
BODY SITE - BONE MARROW: NORMAL
BUN SERPL-MCNC: 20 MG/DL (ref 6–20)
CALCIUM SERPL-MCNC: 8.8 MG/DL (ref 8.7–10.5)
CHLORIDE SERPL-SCNC: 100 MMOL/L (ref 95–110)
CLINICAL DIAGNOSIS - BONE MARROW: NORMAL
CO2 SERPL-SCNC: 26 MMOL/L (ref 23–29)
CREAT SERPL-MCNC: 0.6 MG/DL (ref 0.5–1.4)
DIFFERENTIAL METHOD: ABNORMAL
EOSINOPHIL # BLD AUTO: 0 K/UL (ref 0–0.5)
EOSINOPHIL NFR BLD: 0 % (ref 0–8)
ERYTHROCYTE [DISTWIDTH] IN BLOOD BY AUTOMATED COUNT: 14.2 % (ref 11.5–14.5)
EST. GFR  (AFRICAN AMERICAN): >60 ML/MIN/1.73 M^2
EST. GFR  (NON AFRICAN AMERICAN): >60 ML/MIN/1.73 M^2
FIBRINOGEN PPP-MCNC: 279 MG/DL (ref 182–366)
FLOW CYTOMETRY ANTIBODIES ANALYZED - BONE MARROW: NORMAL
FLOW CYTOMETRY COMMENT - BONE MARROW: NORMAL
FLOW CYTOMETRY INTERPRETATION - BONE MARROW: NORMAL
GLUCOSE SERPL-MCNC: 171 MG/DL (ref 70–110)
HCT VFR BLD AUTO: 29.8 % (ref 37–48.5)
HGB BLD-MCNC: 10 G/DL (ref 12–16)
IMM GRANULOCYTES # BLD AUTO: 0.06 K/UL (ref 0–0.04)
IMM GRANULOCYTES NFR BLD AUTO: 2.8 % (ref 0–0.5)
INR PPP: 1.1 (ref 0.8–1.2)
LYMPHOCYTES # BLD AUTO: 0.3 K/UL (ref 1–4.8)
LYMPHOCYTES NFR BLD: 12.2 % (ref 18–48)
MAGNESIUM SERPL-MCNC: 2 MG/DL (ref 1.6–2.6)
MCH RBC QN AUTO: 30.1 PG (ref 27–31)
MCHC RBC AUTO-ENTMCNC: 33.6 G/DL (ref 32–36)
MCV RBC AUTO: 90 FL (ref 82–98)
MONOCYTES # BLD AUTO: 0.4 K/UL (ref 0.3–1)
MONOCYTES NFR BLD: 18.3 % (ref 4–15)
NEUTROPHILS # BLD AUTO: 1.4 K/UL (ref 1.8–7.7)
NEUTROPHILS NFR BLD: 66.7 % (ref 38–73)
NRBC BLD-RTO: 0 /100 WBC
PHOSPHATE SERPL-MCNC: 3.6 MG/DL (ref 2.7–4.5)
PLATELET # BLD AUTO: 220 K/UL (ref 150–350)
PMV BLD AUTO: 11.3 FL (ref 9.2–12.9)
POCT GLUCOSE: 125 MG/DL (ref 70–110)
POCT GLUCOSE: 140 MG/DL (ref 70–110)
POCT GLUCOSE: 148 MG/DL (ref 70–110)
POCT GLUCOSE: 99 MG/DL (ref 70–110)
POTASSIUM SERPL-SCNC: 4.2 MMOL/L (ref 3.5–5.1)
PROT SERPL-MCNC: 6.4 G/DL (ref 6–8.4)
PROTHROMBIN TIME: 12.4 SEC (ref 9–12.5)
RBC # BLD AUTO: 3.32 M/UL (ref 4–5.4)
SODIUM SERPL-SCNC: 136 MMOL/L (ref 136–145)
WBC # BLD AUTO: 2.13 K/UL (ref 3.9–12.7)

## 2020-10-16 PROCEDURE — 85384 FIBRINOGEN ACTIVITY: CPT

## 2020-10-16 PROCEDURE — 97116 GAIT TRAINING THERAPY: CPT | Mod: CQ

## 2020-10-16 PROCEDURE — 93010 EKG 12-LEAD: ICD-10-PCS | Mod: ,,, | Performed by: INTERNAL MEDICINE

## 2020-10-16 PROCEDURE — 20600001 HC STEP DOWN PRIVATE ROOM

## 2020-10-16 PROCEDURE — 63600175 PHARM REV CODE 636 W HCPCS: Performed by: INTERNAL MEDICINE

## 2020-10-16 PROCEDURE — 93005 ELECTROCARDIOGRAM TRACING: CPT

## 2020-10-16 PROCEDURE — 36415 COLL VENOUS BLD VENIPUNCTURE: CPT

## 2020-10-16 PROCEDURE — 80053 COMPREHEN METABOLIC PANEL: CPT

## 2020-10-16 PROCEDURE — 85610 PROTHROMBIN TIME: CPT

## 2020-10-16 PROCEDURE — 99233 PR SUBSEQUENT HOSPITAL CARE,LEVL III: ICD-10-PCS | Mod: ,,, | Performed by: INTERNAL MEDICINE

## 2020-10-16 PROCEDURE — 99233 SBSQ HOSP IP/OBS HIGH 50: CPT | Mod: ,,, | Performed by: INTERNAL MEDICINE

## 2020-10-16 PROCEDURE — 25000003 PHARM REV CODE 250: Performed by: STUDENT IN AN ORGANIZED HEALTH CARE EDUCATION/TRAINING PROGRAM

## 2020-10-16 PROCEDURE — 93010 ELECTROCARDIOGRAM REPORT: CPT | Mod: ,,, | Performed by: INTERNAL MEDICINE

## 2020-10-16 PROCEDURE — 85025 COMPLETE CBC W/AUTO DIFF WBC: CPT

## 2020-10-16 PROCEDURE — 85730 THROMBOPLASTIN TIME PARTIAL: CPT

## 2020-10-16 PROCEDURE — 84100 ASSAY OF PHOSPHORUS: CPT

## 2020-10-16 PROCEDURE — 83735 ASSAY OF MAGNESIUM: CPT

## 2020-10-16 RX ORDER — DEXAMETHASONE SODIUM PHOSPHATE 4 MG/ML
1 INJECTION, SOLUTION INTRA-ARTICULAR; INTRALESIONAL; INTRAMUSCULAR; INTRAVENOUS; SOFT TISSUE EVERY 12 HOURS
Status: DISCONTINUED | OUTPATIENT
Start: 2020-10-17 | End: 2020-10-18 | Stop reason: HOSPADM

## 2020-10-16 RX ADMIN — DEXAMETHASONE SODIUM PHOSPHATE 2 MG: 4 INJECTION, SOLUTION INTRA-ARTICULAR; INTRALESIONAL; INTRAMUSCULAR; INTRAVENOUS; SOFT TISSUE at 09:10

## 2020-10-16 RX ADMIN — INSULIN ASPART 3 UNITS: 100 INJECTION, SOLUTION INTRAVENOUS; SUBCUTANEOUS at 06:10

## 2020-10-16 RX ADMIN — ENOXAPARIN SODIUM 40 MG: 40 INJECTION SUBCUTANEOUS at 06:10

## 2020-10-16 RX ADMIN — ACYCLOVIR 400 MG: 200 CAPSULE ORAL at 09:10

## 2020-10-16 RX ADMIN — INSULIN ASPART 3 UNITS: 100 INJECTION, SOLUTION INTRAVENOUS; SUBCUTANEOUS at 09:10

## 2020-10-16 RX ADMIN — DEXAMETHASONE SODIUM PHOSPHATE 2 MG: 4 INJECTION, SOLUTION INTRA-ARTICULAR; INTRALESIONAL; INTRAMUSCULAR; INTRAVENOUS; SOFT TISSUE at 08:10

## 2020-10-16 RX ADMIN — INSULIN ASPART 3 UNITS: 100 INJECTION, SOLUTION INTRAVENOUS; SUBCUTANEOUS at 01:10

## 2020-10-16 RX ADMIN — ACYCLOVIR 400 MG: 200 CAPSULE ORAL at 08:10

## 2020-10-16 NOTE — ASSESSMENT & PLAN NOTE
- A1C 6%  - BG normal when off steroids  - With steroids CBGs have been significantly elevated  - Decreased Detemir to 14 units today with moderate SSI  - Aspart 3 units TIDAC  - Better controlled on above regimen  - Monitor closely as steroids are weaned

## 2020-10-16 NOTE — PROGRESS NOTES
Pt seen for follow up.  Doing well with no complaints.  Consented to  Mom's request for contact of [facility] for follow up; referred by [facility] to their retinal specialist.  Left voicemail for Dr George Steele ([phone]) requesting callback.  No needs identified at this time. Will continue to follow and assist as needed.

## 2020-10-16 NOTE — PROGRESS NOTES
[facility]  Hematology  Bone Marrow Transplant  Progress Note    Patient Name: Erika Sanii  Admission Date: 9/18/2020  Hospital Length of Stay: 28 days  Code Status: Full Code    Hospital course:  24 yo F transferred from [facility] with concern for APL (presented pancytopenic with nonspecific sx and fever). 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 Treated with ATRA (held day 18-20, 23-29 d/t differentiation syndrome) and ASO (held day 11, 12, 15-26 d/t QTc prolongation, transaminitis, differentiation syndrome). Hospital course complicated by differentiation syndrome requiring comfort flow NC on 10/4 treated with 4 days of dexamethasone 10mg IV q12hrs followed by taper and diuresis. 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 Results of BMBx performed on 10/14 pending.     Subjective:     Interval History: ATRA 29 day, ASO day 26 for APL. ATRA and ASO continue to be held due to transaminitis which is improving today. Her biopsy site was sore yesterday but states it is completely resolved today and denies any complaints other than the lgihts in her room not working appropriately.    Objective:     Vital Signs (Most Recent):  Temp: 98.1 °F (36.7 °C) (10/16/20 0411)  Pulse: 63 (10/16/20 0649)  Resp: 18 (10/16/20 0411)  BP: (!) 94/58 (10/16/20 0411)  SpO2: 97 % (10/16/20 0411) Vital Signs (24h Range):  Temp:  [97.8 °F (36.6 °C)-98.3 °F (36.8 °C)] 98.1 °F (36.7 °C)  Pulse:  [43-77] 63  Resp:  [16-18] 18  SpO2:  [94 %-100 %] 97 %  BP: ()/(58-71) 94/58     Weight: 120.9 kg (266 lb 8 oz)  Body mass index is 52.05 kg/m².  Body surface area is 2.26 meters squared.      Intake/Output - Last 3 Shifts       10/14 0700 - 10/15 0659 10/15 0700 - 10/16 0659 10/16 0700 - 10/17 0659    P.O. 1080 4281     Total Intake(mL/kg)  1080 (8.9) 1171 (9.7)     Urine (mL/kg/hr) 1450 (0.5) 1300 (0.4)     Stool 0      Total Output 1450 1300     Net -370 -129            Stool Occurrence 1 x            Physical Exam  Vitals signs reviewed.   Constitutional:       General: She is not in acute distress.     Appearance: Normal appearance. She is obese.      Comments: Laying in bed on left side   HENT:      Head: Normocephalic and atraumatic.      Mouth/Throat:      Mouth: Mucous membranes are moist.   Eyes:      Extraocular Movements: Extraocular movements intact.      Conjunctiva/sclera: Conjunctivae normal.   Neck:      Musculoskeletal: Normal range of motion and neck supple.   Cardiovascular:      Rate and Rhythm: Normal rate and regular rhythm.   Pulmonary:      Effort: Pulmonary effort is normal.      Breath sounds: Normal breath sounds.      Comments: Difficult to appreciate given poor effort and body habitus  Abdominal:      General: Bowel sounds are normal.      Palpations: Abdomen is soft.   Skin:     General: Skin is warm and dry.      Comments: Biopsy site at right buttock without surrounding erythema or tenderness   Neurological:      General: No focal deficit present.      Mental Status: She is alert and oriented to person, place, and time.   Psychiatric:         Mood and Affect: Affect normal.         Behavior: Behavior is cooperative.         Significant Labs:   CBC:   Recent Labs   Lab 10/15/20  0345 10/16/20  0400   WBC 2.42* 2.13*   HGB 10.8* 10.0*   HCT 32.3* 29.8*    220   , CMP:   Recent Labs   Lab 10/15/20  0345 10/16/20  0400    136   K 4.4 4.2    100   CO2 24 26   * 171*   BUN 24* 20   CREATININE 0.7 0.6   CALCIUM 9.4 8.8   PROT 6.9 6.4   ALBUMIN 3.6 3.4*   BILITOT 1.1* 1.2*   ALKPHOS 72 64   * 89*   * 558*   ANIONGAP 13 10   EGFRNONAA >60.0 >60.0    and All pertinent labs from the last 24 hours have been reviewed.    Diagnostic Results:  I have reviewed all pertinent imaging  results/findings within the past 24 hours.    Assessment/Plan:     * APL (acute promyelocytic leukemia)  - Monitor TLS and DIC labs daily, monitoring for differentiation syndrome  - 2D ECHO - EF 58%, repeat EF 55% with small pericardial effusion, mild TR, CVP 8 from 3 earlier in the admission  - Continue tretinoin (start 9/18, today is Day 29) BID w/ meals,  - Holding ASO (start 09/21, today is day 26), monitor Qtc (daily EKG until improved, weekly thereafter), LFTs  - ATRA held day 18-20, 23-29 due to differentiation syndrome and transaminitis  - ASO held day 11, 12, 15-26 due to prolonged QTc and elevated LFTs, dose reduced 50% day 13 and 14  - Will hold both today due to transaminitis  - BMBx day 27, results pending  - 3 days of Dex 10mg q12hr for differentiation syndrome, weaned to 6mg q12hrs for 3 days, 4mg q12h for 2 days, weaned 2mg q12hrs (day 3), plan to wean to 1mg q12hrs and d/c on taper on monday  - Continue allopurinol 300mg daily  - Transfuse cyroglobulin if fibrinogen <150    - Platelet goal > 50k.   - Hgb goal >7  - INR < 1.5  - Monitor I/O and daily weights twice daily  - Will follow up with Dr. Mullins at [facility]    Differentiation syndrome  - Was desaturing tot he 60's and becoming sob with movement  - CTA showed b/l pulmonary edema with left sided pleural effusion and small pericardial effusion, no PE however limited by body habitus  - Per echo, CVP 8 from 3 previously during admission  - Max weight 141.3kg, currently below admission weight at 125.6kg  - Completed 3 days of Dex 10mg q12hrs, weaned to 6mg q12hrs for 3 days, 4mg q12hrs for 2 days, weaned to 2mg q12hrs for 3 days, plan to wean to 1mg q12hrs until monday and will d/c taper at that time.  - Appears euvolemic at this time, no need for diuresis today  - Oxygen has been weaned, goal O2 sat >92%    Transaminitis  - Elevated LFTs, improving  - Etiology likely mixed with ASO administration, congestive hepatopathy, and PASCUAL  - Hepatitis studies  consistent with hep B vaccine  - US liver showed no abnormalities  - Trend daily    Steroid-induced hyperglycemia  - A1C 6%  - BG normal when off steroids  - With steroids CBGs have been significantly elevated  - Decreased Detemir to 14 units today with moderate SSI  - Aspart 3 units TIDAC  - Better controlled on above regimen  - Monitor closely as steroids are weaned    Sinus tachycardia  -- Improved, possibly a combination of ASO administration with differentiation syndrome    Retinopathy  On 9/27 complained of vision changes present even before admission    - Opthalmology evaluated, appreciate assistance   - Secondary to subretinal hemorrhage?   - Per optho patient's vision should improve with time as pre-retinal hemorrhages resolve.   - May need surgical intervention with vitrectomy if hemorrhages do not resolve in a few weeks.   - Return to retina clinic in 6 weeks    Pancytopenia  - Secondary to APL   - Transfuse if platelets < 50, Hgb< 7      Adjustment reaction with anxiety  - seen by oncology psychology     Neutropenic fever  - Gound to have strep B agalactiae blood cultures (+ at OSH).  - Cultures and sensitivities faxed to us by [facility] show organism sensitive to pcn.   - Repeat cultures negative.    - Completed Ceftriaxone 10/3       Hypokalemia  - Monitor and replace PRN, keep K >4 and Mg >2 ( Given qt prolongation risk with treatment)       Morbid obesity with BMI of 50.0-59.9, adult  Patient with BMI 57.91      VTE Risk Mitigation (From admission, onward)         Ordered     enoxaparin injection 40 mg  Every 24 hours      10/12/20 0839     heparin, porcine (PF) 100 unit/mL injection flush 300 Units  As needed (PRN)      09/21/20 1554     IP VTE HIGH RISK PATIENT  Once      09/18/20 0148     Place sequential compression device  Until discontinued      09/18/20 0148                Disposition: Continue with inpatient management. Pending BMBx results.    Jorge A Ferrer MD  Bone Marrow Transplant  [facility]

## 2020-10-16 NOTE — PLAN OF CARE
POC reviewed with patient at beginning of shift. Assessment completed & no alarming findings found.VS remained stable.All schedules/PRN medications administered as ordered. Tolerating a pediatric diet without any difficulty; poor intake; Assisted with activity; up to commode; No BM noted today. Fall/safety reviewed with patient: side rails up x2; call bell in place; bed in lowest, locked position; skid proof socks on; no evidence of skin breakdown; care plan explained to patient; no additional complaints at this time. Will continue routine plan of care.

## 2020-10-16 NOTE — ASSESSMENT & PLAN NOTE
- Monitor TLS and DIC labs daily, monitoring for differentiation syndrome  - 2D ECHO - EF 58%, repeat EF 55% with small pericardial effusion, mild TR, CVP 8 from 3 earlier in the admission  - Continue tretinoin (start 9/18, today is Day 29) BID w/ meals,  - Holding ASO (start 09/21, today is day 26), monitor Qtc (daily EKG until improved, weekly thereafter), LFTs  - ATRA held day 18-20, 23-29 due to differentiation syndrome and transaminitis  - ASO held day 11, 12, 15-26 due to prolonged QTc and elevated LFTs, dose reduced 50% day 13 and 14  - Will hold both today due to transaminitis  - BMBx day 27, results pending  - 3 days of Dex 10mg q12hr for differentiation syndrome, weaned to 6mg q12hrs for 3 days, 4mg q12h for 2 days, weaned 2mg q12hrs (day 3), plan to wean to 1mg q12hrs and d/c on taper on monday  - Continue allopurinol 300mg daily  - Transfuse cyroglobulin if fibrinogen <150    - Platelet goal > 50k.   - Hgb goal >7  - INR < 1.5  - Monitor I/O and daily weights twice daily  - Will follow up with Dr. Mullins at [facility]

## 2020-10-16 NOTE — ASSESSMENT & PLAN NOTE
- Elevated LFTs, improving  - Etiology likely mixed with ASO administration, congestive hepatopathy, and PASCUAL  - Hepatitis studies consistent with hep B vaccine  - US liver showed no abnormalities  - Trend daily

## 2020-10-16 NOTE — PLAN OF CARE
Plan of care reviewed. Patient is oriented X4. Stand by assist. Bedside commode in room. DAT PICC dressing changed. PICC flushed and saline locked. No complaints of pain or discomfort. Remained afebrile. Steroids continued. Patient's mood calm and cooperative this shift. Bed bath self administered. All questions and concerns addressed. All safety precautions in place. Remains free of injury. Patient is stable. Will continue to monitor.

## 2020-10-16 NOTE — SUBJECTIVE & OBJECTIVE
Hospital course:  26 yo F transferred from [facility] with concern for APL (presented pancytopenic with nonspecific sx and fever). 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 Treated with ATRA (held day 18-20, 23-28 d/t differentiation syndrome) and ASO (held day 11, 12, 15-25 d/t QTc prolongation, transaminitis, differentiation syndrome). Hospital course complicated by differentiation syndrome requiring comfort flow NC on 10/4 treated with 4 days of dexamethasone 10mg IV q12hrs followed by taper and diuresis. 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 Results of BMBx performed on 10/14 pending.     Subjective:     Interval History: ATRA 29 day, ASO day 26 for APL. ATRA and ASO continue to be held due to transaminitis which is improving today. Her biopsy site was sore yesterday but states it is completely resolved today and denies any complaints other than the lgihts in her room not working appropriately.    Objective:     Vital Signs (Most Recent):  Temp: 98.1 °F (36.7 °C) (10/16/20 0411)  Pulse: 63 (10/16/20 0649)  Resp: 18 (10/16/20 0411)  BP: (!) 94/58 (10/16/20 0411)  SpO2: 97 % (10/16/20 0411) Vital Signs (24h Range):  Temp:  [97.8 °F (36.6 °C)-98.3 °F (36.8 °C)] 98.1 °F (36.7 °C)  Pulse:  [43-77] 63  Resp:  [16-18] 18  SpO2:  [94 %-100 %] 97 %  BP: ()/(58-71) 94/58     Weight: 120.9 kg (266 lb 8 oz)  Body mass index is 52.05 kg/m².  Body surface area is 2.26 meters squared.      Intake/Output - Last 3 Shifts       10/14 0700 - 10/15 0659 10/15 0700 - 10/16 0659 10/16 0700 - 10/17 0659    P.O. 1080 1171     Total Intake(mL/kg) 1080 (8.9) 1171 (9.7)     Urine (mL/kg/hr) 1450 (0.5) 1300 (0.4)     Stool 0      Total Output 1450 1300     Net -370 -129            Stool Occurrence 1 x            Physical Exam  Vitals signs reviewed.    Constitutional:       General: She is not in acute distress.     Appearance: Normal appearance. She is obese.      Comments: Laying in bed on left side   HENT:      Head: Normocephalic and atraumatic.      Mouth/Throat:      Mouth: Mucous membranes are moist.   Eyes:      Extraocular Movements: Extraocular movements intact.      Conjunctiva/sclera: Conjunctivae normal.   Neck:      Musculoskeletal: Normal range of motion and neck supple.   Cardiovascular:      Rate and Rhythm: Normal rate and regular rhythm.   Pulmonary:      Effort: Pulmonary effort is normal.      Breath sounds: Normal breath sounds.      Comments: Difficult to appreciate given poor effort and body habitus  Abdominal:      General: Bowel sounds are normal.      Palpations: Abdomen is soft.   Skin:     General: Skin is warm and dry.      Comments: Biopsy site at right buttock without surrounding erythema or tenderness   Neurological:      General: No focal deficit present.      Mental Status: She is alert and oriented to person, place, and time.   Psychiatric:         Mood and Affect: Affect normal.         Behavior: Behavior is cooperative.         Significant Labs:   CBC:   Recent Labs   Lab 10/15/20  0345 10/16/20  0400   WBC 2.42* 2.13*   HGB 10.8* 10.0*   HCT 32.3* 29.8*    220   , CMP:   Recent Labs   Lab 10/15/20  0345 10/16/20  0400    136   K 4.4 4.2    100   CO2 24 26   * 171*   BUN 24* 20   CREATININE 0.7 0.6   CALCIUM 9.4 8.8   PROT 6.9 6.4   ALBUMIN 3.6 3.4*   BILITOT 1.1* 1.2*   ALKPHOS 72 64   * 89*   * 558*   ANIONGAP 13 10   EGFRNONAA >60.0 >60.0    and All pertinent labs from the last 24 hours have been reviewed.    Diagnostic Results:  I have reviewed all pertinent imaging results/findings within the past 24 hours.

## 2020-10-16 NOTE — PT/OT/SLP PROGRESS
"Physical Therapy Treatment    Patient Name:  Erika Saini   MRN:  [id number]    Recommendations:     Discharge Recommendations:  home health PT   Discharge Equipment Recommendations: walker, rolling  Barriers to discharge: None    Assessment:     Erika Saini is a 25 y.o. female admitted with a medical diagnosis of APL (acute promyelocytic leukemia).  She presents with the following impairments/functional limitations:  weakness, impaired endurance, impaired self care skills, impaired functional mobilty, gait instability, impaired balance. Pt tolerated session well with focus on bed mobility, transfers, and gait training. Pt progressing well but demonstrates impulsive movements and poor safety awareness, especially with use of RW, with pt letting go of it and experiencing minor LOBs. Pt also demonstrates difficulty with stair training this day with difficulty elevating ~6 inch step. Pt will continue to benefit from therapy services to address impairments listed above.     Rehab Prognosis: Good; patient would benefit from acute skilled PT services to address these deficits and reach maximum level of function.    Recent Surgery: * No surgery found *      Plan:     During this hospitalization, patient to be seen 3 x/week to address the identified rehab impairments via gait training, therapeutic activities, therapeutic exercises and progress toward the following goals:    · Plan of Care Expires:  11/12/20    Subjective     Chief Complaint: weakness  Patient/Family Comments/goals: "I'm struggling to step up those stairs. I might need my parents to help me."  Pain/Comfort:  · Pain Rating 1: 0/10  · Pain Rating Post-Intervention 1: 0/10      Objective:     Communicated with NSG prior to session.  Patient found supine with peripheral IV upon PTA entry to room.     General Precautions: Standard, fall   Orthopedic Precautions:N/A   Braces: N/A     Functional Mobility:  · Bed Mobility:     · Supine to Sit: modified " independence  · Sit to Supine: modified independence  · Transfers:     · Sit to Stand:  supervision with no AD  · Gait: Pt ambulates ~176 ft and ~110 ft with RW and CGA/Min A. Pt impulsive and letting go of RW and experiencing LOBs requiring Min A to recover. Seated rest at EOB between trials. Increased lateral sway and decreased B step clearance.  · Stairs:  Pt ascended/descended 2 stair(s) with No Assistive Device with left handrail with Minimal Assistance.       AM-PAC 6 CLICK MOBILITY  Turning over in bed (including adjusting bedclothes, sheets and blankets)?: 4  Sitting down on and standing up from a chair with arms (e.g., wheelchair, bedside commode, etc.): 4  Moving from lying on back to sitting on the side of the bed?: 4  Moving to and from a bed to a chair (including a wheelchair)?: 4  Need to walk in hospital room?: 3  Climbing 3-5 steps with a railing?: 3  Basic Mobility Total Score: 22     Patient left sitting EOB with all lines intact and call button in reach..    GOALS:   Multidisciplinary Problems     Physical Therapy Goals        Problem: Physical Therapy Goal    Goal Priority Disciplines Outcome Goal Variances Interventions   Physical Therapy Goal     PT, PT/OT      Description: Goals to be met by: 10/27/2020     Patient will increase functional independence with mobility by performin. Supine to sit with Set-up [place]  2. Sit to supine with Set-up [place]  3. Sit to stand transfer with Supervision  4. Bed to chair transfer with Supervision using LRAD  5. Gait  x 250 feet with Supervision using LRAD.   6. Lower extremity exercise program x15 reps per handout, with supervision               Multidisciplinary Problems (Resolved)        Problem: Physical Therapy Goal    Goal Priority Disciplines Outcome Goal Variances Interventions   Physical Therapy Goal   (Resolved)     PT, PT/OT Met                     Time Tracking:     PT Received On: 10/16/20  PT Start Time: 1132     PT Stop  Time: 1155  PT Total Time (min): 23 min     Billable Minutes: Gait Training 23    Treatment Type: Treatment  PT/PTA: PTA     PTA Visit Number: 1     Hayden Manzo PTA  10/16/2020

## 2020-10-16 NOTE — ASSESSMENT & PLAN NOTE
- Was desaturing tot he 60's and becoming sob with movement  - CTA showed b/l pulmonary edema with left sided pleural effusion and small pericardial effusion, no PE however limited by body habitus  - Per echo, CVP 8 from 3 previously during admission  - Max weight 141.3kg, currently below admission weight at 125.6kg  - Completed 3 days of Dex 10mg q12hrs, weaned to 6mg q12hrs for 3 days, 4mg q12hrs for 2 days, weaned to 2mg q12hrs for 3 days, plan to wean to 1mg q12hrs until monday and will d/c taper at that time.  - Appears euvolemic at this time, no need for diuresis today  - Oxygen has been weaned, goal O2 sat >92%

## 2020-10-17 LAB
ALBUMIN SERPL BCP-MCNC: 3.5 G/DL (ref 3.5–5.2)
ALP SERPL-CCNC: 74 U/L (ref 55–135)
ALT SERPL W/O P-5'-P-CCNC: 569 U/L (ref 10–44)
ANION GAP SERPL CALC-SCNC: 11 MMOL/L (ref 8–16)
APTT BLDCRRT: 26.9 SEC (ref 21–32)
AST SERPL-CCNC: 102 U/L (ref 10–40)
BASOPHILS # BLD AUTO: 0 K/UL (ref 0–0.2)
BASOPHILS NFR BLD: 0 % (ref 0–1.9)
BILIRUB SERPL-MCNC: 1 MG/DL (ref 0.1–1)
BUN SERPL-MCNC: 19 MG/DL (ref 6–20)
CALCIUM SERPL-MCNC: 8.9 MG/DL (ref 8.7–10.5)
CHLORIDE SERPL-SCNC: 101 MMOL/L (ref 95–110)
CO2 SERPL-SCNC: 25 MMOL/L (ref 23–29)
CREAT SERPL-MCNC: 0.6 MG/DL (ref 0.5–1.4)
DIFFERENTIAL METHOD: ABNORMAL
EOSINOPHIL # BLD AUTO: 0 K/UL (ref 0–0.5)
EOSINOPHIL NFR BLD: 0 % (ref 0–8)
ERYTHROCYTE [DISTWIDTH] IN BLOOD BY AUTOMATED COUNT: 14.4 % (ref 11.5–14.5)
EST. GFR  (AFRICAN AMERICAN): >60 ML/MIN/1.73 M^2
EST. GFR  (NON AFRICAN AMERICAN): >60 ML/MIN/1.73 M^2
FIBRINOGEN PPP-MCNC: 274 MG/DL (ref 182–366)
GLUCOSE SERPL-MCNC: 167 MG/DL (ref 70–110)
HCT VFR BLD AUTO: 31 % (ref 37–48.5)
HGB BLD-MCNC: 10.1 G/DL (ref 12–16)
IMM GRANULOCYTES # BLD AUTO: 0.02 K/UL (ref 0–0.04)
IMM GRANULOCYTES NFR BLD AUTO: 0.8 % (ref 0–0.5)
INR PPP: 1.1 (ref 0.8–1.2)
LYMPHOCYTES # BLD AUTO: 0.4 K/UL (ref 1–4.8)
LYMPHOCYTES NFR BLD: 14.5 % (ref 18–48)
MAGNESIUM SERPL-MCNC: 1.9 MG/DL (ref 1.6–2.6)
MCH RBC QN AUTO: 29.6 PG (ref 27–31)
MCHC RBC AUTO-ENTMCNC: 32.6 G/DL (ref 32–36)
MCV RBC AUTO: 91 FL (ref 82–98)
MONOCYTES # BLD AUTO: 0.5 K/UL (ref 0.3–1)
MONOCYTES NFR BLD: 21.8 % (ref 4–15)
NEUTROPHILS # BLD AUTO: 1.6 K/UL (ref 1.8–7.7)
NEUTROPHILS NFR BLD: 62.9 % (ref 38–73)
NRBC BLD-RTO: 0 /100 WBC
PHOSPHATE SERPL-MCNC: 4.1 MG/DL (ref 2.7–4.5)
PLATELET # BLD AUTO: 293 K/UL (ref 150–350)
PML/RARA RESULT (BM): NORMAL
PML/RARA SPECIMEN TYPE (BONE MARROW): NORMAL
PMLR FINAL DIAGNOSIS (BONE MARROW): NORMAL
PMV BLD AUTO: 11.1 FL (ref 9.2–12.9)
POCT GLUCOSE: 100 MG/DL (ref 70–110)
POCT GLUCOSE: 123 MG/DL (ref 70–110)
POCT GLUCOSE: 132 MG/DL (ref 70–110)
POCT GLUCOSE: 138 MG/DL (ref 70–110)
POTASSIUM SERPL-SCNC: 4 MMOL/L (ref 3.5–5.1)
PROT SERPL-MCNC: 6.4 G/DL (ref 6–8.4)
PROTHROMBIN TIME: 12.1 SEC (ref 9–12.5)
RBC # BLD AUTO: 3.41 M/UL (ref 4–5.4)
SODIUM SERPL-SCNC: 137 MMOL/L (ref 136–145)
WBC # BLD AUTO: 2.48 K/UL (ref 3.9–12.7)

## 2020-10-17 PROCEDURE — 99233 PR SUBSEQUENT HOSPITAL CARE,LEVL III: ICD-10-PCS | Mod: ,,, | Performed by: INTERNAL MEDICINE

## 2020-10-17 PROCEDURE — 85025 COMPLETE CBC W/AUTO DIFF WBC: CPT

## 2020-10-17 PROCEDURE — 25000003 PHARM REV CODE 250: Performed by: STUDENT IN AN ORGANIZED HEALTH CARE EDUCATION/TRAINING PROGRAM

## 2020-10-17 PROCEDURE — 20600001 HC STEP DOWN PRIVATE ROOM

## 2020-10-17 PROCEDURE — 87516 HEPATITIS B DNA AMP PROBE: CPT

## 2020-10-17 PROCEDURE — 93005 ELECTROCARDIOGRAM TRACING: CPT

## 2020-10-17 PROCEDURE — 85730 THROMBOPLASTIN TIME PARTIAL: CPT

## 2020-10-17 PROCEDURE — 80053 COMPREHEN METABOLIC PANEL: CPT

## 2020-10-17 PROCEDURE — 93010 ELECTROCARDIOGRAM REPORT: CPT | Mod: ,,, | Performed by: INTERNAL MEDICINE

## 2020-10-17 PROCEDURE — 85610 PROTHROMBIN TIME: CPT

## 2020-10-17 PROCEDURE — 99233 SBSQ HOSP IP/OBS HIGH 50: CPT | Mod: ,,, | Performed by: INTERNAL MEDICINE

## 2020-10-17 PROCEDURE — 63600175 PHARM REV CODE 636 W HCPCS: Performed by: INTERNAL MEDICINE

## 2020-10-17 PROCEDURE — 93010 EKG 12-LEAD: ICD-10-PCS | Mod: ,,, | Performed by: INTERNAL MEDICINE

## 2020-10-17 PROCEDURE — 85384 FIBRINOGEN ACTIVITY: CPT

## 2020-10-17 PROCEDURE — 84100 ASSAY OF PHOSPHORUS: CPT

## 2020-10-17 PROCEDURE — 36415 COLL VENOUS BLD VENIPUNCTURE: CPT

## 2020-10-17 PROCEDURE — 83735 ASSAY OF MAGNESIUM: CPT

## 2020-10-17 RX ADMIN — ACYCLOVIR 400 MG: 200 CAPSULE ORAL at 08:10

## 2020-10-17 RX ADMIN — DEXAMETHASONE SODIUM PHOSPHATE 1 MG: 4 INJECTION INTRA-ARTICULAR; INTRALESIONAL; INTRAMUSCULAR; INTRAVENOUS; SOFT TISSUE at 08:10

## 2020-10-17 RX ADMIN — INSULIN ASPART 3 UNITS: 100 INJECTION, SOLUTION INTRAVENOUS; SUBCUTANEOUS at 01:10

## 2020-10-17 RX ADMIN — DEXAMETHASONE SODIUM PHOSPHATE 1 MG: 4 INJECTION INTRA-ARTICULAR; INTRALESIONAL; INTRAMUSCULAR; INTRAVENOUS; SOFT TISSUE at 09:10

## 2020-10-17 RX ADMIN — ENOXAPARIN SODIUM 40 MG: 40 INJECTION SUBCUTANEOUS at 05:10

## 2020-10-17 RX ADMIN — INSULIN ASPART 3 UNITS: 100 INJECTION, SOLUTION INTRAVENOUS; SUBCUTANEOUS at 08:10

## 2020-10-17 RX ADMIN — ACYCLOVIR 400 MG: 200 CAPSULE ORAL at 09:10

## 2020-10-17 NOTE — ASSESSMENT & PLAN NOTE
- Gound to have strep B agalactiae blood cultures (+ at OSH).  - Cultures and sensitivities faxed to us by [facility] show organism sensitive to pcn.   - Repeat cultures negative.    - Completed Ceftriaxone 10/3

## 2020-10-17 NOTE — ASSESSMENT & PLAN NOTE
- Monitor TLS and DIC labs daily, monitoring for differentiation syndrome  - 2D ECHO - EF 58%, repeat EF 55% with small pericardial effusion, mild TR, CVP 8 from 3 earlier in the admission  - Continue tretinoin (start 9/18, today is Day 30) BID w/ meals,  - Holding ASO (start 09/21, today is day 27), monitor Qtc (daily EKG until improved, weekly thereafter), LFTs  - ATRA held day 18-20, 23-29 due to differentiation syndrome and transaminitis  - ASO held day 11, 12, 15-26 due to prolonged QTc and elevated LFTs, dose reduced 50% day 13 and 14  - Will hold both today due to transaminitis  - BMBx day 27, results pending  - 3 days of Dex 10mg q12hr for differentiation syndrome, weaned to 6mg q12hrs for 3 days, 4mg q12h for 2 days, weaned 2mg q12hrs (day 3), plan to wean to 1mg q12hrs and d/c on taper on Monday.  - Dexamethasone is d/c on 10/18  - Will follow up with Dr. Mullins at [facility]

## 2020-10-17 NOTE — PLAN OF CARE
Plan of care reviewed. Patient is oriented X4. Stand by assist. Bedside commode in room. PICC flushed and saline locked. No complaints of pain or discomfort. Remained afebrile. Steroids titrated down per order. Accu check HS; No coverage required. 1 BM this shift. All questions and concerns addressed. All safety precautions in place. Remains free of injury. Patient is stable. Will continue to monitor.

## 2020-10-17 NOTE — PROGRESS NOTES
[facility]  Hematology  Bone Marrow Transplant  Progress Note    Patient Name: Erika Saini  Admission Date: 9/18/2020  Hospital Length of Stay: 29 days  Code Status: Full Code    Subjective:     Interval History: ATRA 30 day, ASO, nellie 27 for APL. On hold due to to transaminitis, which is same levels as yesterday. She has no other complaints today.       Objective:     Vital Signs (Most Recent):  Temp: 97.9 °F (36.6 °C) (10/17/20 0756)  Pulse: 76 (10/17/20 0756)  Resp: 18 (10/17/20 0756)  BP: 108/70 (10/17/20 0756)  SpO2: 96 % (10/17/20 0756) Vital Signs (24h Range):  Temp:  [97.9 °F (36.6 °C)-98.3 °F (36.8 °C)] 97.9 °F (36.6 °C)  Pulse:  [58-94] 76  Resp:  [15-18] 18  SpO2:  [96 %-100 %] 96 %  BP: ()/(50-73) 108/70     Weight: 120.7 kg (266 lb)  Body mass index is 51.95 kg/m².  Body surface area is 2.26 meters squared.    ECOG SCORE         [unfilled]    Intake/Output - Last 3 Shifts       10/15 0700 - 10/16 0659 10/16 0700 - 10/17 0659 10/17 0700 - 10/18 0659    P.O. 1171 355     Total Intake(mL/kg) 1171 (9.7) 355 (2.9)     Urine (mL/kg/hr) 1300 (0.4) 625 (0.2)     Stool  0     Total Output 1300 625     Net -129 -270            Urine Occurrence  3 x     Stool Occurrence  1 x           Physical Exam  Constitutional:       General: She is not in acute distress.     Appearance: She is obese.   HENT:      Head: Normocephalic and atraumatic.   Eyes:      Pupils: Pupils are equal, round, and reactive to light.   Cardiovascular:      Rate and Rhythm: Normal rate and regular rhythm.      Heart sounds: No murmur.   Pulmonary:      Effort: No respiratory distress.      Breath sounds: Normal breath sounds. No wheezing.   Abdominal:      General: Abdomen is flat. Bowel sounds are normal. There is no distension.      Palpations: Abdomen is soft. There is no mass.      Tenderness: There is no abdominal tenderness.   Musculoskeletal:         General: No swelling or deformity.   Skin:     Coloration:  Skin is not jaundiced.   Neurological:      General: No focal deficit present.      Mental Status: She is alert and oriented to person, place, and time. Mental status is at baseline.         Significant Labs:   CBC:   Recent Labs   Lab 10/16/20  0400 10/17/20  0417   WBC 2.13* 2.48*   HGB 10.0* 10.1*   HCT 29.8* 31.0*    293    and CMP:   Recent Labs   Lab 10/16/20  0400 10/17/20  0417    137   K 4.2 4.0    101   CO2 26 25   * 167*   BUN 20 19   CREATININE 0.6 0.6   CALCIUM 8.8 8.9   PROT 6.4 6.4   ALBUMIN 3.4* 3.5   BILITOT 1.2* 1.0   ALKPHOS 64 74   AST 89* 102*   * 569*   ANIONGAP 10 11   EGFRNONAA >60.0 >60.0       Diagnostic Results:  None    Assessment/Plan:     * APL (acute promyelocytic leukemia)  - Monitor TLS and DIC labs daily, monitoring for differentiation syndrome  - 2D ECHO - EF 58%, repeat EF 55% with small pericardial effusion, mild TR, CVP 8 from 3 earlier in the admission  - Continue tretinoin (start 9/18, today is Day 30) BID w/ meals,  - Holding ASO (start 09/21, today is day 27), monitor Qtc (daily EKG until improved, weekly thereafter), LFTs  - ATRA held day 18-20, 23-29 due to differentiation syndrome and transaminitis  - ASO held day 11, 12, 15-26 due to prolonged QTc and elevated LFTs, dose reduced 50% day 13 and 14  - Will hold both today due to transaminitis  - BMBx day 27, results pending  - 3 days of Dex 10mg q12hr for differentiation syndrome, weaned to 6mg q12hrs for 3 days, 4mg q12h for 2 days, weaned 2mg q12hrs (day 3), plan to wean to 1mg q12hrs and d/c on taper on monday  - Continue allopurinol 300mg daily  - Transfuse cyroglobulin if fibrinogen <150    - Platelet goal > 50k.   - Hgb goal >7  - INR < 1.5  - Monitor I/O and daily weights twice daily  - Will follow up with Dr. Mullins at [facility]    Steroid-induced hyperglycemia  - A1C 6%  - BG normal when off steroids  - With steroids CBGs have been significantly elevated  - Decreased Detemir to 14  units today with moderate SSI  - Aspart 3 units TIDAC  - Better controlled on above regimen  - Monitor closely as steroids are weaned    Differentiation syndrome  - Was desaturing tot he 60's and becoming sob with movement  - CTA showed b/l pulmonary edema with left sided pleural effusion and small pericardial effusion, no PE however limited by body habitus  - Per echo, CVP 8 from 3 previously during admission  - Max weight 141.3kg, currently below admission weight at 125.6kg  - Completed 3 days of Dex 10mg q12hrs, weaned to 6mg q12hrs for 3 days, 4mg q12hrs for 2 days, weaned to 2mg q12hrs for 3 days, plan to wean to 1mg q12hrs until monday and will d/c taper at that time.  - Appears euvolemic at this time, no need for diuresis today  - Oxygen has been weaned, goal O2 sat >92%    Transaminitis  - Elevated LFTs, improving  - Etiology likely mixed with ASO administration, congestive hepatopathy, and PASCUAL  - Hepatitis studies consistent with hep B vaccine  - US liver showed no abnormalities  - Trend daily    Sinus tachycardia  -- Improved, possibly a combination of ASO administration with differentiation syndrome    Retinopathy  On 9/27 complained of vision changes present even before admission    - Opthalmology evaluated, appreciate assistance   - Secondary to subretinal hemorrhage?   - Per optho patient's vision should improve with time as pre-retinal hemorrhages resolve.   - May need surgical intervention with vitrectomy if hemorrhages do not resolve in a few weeks.   - Return to retina clinic in 6 weeks    Hypokalemia  - Monitor and replace PRN, keep K >4 and Mg >2 ( Given qt prolongation risk with treatment)       Pancytopenia  - Secondary to APL   - Transfuse if platelets < 50, Hgb< 7      Adjustment reaction with anxiety  - seen by oncology psychology     Neutropenic fever  - Gound to have strep B agalactiae blood cultures (+ at OSH).  - Cultures and sensitivities faxed to us by [facility] show organism sensitive to  pcn.   - Repeat cultures negative.    - Completed Ceftriaxone 10/3       Morbid obesity with BMI of 50.0-59.9, adult  Patient with BMI 57.91        VTE Risk Mitigation (From admission, onward)         Ordered     enoxaparin injection 40 mg  Every 24 hours      10/12/20 0839     heparin, porcine (PF) 100 unit/mL injection flush 300 Units  As needed (PRN)      09/21/20 1554     IP VTE HIGH RISK PATIENT  Once      09/18/20 0148     Place sequential compression device  Until discontinued      09/18/20 0148                Disposition: TBD    Milton Schroeder MD  Bone Marrow Transplant  [facility]

## 2020-10-17 NOTE — PLAN OF CARE
Plan of care reviewed with patient at beginning of shift. Patient had no complaints. Patient had a hep B test sent off. Patient had one BM. Possible discharge Monday. Bed locked in lowest position. Side rails up x2. Call bell within reach. BADL within reach. Rounding Q1H. Will continue to monitor.

## 2020-10-17 NOTE — SUBJECTIVE & OBJECTIVE
Subjective:     Interval History: ATRA 30 day, ASO, nellie 27 for APL. On hold due to to transaminitis, which is same levels as yesterday. She has no other complaints today.       Objective:     Vital Signs (Most Recent):  Temp: 97.9 °F (36.6 °C) (10/17/20 0756)  Pulse: 76 (10/17/20 0756)  Resp: 18 (10/17/20 0756)  BP: 108/70 (10/17/20 0756)  SpO2: 96 % (10/17/20 0756) Vital Signs (24h Range):  Temp:  [97.9 °F (36.6 °C)-98.3 °F (36.8 °C)] 97.9 °F (36.6 °C)  Pulse:  [58-94] 76  Resp:  [15-18] 18  SpO2:  [96 %-100 %] 96 %  BP: ()/(50-73) 108/70     Weight: 120.7 kg (266 lb)  Body mass index is 51.95 kg/m².  Body surface area is 2.26 meters squared.    ECOG SCORE         [unfilled]    Intake/Output - Last 3 Shifts       10/15 0700 - 10/16 0659 10/16 0700 - 10/17 0659 10/17 0700 - 10/18 0659    P.O. 1171 355     Total Intake(mL/kg) 1171 (9.7) 355 (2.9)     Urine (mL/kg/hr) 1300 (0.4) 625 (0.2)     Stool  0     Total Output 1300 625     Net -129 -270            Urine Occurrence  3 x     Stool Occurrence  1 x           Physical Exam  Constitutional:       General: She is not in acute distress.     Appearance: She is obese.   HENT:      Head: Normocephalic and atraumatic.   Eyes:      Pupils: Pupils are equal, round, and reactive to light.   Cardiovascular:      Rate and Rhythm: Normal rate and regular rhythm.      Heart sounds: No murmur.   Pulmonary:      Effort: No respiratory distress.      Breath sounds: Normal breath sounds. No wheezing.   Abdominal:      General: Abdomen is flat. Bowel sounds are normal. There is no distension.      Palpations: Abdomen is soft. There is no mass.      Tenderness: There is no abdominal tenderness.   Musculoskeletal:         General: No swelling or deformity.   Skin:     Coloration: Skin is not jaundiced.   Neurological:      General: No focal deficit present.      Mental Status: She is alert and oriented to person, place, and time. Mental status is at baseline.         Significant  Labs:   CBC:   Recent Labs   Lab 10/16/20  0400 10/17/20  0417   WBC 2.13* 2.48*   HGB 10.0* 10.1*   HCT 29.8* 31.0*    293    and CMP:   Recent Labs   Lab 10/16/20  0400 10/17/20  0417    137   K 4.2 4.0    101   CO2 26 25   * 167*   BUN 20 19   CREATININE 0.6 0.6   CALCIUM 8.8 8.9   PROT 6.4 6.4   ALBUMIN 3.4* 3.5   BILITOT 1.2* 1.0   ALKPHOS 64 74   AST 89* 102*   * 569*   ANIONGAP 10 11   EGFRNONAA >60.0 >60.0       Diagnostic Results:  None

## 2020-10-17 NOTE — ASSESSMENT & PLAN NOTE
- Monitor TLS and DIC labs daily, monitoring for differentiation syndrome  - 2D ECHO - EF 58%, repeat EF 55% with small pericardial effusion, mild TR, CVP 8 from 3 earlier in the admission  - Continue tretinoin (start 9/18, today is Day 30) BID w/ meals,  - Holding ASO (start 09/21, today is day 27), monitor Qtc (daily EKG until improved, weekly thereafter), LFTs  - ATRA held day 18-20, 23-29 due to differentiation syndrome and transaminitis  - ASO held day 11, 12, 15-26 due to prolonged QTc and elevated LFTs, dose reduced 50% day 13 and 14  - Will hold both today due to transaminitis  - BMBx day 27, results pending  - 3 days of Dex 10mg q12hr for differentiation syndrome, weaned to 6mg q12hrs for 3 days, 4mg q12h for 2 days, weaned 2mg q12hrs (day 3), plan to wean to 1mg q12hrs and d/c on taper on monday  - Continue allopurinol 300mg daily  - Transfuse cyroglobulin if fibrinogen <150    - Platelet goal > 50k.   - Hgb goal >7  - INR < 1.5  - Monitor I/O and daily weights twice daily  - Will follow up with Dr. Mullins at [facility]

## 2020-10-18 VITALS
SYSTOLIC BLOOD PRESSURE: 120 MMHG | HEIGHT: 60 IN | OXYGEN SATURATION: 100 % | TEMPERATURE: 98 F | RESPIRATION RATE: 16 BRPM | BODY MASS INDEX: 52.22 KG/M2 | HEART RATE: 78 BPM | DIASTOLIC BLOOD PRESSURE: 88 MMHG | WEIGHT: 266 LBS

## 2020-10-18 PROBLEM — D70.9 NEUTROPENIC FEVER: Status: RESOLVED | Noted: 2020-09-18 | Resolved: 2020-10-18

## 2020-10-18 PROBLEM — R50.81 NEUTROPENIC FEVER: Status: RESOLVED | Noted: 2020-09-18 | Resolved: 2020-10-18

## 2020-10-18 PROBLEM — E87.6 HYPOKALEMIA: Status: RESOLVED | Noted: 2020-09-20 | Resolved: 2020-10-18

## 2020-10-18 PROBLEM — D61.818 PANCYTOPENIA: Status: RESOLVED | Noted: 2020-09-19 | Resolved: 2020-10-18

## 2020-10-18 PROBLEM — R00.0 SINUS TACHYCARDIA: Status: RESOLVED | Noted: 2020-10-02 | Resolved: 2020-10-18

## 2020-10-18 PROBLEM — R73.9 STEROID-INDUCED HYPERGLYCEMIA: Status: RESOLVED | Noted: 2020-10-11 | Resolved: 2020-10-18

## 2020-10-18 PROBLEM — T38.0X5A STEROID-INDUCED HYPERGLYCEMIA: Status: RESOLVED | Noted: 2020-10-11 | Resolved: 2020-10-18

## 2020-10-18 PROBLEM — T88.7XXA DIFFERENTIATION SYNDROME: Status: RESOLVED | Noted: 2020-10-11 | Resolved: 2020-10-18

## 2020-10-18 PROBLEM — H35.00 RETINOPATHY: Status: RESOLVED | Noted: 2020-09-27 | Resolved: 2020-10-18

## 2020-10-18 LAB
ALBUMIN SERPL BCP-MCNC: 3.6 G/DL (ref 3.5–5.2)
ALP SERPL-CCNC: 69 U/L (ref 55–135)
ALT SERPL W/O P-5'-P-CCNC: 546 U/L (ref 10–44)
ANION GAP SERPL CALC-SCNC: 10 MMOL/L (ref 8–16)
APTT BLDCRRT: 27.3 SEC (ref 21–32)
AST SERPL-CCNC: 88 U/L (ref 10–40)
BASOPHILS # BLD AUTO: 0 K/UL (ref 0–0.2)
BASOPHILS NFR BLD: 0 % (ref 0–1.9)
BILIRUB SERPL-MCNC: 1 MG/DL (ref 0.1–1)
BUN SERPL-MCNC: 17 MG/DL (ref 6–20)
CALCIUM SERPL-MCNC: 8.8 MG/DL (ref 8.7–10.5)
CHLORIDE SERPL-SCNC: 101 MMOL/L (ref 95–110)
CO2 SERPL-SCNC: 26 MMOL/L (ref 23–29)
CREAT SERPL-MCNC: 0.6 MG/DL (ref 0.5–1.4)
DIFFERENTIAL METHOD: ABNORMAL
EOSINOPHIL # BLD AUTO: 0 K/UL (ref 0–0.5)
EOSINOPHIL NFR BLD: 0 % (ref 0–8)
ERYTHROCYTE [DISTWIDTH] IN BLOOD BY AUTOMATED COUNT: 14.6 % (ref 11.5–14.5)
EST. GFR  (AFRICAN AMERICAN): >60 ML/MIN/1.73 M^2
EST. GFR  (NON AFRICAN AMERICAN): >60 ML/MIN/1.73 M^2
FIBRINOGEN PPP-MCNC: 242 MG/DL (ref 182–366)
GLUCOSE SERPL-MCNC: 162 MG/DL (ref 70–110)
HCT VFR BLD AUTO: 30.9 % (ref 37–48.5)
HGB BLD-MCNC: 10 G/DL (ref 12–16)
IMM GRANULOCYTES # BLD AUTO: 0.03 K/UL (ref 0–0.04)
IMM GRANULOCYTES NFR BLD AUTO: 0.8 % (ref 0–0.5)
INR PPP: 1.1 (ref 0.8–1.2)
LYMPHOCYTES # BLD AUTO: 0.5 K/UL (ref 1–4.8)
LYMPHOCYTES NFR BLD: 12.5 % (ref 18–48)
MAGNESIUM SERPL-MCNC: 1.9 MG/DL (ref 1.6–2.6)
MCH RBC QN AUTO: 29.5 PG (ref 27–31)
MCHC RBC AUTO-ENTMCNC: 32.4 G/DL (ref 32–36)
MCV RBC AUTO: 91 FL (ref 82–98)
MONOCYTES # BLD AUTO: 0.7 K/UL (ref 0.3–1)
MONOCYTES NFR BLD: 19.2 % (ref 4–15)
NEUTROPHILS # BLD AUTO: 2.5 K/UL (ref 1.8–7.7)
NEUTROPHILS NFR BLD: 67.5 % (ref 38–73)
NRBC BLD-RTO: 0 /100 WBC
PHOSPHATE SERPL-MCNC: 3.7 MG/DL (ref 2.7–4.5)
PLATELET # BLD AUTO: 350 K/UL (ref 150–350)
PMV BLD AUTO: 11.3 FL (ref 9.2–12.9)
POCT GLUCOSE: 116 MG/DL (ref 70–110)
POCT GLUCOSE: 158 MG/DL (ref 70–110)
POTASSIUM SERPL-SCNC: 3.8 MMOL/L (ref 3.5–5.1)
PROT SERPL-MCNC: 6.4 G/DL (ref 6–8.4)
PROTHROMBIN TIME: 12 SEC (ref 9–12.5)
RBC # BLD AUTO: 3.39 M/UL (ref 4–5.4)
SODIUM SERPL-SCNC: 137 MMOL/L (ref 136–145)
WBC # BLD AUTO: 3.69 K/UL (ref 3.9–12.7)

## 2020-10-18 PROCEDURE — 36415 COLL VENOUS BLD VENIPUNCTURE: CPT

## 2020-10-18 PROCEDURE — 85730 THROMBOPLASTIN TIME PARTIAL: CPT

## 2020-10-18 PROCEDURE — 85610 PROTHROMBIN TIME: CPT

## 2020-10-18 PROCEDURE — 63600175 PHARM REV CODE 636 W HCPCS: Performed by: INTERNAL MEDICINE

## 2020-10-18 PROCEDURE — 99238 PR HOSPITAL DISCHARGE DAY,<30 MIN: ICD-10-PCS | Mod: ,,, | Performed by: INTERNAL MEDICINE

## 2020-10-18 PROCEDURE — 93010 ELECTROCARDIOGRAM REPORT: CPT | Mod: ,,, | Performed by: INTERNAL MEDICINE

## 2020-10-18 PROCEDURE — 85384 FIBRINOGEN ACTIVITY: CPT

## 2020-10-18 PROCEDURE — 93005 ELECTROCARDIOGRAM TRACING: CPT

## 2020-10-18 PROCEDURE — 80053 COMPREHEN METABOLIC PANEL: CPT

## 2020-10-18 PROCEDURE — 99238 HOSP IP/OBS DSCHRG MGMT 30/<: CPT | Mod: ,,, | Performed by: INTERNAL MEDICINE

## 2020-10-18 PROCEDURE — 85025 COMPLETE CBC W/AUTO DIFF WBC: CPT

## 2020-10-18 PROCEDURE — 93010 EKG 12-LEAD: ICD-10-PCS | Mod: ,,, | Performed by: INTERNAL MEDICINE

## 2020-10-18 PROCEDURE — 84100 ASSAY OF PHOSPHORUS: CPT

## 2020-10-18 PROCEDURE — 25000003 PHARM REV CODE 250: Performed by: STUDENT IN AN ORGANIZED HEALTH CARE EDUCATION/TRAINING PROGRAM

## 2020-10-18 PROCEDURE — 83735 ASSAY OF MAGNESIUM: CPT

## 2020-10-18 RX ORDER — ACYCLOVIR 400 MG/1
400 TABLET ORAL 2 TIMES DAILY
Qty: 60 TABLET | Refills: 5 | Status: SHIPPED | OUTPATIENT
Start: 2020-10-18 | End: 2020-10-20 | Stop reason: SDUPTHER

## 2020-10-18 RX ORDER — TRETINOIN 10 MG/1
45 CAPSULE ORAL 2 TIMES DAILY
Qty: 280 CAPSULE | Refills: 3 | Status: SHIPPED | OUTPATIENT
Start: 2020-10-18 | End: 2022-07-22

## 2020-10-18 RX ADMIN — Medication 400 MG: at 08:10

## 2020-10-18 RX ADMIN — ACYCLOVIR 400 MG: 200 CAPSULE ORAL at 08:10

## 2020-10-18 RX ADMIN — DEXAMETHASONE SODIUM PHOSPHATE 1 MG: 4 INJECTION INTRA-ARTICULAR; INTRALESIONAL; INTRAMUSCULAR; INTRAVENOUS; SOFT TISSUE at 08:10

## 2020-10-18 RX ADMIN — POTASSIUM CHLORIDE 20 MEQ: 750 CAPSULE, EXTENDED RELEASE ORAL at 05:10

## 2020-10-18 RX ADMIN — INSULIN ASPART 3 UNITS: 100 INJECTION, SOLUTION INTRAVENOUS; SUBCUTANEOUS at 12:10

## 2020-10-18 RX ADMIN — Medication 400 MG: at 05:10

## 2020-10-18 RX ADMIN — INSULIN ASPART 3 UNITS: 100 INJECTION, SOLUTION INTRAVENOUS; SUBCUTANEOUS at 08:10

## 2020-10-18 RX ADMIN — INSULIN ASPART 2 UNITS: 100 INJECTION, SOLUTION INTRAVENOUS; SUBCUTANEOUS at 12:10

## 2020-10-18 NOTE — PROGRESS NOTES
MD Rosaura, Med Onc fellow called on call SW and informed that pt will be d/c today with PICC line.  SW researched HH companies covered by pt's insurance and called several who are not accepting Medicaid pts at this time.  [facility] ( [place] [phone]) said they could take pt.  SW sent referral through [facility]. Fellow informed SW that PICC line will not need immediate care, therefore pt can d/c without being accepted by HH.  Pt's mother will provide transportation home.  SW spoke with pt's mother (Jennifer [phone]) and explained that PICC line will need care within a week.  Provided SW name and direct contact information and noted that pt will need a clinic follow up if HH does not admit. Sw encouraged Jennifer to call with future needs or questions.
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
## 2020-10-18 NOTE — DISCHARGE SUMMARY
"[facility]  Hematology  Bone Marrow Transplant  Discharge Summary      Patient Name: Erika Saini  MRN: [id number]  Admission Date: 9/18/2020  Hospital Length of Stay: 30 days  Discharge Date and Time:  10/18/2020 12:29 PM  Attending Physician: Sunshine Sanabria MD   Discharging Provider: Milton Schroeder MD  Primary Care Provider: Jermaine Navas    HPI: Ms. Saini is a 25 year old woman with no significant medical history who presents as a transfer from [facility] for concern of APL. She says that she went to the ED on Wednesday (09/16) for decreased appetite, dehydration. She says that she has been feeling like that since the towards the end of last month. She has also been having a hard time swallowing and feeling weak to the point that she had a fall on Tuesday. She stated that she was too weak and that is why she fell. She did not hit her head at the time. The last time she ate was when she had a chocolate pudding on Wednesday. She says that she had a fever of up to 103F in the hospital but did not have fevers prior to then. Upon review of symptoms, she denies weight loss, chills, night sweats, chest pain, SOB, cough, abdominal pain, nausea, vomiting, constipation, diarrhea, dysuria, leg swelling, headaches. Regarding family history, she states that her paternal grandfather had cancer but does not know which type of cancer. She denies smoking and states that she "barely" drinks alcohol.     At OSH, her labs were notable for pancytopenia: WBC 0.6, H&H 3.0/9.4, platelets 8. She was started on vancomycin and cefepime as she had a fever of 39.6C. CXR did not show an acute process. UA was suggestive of possible UTI. Blood cultures were positive for group B strep. CT chest, abdomen, pelvis did not show acute findings. She was given about 3U PRBC and 2U platelets. Bone marrow biopsy was concerning for APL.     Patient will be admitted to the Hematology BMT service for " further evaluation and management.    * No surgery found *     Hospital Course: 24 yo F transferred from [facility] with concern for APL (presented pancytopenic with nonspecific sx and fever). 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 Treated with ATRA (held day 18-20 d/t differentiation syndrome) and ASO (held day 11, 12, 15-18 d/t QTc prolongation, transaminitis, differentiation syndrome). 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     Patient with APL and pancytopenia, started on ATRA on 9/18, on allopurinol and IVF, being monitored for TLS and DIC. She was found to have Group B streptococcus bacteremia in the OSH, currently on cefepime, will need to get records from OSH.   09/21/2020 NAEON. Weight has increased 10lbs since admission, no shortness of breath. D4 ATRA. APL testing pending, reaching out to pathology dept at [facility]. Blood cultures have NGTD. Plan for PICC today.   09/22/2020  PICC line placed yesterday. PML LORENA FISH confirmed low risk APL. Initiated ASO, today is day 2. ATRA day 5. Hypoxic overnight - briefly requiring 5L NC, now comfortable on 2L. CXR revealed ? RLL airspace disease.  Weight has increased 4kgs. Blood cultures have NGTD. Plan for PICC today.  09/23/2020 ATRA day 6, ASO day 3. Dexamethasone day 2 for ? Differentiation syndrome. Now on RA. Awaiting slides from [facility], request sent. Platelets 29, getting 1 pack, .  09/24/2020 ATRA day 7, ASO day 4. Dexamethasone day 3/3 for differentiation syndrome. Now on RA. Awaiting slides from [facility], request sent. WBC count has recovered to 1.98  09/25/2020 ATRA day 8, ASO day 5. Comfortable on RA. Awaiting slides from  [facility], request sent. WBC count has recovered to 3.67.   09/26/2020 ATRA day 9 and ASO D6, has heartburn, no other complaints.  09/27/2020 ATRA day 10 and ASO D7. Complained of vision changes, evaluated by ophthalmology possibly related to leukemia retinopathy.    09/28/2020 ATRA day 11, ASO day 8. Comfortable on RA. Endorses blurry vision but denies severe HA, focal neurological deficits, or severe vision loss. No chest pain, comfortable on RA. WBC count normal. PLTs 41.  09/29/2020 ATRA day 12, ASO day 9. Comfortable on RA. Tachycardic overnight. Awaiting ophthalmology exam. Denies severe HA, focal neurological deficits, or severe vision loss. WBC count normal. PLTs 41, transfusion ordered for goal PLT > 50.   09/30/2020 ATRA day 13, ASO day 10. Comfortable on RA. HA last night resolved with tramadol, no complaints this AM, says vision improved. Tachycardic overnight. Ophthalmology exam revealed scattered intra retinal hemorrhages. Denies severe HA, focal neurological deficits, or severe vision loss. AST 71 -> 106,  -> 144  10/01/2020 ATRA day 14, ASO day 11. Had reflux due to coughing and a few loose stools overnight. Continues to have the same headache that has not changed in intensity. Feels her visual symptoms have not changed. Slept well otherwise and denies complaints this morning. Plts of 36 with LFTs remaining stable from yesterday.  10/02/2020 ATRA day 15, ASO day 12. ASO held yesterday due to prolonged QTc.Continues to complain of headache and vomiting that is brought on by cough. Symptoms improve with Tylenol. Has not tried the Benzonatate yet to prevent cough, encouraged use this morning. WBC increased to 19 this AM, remains afebrile. Plts 53. Received a 250cc bolus overnight for BP in the 90s/50s with improvement. Pt reports not eating or drinking very much.  10/03/2020  ATRA day 16, ASO day 13 (but ASO has been held since day 11 due to prolonged QTc). EKG this AM pending. WBC stable at 19 this  AM, remains afebrile.  10/04/2020 ATRA day 17, ASO day 14 (ASO held day 11 - 12 due to prolonged QTc). EKG this AM pending. WBC increased to 24 this AM, remains afebrile. Complaining of nausea and persistent cough this AM.   10/04/2020 ATRA day 17, ASO day 14 (ASO held day 11 - 12 due to prolonged QTc). EKG this AM pending. WBC increased to 24 this AM, remains afebrile. Complaining of nausea and persistent cough this AM. In the afternoon her O2 sats dropped to 64%, put on NRB. CXR and stat labs ordered. Given Dexamethasone 10mg IV.   10/05/2020 ATRA day 18, ASO day 15 (ASO held day 11 - 12 due to prolonged QTc). EKG this AM with QTc 496. Remains on Comfort Flow 22L 70% FIO2. Only complains of cough this morning. Otherwise feels comfortable and denies complaints. States headache resolved Saturday and has not returned. Will hold ATRA and ASO today given pleural effusion/pulmonary edema with oxygen requirements and QTc.   10/06/2020 ATRA day 19, ASO day 16 (both held yesterday with QTc 493 and concern for differentiation syndrome). Comfort flow titrated down, currently 20L 50%. Pt comfortable in bed on those settings however desaturates and becomes short of breath with movement to the bedside commode. Hgb 6.9 and plts 48 this AM so received a unit of both with lasix prior to transfusion. Net negative 870cc over last 24hrs. Pt denies complaints this morning other than shortness of breath with significant movement.  10/07/2020 ATRA 20 day, ASO day 17 (both held the last two days). Slept well overnight, was awoken by cough this morning. Still feels short of breath with getting up to use the cammode but is comfortable in bed. Remains on comfort flow 20L 50%. Denies any new complaints. Left blurry vision remains the same and has had no recurrence of her headache.   10/08/2020 ATRA 21 day, ASO day 18 (both held the last three days). UOP 2L yesterday with net negative 1L. Comfort flow down to 15L and 40% this morning. Was  "able to get up and use the commode without shortness of breath. Continues to have a cough but denies any other complaints this morning. Bradycardia noted into the 30s while pt was asleep, EKG showed pt with HR in the 70's once awake with QTc 504.  10/09/2020 ATRA 22 day, ASO day 19. Received ATRA but not ASO yesterday.UOP 1.9L with net negative 1.4L over last 24hrs. Afebrile overnight and comfortably asleep this morning on comfort flow 15L 30%.  10/10/2020 ATRA 23 day, ASO day 20. Received ATRA but not ASO yesterday. UOP 2.8L, in's not recorded. Titrated down from comfort flow to high flow overnight. Afebrile and comfortably asleep this morning. Denies complaints once awake.  10/11/2020  ATRA 24 day, ASO day 21. ATRA and ASO held yesterday with increasing ALT. UOP 1.8L with PO 40mg lasix. Currently on 2L NC with no complaints. Had a headache yesterday evening which has since resolved. Denies any complaints and is questioning when she will have her bone marrow done.  10/12/2020 ATRA 25 day, ASO day 22. ATRA and ASO held yesterday with increasing ALT. Net negative 360cc without diuretics yesterday. Remains on 2L NC. Will give another dose of oral lasix to help remove oxygen. Weight 125kg from 134kg on admission. Encouraged movement as pt lays in bed all day. Denies any complaints this morning. Slept well. Has been persistently bradycardic when laying in bed/sleeping but HR will increase into 60s when pt sit up.  10/13/2020 ATRA 26 day, ASO day 23 for APL. Afebrile. Weaned off oxygen overnight. Urinated "a lot" yesterday but only net negative -120cc documented with 800cc UOP so likely inaccurate recording. Denies any complaints this morning. States she got up to the couch several times yesterday "just no one saw me."  10/14/2020 ATRA 27 day, ASO day 24 for APL. ATRA and ASO continue to be held due to transaminitis. Walked with PT in halls yesterday. Denies any complaints this morning. Afebrile. Saturating 93-97% on " RA.  10/15/2020 ATRA 28 day, ASO day 25 for APL. ATRA and ASO continue to be held. Underwent BMBx yesterday without issue. Weights 121kg from 134.5 on admission. Is upset she's losing weight while in the hospital. Denies any complaints this morning.  10/16/2020 ATRA 29 day, ASO day 26 for APL. ATRA and ASO continue to be held due to transaminitis which is improving today. Her biopsy site was sore yesterday but states it is completely resolved today and denies any complaints other than the lgihts in her room not working appropriately.  10/17/2020 ATRA 30 day, ASO, day 27 for APL. On hold due to to transaminitis, which is same levels as yesterday. She has no other complaints today.   10/18/2020 Bone marrow done on 10/14 is reviewed. She is in morphological remission. PML/LORENA +ve 2.2%.   She will discharged. Follow-up will be arranged with Dr. Jenkins at [facility]. She will be discharged on acyclovir. ATRA will be mailed to her address. Will discharge her with PICC line in for potential chemotherapy. PICC line supplies with be delivered.  Her mother was informed of the results and discharge plans.           Consults (From admission, onward)        Status Ordering Provider     Inpatient consult to Hematology/Oncology Psychology  Once     Provider:  (Not yet assigned)    Completed MAHENDRA TANG     Inpatient consult to Ophthalmology  Once     Provider:  (Not yet assigned)    Completed EVIE STEELE     Inpatient consult to PICC team ([facility])  Once     Provider:  (Not yet assigned)    Completed MAHENDRA TANG     Inpatient consult to PICC team ([facility])  Once     Provider:  (Not yet assigned)    Completed MARIETTA FLORES          Significant Diagnostic Studies: Labs:   BMP:   Recent Labs   Lab 10/17/20  0417 10/18/20  0400   * 162*    137   K 4.0 3.8    101   CO2 25 26   BUN 19 17   CREATININE 0.6 0.6   CALCIUM 8.9 8.8   MG 1.9 1.9   , CMP   Recent Labs   Lab 10/17/20  0417  10/18/20  0400    137   K 4.0 3.8    101   CO2 25 26   * 162*   BUN 19 17   CREATININE 0.6 0.6   CALCIUM 8.9 8.8   PROT 6.4 6.4   ALBUMIN 3.5 3.6   BILITOT 1.0 1.0   ALKPHOS 74 69   * 88*   * 546*   ANIONGAP 11 10   ESTGFRAFRICA >60.0 >60.0   EGFRNONAA >60.0 >60.0    and INR   Lab Results   Component Value Date    INR 1.1 10/18/2020    INR 1.1 10/17/2020    INR 1.1 10/16/2020       Pending Diagnostic Studies:     Procedure Component Value Units Date/Time    Hepatitis B Viral DNA by PCR, Qualitative [[id number]] Collected: 10/17/20 1304    Order Status: Sent Lab Status: In process Updated: 10/17/20 1317    Specimen: Blood         Final Active Diagnoses:    Diagnosis Date Noted POA    PRINCIPAL PROBLEM:  APL (acute promyelocytic leukemia) [C92.40] 09/18/2020 Yes    Transaminitis [R74.01] 10/07/2020 Unknown    Morbid obesity with BMI of 50.0-59.9, adult [E66.01, Z68.43] 09/18/2020 Not Applicable    Adjustment reaction with anxiety [F43.22] 09/18/2020 Unknown      Problems Resolved During this Admission:    Diagnosis Date Noted Date Resolved POA    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] 10/11/2020 10/18/2020 Unknown    Steroid-induced hyperglycemia [R73.9, T38.0X5A] 10/11/2020 10/18/2020 Unknown    Sinus tachycardia [R00.0] 10/02/2020 10/18/2020 Unknown    Retinopathy [H35.00] 09/27/2020 10/18/2020 Unknown    Shortness of breath [R06.02] 09/22/2020 10/11/2020 Unknown    Hypokalemia [E87.6] 09/20/2020 10/18/2020 Unknown    Hypomagnesemia [E83.42] 09/19/2020 09/20/2020 Unknown    Pancytopenia [D61.818] 09/19/2020 10/18/2020 Unknown    Neutropenic fever [D70.9, R50.81] 09/18/2020 10/18/2020 Yes      Discharged Condition: good    Disposition: Home    Follow Up:  This week with Dr. House  At [facility]    Patient Instructions:   No discharge procedures on file.  Medications:  Reconciled Home Medications:      Medication List      START taking these medications    acyclovir 200 MG  capsule  Commonly known as: ZOVIRAX  Take 2 capsules (400 mg total) by mouth 2 (two) times daily.     tretinoin 10 mg capsule  Commonly known as: VESANOID  Take 5 capsules (50 mg total) by mouth 2 (two) times daily For 14 days, then 14 days off. And repeat..            Milton Schrodeer MD  Bone Marrow Transplant  [facility]

## 2020-10-18 NOTE — PLAN OF CARE
Plan of care reviewed. Patient is oriented X4. Stand by assist. Bedside commode in room. PICC flushed and saline locked. No complaints of pain or discomfort. Remained afebrile. Accu check HS; No coverage required. Potassium and magnesium replaced. All questions and concerns addressed. All safety precautions in place. Remains free of injury. Patient is stable. Will continue to monitor.

## 2020-10-18 NOTE — ASSESSMENT & PLAN NOTE
- Elevated LFTs, improving  - Etiology likely mixed with ASO administration, congestive hepatopathy, and PASCUAL  - Hepatitis studies consistent with hep B vaccine  - Hep B DNA PCR is sent, pending result.   - Liver enzymes has been stable  - US liver showed no abnormalities  - Will follow as out-patient.

## 2020-10-18 NOTE — PROGRESS NOTES
Discharge instructions and prescriptions given and explained to pt.  Pt. verbalized understanding with no further questions.  PICC ordered to stay in. Dressing was changed.   VS WDL.  Patient left with transport.  ROAD TEST  O=SpO2 96% on RA  A=Ambulating around room and hallway  D=PICC in place  T=Tolerating regular diet  E=Voids  S=Performs self care independently  T=Teaching on medications complete

## 2020-10-19 NOTE — PROGRESS NOTES
Received call from patient's mother Jennifer ([phone]) was following up on Acyclovir to be mailed to patient and follow-up with Dr Mullins.  Received message from [facility] that they were the wrong office for referral; spoke to [facility] who had no record of patient.  Referrals faxed to [facility], [facility], [facility], and Dr Mullins.  Declined by [facility] as they do not have a psych RN available.  Dr Mullins's office with have their NP Kiara reach out to family to schedule.  Awaiting responses from [facility] and [facility].  Notified clinic of mother's preference for [facility] on [address] for medications not received at discharge if possible.  Confirmed referral to Dr Steele received and scheduled.  Will follow.

## 2020-10-20 ENCOUNTER — SPECIALTY PHARMACY (OUTPATIENT)
Dept: PHARMACY | Facility: CLINIC | Age: 25
End: 2020-10-20

## 2020-10-20 ENCOUNTER — DOCUMENTATION ONLY (OUTPATIENT)
Dept: HEMATOLOGY/ONCOLOGY | Facility: CLINIC | Age: 25
End: 2020-10-20

## 2020-10-20 DIAGNOSIS — R53.1 WEAKNESS: ICD-10-CM

## 2020-10-20 DIAGNOSIS — C92.40 ACUTE PROMYELOCYTIC LEUKEMIA NOT HAVING ACHIEVED REMISSION: Primary | ICD-10-CM

## 2020-10-20 DIAGNOSIS — B00.9 HSV-1 INFECTION: ICD-10-CM

## 2020-10-20 LAB
AML FISH ADDITIONAL INFORMATION (BM): NORMAL
AML FISH DISCLAIMER (BM): NORMAL
AML FISH REASON FOR REFERRAL (BM): NORMAL
AML FISH RELEASED BY (BM): NORMAL
AML FISH RESULT (BM): NORMAL
AML FISH RESULT SUMMARY (BM): NORMAL
AML FISH RESULT TABLE (BM): NORMAL
CHROM BANDING METHOD: NORMAL
CHROMOSOME ANALYSIS BM ADDITIONAL INFORMATION: NORMAL
CHROMOSOME ANALYSIS BM RELEASED BY: NORMAL
CHROMOSOME ANALYSIS BM RESULT SUMMARY: NORMAL
CLINICAL CYTOGENETICIST REVIEW: NORMAL
CLINICAL CYTOGENETICIST REVIEW: NORMAL
FAMLB SPECIMEN: NORMAL
KARYOTYP MAR: NORMAL
REASON FOR REFERRAL (NARRATIVE): NORMAL
REF LAB TEST METHOD: NORMAL
REF LAB TEST METHOD: NORMAL
SPECIMEN SOURCE: NORMAL
SPECIMEN SOURCE: NORMAL
SPECIMEN: NORMAL

## 2020-10-20 PROCEDURE — G0180 MD CERTIFICATION HHA PATIENT: HCPCS | Mod: ,,, | Performed by: INTERNAL MEDICINE

## 2020-10-20 PROCEDURE — G0180 PR HOME HEALTH MD CERTIFICATION: ICD-10-PCS | Mod: ,,, | Performed by: INTERNAL MEDICINE

## 2020-10-20 NOTE — PROGRESS NOTES
Received call from mother checking on status of meds and referrals.  Accepted by [facility] with first visit today.  Declined by [facility] as outside of their delivery area.  Faxed to [facility].  Awaiting response.  Will follow.

## 2020-10-20 NOTE — TELEPHONE ENCOUNTER
Spoke to Erika's mom and explained that we have submitted to insurance. Aware that we will reach out ASAP to set up shipment and complete initial consultation

## 2020-10-21 ENCOUNTER — SPECIALTY PHARMACY (OUTPATIENT)
Dept: PHARMACY | Facility: CLINIC | Age: 25
End: 2020-10-21

## 2020-10-21 DIAGNOSIS — C92.40 ACUTE PROMYELOCYTIC LEUKEMIA NOT HAVING ACHIEVED REMISSION: Primary | ICD-10-CM

## 2020-10-21 LAB
COMMENT: NORMAL
FINAL PATHOLOGIC DIAGNOSIS: NORMAL
GROSS: NORMAL
MICROSCOPIC EXAM: NORMAL
SUPPLEMENTAL DIAGNOSIS: NORMAL

## 2020-10-21 RX ORDER — ACYCLOVIR 400 MG/1
400 TABLET ORAL 2 TIMES DAILY
Qty: 60 TABLET | Refills: 5 | Status: SHIPPED | OUTPATIENT
Start: 2020-10-21 | End: 2021-04-26

## 2020-10-21 NOTE — TELEPHONE ENCOUNTER
Specialty Pharmacy - Initial Clinical Assessment    Specialty Medication Orders Linked to Encounter      Most Recent Value   Medication #1  tretinoin (VESANOID) 10 mg capsule (Order#[id number], Rx#[id number])        Mick Saini is a 25 y.o. female, who is followed by the specialty pharmacy service for management and education.    Encounters since last clinical assessment   10/20/2020: Referral Authorization with Marika Cisneros PharmD   Clinical call attempts since last clinical assessment   No call attempts found.     Today she received education before her first fill with [facility].    Current Outpatient Medications   Medication Sig    acyclovir (ZOVIRAX) 400 MG tablet Take 1 tablet (400 mg total) by mouth 2 (two) times daily.    tretinoin (VESANOID) 10 mg capsule Take 5 capsules (50 mg total) by mouth 2 (two) times daily For 14 days, then 14 days off. And repeat..   Last reviewed on 10/21/2020 10:25 AM by Chelsey Sarmiento, PharmD    Review of patient's allergies indicates:   Allergen Reactions    Amoxicillin    Last reviewed on  10/21/2020 10:24 AM by Chelsey Sarmiento    Drug Interactions    Drug interactions evaluated: yes  Clinically relevant drug interactions identified: no  Provided the patient with educational material regarding drug interactions: not applicable         Adverse Effects    Activity change: Pos  Appetite change: Pos  Abdominal pain: Pos  Skin: System reviewed and is negative  Eyes: System reviewed and is negative  Endocrine and Metabolic: System reviewed and is negative  Genitourinary: System reviewed and is negative  Cardiovascular: System reviewed and is negative  Hematologic: System reviewed and is negative  Musculoskeletal: System reviewed and is negative  HENT: System reviewed and is negative  Neurological: System reviewed and is negative  Psychiatric: System reviewed and is negative  Respiratory: System reviewed and is negative       Assessment  Questions - Documented Responses      Most Recent Value   Assessment   Medication Reconciliation completed for patient  Yes   During the past 4 weeks, has patient missed any activities due to condition or medication?  Missed Planned Activities   Number of Days missed  30   During the past 4 weeks, did patient have any of the following urgent care visits?  Hospital Admission   Goals of Therapy Status  Discussed (new start)   Welcome packet contents reviewed and discussed with patient?  Yes   Assesment completed?  Yes   Plan  Therapy being initiated   Do you need to open a clinical intervention (i-vent)?  No   Do you want to schedule first shipment?  Yes   Medication #1 Assessment Info   Patient status  New medication   Is this medication appropriate for the patient?  Yes   Is this medication effective?  Yes        Refill Questions - Documented Responses      Most Recent Value   Relationship to patient of person spoken to?  Mother   HIPAA/medical authority confirmed?  Yes   Can the patient store medication/sharps container properly (at the correct temperature, away from children/pets, etc.)?  Yes   Can the patient call emergency services (911) in the event of an emergency?  Yes   Does the patient have any concerns or questions about taking or administering this medication as prescribed?  No   How many doses does the patient have on hand?  0   How will the patient receive the medication?  Mail   When does the patient need to receive the medication?  10/21/20   Shipping Address  Home   Address in [facility] confirmed and updated if neccessary?  Yes   Expected Copay ($)  0   Is the patient able to afford the medication copay?  Yes   Payment Method  zero copay   Days supply of Refill  28   Refill activity completed?  Yes   Refill activity plan  Refill scheduled   Shipment/Pickup Date:  10/21/20          Objective    She has no past medical history on file.    Tried/failed medications: none - started on tretinoin  while admitted    BP Readings from Last 4 Encounters:   10/18/20 120/88   09/17/20 112/87     Ht Readings from Last 4 Encounters:   10/06/20 5' (1.524 m)     Wt Readings from Last 4 Encounters:   10/17/20 120.7 kg (266 lb)   09/17/20 133.9 kg (295 lb 3.1 oz)     Recent Labs   Lab Result Units 10/18/20  0400 10/17/20  0417 10/16/20  0400 10/15/20  0345   RBC M/uL 3.39 L 3.41 L 3.32 L 3.65 L   Hemoglobin g/dL 10.0 L 10.1 L 10.0 L 10.8 L   Hematocrit % 30.9 L 31.0 L 29.8 L 32.3 L   WBC K/uL 3.69 L 2.48 L 2.13 L 2.42 L   Gran # (ANC) K/uL 2.5 1.6 L 1.4 L 1.7 L   Gran% % 67.5 62.9 66.7 69.1   Platelets K/uL 350 293 220 239   Sodium mmol/L 137 137 136 137   Potassium mmol/L 3.8 4.0 4.2 4.4   Chloride mmol/L 101 101 100 100   Glucose mg/dL 162 H 167 H 171 H 200 H   BUN, Bld mg/dL 17 19 20 24 H   Creatinine mg/dL 0.6 0.6 0.6 0.7   Calcium mg/dL 8.8 8.9 8.8 9.4   Total Protein g/dL 6.4 6.4 6.4 6.9   Albumin g/dL 3.6 3.5 3.4 L 3.6   Total Bilirubin mg/dL 1.0 1.0 1.2 H 1.1 H   Alkaline Phosphatase U/L 69 74 64 72   AST U/L 88 H 102 H 89 H 108 H   ALT U/L 546 H 569 H 558 H 653 H     The goals of cancer treatment include:  · Achieving remission of cancer, if possible  · Reducing tumor size and spread of cancer, if remission is not possible  · Minimizing pain and symptoms of the cancer  · Preventing infection and other complications of treatment  · Promoting adequate nutrition  · Encouraging proper hydration  · Improving or maintaining quality of life  · Maintaining optimal therapy adherence  · Minimizing and managing side effects    Goals of Therapy Status: Discussed (new start)    Assessment/Plan  Patient plans to continue therapy without changes      Indication, dosage, appropriateness, effectiveness, safety and convenience of her specialty medication(s) were reviewed today.     Patient Counseling    Counseled the patient on the following: doses and administration discussed, safe handling, storage, and disposal discussed,  possible adverse effects and management discussed, possible drug and prescription drug interactions discussed, possible drug and OTC drug and food interactions discussed, lab monitoring and follow-up discussed, use of contraception discussed, therapeutic rationale discussed, cost of medications and cost implications discussed, adherence and missed doses discussed, pharmacy contact information discussed       Patient was reached and spoke to mom for initial consultation. Reviewed over [facility] policy and shipment. Reviewed over storage, handling, and disposal of tretinoin.  Storage: aware to store at room temperature in cool, dry area  Handling: oral chemo handout provided and patient aware to not touch pills directly  Administration: tretinoin 10mg cap: Take 5 capsules (50 mg total) by mouth 2 (two) times daily For 14 days, then 14 days off. And repeat. Patient was instructed not to start until she establishes care with [facility] oncologist. Patient aware to reach out to [facility] for any missed/late doses.    Reviewed over common side effects and warnings/precautions per PI. Specifically reviewed:  Swelling/edema, cardiovascular effects such as chest discomfort/arrhythmia/fluttering in chest, blood pressure changes, skin rashes/skin changes, dry skin, N/V, diarrhea, constipation, general pain, bone pain, visual changes, neuropathy and changes to liver function, increased risk of bleeding and infection.  Discussed contraceptive use. Patient aware of the importance of labs and following up with oncologist.     Medications, allergies and health conditions reviewed. Patient aware to contact [facility]/MD for any medication changes.     Overall patient is doing well have being discharged home. Appetite slightly decreased and energy slightly decreased but improving. She had some abdominal discomfort the other day but states that may have been something she ate. Denies any significant pain.     Patient confirmed understanding. Patient did not  have additional questions.   I will f/u with patient in 1 week from start, [facility] to contact patient in 3 weeks for refills.       Tasks added this encounter   No tasks added.   Tasks due within next 3 months   10/20/2020 - Clinical - Initial Assessment     Chelsey Sarmiento, PharmD  [facility]  [address] [place] [id number]  Phone: [phone]  Fax: [phone]

## 2020-10-21 NOTE — PROGRESS NOTES
Received call from mother checking on status of meds and referrals.  Accepted by [facility] with delivery of line care supplies today. Received call from Dr Reed's office notifying of appointment for 1000 on 10/27/2020; mother and patient aware.  Mother reports appointment with Dr Steele for vision follow-up on 10/28/2020.  Faxed request for rolling walker to [employer] ([phone]) with request to call with estimated co-pay for possible patient assistance.  Awaiting response.  Will follow.

## 2020-10-21 NOTE — TELEPHONE ENCOUNTER
----- Message from Sunshine Yuan sent at 10/21/2020  4:24 PM CDT -----  Contact: [phone]  Pt father is calling to state that [employer] the insurance company need a pathology report. The information can be faxed to [phone], Attn: [employer].          [employer]/ Attn: [employer], Contact #: [phone]  Phone:[phone]  Fax: [phone]

## 2020-10-22 ENCOUNTER — DOCUMENTATION ONLY (OUTPATIENT)
Dept: HEMATOLOGY/ONCOLOGY | Facility: CLINIC | Age: 25
End: 2020-10-22

## 2020-10-22 LAB — HBV DNA SERPL QL NAA+PROBE: NOT DETECTED

## 2020-10-22 NOTE — PROGRESS NOTES
Confirmed RW delivered to patient from [employer] equipment and covered at 100% by insurance.  Received call from patient's father requesting fax of pathology report to [facility]; already completed by clinic.  No other needs noted at this time.  Will follow.

## 2020-10-27 ENCOUNTER — HISTORICAL (OUTPATIENT)
Dept: ADMINISTRATIVE | Facility: HOSPITAL | Age: 25
End: 2020-10-27

## 2020-10-27 ENCOUNTER — DOCUMENTATION ONLY (OUTPATIENT)
Dept: HEMATOLOGY/ONCOLOGY | Facility: CLINIC | Age: 25
End: 2020-10-27

## 2020-10-27 LAB
ABS NEUT (OLG): 5.07 X10(3)/MCL (ref 2.1–9.2)
ALBUMIN SERPL-MCNC: 3.5 GM/DL (ref 3.5–5)
ALBUMIN/GLOB SERPL: 1.1 RATIO (ref 1.1–2)
ALP SERPL-CCNC: 94 UNIT/L (ref 40–150)
ALT SERPL-CCNC: 79 UNIT/L (ref 0–55)
AST SERPL-CCNC: 12 UNIT/L (ref 5–34)
BASOPHILS # BLD AUTO: 0 X10(3)/MCL (ref 0–0.2)
BASOPHILS NFR BLD AUTO: 0 %
BILIRUB SERPL-MCNC: 0.9 MG/DL
BILIRUBIN DIRECT+TOT PNL SERPL-MCNC: 0.3 MG/DL (ref 0–0.5)
BILIRUBIN DIRECT+TOT PNL SERPL-MCNC: 0.6 MG/DL (ref 0–0.8)
BUN SERPL-MCNC: 6 MG/DL (ref 7–18.7)
CALCIUM SERPL-MCNC: 8.8 MG/DL (ref 8.4–10.2)
CHLORIDE SERPL-SCNC: 102 MMOL/L (ref 98–107)
CO2 SERPL-SCNC: 27 MMOL/L (ref 22–29)
CREAT SERPL-MCNC: 0.58 MG/DL (ref 0.55–1.02)
EOSINOPHIL # BLD AUTO: 0 X10(3)/MCL (ref 0–0.9)
EOSINOPHIL NFR BLD AUTO: 0 %
ERYTHROCYTE [DISTWIDTH] IN BLOOD BY AUTOMATED COUNT: 16 % (ref 11.5–14.5)
GLOBULIN SER-MCNC: 3.1 GM/DL (ref 2.4–3.5)
GLUCOSE SERPL-MCNC: 134 MG/DL (ref 74–100)
HCT VFR BLD AUTO: 31.2 % (ref 35–46)
HGB BLD-MCNC: 9.9 GM/DL (ref 12–16)
IMM GRANULOCYTES # BLD AUTO: 0.01 10*3/UL
IMM GRANULOCYTES NFR BLD AUTO: 0 %
LYMPHOCYTES # BLD AUTO: 1 X10(3)/MCL (ref 0.6–4.6)
LYMPHOCYTES NFR BLD AUTO: 15 %
MAGNESIUM SERPL-MCNC: 1.66 MG/DL (ref 1.6–2.6)
MCH RBC QN AUTO: 29.5 PG (ref 26–34)
MCHC RBC AUTO-ENTMCNC: 31.7 GM/DL (ref 31–37)
MCV RBC AUTO: 92.9 FL (ref 80–100)
MONOCYTES # BLD AUTO: 0.6 X10(3)/MCL (ref 0.1–1.3)
MONOCYTES NFR BLD AUTO: 10 %
NEUTROPHILS # BLD AUTO: 5.07 X10(3)/MCL (ref 2.1–9.2)
NEUTROPHILS NFR BLD AUTO: 76 %
PLATELET # BLD AUTO: 278 X10(3)/MCL (ref 130–400)
PMV BLD AUTO: 9.7 FL (ref 7.4–10.4)
POTASSIUM SERPL-SCNC: 2.6 MMOL/L (ref 3.5–5.1)
PROT SERPL-MCNC: 6.6 GM/DL (ref 6.4–8.3)
RBC # BLD AUTO: 3.36 X10(6)/MCL (ref 4–5.2)
SODIUM SERPL-SCNC: 139 MMOL/L (ref 136–145)
WBC # SPEC AUTO: 6.7 X10(3)/MCL (ref 4.5–11)

## 2020-10-27 NOTE — PROGRESS NOTES
Received calls from Chelsey at Dr Mullins's office ([phone]) requesting treatment protocol.  Received daily generic protocol from pharmacist Gloria as well as copy of previously sent protocol and faxed both to the office.  Received call from mother who wished to confirm fax for 's leave and fax to Dr Mullins; mother confirms receipt of walker.  Will follow.

## 2020-10-28 ENCOUNTER — HISTORICAL (OUTPATIENT)
Dept: INFUSION THERAPY | Facility: HOSPITAL | Age: 25
End: 2020-10-28

## 2020-10-28 LAB
ALBUMIN SERPL-MCNC: 3.2 GM/DL (ref 3.5–5)
ALBUMIN/GLOB SERPL: 1.1 RATIO (ref 1.1–2)
ALP SERPL-CCNC: 91 UNIT/L (ref 40–150)
ALT SERPL-CCNC: 60 UNIT/L (ref 0–55)
AST SERPL-CCNC: 12 UNIT/L (ref 5–34)
BILIRUB SERPL-MCNC: 0.6 MG/DL
BILIRUBIN DIRECT+TOT PNL SERPL-MCNC: 0.2 MG/DL (ref 0–0.5)
BILIRUBIN DIRECT+TOT PNL SERPL-MCNC: 0.4 MG/DL (ref 0–0.8)
BUN SERPL-MCNC: 5 MG/DL (ref 7–18.7)
CALCIUM SERPL-MCNC: 8.3 MG/DL (ref 8.4–10.2)
CHLORIDE SERPL-SCNC: 108 MMOL/L (ref 98–107)
CO2 SERPL-SCNC: 28 MMOL/L (ref 22–29)
CREAT SERPL-MCNC: 0.54 MG/DL (ref 0.55–1.02)
GLOBULIN SER-MCNC: 2.9 GM/DL (ref 2.4–3.5)
GLUCOSE SERPL-MCNC: 138 MG/DL (ref 74–100)
POTASSIUM SERPL-SCNC: 3.6 MMOL/L (ref 3.5–5.1)
PROT SERPL-MCNC: 6.1 GM/DL (ref 6.4–8.3)
SODIUM SERPL-SCNC: 142 MMOL/L (ref 136–145)

## 2020-10-29 ENCOUNTER — DOCUMENTATION ONLY (OUTPATIENT)
Dept: HEMATOLOGY/ONCOLOGY | Facility: CLINIC | Age: 25
End: 2020-10-29

## 2020-10-29 NOTE — PROGRESS NOTES
Received call from patient's father reporting previously faxed pathology report was not received by [employer].  Report re-faxed.  Will follow.

## 2020-10-30 ENCOUNTER — DOCUMENTATION ONLY (OUTPATIENT)
Dept: HEMATOLOGY/ONCOLOGY | Facility: CLINIC | Age: 25
End: 2020-10-30

## 2020-10-30 ENCOUNTER — HISTORICAL (OUTPATIENT)
Dept: ADMINISTRATIVE | Facility: HOSPITAL | Age: 25
End: 2020-10-30

## 2020-10-30 LAB — B-HCG SERPL QL: NEGATIVE

## 2020-10-30 NOTE — PROGRESS NOTES
Received irate call from mother who was informed by Dr Richter that he would continue chemo and current treatment plan but would need to return to [facility] in eight months for additional treatment planning.  Mother would prefer a physician in [place] or virtual visits if possible.  Will clarify with team and update mother and patient.  Will follow.

## 2020-11-02 ENCOUNTER — HISTORICAL (OUTPATIENT)
Dept: INFUSION THERAPY | Facility: HOSPITAL | Age: 25
End: 2020-11-02

## 2020-11-02 LAB
ABS NEUT (OLG): 4.4 X10(3)/MCL (ref 2.1–9.2)
ALBUMIN SERPL-MCNC: 3.2 GM/DL (ref 3.5–5)
ALBUMIN/GLOB SERPL: 1 RATIO (ref 1.1–2)
ALP SERPL-CCNC: 95 UNIT/L (ref 40–150)
ALT SERPL-CCNC: 28 UNIT/L (ref 0–55)
AST SERPL-CCNC: 12 UNIT/L (ref 5–34)
B-HCG SERPL QL: NEGATIVE
BASOPHILS # BLD AUTO: 0 X10(3)/MCL (ref 0–0.2)
BASOPHILS NFR BLD AUTO: 0 %
BILIRUB SERPL-MCNC: 0.4 MG/DL
BILIRUBIN DIRECT+TOT PNL SERPL-MCNC: 0.2 MG/DL (ref 0–0.5)
BILIRUBIN DIRECT+TOT PNL SERPL-MCNC: 0.2 MG/DL (ref 0–0.8)
BUN SERPL-MCNC: 7 MG/DL (ref 7–18.7)
CALCIUM SERPL-MCNC: 8.4 MG/DL (ref 8.4–10.2)
CHLORIDE SERPL-SCNC: 107 MMOL/L (ref 98–107)
CO2 SERPL-SCNC: 23 MMOL/L (ref 22–29)
CREAT SERPL-MCNC: 0.6 MG/DL (ref 0.55–1.02)
EOSINOPHIL # BLD AUTO: 0.1 X10(3)/MCL (ref 0–0.9)
EOSINOPHIL NFR BLD AUTO: 1 %
ERYTHROCYTE [DISTWIDTH] IN BLOOD BY AUTOMATED COUNT: 15.5 % (ref 11.5–14.5)
GLOBULIN SER-MCNC: 3.2 GM/DL (ref 2.4–3.5)
GLUCOSE SERPL-MCNC: 190 MG/DL (ref 74–100)
HCT VFR BLD AUTO: 32.7 % (ref 35–46)
HGB BLD-MCNC: 9.9 GM/DL (ref 12–16)
IMM GRANULOCYTES # BLD AUTO: 0.02 10*3/UL
IMM GRANULOCYTES NFR BLD AUTO: 0 %
LYMPHOCYTES # BLD AUTO: 1.1 X10(3)/MCL (ref 0.6–4.6)
LYMPHOCYTES NFR BLD AUTO: 19 %
MAGNESIUM SERPL-MCNC: 1.35 MG/DL (ref 1.6–2.6)
MCH RBC QN AUTO: 28.9 PG (ref 26–34)
MCHC RBC AUTO-ENTMCNC: 30.3 GM/DL (ref 31–37)
MCV RBC AUTO: 95.6 FL (ref 80–100)
MONOCYTES # BLD AUTO: 0.4 X10(3)/MCL (ref 0.1–1.3)
MONOCYTES NFR BLD AUTO: 7 %
NEUTROPHILS # BLD AUTO: 4.4 X10(3)/MCL (ref 2.1–9.2)
NEUTROPHILS NFR BLD AUTO: 72 %
PLATELET # BLD AUTO: 274 X10(3)/MCL (ref 130–400)
PMV BLD AUTO: 10 FL (ref 7.4–10.4)
POTASSIUM SERPL-SCNC: 3.2 MMOL/L (ref 3.5–5.1)
PROT SERPL-MCNC: 6.4 GM/DL (ref 6.4–8.3)
RBC # BLD AUTO: 3.42 X10(6)/MCL (ref 4–5.2)
SODIUM SERPL-SCNC: 142 MMOL/L (ref 136–145)
WBC # SPEC AUTO: 6.1 X10(3)/MCL (ref 4.5–11)

## 2020-11-03 ENCOUNTER — HISTORICAL (OUTPATIENT)
Dept: HEMATOLOGY/ONCOLOGY | Facility: CLINIC | Age: 25
End: 2020-11-03

## 2020-11-03 ENCOUNTER — DOCUMENTATION ONLY (OUTPATIENT)
Dept: HEMATOLOGY/ONCOLOGY | Facility: CLINIC | Age: 25
End: 2020-11-03

## 2020-11-03 LAB
ABS NEUT (OLG): 2.62 X10(3)/MCL (ref 2.1–9.2)
ALBUMIN SERPL-MCNC: 3.2 GM/DL (ref 3.5–5)
ALBUMIN/GLOB SERPL: 1 RATIO (ref 1.1–2)
ALP SERPL-CCNC: 93 UNIT/L (ref 40–150)
ALT SERPL-CCNC: 25 UNIT/L (ref 0–55)
AST SERPL-CCNC: 15 UNIT/L (ref 5–34)
BASOPHILS # BLD AUTO: 0 X10(3)/MCL (ref 0–0.2)
BASOPHILS NFR BLD AUTO: 0 %
BILIRUB SERPL-MCNC: 0.3 MG/DL
BILIRUBIN DIRECT+TOT PNL SERPL-MCNC: 0.1 MG/DL (ref 0–0.8)
BILIRUBIN DIRECT+TOT PNL SERPL-MCNC: 0.2 MG/DL (ref 0–0.5)
BUN SERPL-MCNC: 5 MG/DL (ref 7–18.7)
CALCIUM SERPL-MCNC: 8.8 MG/DL (ref 8.4–10.2)
CHLORIDE SERPL-SCNC: 109 MMOL/L (ref 98–107)
CO2 SERPL-SCNC: 21 MMOL/L (ref 22–29)
CREAT SERPL-MCNC: 0.57 MG/DL (ref 0.55–1.02)
EOSINOPHIL # BLD AUTO: 0 X10(3)/MCL (ref 0–0.9)
EOSINOPHIL NFR BLD AUTO: 1 %
ERYTHROCYTE [DISTWIDTH] IN BLOOD BY AUTOMATED COUNT: 15.5 % (ref 11.5–14.5)
GLOBULIN SER-MCNC: 3.2 GM/DL (ref 2.4–3.5)
GLUCOSE SERPL-MCNC: 164 MG/DL (ref 74–100)
HCT VFR BLD AUTO: 32.8 % (ref 35–46)
HGB BLD-MCNC: 10 GM/DL (ref 12–16)
IMM GRANULOCYTES # BLD AUTO: 0.01 10*3/UL
IMM GRANULOCYTES NFR BLD AUTO: 0 %
LYMPHOCYTES # BLD AUTO: 1.1 X10(3)/MCL (ref 0.6–4.6)
LYMPHOCYTES NFR BLD AUTO: 27 %
MAGNESIUM SERPL-MCNC: 1.71 MG/DL (ref 1.6–2.6)
MCH RBC QN AUTO: 29.1 PG (ref 26–34)
MCHC RBC AUTO-ENTMCNC: 30.5 GM/DL (ref 31–37)
MCV RBC AUTO: 95.3 FL (ref 80–100)
MONOCYTES # BLD AUTO: 0.3 X10(3)/MCL (ref 0.1–1.3)
MONOCYTES NFR BLD AUTO: 8 %
NEUTROPHILS # BLD AUTO: 2.62 X10(3)/MCL (ref 2.1–9.2)
NEUTROPHILS NFR BLD AUTO: 63 %
PLATELET # BLD AUTO: 301 X10(3)/MCL (ref 130–400)
PMV BLD AUTO: 9.9 FL (ref 7.4–10.4)
POTASSIUM SERPL-SCNC: 3.7 MMOL/L (ref 3.5–5.1)
PROT SERPL-MCNC: 6.4 GM/DL (ref 6.4–8.3)
RBC # BLD AUTO: 3.44 X10(6)/MCL (ref 4–5.2)
SODIUM SERPL-SCNC: 142 MMOL/L (ref 136–145)
WBC # SPEC AUTO: 4.2 X10(3)/MCL (ref 4.5–11)

## 2020-11-03 NOTE — PROGRESS NOTES
"Received multiple calls from patient's mother.  Called back to update; clinic clarified need for follow up outside of current provider Dr Reed is for bone marrow biopsy, which could be done by a provider closer to home.  She inquired interest in another MD in her area who accepts medicaid; yesterday patient was turned away from chemo for not completing a recommended potassium prescription.  An RN today wanted to resume chemo and was unaware of the barrier.  Mother was afraid doctor was offended when patient called the requirement for repeated pregnancy testing "stupid" as she is not sexually active.  Mother requests additional options for local oncologists.  Will review needs with the team and update when additional information available.  Will follow.     "

## 2020-11-04 ENCOUNTER — HISTORICAL (OUTPATIENT)
Dept: INFUSION THERAPY | Facility: HOSPITAL | Age: 25
End: 2020-11-04

## 2020-11-05 ENCOUNTER — HISTORICAL (OUTPATIENT)
Dept: INFUSION THERAPY | Facility: HOSPITAL | Age: 25
End: 2020-11-05

## 2020-11-06 ENCOUNTER — HISTORICAL (OUTPATIENT)
Dept: INFUSION THERAPY | Facility: HOSPITAL | Age: 25
End: 2020-11-06

## 2020-11-09 ENCOUNTER — EXTERNAL HOME HEALTH (OUTPATIENT)
Dept: HOME HEALTH SERVICES | Facility: HOSPITAL | Age: 25
End: 2020-11-09
Payer: MEDICAID

## 2020-11-09 ENCOUNTER — HISTORICAL (OUTPATIENT)
Dept: INFUSION THERAPY | Facility: HOSPITAL | Age: 25
End: 2020-11-09

## 2020-11-09 LAB
ABS NEUT (OLG): 4.13 X10(3)/MCL (ref 2.1–9.2)
ALBUMIN SERPL-MCNC: 3.3 GM/DL (ref 3.5–5)
ALBUMIN/GLOB SERPL: 1.2 RATIO (ref 1.1–2)
ALP SERPL-CCNC: 91 UNIT/L (ref 40–150)
ALT SERPL-CCNC: 56 UNIT/L (ref 0–55)
AST SERPL-CCNC: 45 UNIT/L (ref 5–34)
BASOPHILS # BLD AUTO: 0 X10(3)/MCL (ref 0–0.2)
BASOPHILS NFR BLD AUTO: 0 %
BILIRUB SERPL-MCNC: 0.3 MG/DL
BILIRUBIN DIRECT+TOT PNL SERPL-MCNC: 0.1 MG/DL (ref 0–0.8)
BILIRUBIN DIRECT+TOT PNL SERPL-MCNC: 0.2 MG/DL (ref 0–0.5)
BUN SERPL-MCNC: 8 MG/DL (ref 7–18.7)
CALCIUM SERPL-MCNC: 8.3 MG/DL (ref 8.4–10.2)
CHLORIDE SERPL-SCNC: 106 MMOL/L (ref 98–107)
CO2 SERPL-SCNC: 24 MMOL/L (ref 22–29)
CREAT SERPL-MCNC: 0.56 MG/DL (ref 0.55–1.02)
EOSINOPHIL # BLD AUTO: 0 X10(3)/MCL (ref 0–0.9)
EOSINOPHIL NFR BLD AUTO: 0 %
ERYTHROCYTE [DISTWIDTH] IN BLOOD BY AUTOMATED COUNT: 15.5 % (ref 11.5–14.5)
GLOBULIN SER-MCNC: 2.8 GM/DL (ref 2.4–3.5)
GLUCOSE SERPL-MCNC: 115 MG/DL (ref 74–100)
HCT VFR BLD AUTO: 33 % (ref 35–46)
HGB BLD-MCNC: 10.1 GM/DL (ref 12–16)
IMM GRANULOCYTES # BLD AUTO: 0.04 10*3/UL
IMM GRANULOCYTES NFR BLD AUTO: 1 %
LYMPHOCYTES # BLD AUTO: 1.7 X10(3)/MCL (ref 0.6–4.6)
LYMPHOCYTES NFR BLD AUTO: 27 %
MAGNESIUM SERPL-MCNC: 1.41 MG/DL (ref 1.6–2.6)
MCH RBC QN AUTO: 28.7 PG (ref 26–34)
MCHC RBC AUTO-ENTMCNC: 30.6 GM/DL (ref 31–37)
MCV RBC AUTO: 93.8 FL (ref 80–100)
MONOCYTES # BLD AUTO: 0.4 X10(3)/MCL (ref 0.1–1.3)
MONOCYTES NFR BLD AUTO: 6 %
NEUTROPHILS # BLD AUTO: 4.13 X10(3)/MCL (ref 2.1–9.2)
NEUTROPHILS NFR BLD AUTO: 65 %
PLATELET # BLD AUTO: 436 X10(3)/MCL (ref 130–400)
PMV BLD AUTO: 10.1 FL (ref 7.4–10.4)
POTASSIUM SERPL-SCNC: 3.7 MMOL/L (ref 3.5–5.1)
PROT SERPL-MCNC: 6.1 GM/DL (ref 6.4–8.3)
RBC # BLD AUTO: 3.52 X10(6)/MCL (ref 4–5.2)
SODIUM SERPL-SCNC: 140 MMOL/L (ref 136–145)
WBC # SPEC AUTO: 6.3 X10(3)/MCL (ref 4.5–11)

## 2020-11-10 ENCOUNTER — HISTORICAL (OUTPATIENT)
Dept: INFUSION THERAPY | Facility: HOSPITAL | Age: 25
End: 2020-11-10

## 2020-11-11 ENCOUNTER — HISTORICAL (OUTPATIENT)
Dept: INFUSION THERAPY | Facility: HOSPITAL | Age: 25
End: 2020-11-11

## 2020-11-11 ENCOUNTER — SPECIALTY PHARMACY (OUTPATIENT)
Dept: PHARMACY | Facility: CLINIC | Age: 25
End: 2020-11-11

## 2020-11-11 NOTE — TELEPHONE ENCOUNTER
7 Day Touchbase - Documented Responses      Most Recent Value   Have you started taking your medication?  Yes   What day did you start?  11/04/20   How are you feeling?   feeling well,  tolerating well   How are you taking your medication? Are you having any problems taking your medication?  Confirmed correct dose, storage, administration, and handling precautions.   Do you have any questions or concerns that we can help you with today?  no        Chelsey Sarmiento, Shayne  [facility]  [address] [place] [id number]  Phone: [phone]  Fax: [phone]

## 2020-11-12 ENCOUNTER — HISTORICAL (OUTPATIENT)
Dept: INFUSION THERAPY | Facility: HOSPITAL | Age: 25
End: 2020-11-12

## 2020-11-13 ENCOUNTER — HISTORICAL (OUTPATIENT)
Dept: INFUSION THERAPY | Facility: HOSPITAL | Age: 25
End: 2020-11-13

## 2020-11-16 ENCOUNTER — HISTORICAL (OUTPATIENT)
Dept: INFUSION THERAPY | Facility: HOSPITAL | Age: 25
End: 2020-11-16

## 2020-11-16 LAB
ABS NEUT (OLG): 4.23 X10(3)/MCL (ref 2.1–9.2)
ALBUMIN SERPL-MCNC: 3.3 GM/DL (ref 3.5–5)
ALBUMIN/GLOB SERPL: 1.3 RATIO (ref 1.1–2)
ALP SERPL-CCNC: 85 UNIT/L (ref 40–150)
ALT SERPL-CCNC: 105 UNIT/L (ref 0–55)
AST SERPL-CCNC: 66 UNIT/L (ref 5–34)
B-HCG SERPL QL: NEGATIVE
BASOPHILS # BLD AUTO: 0 X10(3)/MCL (ref 0–0.2)
BASOPHILS NFR BLD AUTO: 0 %
BILIRUB SERPL-MCNC: 0.7 MG/DL
BILIRUBIN DIRECT+TOT PNL SERPL-MCNC: 0.3 MG/DL (ref 0–0.5)
BILIRUBIN DIRECT+TOT PNL SERPL-MCNC: 0.4 MG/DL (ref 0–0.8)
BUN SERPL-MCNC: 9 MG/DL (ref 7–18.7)
CALCIUM SERPL-MCNC: 8.4 MG/DL (ref 8.4–10.2)
CHLORIDE SERPL-SCNC: 107 MMOL/L (ref 98–107)
CO2 SERPL-SCNC: 23 MMOL/L (ref 22–29)
CREAT SERPL-MCNC: 0.49 MG/DL (ref 0.55–1.02)
EOSINOPHIL # BLD AUTO: 0.1 X10(3)/MCL (ref 0–0.9)
EOSINOPHIL NFR BLD AUTO: 1 %
ERYTHROCYTE [DISTWIDTH] IN BLOOD BY AUTOMATED COUNT: 16.4 % (ref 11.5–14.5)
GLOBULIN SER-MCNC: 2.5 GM/DL (ref 2.4–3.5)
GLUCOSE SERPL-MCNC: 86 MG/DL (ref 74–100)
HCT VFR BLD AUTO: 30.9 % (ref 35–46)
HGB BLD-MCNC: 9.6 GM/DL (ref 12–16)
IMM GRANULOCYTES # BLD AUTO: 0.03 10*3/UL
IMM GRANULOCYTES NFR BLD AUTO: 0 %
LYMPHOCYTES # BLD AUTO: 1.8 X10(3)/MCL (ref 0.6–4.6)
LYMPHOCYTES NFR BLD AUTO: 28 %
MAGNESIUM SERPL-MCNC: 1.51 MG/DL (ref 1.6–2.6)
MCH RBC QN AUTO: 29 PG (ref 26–34)
MCHC RBC AUTO-ENTMCNC: 31.1 GM/DL (ref 31–37)
MCV RBC AUTO: 93.4 FL (ref 80–100)
MONOCYTES # BLD AUTO: 0.3 X10(3)/MCL (ref 0.1–1.3)
MONOCYTES NFR BLD AUTO: 4 %
NEUTROPHILS # BLD AUTO: 4.23 X10(3)/MCL (ref 2.1–9.2)
NEUTROPHILS NFR BLD AUTO: 66 %
PLATELET # BLD AUTO: 281 X10(3)/MCL (ref 130–400)
PMV BLD AUTO: 11 FL (ref 7.4–10.4)
POTASSIUM SERPL-SCNC: 3.6 MMOL/L (ref 3.5–5.1)
PROT SERPL-MCNC: 5.8 GM/DL (ref 6.4–8.3)
RBC # BLD AUTO: 3.31 X10(6)/MCL (ref 4–5.2)
SODIUM SERPL-SCNC: 141 MMOL/L (ref 136–145)
WBC # SPEC AUTO: 6.4 X10(3)/MCL (ref 4.5–11)

## 2020-11-17 ENCOUNTER — HISTORICAL (OUTPATIENT)
Dept: INFUSION THERAPY | Facility: HOSPITAL | Age: 25
End: 2020-11-17

## 2020-11-17 ENCOUNTER — HISTORICAL (OUTPATIENT)
Dept: HEMATOLOGY/ONCOLOGY | Facility: CLINIC | Age: 25
End: 2020-11-17

## 2020-11-17 LAB
ABS NEUT (OLG): 3.23 X10(3)/MCL (ref 2.1–9.2)
ALBUMIN SERPL-MCNC: 3.4 GM/DL (ref 3.5–5)
ALBUMIN/GLOB SERPL: 1.3 RATIO (ref 1.1–2)
ALP SERPL-CCNC: 92 UNIT/L (ref 40–150)
ALT SERPL-CCNC: 111 UNIT/L (ref 0–55)
AST SERPL-CCNC: 76 UNIT/L (ref 5–34)
BILIRUB SERPL-MCNC: 0.7 MG/DL
BILIRUBIN DIRECT+TOT PNL SERPL-MCNC: 0.3 MG/DL (ref 0–0.5)
BILIRUBIN DIRECT+TOT PNL SERPL-MCNC: 0.4 MG/DL (ref 0–0.8)
BUN SERPL-MCNC: 6 MG/DL (ref 7–18.7)
CALCIUM SERPL-MCNC: 8.8 MG/DL (ref 8.4–10.2)
CHLORIDE SERPL-SCNC: 106 MMOL/L (ref 98–107)
CO2 SERPL-SCNC: 24 MMOL/L (ref 22–29)
CREAT SERPL-MCNC: 0.57 MG/DL (ref 0.55–1.02)
EOSINOPHIL # BLD AUTO: 0 X10(3)/MCL (ref 0–0.9)
EOSINOPHIL NFR BLD AUTO: 1 %
ERYTHROCYTE [DISTWIDTH] IN BLOOD BY AUTOMATED COUNT: 17.1 % (ref 11.5–14.5)
GLOBULIN SER-MCNC: 2.6 GM/DL (ref 2.4–3.5)
GLUCOSE SERPL-MCNC: 107 MG/DL (ref 74–100)
HCT VFR BLD AUTO: 31.1 % (ref 35–46)
HGB BLD-MCNC: 9.4 GM/DL (ref 12–16)
IMM GRANULOCYTES # BLD AUTO: 0.03 10*3/UL
IMM GRANULOCYTES NFR BLD AUTO: 1 %
LYMPHOCYTES # BLD AUTO: 1.6 X10(3)/MCL (ref 0.6–4.6)
LYMPHOCYTES NFR BLD AUTO: 32 %
MAGNESIUM SERPL-MCNC: 1.92 MG/DL (ref 1.6–2.6)
MCH RBC QN AUTO: 29 PG (ref 26–34)
MCHC RBC AUTO-ENTMCNC: 30.2 GM/DL (ref 31–37)
MCV RBC AUTO: 96 FL (ref 80–100)
MONOCYTES # BLD AUTO: 0.2 X10(3)/MCL (ref 0.1–1.3)
MONOCYTES NFR BLD AUTO: 5 %
NEUTROPHILS # BLD AUTO: 3.23 X10(3)/MCL (ref 2.1–9.2)
NEUTROPHILS NFR BLD AUTO: 62 %
PLATELET # BLD AUTO: 298 X10(3)/MCL (ref 130–400)
PMV BLD AUTO: 10.8 FL (ref 7.4–10.4)
POTASSIUM SERPL-SCNC: 3.9 MMOL/L (ref 3.5–5.1)
PROT SERPL-MCNC: 6 GM/DL (ref 6.4–8.3)
RBC # BLD AUTO: 3.24 X10(6)/MCL (ref 4–5.2)
SODIUM SERPL-SCNC: 139 MMOL/L (ref 136–145)
WBC # SPEC AUTO: 5.2 X10(3)/MCL (ref 4.5–11)

## 2020-11-18 ENCOUNTER — HISTORICAL (OUTPATIENT)
Dept: INFUSION THERAPY | Facility: HOSPITAL | Age: 25
End: 2020-11-18

## 2020-11-19 ENCOUNTER — HISTORICAL (OUTPATIENT)
Dept: INFUSION THERAPY | Facility: HOSPITAL | Age: 25
End: 2020-11-19

## 2020-11-20 ENCOUNTER — HISTORICAL (OUTPATIENT)
Dept: INFUSION THERAPY | Facility: HOSPITAL | Age: 25
End: 2020-11-20

## 2020-11-23 ENCOUNTER — HISTORICAL (OUTPATIENT)
Dept: HEMATOLOGY/ONCOLOGY | Facility: CLINIC | Age: 25
End: 2020-11-23

## 2020-11-23 LAB — MAGNESIUM SERPL-MCNC: 2.19 MG/DL (ref 1.6–2.6)

## 2020-11-25 ENCOUNTER — DOCUMENT SCAN (OUTPATIENT)
Dept: HOME HEALTH SERVICES | Facility: HOSPITAL | Age: 25
End: 2020-11-25
Payer: MEDICAID

## 2020-12-01 ENCOUNTER — DOCUMENTATION ONLY (OUTPATIENT)
Dept: HEMATOLOGY/ONCOLOGY | Facility: CLINIC | Age: 25
End: 2020-12-01

## 2020-12-01 ENCOUNTER — SPECIALTY PHARMACY (OUTPATIENT)
Dept: PHARMACY | Facility: CLINIC | Age: 25
End: 2020-12-01

## 2020-12-01 NOTE — PROGRESS NOTES
Mother called back and reported chemo has been delayed by patient's persistent cough prompting ED visits and COVID tests.  December visit will be missed for Marcia Jo visit to eye specialist, as both MDs in her area do not accept her insurance.  Mother ended call to contact HH about line becoming exposed.  Dr Abad has not provided a local option for bone marrow biopsy.  Will explore options and follow.

## 2020-12-07 ENCOUNTER — DOCUMENTATION ONLY (OUTPATIENT)
Dept: HEMATOLOGY/ONCOLOGY | Facility: CLINIC | Age: 25
End: 2020-12-07

## 2020-12-07 NOTE — PROGRESS NOTES
Spoke to [facility] who reported all patients with [facility] Medicaid are treated at [facility].  Spoke to patient navigator at [facility], Dr Abad's office, who confirmed that their office is able to provide bone marrow biopsy through an [facility] provider there.  She sent a message to his RN manager to coordinate local scheduling for patient.  Will notify patient and mother.  Will follow.

## 2020-12-14 ENCOUNTER — HISTORICAL (OUTPATIENT)
Dept: INFUSION THERAPY | Facility: HOSPITAL | Age: 25
End: 2020-12-14

## 2020-12-14 LAB
ABS NEUT (OLG): 3.74 X10(3)/MCL (ref 2.1–9.2)
ALBUMIN SERPL-MCNC: 3.6 GM/DL (ref 3.5–5)
ALBUMIN/GLOB SERPL: 1 RATIO (ref 1.1–2)
ALP SERPL-CCNC: 97 UNIT/L (ref 40–150)
ALT SERPL-CCNC: 22 UNIT/L (ref 0–55)
AST SERPL-CCNC: 22 UNIT/L (ref 5–34)
B-HCG SERPL QL: NEGATIVE
BASOPHILS # BLD AUTO: 0 X10(3)/MCL (ref 0–0.2)
BASOPHILS NFR BLD AUTO: 0 %
BILIRUB SERPL-MCNC: 0.3 MG/DL
BILIRUBIN DIRECT+TOT PNL SERPL-MCNC: 0.1 MG/DL (ref 0–0.5)
BILIRUBIN DIRECT+TOT PNL SERPL-MCNC: 0.2 MG/DL (ref 0–0.8)
BUN SERPL-MCNC: 7 MG/DL (ref 7–18.7)
CALCIUM SERPL-MCNC: 9.2 MG/DL (ref 8.4–10.2)
CHLORIDE SERPL-SCNC: 105 MMOL/L (ref 98–107)
CO2 SERPL-SCNC: 25 MMOL/L (ref 22–29)
CREAT SERPL-MCNC: 0.59 MG/DL (ref 0.55–1.02)
EOSINOPHIL # BLD AUTO: 0.2 X10(3)/MCL (ref 0–0.9)
EOSINOPHIL NFR BLD AUTO: 3 %
ERYTHROCYTE [DISTWIDTH] IN BLOOD BY AUTOMATED COUNT: 15.1 % (ref 11.5–14.5)
GLOBULIN SER-MCNC: 3.5 GM/DL (ref 2.4–3.5)
GLUCOSE SERPL-MCNC: 113 MG/DL (ref 74–100)
HCT VFR BLD AUTO: 40.8 % (ref 35–46)
HGB BLD-MCNC: 12.4 GM/DL (ref 12–16)
IMM GRANULOCYTES # BLD AUTO: 0.01 10*3/UL
IMM GRANULOCYTES NFR BLD AUTO: 0 %
LYMPHOCYTES # BLD AUTO: 1.8 X10(3)/MCL (ref 0.6–4.6)
LYMPHOCYTES NFR BLD AUTO: 29 %
MAGNESIUM SERPL-MCNC: 1.6 MG/DL (ref 1.6–2.6)
MCH RBC QN AUTO: 27.9 PG (ref 26–34)
MCHC RBC AUTO-ENTMCNC: 30.4 GM/DL (ref 31–37)
MCV RBC AUTO: 91.9 FL (ref 80–100)
MONOCYTES # BLD AUTO: 0.4 X10(3)/MCL (ref 0.1–1.3)
MONOCYTES NFR BLD AUTO: 7 %
NEUTROPHILS # BLD AUTO: 3.74 X10(3)/MCL (ref 2.1–9.2)
NEUTROPHILS NFR BLD AUTO: 61 %
PLATELET # BLD AUTO: 261 X10(3)/MCL (ref 130–400)
PMV BLD AUTO: 9.5 FL (ref 7.4–10.4)
POTASSIUM SERPL-SCNC: 4.2 MMOL/L (ref 3.5–5.1)
PROT SERPL-MCNC: 7.1 GM/DL (ref 6.4–8.3)
RBC # BLD AUTO: 4.44 X10(6)/MCL (ref 4–5.2)
SODIUM SERPL-SCNC: 139 MMOL/L (ref 136–145)
WBC # SPEC AUTO: 6.2 X10(3)/MCL (ref 4.5–11)

## 2020-12-15 ENCOUNTER — HISTORICAL (OUTPATIENT)
Dept: INFUSION THERAPY | Facility: HOSPITAL | Age: 25
End: 2020-12-15

## 2020-12-16 ENCOUNTER — HISTORICAL (OUTPATIENT)
Dept: INFUSION THERAPY | Facility: HOSPITAL | Age: 25
End: 2020-12-16

## 2020-12-18 ENCOUNTER — HISTORICAL (OUTPATIENT)
Dept: INFUSION THERAPY | Facility: HOSPITAL | Age: 25
End: 2020-12-18

## 2020-12-28 ENCOUNTER — HISTORICAL (OUTPATIENT)
Dept: HEMATOLOGY/ONCOLOGY | Facility: CLINIC | Age: 25
End: 2020-12-28

## 2020-12-28 LAB
ABS NEUT (OLG): 2.89 X10(3)/MCL (ref 2.1–9.2)
ALBUMIN SERPL-MCNC: 3.9 GM/DL (ref 3.5–5)
ALBUMIN/GLOB SERPL: 1.3 RATIO (ref 1.1–2)
ALP SERPL-CCNC: 65 UNIT/L (ref 40–150)
ALT SERPL-CCNC: 19 UNIT/L (ref 0–55)
AST SERPL-CCNC: 17 UNIT/L (ref 5–34)
B-HCG SERPL QL: NEGATIVE
BASOPHILS # BLD AUTO: 0 X10(3)/MCL (ref 0–0.2)
BASOPHILS NFR BLD AUTO: 0 %
BILIRUB SERPL-MCNC: 0.3 MG/DL
BILIRUBIN DIRECT+TOT PNL SERPL-MCNC: 0.1 MG/DL (ref 0–0.8)
BILIRUBIN DIRECT+TOT PNL SERPL-MCNC: 0.2 MG/DL (ref 0–0.5)
BUN SERPL-MCNC: 7 MG/DL (ref 7–18.7)
CALCIUM SERPL-MCNC: 9.3 MG/DL (ref 8.4–10.2)
CHLORIDE SERPL-SCNC: 105 MMOL/L (ref 98–107)
CO2 SERPL-SCNC: 24 MMOL/L (ref 22–29)
CREAT SERPL-MCNC: 0.57 MG/DL (ref 0.55–1.02)
EOSINOPHIL # BLD AUTO: 0.5 X10(3)/MCL (ref 0–0.9)
EOSINOPHIL NFR BLD AUTO: 9 %
ERYTHROCYTE [DISTWIDTH] IN BLOOD BY AUTOMATED COUNT: 16 % (ref 11.5–14.5)
GLOBULIN SER-MCNC: 3.1 GM/DL (ref 2.4–3.5)
GLUCOSE SERPL-MCNC: 97 MG/DL (ref 74–100)
HCT VFR BLD AUTO: 39.2 % (ref 35–46)
HGB BLD-MCNC: 11.9 GM/DL (ref 12–16)
IMM GRANULOCYTES # BLD AUTO: 0.02 10*3/UL
IMM GRANULOCYTES NFR BLD AUTO: 0 %
LYMPHOCYTES # BLD AUTO: 1.5 X10(3)/MCL (ref 0.6–4.6)
LYMPHOCYTES NFR BLD AUTO: 28 %
MAGNESIUM SERPL-MCNC: 1.87 MG/DL (ref 1.6–2.6)
MCH RBC QN AUTO: 27.8 PG (ref 26–34)
MCHC RBC AUTO-ENTMCNC: 30.4 GM/DL (ref 31–37)
MCV RBC AUTO: 91.6 FL (ref 80–100)
MONOCYTES # BLD AUTO: 0.5 X10(3)/MCL (ref 0.1–1.3)
MONOCYTES NFR BLD AUTO: 10 %
NEUTROPHILS # BLD AUTO: 2.89 X10(3)/MCL (ref 2.1–9.2)
NEUTROPHILS NFR BLD AUTO: 53 %
PLATELET # BLD AUTO: 286 X10(3)/MCL (ref 130–400)
PMV BLD AUTO: 10 FL (ref 7.4–10.4)
POTASSIUM SERPL-SCNC: 4.2 MMOL/L (ref 3.5–5.1)
PROT SERPL-MCNC: 7 GM/DL (ref 6.4–8.3)
RBC # BLD AUTO: 4.28 X10(6)/MCL (ref 4–5.2)
SODIUM SERPL-SCNC: 139 MMOL/L (ref 136–145)
WBC # SPEC AUTO: 5.5 X10(3)/MCL (ref 4.5–11)

## 2021-01-18 ENCOUNTER — HISTORICAL (OUTPATIENT)
Dept: INFUSION THERAPY | Facility: HOSPITAL | Age: 26
End: 2021-01-18

## 2021-01-18 LAB
ABS NEUT (OLG): 2.39 X10(3)/MCL (ref 2.1–9.2)
ALBUMIN SERPL-MCNC: 4 GM/DL (ref 3.5–5)
ALBUMIN/GLOB SERPL: 1.1 RATIO (ref 1.1–2)
ALP SERPL-CCNC: 75 UNIT/L (ref 40–150)
ALT SERPL-CCNC: 20 UNIT/L (ref 0–55)
AST SERPL-CCNC: 18 UNIT/L (ref 5–34)
B-HCG SERPL QL: NEGATIVE
BASOPHILS # BLD AUTO: 0 X10(3)/MCL (ref 0–0.2)
BASOPHILS NFR BLD AUTO: 0 %
BILIRUB SERPL-MCNC: 0.4 MG/DL
BILIRUBIN DIRECT+TOT PNL SERPL-MCNC: 0.2 MG/DL (ref 0–0.5)
BILIRUBIN DIRECT+TOT PNL SERPL-MCNC: 0.2 MG/DL (ref 0–0.8)
BUN SERPL-MCNC: 9 MG/DL (ref 7–18.7)
CALCIUM SERPL-MCNC: 9.6 MG/DL (ref 8.4–10.2)
CHLORIDE SERPL-SCNC: 107 MMOL/L (ref 98–107)
CO2 SERPL-SCNC: 24 MMOL/L (ref 22–29)
CREAT SERPL-MCNC: 0.62 MG/DL (ref 0.55–1.02)
EOSINOPHIL # BLD AUTO: 0.1 X10(3)/MCL (ref 0–0.9)
EOSINOPHIL NFR BLD AUTO: 3 %
ERYTHROCYTE [DISTWIDTH] IN BLOOD BY AUTOMATED COUNT: 14.6 % (ref 11.5–14.5)
GLOBULIN SER-MCNC: 3.7 GM/DL (ref 2.4–3.5)
GLUCOSE SERPL-MCNC: 111 MG/DL (ref 74–100)
HCT VFR BLD AUTO: 46.6 % (ref 35–46)
HGB BLD-MCNC: 14.5 GM/DL (ref 12–16)
IMM GRANULOCYTES # BLD AUTO: 0.01 10*3/UL
IMM GRANULOCYTES NFR BLD AUTO: 0 %
LYMPHOCYTES # BLD AUTO: 1.6 X10(3)/MCL (ref 0.6–4.6)
LYMPHOCYTES NFR BLD AUTO: 35 %
MAGNESIUM SERPL-MCNC: 1.97 MG/DL (ref 1.6–2.6)
MCH RBC QN AUTO: 27.4 PG (ref 26–34)
MCHC RBC AUTO-ENTMCNC: 31.1 GM/DL (ref 31–37)
MCV RBC AUTO: 87.9 FL (ref 80–100)
MONOCYTES # BLD AUTO: 0.5 X10(3)/MCL (ref 0.1–1.3)
MONOCYTES NFR BLD AUTO: 10 %
NEUTROPHILS # BLD AUTO: 2.39 X10(3)/MCL (ref 2.1–9.2)
NEUTROPHILS NFR BLD AUTO: 52 %
PLATELET # BLD AUTO: 305 X10(3)/MCL (ref 130–400)
PMV BLD AUTO: 10.1 FL (ref 7.4–10.4)
POTASSIUM SERPL-SCNC: 3.9 MMOL/L (ref 3.5–5.1)
PROT SERPL-MCNC: 7.7 GM/DL (ref 6.4–8.3)
RBC # BLD AUTO: 5.3 X10(6)/MCL (ref 4–5.2)
SODIUM SERPL-SCNC: 140 MMOL/L (ref 136–145)
WBC # SPEC AUTO: 4.6 X10(3)/MCL (ref 4.5–11)

## 2021-01-19 ENCOUNTER — HISTORICAL (OUTPATIENT)
Dept: INFUSION THERAPY | Facility: HOSPITAL | Age: 26
End: 2021-01-19

## 2021-01-20 ENCOUNTER — HISTORICAL (OUTPATIENT)
Dept: INFUSION THERAPY | Facility: HOSPITAL | Age: 26
End: 2021-01-20

## 2021-01-22 ENCOUNTER — HISTORICAL (OUTPATIENT)
Dept: INFUSION THERAPY | Facility: HOSPITAL | Age: 26
End: 2021-01-22

## 2021-01-22 ENCOUNTER — SPECIALTY PHARMACY (OUTPATIENT)
Dept: PHARMACY | Facility: CLINIC | Age: 26
End: 2021-01-22

## 2021-01-22 DIAGNOSIS — C92.40 ACUTE PROMYELOCYTIC LEUKEMIA NOT HAVING ACHIEVED REMISSION: Primary | ICD-10-CM

## 2021-01-25 ENCOUNTER — HISTORICAL (OUTPATIENT)
Dept: INFUSION THERAPY | Facility: HOSPITAL | Age: 26
End: 2021-01-25

## 2021-01-25 LAB
ABS NEUT (OLG): 1.97 X10(3)/MCL (ref 2.1–9.2)
ALBUMIN SERPL-MCNC: 3.5 GM/DL (ref 3.5–5)
ALBUMIN/GLOB SERPL: 1.2 RATIO (ref 1.1–2)
ALP SERPL-CCNC: 67 UNIT/L (ref 40–150)
ALT SERPL-CCNC: 24 UNIT/L (ref 0–55)
AST SERPL-CCNC: 20 UNIT/L (ref 5–34)
BILIRUB SERPL-MCNC: 0.4 MG/DL
BILIRUBIN DIRECT+TOT PNL SERPL-MCNC: 0.1 MG/DL (ref 0–0.5)
BILIRUBIN DIRECT+TOT PNL SERPL-MCNC: 0.3 MG/DL (ref 0–0.8)
BUN SERPL-MCNC: 9 MG/DL (ref 7–18.7)
CALCIUM SERPL-MCNC: 8.8 MG/DL (ref 8.4–10.2)
CHLORIDE SERPL-SCNC: 107 MMOL/L (ref 98–107)
CO2 SERPL-SCNC: 26 MMOL/L (ref 22–29)
CREAT SERPL-MCNC: 0.54 MG/DL (ref 0.55–1.02)
EOSINOPHIL # BLD AUTO: 0.2 X10(3)/MCL (ref 0–0.9)
EOSINOPHIL NFR BLD AUTO: 5 %
ERYTHROCYTE [DISTWIDTH] IN BLOOD BY AUTOMATED COUNT: 15.2 % (ref 11.5–14.5)
GLOBULIN SER-MCNC: 2.8 GM/DL (ref 2.4–3.5)
GLUCOSE SERPL-MCNC: 89 MG/DL (ref 74–100)
HCT VFR BLD AUTO: 40.6 % (ref 35–46)
HGB BLD-MCNC: 12.5 GM/DL (ref 12–16)
IMM GRANULOCYTES # BLD AUTO: 0.01 10*3/UL
IMM GRANULOCYTES NFR BLD AUTO: 0 %
LYMPHOCYTES # BLD AUTO: 2 X10(3)/MCL (ref 0.6–4.6)
LYMPHOCYTES NFR BLD AUTO: 44 %
MAGNESIUM SERPL-MCNC: 2.06 MG/DL (ref 1.6–2.6)
MCH RBC QN AUTO: 27 PG (ref 26–34)
MCHC RBC AUTO-ENTMCNC: 30.8 GM/DL (ref 31–37)
MCV RBC AUTO: 87.7 FL (ref 80–100)
MONOCYTES # BLD AUTO: 0.4 X10(3)/MCL (ref 0.1–1.3)
MONOCYTES NFR BLD AUTO: 10 %
NEUTROPHILS # BLD AUTO: 1.97 X10(3)/MCL (ref 2.1–9.2)
NEUTROPHILS NFR BLD AUTO: 42 %
PLATELET # BLD AUTO: 335 X10(3)/MCL (ref 130–400)
PMV BLD AUTO: 11.2 FL (ref 7.4–10.4)
POTASSIUM SERPL-SCNC: 3.8 MMOL/L (ref 3.5–5.1)
PROT SERPL-MCNC: 6.3 GM/DL (ref 6.4–8.3)
RBC # BLD AUTO: 4.63 X10(6)/MCL (ref 4–5.2)
SODIUM SERPL-SCNC: 140 MMOL/L (ref 136–145)
WBC # SPEC AUTO: 4.7 X10(3)/MCL (ref 4.5–11)

## 2021-01-28 ENCOUNTER — HISTORICAL (OUTPATIENT)
Dept: INFUSION THERAPY | Facility: HOSPITAL | Age: 26
End: 2021-01-28

## 2021-01-29 ENCOUNTER — HISTORICAL (OUTPATIENT)
Dept: INFUSION THERAPY | Facility: HOSPITAL | Age: 26
End: 2021-01-29

## 2021-02-01 ENCOUNTER — HISTORICAL (OUTPATIENT)
Dept: INFUSION THERAPY | Facility: HOSPITAL | Age: 26
End: 2021-02-01

## 2021-02-01 LAB
ABS NEUT (OLG): 1.34 X10(3)/MCL (ref 2.1–9.2)
ALBUMIN SERPL-MCNC: 3.6 GM/DL (ref 3.5–5)
ALBUMIN/GLOB SERPL: 1.3 RATIO (ref 1.1–2)
ALP SERPL-CCNC: 79 UNIT/L (ref 40–150)
ALT SERPL-CCNC: 52 UNIT/L (ref 0–55)
AST SERPL-CCNC: 44 UNIT/L (ref 5–34)
B-HCG FREE SERPL-ACNC: <2.42 MIU/ML
BILIRUB SERPL-MCNC: 0.3 MG/DL
BILIRUBIN DIRECT+TOT PNL SERPL-MCNC: 0.1 MG/DL (ref 0–0.5)
BILIRUBIN DIRECT+TOT PNL SERPL-MCNC: 0.2 MG/DL (ref 0–0.8)
BUN SERPL-MCNC: 13 MG/DL (ref 7–18.7)
CALCIUM SERPL-MCNC: 8.9 MG/DL (ref 8.4–10.2)
CHLORIDE SERPL-SCNC: 112 MMOL/L (ref 98–107)
CO2 SERPL-SCNC: 20 MMOL/L (ref 22–29)
CREAT SERPL-MCNC: 0.61 MG/DL (ref 0.55–1.02)
EOSINOPHIL # BLD AUTO: 0.2 X10(3)/MCL (ref 0–0.9)
EOSINOPHIL NFR BLD AUTO: 5 %
ERYTHROCYTE [DISTWIDTH] IN BLOOD BY AUTOMATED COUNT: 16.5 % (ref 11.5–14.5)
GLOBULIN SER-MCNC: 2.8 GM/DL (ref 2.4–3.5)
GLUCOSE SERPL-MCNC: 82 MG/DL (ref 74–100)
HCT VFR BLD AUTO: 38.5 % (ref 35–46)
HGB BLD-MCNC: 12.2 GM/DL (ref 12–16)
IMM GRANULOCYTES # BLD AUTO: 0.01 10*3/UL
IMM GRANULOCYTES NFR BLD AUTO: 0 %
LYMPHOCYTES # BLD AUTO: 2.3 X10(3)/MCL (ref 0.6–4.6)
LYMPHOCYTES NFR BLD AUTO: 54 %
MAGNESIUM SERPL-MCNC: 2.03 MG/DL (ref 1.6–2.6)
MCH RBC QN AUTO: 27.1 PG (ref 26–34)
MCHC RBC AUTO-ENTMCNC: 31.7 GM/DL (ref 31–37)
MCV RBC AUTO: 85.6 FL (ref 80–100)
MONOCYTES # BLD AUTO: 0.4 X10(3)/MCL (ref 0.1–1.3)
MONOCYTES NFR BLD AUTO: 10 %
NEUTROPHILS # BLD AUTO: 1.34 X10(3)/MCL (ref 2.1–9.2)
NEUTROPHILS NFR BLD AUTO: 32 %
PLATELET # BLD AUTO: 291 X10(3)/MCL (ref 130–400)
PMV BLD AUTO: 10.3 FL (ref 7.4–10.4)
POTASSIUM SERPL-SCNC: 3.8 MMOL/L (ref 3.5–5.1)
PROT SERPL-MCNC: 6.4 GM/DL (ref 6.4–8.3)
RBC # BLD AUTO: 4.5 X10(6)/MCL (ref 4–5.2)
SODIUM SERPL-SCNC: 141 MMOL/L (ref 136–145)
WBC # SPEC AUTO: 4.3 X10(3)/MCL (ref 4.5–11)

## 2021-02-04 ENCOUNTER — HISTORICAL (OUTPATIENT)
Dept: INFUSION THERAPY | Facility: HOSPITAL | Age: 26
End: 2021-02-04

## 2021-02-05 ENCOUNTER — HISTORICAL (OUTPATIENT)
Dept: INFUSION THERAPY | Facility: HOSPITAL | Age: 26
End: 2021-02-05

## 2021-02-05 ENCOUNTER — HISTORICAL (OUTPATIENT)
Dept: ADMINISTRATIVE | Facility: HOSPITAL | Age: 26
End: 2021-02-05

## 2021-02-05 LAB
APPEARANCE, UA: CLEAR
BACTERIA #/AREA URNS AUTO: ABNORMAL /HPF
BILIRUB UR QL STRIP: NEGATIVE
COLOR UR: YELLOW
GLUCOSE (UA): NEGATIVE
HGB UR QL STRIP: NEGATIVE
HYALINE CASTS #/AREA URNS LPF: ABNORMAL /LPF
KETONES UR QL STRIP: NEGATIVE
LEUKOCYTE ESTERASE UR QL STRIP: NEGATIVE
NITRITE UR QL STRIP: NEGATIVE
PH UR STRIP: 5.5 [PH] (ref 4.5–8)
PROT UR QL STRIP: 30 MG/DL
RBC #/AREA URNS AUTO: ABNORMAL /HPF
SP GR UR STRIP: 1.03 (ref 1–1.03)
SQUAMOUS #/AREA URNS LPF: ABNORMAL /LPF
UROBILINOGEN UR STRIP-ACNC: NORMAL
WBC #/AREA URNS AUTO: ABNORMAL /HPF

## 2021-02-08 ENCOUNTER — HISTORICAL (OUTPATIENT)
Dept: INFUSION THERAPY | Facility: HOSPITAL | Age: 26
End: 2021-02-08

## 2021-02-08 LAB
ABS NEUT (OLG): 1.25 X10(3)/MCL (ref 2.1–9.2)
ALBUMIN SERPL-MCNC: 3.5 GM/DL (ref 3.5–5)
ALBUMIN/GLOB SERPL: 1.3 RATIO (ref 1.1–2)
ALP SERPL-CCNC: 81 UNIT/L (ref 40–150)
ALT SERPL-CCNC: 38 UNIT/L (ref 0–55)
ANISOCYTOSIS BLD QL SMEAR: ABNORMAL
AST SERPL-CCNC: 35 UNIT/L (ref 5–34)
BASOPHILS NFR BLD MANUAL: 0 %
BILIRUB SERPL-MCNC: 0.4 MG/DL
BILIRUBIN DIRECT+TOT PNL SERPL-MCNC: 0.2 MG/DL (ref 0–0.5)
BILIRUBIN DIRECT+TOT PNL SERPL-MCNC: 0.2 MG/DL (ref 0–0.8)
BUN SERPL-MCNC: 16 MG/DL (ref 7–18.7)
CALCIUM SERPL-MCNC: 8.7 MG/DL (ref 8.4–10.2)
CHLORIDE SERPL-SCNC: 115 MMOL/L (ref 98–107)
CO2 SERPL-SCNC: 19 MMOL/L (ref 22–29)
CREAT SERPL-MCNC: 0.58 MG/DL (ref 0.55–1.02)
EOSINOPHIL NFR BLD MANUAL: 4 %
ERYTHROCYTE [DISTWIDTH] IN BLOOD BY AUTOMATED COUNT: 17.7 % (ref 11.5–14.5)
GLOBULIN SER-MCNC: 2.6 GM/DL (ref 2.4–3.5)
GLUCOSE SERPL-MCNC: 79 MG/DL (ref 74–100)
GRANULOCYTES NFR BLD MANUAL: 18 % (ref 43–75)
HCT VFR BLD AUTO: 35.8 % (ref 35–46)
HGB BLD-MCNC: 11.5 GM/DL (ref 12–16)
LYMPHOCYTES NFR BLD MANUAL: 66 % (ref 20.5–51.1)
MAGNESIUM SERPL-MCNC: 2.05 MG/DL (ref 1.6–2.6)
MCH RBC QN AUTO: 27.2 PG (ref 26–34)
MCHC RBC AUTO-ENTMCNC: 32.1 GM/DL (ref 31–37)
MCV RBC AUTO: 84.6 FL (ref 80–100)
MONOCYTES NFR BLD MANUAL: 12 % (ref 2–9)
PLATELET # BLD AUTO: 239 X10(3)/MCL (ref 130–400)
PLATELET # BLD EST: ADEQUATE 10*3/UL
PMV BLD AUTO: 10.9 FL (ref 7.4–10.4)
POTASSIUM SERPL-SCNC: 3.7 MMOL/L (ref 3.5–5.1)
PROT SERPL-MCNC: 6.1 GM/DL (ref 6.4–8.3)
RBC # BLD AUTO: 4.23 X10(6)/MCL (ref 4–5.2)
SODIUM SERPL-SCNC: 143 MMOL/L (ref 136–145)
WBC # SPEC AUTO: 4 X10(3)/MCL (ref 4.5–11)

## 2021-02-09 ENCOUNTER — HISTORICAL (OUTPATIENT)
Dept: INFUSION THERAPY | Facility: HOSPITAL | Age: 26
End: 2021-02-09

## 2021-02-10 ENCOUNTER — HISTORICAL (OUTPATIENT)
Dept: INFUSION THERAPY | Facility: HOSPITAL | Age: 26
End: 2021-02-10

## 2021-02-11 ENCOUNTER — HISTORICAL (OUTPATIENT)
Dept: INFUSION THERAPY | Facility: HOSPITAL | Age: 26
End: 2021-02-11

## 2021-02-12 ENCOUNTER — HISTORICAL (OUTPATIENT)
Dept: INFUSION THERAPY | Facility: HOSPITAL | Age: 26
End: 2021-02-12

## 2021-02-17 ENCOUNTER — HISTORICAL (OUTPATIENT)
Dept: ADMINISTRATIVE | Facility: HOSPITAL | Age: 26
End: 2021-02-17

## 2021-02-17 LAB
ABS NEUT (OLG): 0.6 X10(3)/MCL (ref 2.1–9.2)
ALBUMIN SERPL-MCNC: 3.5 GM/DL (ref 3.5–5)
ALBUMIN/GLOB SERPL: 1.5 RATIO (ref 1.1–2)
ALP SERPL-CCNC: 60 UNIT/L (ref 40–150)
ALT SERPL-CCNC: 52 UNIT/L (ref 0–55)
AST SERPL-CCNC: 39 UNIT/L (ref 5–34)
BASOPHILS # BLD AUTO: 0 X10(3)/MCL (ref 0–0.2)
BASOPHILS NFR BLD AUTO: 0 %
BILIRUB SERPL-MCNC: 0.8 MG/DL
BILIRUBIN DIRECT+TOT PNL SERPL-MCNC: 0.3 MG/DL (ref 0–0.5)
BILIRUBIN DIRECT+TOT PNL SERPL-MCNC: 0.5 MG/DL (ref 0–0.8)
BUN SERPL-MCNC: 5 MG/DL (ref 7–18.7)
CALCIUM SERPL-MCNC: 9 MG/DL (ref 8.4–10.2)
CHLORIDE SERPL-SCNC: 108 MMOL/L (ref 98–107)
CO2 SERPL-SCNC: 26 MMOL/L (ref 22–29)
CREAT SERPL-MCNC: 0.57 MG/DL (ref 0.55–1.02)
EOSINOPHIL # BLD AUTO: 0.2 X10(3)/MCL (ref 0–0.9)
EOSINOPHIL NFR BLD AUTO: 7 %
ERYTHROCYTE [DISTWIDTH] IN BLOOD BY AUTOMATED COUNT: 18.6 % (ref 11.5–14.5)
GLOBULIN SER-MCNC: 2.3 GM/DL (ref 2.4–3.5)
GLUCOSE SERPL-MCNC: 99 MG/DL (ref 74–100)
HCT VFR BLD AUTO: 34.9 % (ref 35–46)
HGB BLD-MCNC: 10.7 GM/DL (ref 12–16)
IMM GRANULOCYTES # BLD AUTO: 0.01 10*3/UL
IMM GRANULOCYTES NFR BLD AUTO: 0 %
LYMPHOCYTES # BLD AUTO: 1.8 X10(3)/MCL (ref 0.6–4.6)
LYMPHOCYTES NFR BLD AUTO: 64 %
MAGNESIUM SERPL-MCNC: 2.09 MG/DL (ref 1.6–2.6)
MCH RBC QN AUTO: 27.2 PG (ref 26–34)
MCHC RBC AUTO-ENTMCNC: 30.7 GM/DL (ref 31–37)
MCV RBC AUTO: 88.8 FL (ref 80–100)
MONOCYTES # BLD AUTO: 0.2 X10(3)/MCL (ref 0.1–1.3)
MONOCYTES NFR BLD AUTO: 6 %
NEUTROPHILS # BLD AUTO: 0.6 X10(3)/MCL (ref 2.1–9.2)
NEUTROPHILS NFR BLD AUTO: 22 %
PLATELET # BLD AUTO: 240 X10(3)/MCL (ref 130–400)
PMV BLD AUTO: 11.2 FL (ref 7.4–10.4)
POTASSIUM SERPL-SCNC: 4.1 MMOL/L (ref 3.5–5.1)
PROT SERPL-MCNC: 5.8 GM/DL (ref 6.4–8.3)
RBC # BLD AUTO: 3.93 X10(6)/MCL (ref 4–5.2)
SODIUM SERPL-SCNC: 143 MMOL/L (ref 136–145)
WBC # SPEC AUTO: 2.8 X10(3)/MCL (ref 4.5–11)

## 2021-02-24 ENCOUNTER — HISTORICAL (OUTPATIENT)
Dept: ADMINISTRATIVE | Facility: HOSPITAL | Age: 26
End: 2021-02-24

## 2021-02-24 LAB
ABS NEUT (OLG): 0.84 X10(3)/MCL (ref 2.1–9.2)
ABS NEUT (OLG): 0.91 X10(3)/MCL (ref 2.1–9.2)
ALBUMIN SERPL-MCNC: 4.2 GM/DL (ref 3.5–5)
ALBUMIN/GLOB SERPL: 1.9 RATIO (ref 1.1–2)
ALP SERPL-CCNC: 72 UNIT/L (ref 40–150)
ALT SERPL-CCNC: 19 UNIT/L (ref 0–55)
AST SERPL-CCNC: 16 UNIT/L (ref 5–34)
BASOPHILS # BLD AUTO: 0 X10(3)/MCL (ref 0–0.2)
BASOPHILS NFR BLD AUTO: 0 %
BILIRUB SERPL-MCNC: 0.5 MG/DL
BILIRUBIN DIRECT+TOT PNL SERPL-MCNC: 0.2 MG/DL (ref 0–0.5)
BILIRUBIN DIRECT+TOT PNL SERPL-MCNC: 0.3 MG/DL (ref 0–0.8)
BUN SERPL-MCNC: 8.2 MG/DL (ref 7–18.7)
CALCIUM SERPL-MCNC: 8.9 MG/DL (ref 8.4–10.2)
CHLORIDE SERPL-SCNC: 112 MMOL/L (ref 98–107)
CO2 SERPL-SCNC: 20 MMOL/L (ref 22–29)
CREAT SERPL-MCNC: 0.61 MG/DL (ref 0.55–1.02)
EOSINOPHIL # BLD AUTO: 0 X10(3)/MCL (ref 0–0.9)
EOSINOPHIL NFR BLD AUTO: 2 %
ERYTHROCYTE [DISTWIDTH] IN BLOOD BY AUTOMATED COUNT: 18.4 % (ref 11.5–17)
ERYTHROCYTE [DISTWIDTH] IN BLOOD BY AUTOMATED COUNT: 18.6 % (ref 11.5–17)
GLOBULIN SER-MCNC: 2.2 GM/DL (ref 2.4–3.5)
GLUCOSE SERPL-MCNC: 81 MG/DL (ref 74–100)
HCT VFR BLD AUTO: 39.8 % (ref 37–47)
HCT VFR BLD AUTO: 41 % (ref 37–47)
HGB BLD-MCNC: 11.9 GM/DL (ref 12–16)
HGB BLD-MCNC: 12.2 GM/DL (ref 12–16)
HYPOCHROMIA BLD QL SMEAR: 1
LYMPHOCYTES # BLD AUTO: 1.2 X10(3)/MCL (ref 0.6–4.6)
LYMPHOCYTES NFR BLD AUTO: 49 %
LYMPHOCYTES NFR BLD MANUAL: 50 % (ref 13–40)
MAGNESIUM SERPL-MCNC: 2.1 MG/DL (ref 1.6–2.6)
MCH RBC QN AUTO: 27.5 PG (ref 27–31)
MCH RBC QN AUTO: 27.8 PG (ref 27–31)
MCHC RBC AUTO-ENTMCNC: 29.8 GM/DL (ref 33–36)
MCHC RBC AUTO-ENTMCNC: 29.9 GM/DL (ref 33–36)
MCV RBC AUTO: 91.9 FL (ref 80–94)
MCV RBC AUTO: 93.4 FL (ref 80–94)
MONOCYTES # BLD AUTO: 0.3 X10(3)/MCL (ref 0.1–1.3)
MONOCYTES NFR BLD AUTO: 13 %
MONOCYTES NFR BLD MANUAL: 12 % (ref 2–11)
NEUTROPHILS # BLD AUTO: 0.84 X10(3)/MCL (ref 2.1–9.2)
NEUTROPHILS NFR BLD AUTO: 35 %
NEUTROPHILS NFR BLD MANUAL: 38 % (ref 47–80)
PLATELET # BLD AUTO: 231 X10(3)/MCL (ref 130–400)
PLATELET # BLD AUTO: 243 X10(3)/MCL (ref 130–400)
PLATELET # BLD EST: ADEQUATE 10*3/UL
PMV BLD AUTO: 10.5 FL (ref 9.4–12.4)
PMV BLD AUTO: 10.7 FL (ref 9.4–12.4)
POTASSIUM SERPL-SCNC: 4 MMOL/L (ref 3.5–5.1)
PROT SERPL-MCNC: 6.4 GM/DL (ref 6.4–8.3)
RBC # BLD AUTO: 4.33 X10(6)/MCL (ref 4.2–5.4)
RBC # BLD AUTO: 4.39 X10(6)/MCL (ref 4.2–5.4)
RBC MORPH BLD: ABNORMAL
SODIUM SERPL-SCNC: 141 MMOL/L (ref 136–145)
WBC # SPEC AUTO: 2.4 X10(3)/MCL (ref 4.5–11.5)
WBC # SPEC AUTO: 2.5 X10(3)/MCL (ref 4.5–11.5)

## 2021-03-02 ENCOUNTER — HISTORICAL (OUTPATIENT)
Dept: ADMINISTRATIVE | Facility: HOSPITAL | Age: 26
End: 2021-03-02

## 2021-03-02 LAB
ABS NEUT (OLG): 1.49 X10(3)/MCL (ref 2.1–9.2)
ALBUMIN SERPL-MCNC: 4 GM/DL (ref 3.5–5)
ALBUMIN/GLOB SERPL: 1.6 RATIO (ref 1.1–2)
ALP SERPL-CCNC: 95 UNIT/L (ref 40–150)
ALT SERPL-CCNC: 17 UNIT/L (ref 0–55)
AST SERPL-CCNC: 20 UNIT/L (ref 5–34)
BASOPHILS # BLD AUTO: 0 X10(3)/MCL (ref 0–0.2)
BASOPHILS NFR BLD AUTO: 1 %
BILIRUB SERPL-MCNC: 0.2 MG/DL
BILIRUBIN DIRECT+TOT PNL SERPL-MCNC: 0.1 MG/DL (ref 0–0.5)
BILIRUBIN DIRECT+TOT PNL SERPL-MCNC: 0.1 MG/DL (ref 0–0.8)
BUN SERPL-MCNC: 10.5 MG/DL (ref 7–18.7)
CALCIUM SERPL-MCNC: 8.5 MG/DL (ref 8.4–10.2)
CHLORIDE SERPL-SCNC: 116 MMOL/L (ref 98–107)
CO2 SERPL-SCNC: 18 MMOL/L (ref 22–29)
CREAT SERPL-MCNC: 0.57 MG/DL (ref 0.55–1.02)
EOSINOPHIL # BLD AUTO: 0.1 X10(3)/MCL (ref 0–0.9)
EOSINOPHIL NFR BLD AUTO: 4 %
ERYTHROCYTE [DISTWIDTH] IN BLOOD BY AUTOMATED COUNT: 17.7 % (ref 11.5–17)
GLOBULIN SER-MCNC: 2.5 GM/DL (ref 2.4–3.5)
GLUCOSE SERPL-MCNC: 87 MG/DL (ref 74–100)
HCT VFR BLD AUTO: 42.1 % (ref 37–47)
HGB BLD-MCNC: 13.2 GM/DL (ref 12–16)
LYMPHOCYTES # BLD AUTO: 1.3 X10(3)/MCL (ref 0.6–4.6)
LYMPHOCYTES NFR BLD AUTO: 40 %
MAGNESIUM SERPL-MCNC: 1.9 MG/DL (ref 1.6–2.6)
MCH RBC QN AUTO: 28.1 PG (ref 27–31)
MCHC RBC AUTO-ENTMCNC: 31.4 GM/DL (ref 33–36)
MCV RBC AUTO: 89.8 FL (ref 80–94)
MONOCYTES # BLD AUTO: 0.4 X10(3)/MCL (ref 0.1–1.3)
MONOCYTES NFR BLD AUTO: 11 %
NEUTROPHILS # BLD AUTO: 1.49 X10(3)/MCL (ref 2.1–9.2)
NEUTROPHILS NFR BLD AUTO: 45 %
PLATELET # BLD AUTO: 223 X10(3)/MCL (ref 130–400)
PMV BLD AUTO: 10.5 FL (ref 9.4–12.4)
POTASSIUM SERPL-SCNC: 3.7 MMOL/L (ref 3.5–5.1)
PROT SERPL-MCNC: 6.5 GM/DL (ref 6.4–8.3)
RBC # BLD AUTO: 4.69 X10(6)/MCL (ref 4.2–5.4)
SODIUM SERPL-SCNC: 142 MMOL/L (ref 136–145)
WBC # SPEC AUTO: 3.3 X10(3)/MCL (ref 4.5–11.5)

## 2021-03-09 ENCOUNTER — SPECIALTY PHARMACY (OUTPATIENT)
Dept: PHARMACY | Facility: CLINIC | Age: 26
End: 2021-03-09

## 2021-03-10 ENCOUNTER — HISTORICAL (OUTPATIENT)
Dept: ADMINISTRATIVE | Facility: HOSPITAL | Age: 26
End: 2021-03-10

## 2021-03-10 LAB
ABS NEUT (OLG): 3.04 X10(3)/MCL (ref 2.1–9.2)
ALBUMIN SERPL-MCNC: 3.8 GM/DL (ref 3.5–5)
ALBUMIN/GLOB SERPL: 1.5 RATIO (ref 1.1–2)
ALP SERPL-CCNC: 95 UNIT/L (ref 40–150)
ALT SERPL-CCNC: 16 UNIT/L (ref 0–55)
AST SERPL-CCNC: 20 UNIT/L (ref 5–34)
BASOPHILS # BLD AUTO: 0 X10(3)/MCL (ref 0–0.2)
BASOPHILS NFR BLD AUTO: 0 %
BILIRUB SERPL-MCNC: 0.2 MG/DL
BILIRUBIN DIRECT+TOT PNL SERPL-MCNC: 0.1 MG/DL (ref 0–0.5)
BILIRUBIN DIRECT+TOT PNL SERPL-MCNC: 0.1 MG/DL (ref 0–0.8)
BUN SERPL-MCNC: 12.4 MG/DL (ref 7–18.7)
CALCIUM SERPL-MCNC: 8.1 MG/DL (ref 8.4–10.2)
CHLORIDE SERPL-SCNC: 110 MMOL/L (ref 98–107)
CO2 SERPL-SCNC: 18 MMOL/L (ref 22–29)
CREAT SERPL-MCNC: 0.57 MG/DL (ref 0.55–1.02)
EOSINOPHIL # BLD AUTO: 0.3 X10(3)/MCL (ref 0–0.9)
EOSINOPHIL NFR BLD AUTO: 5 %
ERYTHROCYTE [DISTWIDTH] IN BLOOD BY AUTOMATED COUNT: 17.7 % (ref 11.5–17)
GLOBULIN SER-MCNC: 2.6 GM/DL (ref 2.4–3.5)
GLUCOSE SERPL-MCNC: 73 MG/DL (ref 74–100)
HCT VFR BLD AUTO: 42.7 % (ref 37–47)
HGB BLD-MCNC: 13.3 GM/DL (ref 12–16)
LYMPHOCYTES # BLD AUTO: 1.8 X10(3)/MCL (ref 0.6–4.6)
LYMPHOCYTES NFR BLD AUTO: 32 %
MCH RBC QN AUTO: 27.3 PG (ref 27–31)
MCHC RBC AUTO-ENTMCNC: 31.1 GM/DL (ref 33–36)
MCV RBC AUTO: 87.5 FL (ref 80–94)
MONOCYTES # BLD AUTO: 0.5 X10(3)/MCL (ref 0.1–1.3)
MONOCYTES NFR BLD AUTO: 8 %
NEUTROPHILS # BLD AUTO: 3.04 X10(3)/MCL (ref 2.1–9.2)
NEUTROPHILS NFR BLD AUTO: 55 %
PLATELET # BLD AUTO: 259 X10(3)/MCL (ref 130–400)
PMV BLD AUTO: 10.3 FL (ref 9.4–12.4)
POTASSIUM SERPL-SCNC: 3.9 MMOL/L (ref 3.5–5.1)
PROT SERPL-MCNC: 6.4 GM/DL (ref 6.4–8.3)
RBC # BLD AUTO: 4.88 X10(6)/MCL (ref 4.2–5.4)
SODIUM SERPL-SCNC: 141 MMOL/L (ref 136–145)
WBC # SPEC AUTO: 5.6 X10(3)/MCL (ref 4.5–11.5)

## 2021-03-15 ENCOUNTER — HISTORICAL (OUTPATIENT)
Dept: INFUSION THERAPY | Facility: HOSPITAL | Age: 26
End: 2021-03-15

## 2021-03-15 LAB
ABS NEUT (OLG): 3.47 X10(3)/MCL (ref 2.1–9.2)
ALBUMIN SERPL-MCNC: 3.6 GM/DL (ref 3.5–5)
ALBUMIN/GLOB SERPL: 1.2 RATIO (ref 1.1–2)
ALP SERPL-CCNC: 73 UNIT/L (ref 40–150)
ALT SERPL-CCNC: 13 UNIT/L (ref 0–55)
AST SERPL-CCNC: 16 UNIT/L (ref 5–34)
BASOPHILS # BLD AUTO: 0 X10(3)/MCL (ref 0–0.2)
BASOPHILS NFR BLD AUTO: 0 %
BILIRUB SERPL-MCNC: 0.5 MG/DL
BILIRUBIN DIRECT+TOT PNL SERPL-MCNC: 0.2 MG/DL (ref 0–0.5)
BILIRUBIN DIRECT+TOT PNL SERPL-MCNC: 0.3 MG/DL (ref 0–0.8)
BUN SERPL-MCNC: 6 MG/DL (ref 7–18.7)
CALCIUM SERPL-MCNC: 8.5 MG/DL (ref 8.4–10.2)
CHLORIDE SERPL-SCNC: 109 MMOL/L (ref 98–107)
CO2 SERPL-SCNC: 24 MMOL/L (ref 22–29)
CREAT SERPL-MCNC: 0.53 MG/DL (ref 0.55–1.02)
EOSINOPHIL # BLD AUTO: 0.4 X10(3)/MCL (ref 0–0.9)
EOSINOPHIL NFR BLD AUTO: 6 %
ERYTHROCYTE [DISTWIDTH] IN BLOOD BY AUTOMATED COUNT: 16.4 % (ref 11.5–14.5)
GLOBULIN SER-MCNC: 3 GM/DL (ref 2.4–3.5)
GLUCOSE SERPL-MCNC: 98 MG/DL (ref 74–100)
HCT VFR BLD AUTO: 39.6 % (ref 35–46)
HGB BLD-MCNC: 12.7 GM/DL (ref 12–16)
IMM GRANULOCYTES # BLD AUTO: 0.02 10*3/UL
IMM GRANULOCYTES NFR BLD AUTO: 0 %
LYMPHOCYTES # BLD AUTO: 2.2 X10(3)/MCL (ref 0.6–4.6)
LYMPHOCYTES NFR BLD AUTO: 34 %
MAGNESIUM SERPL-MCNC: 1.7 MG/DL (ref 1.6–2.6)
MCH RBC QN AUTO: 27.9 PG (ref 26–34)
MCHC RBC AUTO-ENTMCNC: 32.1 GM/DL (ref 31–37)
MCV RBC AUTO: 87 FL (ref 80–100)
MONOCYTES # BLD AUTO: 0.5 X10(3)/MCL (ref 0.1–1.3)
MONOCYTES NFR BLD AUTO: 7 %
NEUTROPHILS # BLD AUTO: 3.47 X10(3)/MCL (ref 2.1–9.2)
NEUTROPHILS NFR BLD AUTO: 53 %
PLATELET # BLD AUTO: 227 X10(3)/MCL (ref 130–400)
PMV BLD AUTO: 9.7 FL (ref 7.4–10.4)
POTASSIUM SERPL-SCNC: 3.8 MMOL/L (ref 3.5–5.1)
PROT SERPL-MCNC: 6.6 GM/DL (ref 6.4–8.3)
RBC # BLD AUTO: 4.55 X10(6)/MCL (ref 4–5.2)
SODIUM SERPL-SCNC: 142 MMOL/L (ref 136–145)
WBC # SPEC AUTO: 6.6 X10(3)/MCL (ref 4.5–11)

## 2021-03-16 ENCOUNTER — HISTORICAL (OUTPATIENT)
Dept: INFUSION THERAPY | Facility: HOSPITAL | Age: 26
End: 2021-03-16

## 2021-03-17 ENCOUNTER — HISTORICAL (OUTPATIENT)
Dept: INFUSION THERAPY | Facility: HOSPITAL | Age: 26
End: 2021-03-17

## 2021-03-18 ENCOUNTER — HISTORICAL (OUTPATIENT)
Dept: INFUSION THERAPY | Facility: HOSPITAL | Age: 26
End: 2021-03-18

## 2021-03-19 ENCOUNTER — HISTORICAL (OUTPATIENT)
Dept: INFUSION THERAPY | Facility: HOSPITAL | Age: 26
End: 2021-03-19

## 2021-03-22 ENCOUNTER — HISTORICAL (OUTPATIENT)
Dept: INFUSION THERAPY | Facility: HOSPITAL | Age: 26
End: 2021-03-22

## 2021-03-22 LAB
ABS NEUT (OLG): 2.05 X10(3)/MCL (ref 2.1–9.2)
ALBUMIN SERPL-MCNC: 3.5 GM/DL (ref 3.5–5)
ALBUMIN/GLOB SERPL: 1.4 RATIO (ref 1.1–2)
ALP SERPL-CCNC: 61 UNIT/L (ref 40–150)
ALT SERPL-CCNC: 14 UNIT/L (ref 0–55)
AST SERPL-CCNC: 15 UNIT/L (ref 5–34)
BASOPHILS # BLD AUTO: 0 X10(3)/MCL (ref 0–0.2)
BASOPHILS NFR BLD AUTO: 0 %
BILIRUB SERPL-MCNC: 0.8 MG/DL
BILIRUBIN DIRECT+TOT PNL SERPL-MCNC: 0.3 MG/DL (ref 0–0.5)
BILIRUBIN DIRECT+TOT PNL SERPL-MCNC: 0.5 MG/DL (ref 0–0.8)
BUN SERPL-MCNC: 6.2 MG/DL (ref 7–18.7)
CALCIUM SERPL-MCNC: 8.4 MG/DL (ref 8.4–10.2)
CHLORIDE SERPL-SCNC: 105 MMOL/L (ref 98–107)
CO2 SERPL-SCNC: 26 MMOL/L (ref 22–29)
CREAT SERPL-MCNC: 0.56 MG/DL (ref 0.55–1.02)
EOSINOPHIL # BLD AUTO: 0.2 X10(3)/MCL (ref 0–0.9)
EOSINOPHIL NFR BLD AUTO: 3 %
ERYTHROCYTE [DISTWIDTH] IN BLOOD BY AUTOMATED COUNT: 16.7 % (ref 11.5–14.5)
GLOBULIN SER-MCNC: 2.5 GM/DL (ref 2.4–3.5)
GLUCOSE SERPL-MCNC: 89 MG/DL (ref 74–100)
HCT VFR BLD AUTO: 37.2 % (ref 35–46)
HGB BLD-MCNC: 11.7 GM/DL (ref 12–16)
IMM GRANULOCYTES # BLD AUTO: 0.02 10*3/UL
IMM GRANULOCYTES NFR BLD AUTO: 0 %
LYMPHOCYTES # BLD AUTO: 2.3 X10(3)/MCL (ref 0.6–4.6)
LYMPHOCYTES NFR BLD AUTO: 43 %
MAGNESIUM SERPL-MCNC: 2 MG/DL (ref 1.6–2.6)
MCH RBC QN AUTO: 27.1 PG (ref 26–34)
MCHC RBC AUTO-ENTMCNC: 31.5 GM/DL (ref 31–37)
MCV RBC AUTO: 86.3 FL (ref 80–100)
MONOCYTES # BLD AUTO: 0.8 X10(3)/MCL (ref 0.1–1.3)
MONOCYTES NFR BLD AUTO: 14 %
NEUTROPHILS # BLD AUTO: 2.05 X10(3)/MCL (ref 2.1–9.2)
NEUTROPHILS NFR BLD AUTO: 38 %
PLATELET # BLD AUTO: 298 X10(3)/MCL (ref 130–400)
PMV BLD AUTO: 10.8 FL (ref 7.4–10.4)
POTASSIUM SERPL-SCNC: 3.5 MMOL/L (ref 3.5–5.1)
PROT SERPL-MCNC: 6 GM/DL (ref 6.4–8.3)
RBC # BLD AUTO: 4.31 X10(6)/MCL (ref 4–5.2)
SODIUM SERPL-SCNC: 142 MMOL/L (ref 136–145)
WBC # SPEC AUTO: 5.4 X10(3)/MCL (ref 4.5–11)

## 2021-03-23 ENCOUNTER — HISTORICAL (OUTPATIENT)
Dept: INFUSION THERAPY | Facility: HOSPITAL | Age: 26
End: 2021-03-23

## 2021-03-24 ENCOUNTER — HISTORICAL (OUTPATIENT)
Dept: INFUSION THERAPY | Facility: HOSPITAL | Age: 26
End: 2021-03-24

## 2021-03-26 ENCOUNTER — HISTORICAL (OUTPATIENT)
Dept: INFUSION THERAPY | Facility: HOSPITAL | Age: 26
End: 2021-03-26

## 2021-03-29 ENCOUNTER — HISTORICAL (OUTPATIENT)
Dept: INFUSION THERAPY | Facility: HOSPITAL | Age: 26
End: 2021-03-29

## 2021-03-29 LAB
ABS NEUT (OLG): 1.72 X10(3)/MCL (ref 2.1–9.2)
ALBUMIN SERPL-MCNC: 3.4 GM/DL (ref 3.5–5)
ALBUMIN/GLOB SERPL: 1.4 RATIO (ref 1.1–2)
ALP SERPL-CCNC: 71 UNIT/L (ref 40–150)
ALT SERPL-CCNC: 37 UNIT/L (ref 0–55)
AST SERPL-CCNC: 30 UNIT/L (ref 5–34)
BILIRUB SERPL-MCNC: 0.5 MG/DL
BILIRUBIN DIRECT+TOT PNL SERPL-MCNC: 0.2 MG/DL (ref 0–0.5)
BILIRUBIN DIRECT+TOT PNL SERPL-MCNC: 0.3 MG/DL (ref 0–0.8)
BUN SERPL-MCNC: 20.4 MG/DL (ref 7–18.7)
CALCIUM SERPL-MCNC: 8.1 MG/DL (ref 8.4–10.2)
CHLORIDE SERPL-SCNC: 114 MMOL/L (ref 98–107)
CO2 SERPL-SCNC: 18 MMOL/L (ref 22–29)
CREAT SERPL-MCNC: 0.53 MG/DL (ref 0.55–1.02)
EOSINOPHIL # BLD AUTO: 0.1 X10(3)/MCL (ref 0–0.9)
EOSINOPHIL NFR BLD AUTO: 3 %
ERYTHROCYTE [DISTWIDTH] IN BLOOD BY AUTOMATED COUNT: 18.4 % (ref 11.5–14.5)
GLOBULIN SER-MCNC: 2.4 GM/DL (ref 2.4–3.5)
GLUCOSE SERPL-MCNC: 86 MG/DL (ref 74–100)
HCT VFR BLD AUTO: 36.5 % (ref 35–46)
HGB BLD-MCNC: 11.3 GM/DL (ref 12–16)
IMM GRANULOCYTES # BLD AUTO: 0.02 10*3/UL
IMM GRANULOCYTES NFR BLD AUTO: 0 %
LYMPHOCYTES # BLD AUTO: 2.1 X10(3)/MCL (ref 0.6–4.6)
LYMPHOCYTES NFR BLD AUTO: 51 %
MAGNESIUM SERPL-MCNC: 2.1 MG/DL (ref 1.6–2.6)
MCH RBC QN AUTO: 27.5 PG (ref 26–34)
MCHC RBC AUTO-ENTMCNC: 31 GM/DL (ref 31–37)
MCV RBC AUTO: 88.8 FL (ref 80–100)
MONOCYTES # BLD AUTO: 0.2 X10(3)/MCL (ref 0.1–1.3)
MONOCYTES NFR BLD AUTO: 5 %
NEUTROPHILS # BLD AUTO: 1.72 X10(3)/MCL (ref 2.1–9.2)
NEUTROPHILS NFR BLD AUTO: 41 %
PLATELET # BLD AUTO: 277 X10(3)/MCL (ref 130–400)
PMV BLD AUTO: 10.7 FL (ref 7.4–10.4)
POTASSIUM SERPL-SCNC: 4 MMOL/L (ref 3.5–5.1)
PROT SERPL-MCNC: 5.8 GM/DL (ref 6.4–8.3)
RBC # BLD AUTO: 4.11 X10(6)/MCL (ref 4–5.2)
SODIUM SERPL-SCNC: 139 MMOL/L (ref 136–145)
WBC # SPEC AUTO: 4.2 X10(3)/MCL (ref 4.5–11)

## 2021-03-31 ENCOUNTER — HISTORICAL (OUTPATIENT)
Dept: INFUSION THERAPY | Facility: HOSPITAL | Age: 26
End: 2021-03-31

## 2021-04-01 ENCOUNTER — HISTORICAL (OUTPATIENT)
Dept: INFUSION THERAPY | Facility: HOSPITAL | Age: 26
End: 2021-04-01

## 2021-04-05 ENCOUNTER — HISTORICAL (OUTPATIENT)
Dept: INFUSION THERAPY | Facility: HOSPITAL | Age: 26
End: 2021-04-05

## 2021-04-05 LAB
ABS NEUT (OLG): 0.8 X10(3)/MCL (ref 2.1–9.2)
ALBUMIN SERPL-MCNC: 3.4 GM/DL (ref 3.5–5)
ALBUMIN/GLOB SERPL: 1.4 RATIO (ref 1.1–2)
ALP SERPL-CCNC: 77 UNIT/L (ref 40–150)
ALT SERPL-CCNC: 38 UNIT/L (ref 0–55)
AST SERPL-CCNC: 31 UNIT/L (ref 5–34)
BASOPHILS # BLD AUTO: 0 X10(3)/MCL (ref 0–0.2)
BASOPHILS NFR BLD AUTO: 0 %
BILIRUB SERPL-MCNC: 0.6 MG/DL
BILIRUBIN DIRECT+TOT PNL SERPL-MCNC: 0.2 MG/DL (ref 0–0.5)
BILIRUBIN DIRECT+TOT PNL SERPL-MCNC: 0.4 MG/DL (ref 0–0.8)
BUN SERPL-MCNC: 13 MG/DL (ref 7–18.7)
CALCIUM SERPL-MCNC: 8.4 MG/DL (ref 8.4–10.2)
CHLORIDE SERPL-SCNC: 115 MMOL/L (ref 98–107)
CO2 SERPL-SCNC: 19 MMOL/L (ref 22–29)
CREAT SERPL-MCNC: 0.53 MG/DL (ref 0.55–1.02)
EOSINOPHIL # BLD AUTO: 0.2 X10(3)/MCL (ref 0–0.9)
EOSINOPHIL NFR BLD AUTO: 5 %
ERYTHROCYTE [DISTWIDTH] IN BLOOD BY AUTOMATED COUNT: 19.8 % (ref 11.5–14.5)
GLOBULIN SER-MCNC: 2.4 GM/DL (ref 2.4–3.5)
GLUCOSE SERPL-MCNC: 84 MG/DL (ref 74–100)
HCT VFR BLD AUTO: 34.2 % (ref 35–46)
HGB BLD-MCNC: 10.5 GM/DL (ref 12–16)
IMM GRANULOCYTES # BLD AUTO: 0.01 10*3/UL
IMM GRANULOCYTES NFR BLD AUTO: 0 %
LYMPHOCYTES # BLD AUTO: 2 X10(3)/MCL (ref 0.6–4.6)
LYMPHOCYTES NFR BLD AUTO: 63 %
MAGNESIUM SERPL-MCNC: 2 MG/DL (ref 1.6–2.6)
MCH RBC QN AUTO: 28 PG (ref 26–34)
MCHC RBC AUTO-ENTMCNC: 30.7 GM/DL (ref 31–37)
MCV RBC AUTO: 91.2 FL (ref 80–100)
MONOCYTES # BLD AUTO: 0.2 X10(3)/MCL (ref 0.1–1.3)
MONOCYTES NFR BLD AUTO: 6 %
NEUTROPHILS # BLD AUTO: 0.8 X10(3)/MCL (ref 2.1–9.2)
NEUTROPHILS NFR BLD AUTO: 25 %
PLATELET # BLD AUTO: 196 X10(3)/MCL (ref 130–400)
PMV BLD AUTO: 9.9 FL (ref 7.4–10.4)
POTASSIUM SERPL-SCNC: 3.9 MMOL/L (ref 3.5–5.1)
PROT SERPL-MCNC: 5.8 GM/DL (ref 6.4–8.3)
RBC # BLD AUTO: 3.75 X10(6)/MCL (ref 4–5.2)
SODIUM SERPL-SCNC: 141 MMOL/L (ref 136–145)
WBC # SPEC AUTO: 3.2 X10(3)/MCL (ref 4.5–11)

## 2021-04-06 ENCOUNTER — HISTORICAL (OUTPATIENT)
Dept: INFUSION THERAPY | Facility: HOSPITAL | Age: 26
End: 2021-04-06

## 2021-04-07 ENCOUNTER — HISTORICAL (OUTPATIENT)
Dept: INFUSION THERAPY | Facility: HOSPITAL | Age: 26
End: 2021-04-07

## 2021-04-08 ENCOUNTER — HISTORICAL (OUTPATIENT)
Dept: INFUSION THERAPY | Facility: HOSPITAL | Age: 26
End: 2021-04-08

## 2021-04-09 ENCOUNTER — HISTORICAL (OUTPATIENT)
Dept: INFUSION THERAPY | Facility: HOSPITAL | Age: 26
End: 2021-04-09

## 2021-04-09 LAB
ABS NEUT (OLG): 0.54 X10(3)/MCL (ref 2.1–9.2)
ALBUMIN SERPL-MCNC: 3.7 GM/DL (ref 3.5–5)
ALBUMIN/GLOB SERPL: 1.4 RATIO (ref 1.1–2)
ALP SERPL-CCNC: 70 UNIT/L (ref 40–150)
ALT SERPL-CCNC: 28 UNIT/L (ref 0–55)
ANISOCYTOSIS BLD QL SMEAR: ABNORMAL
AST SERPL-CCNC: 20 UNIT/L (ref 5–34)
BASOPHILS NFR BLD MANUAL: 0 %
BILIRUB SERPL-MCNC: 0.9 MG/DL
BILIRUBIN DIRECT+TOT PNL SERPL-MCNC: 0.3 MG/DL (ref 0–0.5)
BILIRUBIN DIRECT+TOT PNL SERPL-MCNC: 0.6 MG/DL (ref 0–0.8)
BUN SERPL-MCNC: 13.1 MG/DL (ref 7–18.7)
CALCIUM SERPL-MCNC: 8.7 MG/DL (ref 8.4–10.2)
CHLORIDE SERPL-SCNC: 113 MMOL/L (ref 98–107)
CO2 SERPL-SCNC: 19 MMOL/L (ref 22–29)
CREAT SERPL-MCNC: 0.61 MG/DL (ref 0.55–1.02)
EOSINOPHIL NFR BLD MANUAL: 0 %
ERYTHROCYTE [DISTWIDTH] IN BLOOD BY AUTOMATED COUNT: 20.6 % (ref 11.5–14.5)
GLOBULIN SER-MCNC: 2.6 GM/DL (ref 2.4–3.5)
GLUCOSE SERPL-MCNC: 101 MG/DL (ref 74–100)
GRANULOCYTES NFR BLD MANUAL: 18 % (ref 43–75)
HCT VFR BLD AUTO: 39.3 % (ref 35–46)
HGB BLD-MCNC: 12.3 GM/DL (ref 12–16)
LYMPHOCYTES NFR BLD MANUAL: 70 % (ref 20.5–51.1)
MAGNESIUM SERPL-MCNC: 2.1 MG/DL (ref 1.6–2.6)
MCH RBC QN AUTO: 28.3 PG (ref 26–34)
MCHC RBC AUTO-ENTMCNC: 31.3 GM/DL (ref 31–37)
MCV RBC AUTO: 90.6 FL (ref 80–100)
MONOCYTES NFR BLD MANUAL: 12 % (ref 2–9)
PLATELET # BLD AUTO: 247 X10(3)/MCL (ref 130–400)
PLATELET # BLD EST: ADEQUATE 10*3/UL
PMV BLD AUTO: 10.7 FL (ref 7.4–10.4)
POIKILOCYTOSIS BLD QL SMEAR: ABNORMAL
POTASSIUM SERPL-SCNC: 3.7 MMOL/L (ref 3.5–5.1)
PROT SERPL-MCNC: 6.3 GM/DL (ref 6.4–8.3)
RBC # BLD AUTO: 4.34 X10(6)/MCL (ref 4–5.2)
SODIUM SERPL-SCNC: 141 MMOL/L (ref 136–145)
WBC # SPEC AUTO: 3 X10(3)/MCL (ref 4.5–11)

## 2021-04-12 ENCOUNTER — SPECIALTY PHARMACY (OUTPATIENT)
Dept: PHARMACY | Facility: CLINIC | Age: 26
End: 2021-04-12

## 2021-04-16 ENCOUNTER — HISTORICAL (OUTPATIENT)
Dept: ADMINISTRATIVE | Facility: HOSPITAL | Age: 26
End: 2021-04-16

## 2021-04-16 LAB
ABS NEUT (OLG): 0.99 X10(3)/MCL (ref 2.1–9.2)
ALBUMIN SERPL-MCNC: 3.8 GM/DL (ref 3.5–5)
ALBUMIN/GLOB SERPL: 1.8 RATIO (ref 1.1–2)
ALP SERPL-CCNC: 71 UNIT/L (ref 40–150)
ALT SERPL-CCNC: 16 UNIT/L (ref 0–55)
ANISOCYTOSIS BLD QL SMEAR: 1
AST SERPL-CCNC: 15 UNIT/L (ref 5–34)
BILIRUB SERPL-MCNC: 0.5 MG/DL
BILIRUBIN DIRECT+TOT PNL SERPL-MCNC: 0.2 MG/DL (ref 0–0.5)
BILIRUBIN DIRECT+TOT PNL SERPL-MCNC: 0.3 MG/DL (ref 0–0.8)
BUN SERPL-MCNC: 9.3 MG/DL (ref 7–18.7)
CALCIUM SERPL-MCNC: 9 MG/DL (ref 8.4–10.2)
CHLORIDE SERPL-SCNC: 109 MMOL/L (ref 98–107)
CO2 SERPL-SCNC: 24 MMOL/L (ref 22–29)
CREAT SERPL-MCNC: 0.59 MG/DL (ref 0.55–1.02)
EOSINOPHIL NFR BLD MANUAL: 6 % (ref 0–8)
ERYTHROCYTE [DISTWIDTH] IN BLOOD BY AUTOMATED COUNT: 21 % (ref 11.5–17)
GLOBULIN SER-MCNC: 2.1 GM/DL (ref 2.4–3.5)
GLUCOSE SERPL-MCNC: 76 MG/DL (ref 74–100)
HCT VFR BLD AUTO: 38.1 % (ref 37–47)
HGB BLD-MCNC: 11.4 GM/DL (ref 12–16)
LYMPHOCYTES NFR BLD MANUAL: 58 % (ref 13–40)
MACROCYTES BLD QL SMEAR: 1 /MCL
MAGNESIUM SERPL-MCNC: 2.2 MG/DL (ref 1.6–2.6)
MCH RBC QN AUTO: 29.2 PG (ref 27–31)
MCHC RBC AUTO-ENTMCNC: 29.9 GM/DL (ref 33–36)
MCV RBC AUTO: 97.4 FL (ref 80–94)
MONOCYTES NFR BLD MANUAL: 10 % (ref 2–11)
NEUTROPHILS NFR BLD MANUAL: 26 % (ref 47–80)
PLATELET # BLD AUTO: 236 X10(3)/MCL (ref 130–400)
PLATELET # BLD EST: NORMAL 10*3/UL
PMV BLD AUTO: 10.7 FL (ref 7.4–10.4)
POTASSIUM SERPL-SCNC: 4.1 MMOL/L (ref 3.5–5.1)
PROT SERPL-MCNC: 5.9 GM/DL (ref 6.4–8.3)
RBC # BLD AUTO: 3.91 X10(6)/MCL (ref 4.2–5.4)
SODIUM SERPL-SCNC: 141 MMOL/L (ref 136–145)
WBC # SPEC AUTO: 3 X10(3)/MCL (ref 4.5–11.5)

## 2021-04-23 ENCOUNTER — HISTORICAL (OUTPATIENT)
Dept: ADMINISTRATIVE | Facility: HOSPITAL | Age: 26
End: 2021-04-23

## 2021-04-23 LAB
ABS NEUT (OLG): 1.25 X10(3)/MCL (ref 2.1–9.2)
ALBUMIN SERPL-MCNC: 3.8 GM/DL (ref 3.5–5)
ALBUMIN/GLOB SERPL: 1.4 RATIO (ref 1.1–2)
ALP SERPL-CCNC: 77 UNIT/L (ref 40–150)
ALT SERPL-CCNC: 8 UNIT/L (ref 0–55)
AST SERPL-CCNC: 12 UNIT/L (ref 5–34)
BASOPHILS # BLD AUTO: 0 X10(3)/MCL (ref 0–0.2)
BASOPHILS NFR BLD AUTO: 1 %
BILIRUB SERPL-MCNC: 0.2 MG/DL
BILIRUBIN DIRECT+TOT PNL SERPL-MCNC: 0.1 MG/DL (ref 0–0.5)
BILIRUBIN DIRECT+TOT PNL SERPL-MCNC: 0.1 MG/DL (ref 0–0.8)
BUN SERPL-MCNC: 14 MG/DL (ref 7–18.7)
CALCIUM SERPL-MCNC: 8.9 MG/DL (ref 8.4–10.2)
CHLORIDE SERPL-SCNC: 113 MMOL/L (ref 98–107)
CO2 SERPL-SCNC: 17 MMOL/L (ref 22–29)
CREAT SERPL-MCNC: 0.65 MG/DL (ref 0.55–1.02)
EOSINOPHIL # BLD AUTO: 0.1 X10(3)/MCL (ref 0–0.9)
EOSINOPHIL NFR BLD AUTO: 3 %
ERYTHROCYTE [DISTWIDTH] IN BLOOD BY AUTOMATED COUNT: 18.3 % (ref 11.5–17)
GLOBULIN SER-MCNC: 2.7 GM/DL (ref 2.4–3.5)
GLUCOSE SERPL-MCNC: 67 MG/DL (ref 74–100)
HCT VFR BLD AUTO: 39.8 % (ref 37–47)
HGB BLD-MCNC: 12.2 GM/DL (ref 12–16)
LYMPHOCYTES # BLD AUTO: 1.3 X10(3)/MCL (ref 0.6–4.6)
LYMPHOCYTES NFR BLD AUTO: 42 %
MAGNESIUM SERPL-MCNC: 1.9 MG/DL (ref 1.6–2.6)
MCH RBC QN AUTO: 29.3 PG (ref 27–31)
MCHC RBC AUTO-ENTMCNC: 30.7 GM/DL (ref 33–36)
MCV RBC AUTO: 95.4 FL (ref 80–94)
MONOCYTES # BLD AUTO: 0.4 X10(3)/MCL (ref 0.1–1.3)
MONOCYTES NFR BLD AUTO: 12 %
NEUTROPHILS # BLD AUTO: 1.25 X10(3)/MCL (ref 2.1–9.2)
NEUTROPHILS NFR BLD AUTO: 42 %
PLATELET # BLD AUTO: 206 X10(3)/MCL (ref 130–400)
PMV BLD AUTO: 10.3 FL (ref 9.4–12.4)
POTASSIUM SERPL-SCNC: 3.5 MMOL/L (ref 3.5–5.1)
PROT SERPL-MCNC: 6.5 GM/DL (ref 6.4–8.3)
RBC # BLD AUTO: 4.17 X10(6)/MCL (ref 4.2–5.4)
SODIUM SERPL-SCNC: 141 MMOL/L (ref 136–145)
WBC # SPEC AUTO: 3 X10(3)/MCL (ref 4.5–11.5)

## 2021-04-30 ENCOUNTER — HISTORICAL (OUTPATIENT)
Dept: ADMINISTRATIVE | Facility: HOSPITAL | Age: 26
End: 2021-04-30

## 2021-04-30 LAB
ABS NEUT (OLG): 2.02 X10(3)/MCL (ref 2.1–9.2)
ALBUMIN SERPL-MCNC: 3.6 GM/DL (ref 3.5–5)
ALBUMIN/GLOB SERPL: 1.2 RATIO (ref 1.1–2)
ALP SERPL-CCNC: 102 UNIT/L (ref 40–150)
ALT SERPL-CCNC: 9 UNIT/L (ref 0–55)
AST SERPL-CCNC: 15 UNIT/L (ref 5–34)
BASOPHILS # BLD AUTO: 0 X10(3)/MCL (ref 0–0.2)
BASOPHILS NFR BLD AUTO: 0 %
BILIRUB SERPL-MCNC: 0.3 MG/DL
BILIRUBIN DIRECT+TOT PNL SERPL-MCNC: 0.1 MG/DL (ref 0–0.5)
BILIRUBIN DIRECT+TOT PNL SERPL-MCNC: 0.2 MG/DL (ref 0–0.8)
BUN SERPL-MCNC: 12 MG/DL (ref 7–18.7)
CALCIUM SERPL-MCNC: 8.9 MG/DL (ref 8.4–10.2)
CHLORIDE SERPL-SCNC: 115 MMOL/L (ref 98–107)
CO2 SERPL-SCNC: 16 MMOL/L (ref 22–29)
CREAT SERPL-MCNC: 0.64 MG/DL (ref 0.55–1.02)
EOSINOPHIL # BLD AUTO: 0.2 X10(3)/MCL (ref 0–0.9)
EOSINOPHIL NFR BLD AUTO: 4 %
ERYTHROCYTE [DISTWIDTH] IN BLOOD BY AUTOMATED COUNT: 17.1 % (ref 11.5–17)
GLOBULIN SER-MCNC: 3.1 GM/DL (ref 2.4–3.5)
GLUCOSE SERPL-MCNC: 74 MG/DL (ref 74–100)
HCT VFR BLD AUTO: 39.4 % (ref 37–47)
HGB BLD-MCNC: 12.6 GM/DL (ref 12–16)
LYMPHOCYTES # BLD AUTO: 1.6 X10(3)/MCL (ref 0.6–4.6)
LYMPHOCYTES NFR BLD AUTO: 38 %
MAGNESIUM SERPL-MCNC: 1.9 MG/DL (ref 1.6–2.6)
MCH RBC QN AUTO: 29.1 PG (ref 27–31)
MCHC RBC AUTO-ENTMCNC: 32 GM/DL (ref 33–36)
MCV RBC AUTO: 91 FL (ref 80–94)
MONOCYTES # BLD AUTO: 0.4 X10(3)/MCL (ref 0.1–1.3)
MONOCYTES NFR BLD AUTO: 9 %
NEUTROPHILS # BLD AUTO: 2.02 X10(3)/MCL (ref 2.1–9.2)
NEUTROPHILS NFR BLD AUTO: 49 %
PLATELET # BLD AUTO: 241 X10(3)/MCL (ref 130–400)
PMV BLD AUTO: 10.3 FL (ref 9.4–12.4)
POTASSIUM SERPL-SCNC: 3.6 MMOL/L (ref 3.5–5.1)
PROT SERPL-MCNC: 6.7 GM/DL (ref 6.4–8.3)
RBC # BLD AUTO: 4.33 X10(6)/MCL (ref 4.2–5.4)
SODIUM SERPL-SCNC: 139 MMOL/L (ref 136–145)
WBC # SPEC AUTO: 4.1 X10(3)/MCL (ref 4.5–11.5)

## 2021-05-06 ENCOUNTER — SPECIALTY PHARMACY (OUTPATIENT)
Dept: PHARMACY | Facility: CLINIC | Age: 26
End: 2021-05-06

## 2021-05-10 ENCOUNTER — HISTORICAL (OUTPATIENT)
Dept: INFUSION THERAPY | Facility: HOSPITAL | Age: 26
End: 2021-05-10

## 2021-05-10 LAB
ABS NEUT (OLG): 3.9 X10(3)/MCL (ref 2.1–9.2)
ALBUMIN SERPL-MCNC: 3.7 GM/DL (ref 3.5–5)
ALBUMIN/GLOB SERPL: 1.3 RATIO (ref 1.1–2)
ALP SERPL-CCNC: 96 UNIT/L (ref 40–150)
ALT SERPL-CCNC: 9 UNIT/L (ref 0–55)
AST SERPL-CCNC: 13 UNIT/L (ref 5–34)
BASOPHILS # BLD AUTO: 0 X10(3)/MCL (ref 0–0.2)
BASOPHILS NFR BLD AUTO: 0 %
BILIRUB SERPL-MCNC: 0.4 MG/DL
BILIRUBIN DIRECT+TOT PNL SERPL-MCNC: 0.2 MG/DL (ref 0–0.5)
BILIRUBIN DIRECT+TOT PNL SERPL-MCNC: 0.2 MG/DL (ref 0–0.8)
BUN SERPL-MCNC: 9.6 MG/DL (ref 7–18.7)
CALCIUM SERPL-MCNC: 9 MG/DL (ref 8.4–10.2)
CHLORIDE SERPL-SCNC: 109 MMOL/L (ref 98–107)
CO2 SERPL-SCNC: 21 MMOL/L (ref 22–29)
CREAT SERPL-MCNC: 0.51 MG/DL (ref 0.55–1.02)
EOSINOPHIL # BLD AUTO: 0.3 X10(3)/MCL (ref 0–0.9)
EOSINOPHIL NFR BLD AUTO: 6 %
ERYTHROCYTE [DISTWIDTH] IN BLOOD BY AUTOMATED COUNT: 15.4 % (ref 11.5–14.5)
GLOBULIN SER-MCNC: 2.9 GM/DL (ref 2.4–3.5)
GLUCOSE SERPL-MCNC: 81 MG/DL (ref 74–100)
HCT VFR BLD AUTO: 38.8 % (ref 35–46)
HGB BLD-MCNC: 12.3 GM/DL (ref 12–16)
IMM GRANULOCYTES # BLD AUTO: 0.02 10*3/UL
IMM GRANULOCYTES NFR BLD AUTO: 0 %
LYMPHOCYTES # BLD AUTO: 1.4 X10(3)/MCL (ref 0.6–4.6)
LYMPHOCYTES NFR BLD AUTO: 23 %
MAGNESIUM SERPL-MCNC: 2 MG/DL (ref 1.6–2.6)
MCH RBC QN AUTO: 28.8 PG (ref 26–34)
MCHC RBC AUTO-ENTMCNC: 31.7 GM/DL (ref 31–37)
MCV RBC AUTO: 90.9 FL (ref 80–100)
MONOCYTES # BLD AUTO: 0.5 X10(3)/MCL (ref 0.1–1.3)
MONOCYTES NFR BLD AUTO: 8 %
NEUTROPHILS # BLD AUTO: 3.9 X10(3)/MCL (ref 2.1–9.2)
NEUTROPHILS NFR BLD AUTO: 63 %
PLATELET # BLD AUTO: 237 X10(3)/MCL (ref 130–400)
PMV BLD AUTO: 9.8 FL (ref 7.4–10.4)
POTASSIUM SERPL-SCNC: 4 MMOL/L (ref 3.5–5.1)
PROT SERPL-MCNC: 6.6 GM/DL (ref 6.4–8.3)
RBC # BLD AUTO: 4.27 X10(6)/MCL (ref 4–5.2)
SODIUM SERPL-SCNC: 140 MMOL/L (ref 136–145)
WBC # SPEC AUTO: 6.2 X10(3)/MCL (ref 4.5–11)

## 2021-05-11 ENCOUNTER — HISTORICAL (OUTPATIENT)
Dept: INFUSION THERAPY | Facility: HOSPITAL | Age: 26
End: 2021-05-11

## 2021-05-12 ENCOUNTER — HISTORICAL (OUTPATIENT)
Dept: INFUSION THERAPY | Facility: HOSPITAL | Age: 26
End: 2021-05-12

## 2021-05-13 ENCOUNTER — HISTORICAL (OUTPATIENT)
Dept: INFUSION THERAPY | Facility: HOSPITAL | Age: 26
End: 2021-05-13

## 2021-05-14 ENCOUNTER — HISTORICAL (OUTPATIENT)
Dept: INFUSION THERAPY | Facility: HOSPITAL | Age: 26
End: 2021-05-14

## 2021-05-17 ENCOUNTER — HISTORICAL (OUTPATIENT)
Dept: INFUSION THERAPY | Facility: HOSPITAL | Age: 26
End: 2021-05-17

## 2021-05-17 LAB
ABS NEUT (OLG): 2.3 X10(3)/MCL (ref 2.1–9.2)
ALBUMIN SERPL-MCNC: 3.4 GM/DL (ref 3.5–5)
ALBUMIN/GLOB SERPL: 1.4 RATIO (ref 1.1–2)
ALP SERPL-CCNC: 70 UNIT/L (ref 40–150)
ALT SERPL-CCNC: 12 UNIT/L (ref 0–55)
AST SERPL-CCNC: 13 UNIT/L (ref 5–34)
BILIRUB SERPL-MCNC: 0.8 MG/DL
BILIRUBIN DIRECT+TOT PNL SERPL-MCNC: 0.3 MG/DL (ref 0–0.5)
BILIRUBIN DIRECT+TOT PNL SERPL-MCNC: 0.5 MG/DL (ref 0–0.8)
BUN SERPL-MCNC: 4.4 MG/DL (ref 7–18.7)
CALCIUM SERPL-MCNC: 8.5 MG/DL (ref 8.4–10.2)
CHLORIDE SERPL-SCNC: 106 MMOL/L (ref 98–107)
CO2 SERPL-SCNC: 23 MMOL/L (ref 22–29)
CREAT SERPL-MCNC: 0.53 MG/DL (ref 0.55–1.02)
EOSINOPHIL # BLD AUTO: 0.2 X10(3)/MCL (ref 0–0.9)
EOSINOPHIL NFR BLD AUTO: 3 %
ERYTHROCYTE [DISTWIDTH] IN BLOOD BY AUTOMATED COUNT: 15 % (ref 11.5–14.5)
GLOBULIN SER-MCNC: 2.5 GM/DL (ref 2.4–3.5)
GLUCOSE SERPL-MCNC: 81 MG/DL (ref 74–100)
HCT VFR BLD AUTO: 36.3 % (ref 35–46)
HGB BLD-MCNC: 11.3 GM/DL (ref 12–16)
IMM GRANULOCYTES # BLD AUTO: 0.03 10*3/UL
IMM GRANULOCYTES NFR BLD AUTO: 1 %
LYMPHOCYTES # BLD AUTO: 2 X10(3)/MCL (ref 0.6–4.6)
LYMPHOCYTES NFR BLD AUTO: 39 %
MAGNESIUM SERPL-MCNC: 2.1 MG/DL (ref 1.6–2.6)
MCH RBC QN AUTO: 28.7 PG (ref 26–34)
MCHC RBC AUTO-ENTMCNC: 31.1 GM/DL (ref 31–37)
MCV RBC AUTO: 92.1 FL (ref 80–100)
MONOCYTES # BLD AUTO: 0.7 X10(3)/MCL (ref 0.1–1.3)
MONOCYTES NFR BLD AUTO: 13 %
NEUTROPHILS # BLD AUTO: 2.3 X10(3)/MCL (ref 2.1–9.2)
NEUTROPHILS NFR BLD AUTO: 44 %
PLATELET # BLD AUTO: 276 X10(3)/MCL (ref 130–400)
PMV BLD AUTO: 10.5 FL (ref 7.4–10.4)
POTASSIUM SERPL-SCNC: 3.7 MMOL/L (ref 3.5–5.1)
PROT SERPL-MCNC: 5.9 GM/DL (ref 6.4–8.3)
RBC # BLD AUTO: 3.94 X10(6)/MCL (ref 4–5.2)
SODIUM SERPL-SCNC: 139 MMOL/L (ref 136–145)
WBC # SPEC AUTO: 5.2 X10(3)/MCL (ref 4.5–11)

## 2021-05-18 ENCOUNTER — HISTORICAL (OUTPATIENT)
Dept: INFUSION THERAPY | Facility: HOSPITAL | Age: 26
End: 2021-05-18

## 2021-05-19 ENCOUNTER — HISTORICAL (OUTPATIENT)
Dept: INFUSION THERAPY | Facility: HOSPITAL | Age: 26
End: 2021-05-19

## 2021-05-20 ENCOUNTER — HISTORICAL (OUTPATIENT)
Dept: INFUSION THERAPY | Facility: HOSPITAL | Age: 26
End: 2021-05-20

## 2021-05-21 ENCOUNTER — HISTORICAL (OUTPATIENT)
Dept: INFUSION THERAPY | Facility: HOSPITAL | Age: 26
End: 2021-05-21

## 2021-05-24 ENCOUNTER — HISTORICAL (OUTPATIENT)
Dept: INFUSION THERAPY | Facility: HOSPITAL | Age: 26
End: 2021-05-24

## 2021-05-24 LAB
ABS NEUT (OLG): 1.75 X10(3)/MCL (ref 2.1–9.2)
ALBUMIN SERPL-MCNC: 3.5 GM/DL (ref 3.5–5)
ALBUMIN/GLOB SERPL: 1.5 RATIO (ref 1.1–2)
ALP SERPL-CCNC: 78 UNIT/L (ref 40–150)
ALT SERPL-CCNC: 18 UNIT/L (ref 0–55)
AST SERPL-CCNC: 15 UNIT/L (ref 5–34)
BILIRUB SERPL-MCNC: 0.6 MG/DL
BILIRUBIN DIRECT+TOT PNL SERPL-MCNC: 0.2 MG/DL (ref 0–0.5)
BILIRUBIN DIRECT+TOT PNL SERPL-MCNC: 0.4 MG/DL (ref 0–0.8)
BUN SERPL-MCNC: 7.6 MG/DL (ref 7–18.7)
CALCIUM SERPL-MCNC: 8.5 MG/DL (ref 8.4–10.2)
CHLORIDE SERPL-SCNC: 107 MMOL/L (ref 98–107)
CO2 SERPL-SCNC: 23 MMOL/L (ref 22–29)
CREAT SERPL-MCNC: 0.59 MG/DL (ref 0.55–1.02)
EOSINOPHIL # BLD AUTO: 0.1 X10(3)/MCL (ref 0–0.9)
EOSINOPHIL NFR BLD AUTO: 3 %
ERYTHROCYTE [DISTWIDTH] IN BLOOD BY AUTOMATED COUNT: 16 % (ref 11.5–14.5)
GLOBULIN SER-MCNC: 2.4 GM/DL (ref 2.4–3.5)
GLUCOSE SERPL-MCNC: 88 MG/DL (ref 74–100)
HCT VFR BLD AUTO: 36.7 % (ref 35–46)
HGB BLD-MCNC: 11.5 GM/DL (ref 12–16)
IMM GRANULOCYTES # BLD AUTO: 0.02 10*3/UL
IMM GRANULOCYTES NFR BLD AUTO: 0 %
LYMPHOCYTES # BLD AUTO: 2.5 X10(3)/MCL (ref 0.6–4.6)
LYMPHOCYTES NFR BLD AUTO: 52 %
MAGNESIUM SERPL-MCNC: 2.1 MG/DL (ref 1.6–2.6)
MCH RBC QN AUTO: 29.1 PG (ref 26–34)
MCHC RBC AUTO-ENTMCNC: 31.3 GM/DL (ref 31–37)
MCV RBC AUTO: 92.9 FL (ref 80–100)
MONOCYTES # BLD AUTO: 0.4 X10(3)/MCL (ref 0.1–1.3)
MONOCYTES NFR BLD AUTO: 8 %
NEUTROPHILS # BLD AUTO: 1.75 X10(3)/MCL (ref 2.1–9.2)
NEUTROPHILS NFR BLD AUTO: 37 %
PLATELET # BLD AUTO: 256 X10(3)/MCL (ref 130–400)
PMV BLD AUTO: 9.9 FL (ref 7.4–10.4)
POTASSIUM SERPL-SCNC: 4.2 MMOL/L (ref 3.5–5.1)
PROT SERPL-MCNC: 5.9 GM/DL (ref 6.4–8.3)
RBC # BLD AUTO: 3.95 X10(6)/MCL (ref 4–5.2)
SODIUM SERPL-SCNC: 140 MMOL/L (ref 136–145)
WBC # SPEC AUTO: 4.8 X10(3)/MCL (ref 4.5–11)

## 2021-05-25 ENCOUNTER — HISTORICAL (OUTPATIENT)
Dept: INFUSION THERAPY | Facility: HOSPITAL | Age: 26
End: 2021-05-25

## 2021-05-26 ENCOUNTER — HISTORICAL (OUTPATIENT)
Dept: INFUSION THERAPY | Facility: HOSPITAL | Age: 26
End: 2021-05-26

## 2021-05-26 ENCOUNTER — DOCUMENTATION ONLY (OUTPATIENT)
Dept: HEMATOLOGY/ONCOLOGY | Facility: CLINIC | Age: 26
End: 2021-05-26

## 2021-05-27 ENCOUNTER — HISTORICAL (OUTPATIENT)
Dept: INFUSION THERAPY | Facility: HOSPITAL | Age: 26
End: 2021-05-27

## 2021-05-28 ENCOUNTER — HISTORICAL (OUTPATIENT)
Dept: INFUSION THERAPY | Facility: HOSPITAL | Age: 26
End: 2021-05-28

## 2021-06-01 ENCOUNTER — HISTORICAL (OUTPATIENT)
Dept: INFUSION THERAPY | Facility: HOSPITAL | Age: 26
End: 2021-06-01

## 2021-06-01 LAB
ABS NEUT (OLG): 1.36 X10(3)/MCL (ref 2.1–9.2)
ALBUMIN SERPL-MCNC: 3.7 GM/DL (ref 3.5–5)
ALBUMIN/GLOB SERPL: 1.7 RATIO (ref 1.1–2)
ALP SERPL-CCNC: 69 UNIT/L (ref 40–150)
ALT SERPL-CCNC: 15 UNIT/L (ref 0–55)
AST SERPL-CCNC: 15 UNIT/L (ref 5–34)
BILIRUB SERPL-MCNC: 0.6 MG/DL
BILIRUBIN DIRECT+TOT PNL SERPL-MCNC: 0.3 MG/DL (ref 0–0.5)
BILIRUBIN DIRECT+TOT PNL SERPL-MCNC: 0.3 MG/DL (ref 0–0.8)
BUN SERPL-MCNC: 8.8 MG/DL (ref 7–18.7)
CALCIUM SERPL-MCNC: 9.3 MG/DL (ref 8.4–10.2)
CHLORIDE SERPL-SCNC: 107 MMOL/L (ref 98–107)
CO2 SERPL-SCNC: 28 MMOL/L (ref 22–29)
CREAT SERPL-MCNC: 0.52 MG/DL (ref 0.55–1.02)
EOSINOPHIL # BLD AUTO: 0.2 X10(3)/MCL (ref 0–0.9)
EOSINOPHIL NFR BLD AUTO: 4 %
ERYTHROCYTE [DISTWIDTH] IN BLOOD BY AUTOMATED COUNT: 17.4 % (ref 11.5–14.5)
GLOBULIN SER-MCNC: 2.2 GM/DL (ref 2.4–3.5)
GLUCOSE SERPL-MCNC: 81 MG/DL (ref 74–100)
HCT VFR BLD AUTO: 36.6 % (ref 35–46)
HGB BLD-MCNC: 11.5 GM/DL (ref 12–16)
IMM GRANULOCYTES # BLD AUTO: 0.01 10*3/UL
IMM GRANULOCYTES NFR BLD AUTO: 0 %
LYMPHOCYTES # BLD AUTO: 2 X10(3)/MCL (ref 0.6–4.6)
LYMPHOCYTES NFR BLD AUTO: 54 %
MAGNESIUM SERPL-MCNC: 2.3 MG/DL (ref 1.6–2.6)
MCH RBC QN AUTO: 30 PG (ref 26–34)
MCHC RBC AUTO-ENTMCNC: 31.4 GM/DL (ref 31–37)
MCV RBC AUTO: 95.6 FL (ref 80–100)
MONOCYTES # BLD AUTO: 0.2 X10(3)/MCL (ref 0.1–1.3)
MONOCYTES NFR BLD AUTO: 5 %
NEUTROPHILS # BLD AUTO: 1.36 X10(3)/MCL (ref 2.1–9.2)
NEUTROPHILS NFR BLD AUTO: 36 %
NRBC BLD AUTO-RTO: 0 % (ref 0–0.2)
PLATELET # BLD AUTO: 215 X10(3)/MCL (ref 130–400)
PMV BLD AUTO: 10.4 FL (ref 7.4–10.4)
POTASSIUM SERPL-SCNC: 4.2 MMOL/L (ref 3.5–5.1)
PROT SERPL-MCNC: 5.9 GM/DL (ref 6.4–8.3)
RBC # BLD AUTO: 3.83 X10(6)/MCL (ref 4–5.2)
SODIUM SERPL-SCNC: 141 MMOL/L (ref 136–145)
WBC # SPEC AUTO: 3.7 X10(3)/MCL (ref 4.5–11)

## 2021-06-02 ENCOUNTER — HISTORICAL (OUTPATIENT)
Dept: INFUSION THERAPY | Facility: HOSPITAL | Age: 26
End: 2021-06-02

## 2021-06-03 ENCOUNTER — HISTORICAL (OUTPATIENT)
Dept: INFUSION THERAPY | Facility: HOSPITAL | Age: 26
End: 2021-06-03

## 2021-06-04 ENCOUNTER — HISTORICAL (OUTPATIENT)
Dept: INFUSION THERAPY | Facility: HOSPITAL | Age: 26
End: 2021-06-04

## 2021-06-07 ENCOUNTER — HISTORICAL (OUTPATIENT)
Dept: ADMINISTRATIVE | Facility: HOSPITAL | Age: 26
End: 2021-06-07

## 2021-06-07 LAB
ABS NEUT (OLG): 1.06 X10(3)/MCL (ref 2.1–9.2)
ALBUMIN SERPL-MCNC: 3.5 GM/DL (ref 3.5–5)
ALBUMIN/GLOB SERPL: 1.9 RATIO (ref 1.1–2)
ALP SERPL-CCNC: 66 UNIT/L (ref 40–150)
ALT SERPL-CCNC: 11 UNIT/L (ref 0–55)
AST SERPL-CCNC: 12 UNIT/L (ref 5–34)
BASOPHILS # BLD AUTO: 0 X10(3)/MCL (ref 0–0.2)
BASOPHILS NFR BLD AUTO: 0 %
BILIRUB SERPL-MCNC: 0.6 MG/DL
BILIRUBIN DIRECT+TOT PNL SERPL-MCNC: 0.2 MG/DL (ref 0–0.5)
BILIRUBIN DIRECT+TOT PNL SERPL-MCNC: 0.4 MG/DL (ref 0–0.8)
BUN SERPL-MCNC: 8.9 MG/DL (ref 7–18.7)
CALCIUM SERPL-MCNC: 8.4 MG/DL (ref 8.4–10.2)
CHLORIDE SERPL-SCNC: 107 MMOL/L (ref 98–107)
CO2 SERPL-SCNC: 25 MMOL/L (ref 22–29)
CREAT SERPL-MCNC: 0.57 MG/DL (ref 0.55–1.02)
EOSINOPHIL # BLD AUTO: 0.1 X10(3)/MCL (ref 0–0.9)
EOSINOPHIL NFR BLD AUTO: 3 %
ERYTHROCYTE [DISTWIDTH] IN BLOOD BY AUTOMATED COUNT: 18 % (ref 11.5–17)
GLOBULIN SER-MCNC: 1.8 GM/DL (ref 2.4–3.5)
GLUCOSE SERPL-MCNC: 73 MG/DL (ref 74–100)
HCT VFR BLD AUTO: 37 % (ref 37–47)
HGB BLD-MCNC: 11.6 GM/DL (ref 12–16)
LYMPHOCYTES # BLD AUTO: 2.1 X10(3)/MCL (ref 0.6–4.6)
LYMPHOCYTES NFR BLD AUTO: 58 %
MAGNESIUM SERPL-MCNC: 2 MG/DL (ref 1.6–2.6)
MCH RBC QN AUTO: 29.7 PG (ref 27–31)
MCHC RBC AUTO-ENTMCNC: 31.4 GM/DL (ref 33–36)
MCV RBC AUTO: 94.6 FL (ref 80–94)
MONOCYTES # BLD AUTO: 0.3 X10(3)/MCL (ref 0.1–1.3)
MONOCYTES NFR BLD AUTO: 8 %
NEUTROPHILS # BLD AUTO: 1.06 X10(3)/MCL (ref 2.1–9.2)
NEUTROPHILS NFR BLD AUTO: 30 %
PLATELET # BLD AUTO: 247 X10(3)/MCL (ref 130–400)
PMV BLD AUTO: 11.1 FL (ref 9.4–12.4)
POTASSIUM SERPL-SCNC: 3.5 MMOL/L (ref 3.5–5.1)
PROT SERPL-MCNC: 5.3 GM/DL (ref 6.4–8.3)
RBC # BLD AUTO: 3.91 X10(6)/MCL (ref 4.2–5.4)
SODIUM SERPL-SCNC: 143 MMOL/L (ref 136–145)
WBC # SPEC AUTO: 3.5 X10(3)/MCL (ref 4.5–11.5)

## 2021-06-08 ENCOUNTER — TELEPHONE (OUTPATIENT)
Dept: PHARMACY | Facility: CLINIC | Age: 26
End: 2021-06-08

## 2021-06-09 ENCOUNTER — SPECIALTY PHARMACY (OUTPATIENT)
Dept: PHARMACY | Facility: CLINIC | Age: 26
End: 2021-06-09

## 2021-06-14 ENCOUNTER — HISTORICAL (OUTPATIENT)
Dept: ADMINISTRATIVE | Facility: HOSPITAL | Age: 26
End: 2021-06-14

## 2021-06-14 LAB
ABS NEUT (OLG): 1.2 X10(3)/MCL (ref 2.1–9.2)
ALBUMIN SERPL-MCNC: 3.4 GM/DL (ref 3.5–5)
ALBUMIN/GLOB SERPL: 1.5 RATIO (ref 1.1–2)
ALP SERPL-CCNC: 73 UNIT/L (ref 40–150)
ALT SERPL-CCNC: 8 UNIT/L (ref 0–55)
AST SERPL-CCNC: 10 UNIT/L (ref 5–34)
BASOPHILS # BLD AUTO: 0 X10(3)/MCL (ref 0–0.2)
BASOPHILS NFR BLD AUTO: 0 %
BILIRUB SERPL-MCNC: 0.6 MG/DL
BILIRUBIN DIRECT+TOT PNL SERPL-MCNC: 0.2 MG/DL (ref 0–0.5)
BILIRUBIN DIRECT+TOT PNL SERPL-MCNC: 0.4 MG/DL (ref 0–0.8)
BUN SERPL-MCNC: 6 MG/DL (ref 7–18.7)
CALCIUM SERPL-MCNC: 8.7 MG/DL (ref 8.4–10.2)
CHLORIDE SERPL-SCNC: 110 MMOL/L (ref 98–107)
CO2 SERPL-SCNC: 24 MMOL/L (ref 22–29)
CREAT SERPL-MCNC: 0.49 MG/DL (ref 0.55–1.02)
EOSINOPHIL # BLD AUTO: 0.1 X10(3)/MCL (ref 0–0.9)
EOSINOPHIL NFR BLD AUTO: 5 %
ERYTHROCYTE [DISTWIDTH] IN BLOOD BY AUTOMATED COUNT: 17.2 % (ref 11.5–17)
GLOBULIN SER-MCNC: 2.3 GM/DL (ref 2.4–3.5)
GLUCOSE SERPL-MCNC: 52 MG/DL (ref 74–100)
HCT VFR BLD AUTO: 40.1 % (ref 37–47)
HGB BLD-MCNC: 12.1 GM/DL (ref 12–16)
LYMPHOCYTES # BLD AUTO: 1.4 X10(3)/MCL (ref 0.6–4.6)
LYMPHOCYTES NFR BLD AUTO: 47 %
MAGNESIUM SERPL-MCNC: 2 MG/DL (ref 1.6–2.6)
MCH RBC QN AUTO: 30 PG (ref 27–31)
MCHC RBC AUTO-ENTMCNC: 30.2 GM/DL (ref 33–36)
MCV RBC AUTO: 99.3 FL (ref 80–94)
MONOCYTES # BLD AUTO: 0.3 X10(3)/MCL (ref 0.1–1.3)
MONOCYTES NFR BLD AUTO: 9 %
NEUTROPHILS # BLD AUTO: 1.2 X10(3)/MCL (ref 2.1–9.2)
NEUTROPHILS NFR BLD AUTO: 39 %
PLATELET # BLD AUTO: 214 X10(3)/MCL (ref 130–400)
PMV BLD AUTO: 11 FL (ref 9.4–12.4)
POTASSIUM SERPL-SCNC: 3.8 MMOL/L (ref 3.5–5.1)
PROT SERPL-MCNC: 5.7 GM/DL (ref 6.4–8.3)
RBC # BLD AUTO: 4.04 X10(6)/MCL (ref 4.2–5.4)
SODIUM SERPL-SCNC: 145 MMOL/L (ref 136–145)
WBC # SPEC AUTO: 3 X10(3)/MCL (ref 4.5–11.5)

## 2021-06-21 ENCOUNTER — HISTORICAL (OUTPATIENT)
Dept: ADMINISTRATIVE | Facility: HOSPITAL | Age: 26
End: 2021-06-21

## 2021-06-21 LAB
ABS NEUT (OLG): 2.33 X10(3)/MCL (ref 2.1–9.2)
ALBUMIN SERPL-MCNC: 3.5 GM/DL (ref 3.5–5)
ALBUMIN/GLOB SERPL: 1.2 RATIO (ref 1.1–2)
ALP SERPL-CCNC: 88 UNIT/L (ref 40–150)
ALT SERPL-CCNC: 8 UNIT/L (ref 0–55)
AST SERPL-CCNC: 17 UNIT/L (ref 5–34)
BASOPHILS # BLD AUTO: 0 X10(3)/MCL (ref 0–0.2)
BASOPHILS NFR BLD AUTO: 1 %
BILIRUB SERPL-MCNC: 0.5 MG/DL
BILIRUBIN DIRECT+TOT PNL SERPL-MCNC: 0.1 MG/DL (ref 0–0.5)
BILIRUBIN DIRECT+TOT PNL SERPL-MCNC: 0.4 MG/DL (ref 0–0.8)
BUN SERPL-MCNC: 7.3 MG/DL (ref 7–18.7)
CALCIUM SERPL-MCNC: 8.3 MG/DL (ref 8.4–10.2)
CHLORIDE SERPL-SCNC: 109 MMOL/L (ref 98–107)
CO2 SERPL-SCNC: 20 MMOL/L (ref 22–29)
CREAT SERPL-MCNC: 0.51 MG/DL (ref 0.55–1.02)
EOSINOPHIL # BLD AUTO: 0.2 X10(3)/MCL (ref 0–0.9)
EOSINOPHIL NFR BLD AUTO: 5 %
ERYTHROCYTE [DISTWIDTH] IN BLOOD BY AUTOMATED COUNT: 15.5 % (ref 11.5–17)
GLOBULIN SER-MCNC: 2.8 GM/DL (ref 2.4–3.5)
GLUCOSE SERPL-MCNC: 80 MG/DL (ref 74–100)
HCT VFR BLD AUTO: 40.2 % (ref 37–47)
HGB BLD-MCNC: 12.4 GM/DL (ref 12–16)
LYMPHOCYTES # BLD AUTO: 1.5 X10(3)/MCL (ref 0.6–4.6)
LYMPHOCYTES NFR BLD AUTO: 33 %
MAGNESIUM SERPL-MCNC: 1.8 MG/DL (ref 1.6–2.6)
MCH RBC QN AUTO: 29.2 PG (ref 27–31)
MCHC RBC AUTO-ENTMCNC: 30.8 GM/DL (ref 33–36)
MCV RBC AUTO: 94.8 FL (ref 80–94)
MONOCYTES # BLD AUTO: 0.5 X10(3)/MCL (ref 0.1–1.3)
MONOCYTES NFR BLD AUTO: 11 %
NEUTROPHILS # BLD AUTO: 2.33 X10(3)/MCL (ref 2.1–9.2)
NEUTROPHILS NFR BLD AUTO: 50 %
PLATELET # BLD AUTO: 246 X10(3)/MCL (ref 130–400)
PMV BLD AUTO: 10.7 FL (ref 9.4–12.4)
POTASSIUM SERPL-SCNC: 4.4 MMOL/L (ref 3.5–5.1)
PROT SERPL-MCNC: 6.3 GM/DL (ref 6.4–8.3)
RBC # BLD AUTO: 4.24 X10(6)/MCL (ref 4.2–5.4)
SODIUM SERPL-SCNC: 139 MMOL/L (ref 136–145)
WBC # SPEC AUTO: 4.7 X10(3)/MCL (ref 4.5–11.5)

## 2021-06-28 ENCOUNTER — HISTORICAL (OUTPATIENT)
Dept: INFUSION THERAPY | Facility: HOSPITAL | Age: 26
End: 2021-06-28

## 2021-06-28 ENCOUNTER — TELEPHONE (OUTPATIENT)
Dept: HEMATOLOGY/ONCOLOGY | Facility: CLINIC | Age: 26
End: 2021-06-28

## 2021-06-28 LAB
ABS NEUT (OLG): 2.89 X10(3)/MCL (ref 2.1–9.2)
ALBUMIN SERPL-MCNC: 3.7 GM/DL (ref 3.5–5)
ALBUMIN/GLOB SERPL: 1.2 RATIO (ref 1.1–2)
ALP SERPL-CCNC: 73 UNIT/L (ref 40–150)
ALT SERPL-CCNC: 8 UNIT/L (ref 0–55)
AST SERPL-CCNC: 10 UNIT/L (ref 5–34)
BASOPHILS # BLD AUTO: 0 X10(3)/MCL (ref 0–0.2)
BASOPHILS NFR BLD AUTO: 0 %
BILIRUB SERPL-MCNC: 0.4 MG/DL
BILIRUBIN DIRECT+TOT PNL SERPL-MCNC: 0.2 MG/DL (ref 0–0.5)
BILIRUBIN DIRECT+TOT PNL SERPL-MCNC: 0.2 MG/DL (ref 0–0.8)
BUN SERPL-MCNC: 4.3 MG/DL (ref 7–18.7)
CALCIUM SERPL-MCNC: 9.1 MG/DL (ref 8.4–10.2)
CHLORIDE SERPL-SCNC: 110 MMOL/L (ref 98–107)
CO2 SERPL-SCNC: 23 MMOL/L (ref 22–29)
CREAT SERPL-MCNC: 0.54 MG/DL (ref 0.55–1.02)
EOSINOPHIL # BLD AUTO: 0.3 X10(3)/MCL (ref 0–0.9)
EOSINOPHIL NFR BLD AUTO: 6 %
ERYTHROCYTE [DISTWIDTH] IN BLOOD BY AUTOMATED COUNT: 14.5 % (ref 11.5–14.5)
GLOBULIN SER-MCNC: 3 GM/DL (ref 2.4–3.5)
GLUCOSE SERPL-MCNC: 94 MG/DL (ref 74–100)
HCT VFR BLD AUTO: 38.9 % (ref 35–46)
HGB BLD-MCNC: 12.5 GM/DL (ref 12–16)
IMM GRANULOCYTES # BLD AUTO: 0.02 10*3/UL
IMM GRANULOCYTES NFR BLD AUTO: 0 %
LYMPHOCYTES # BLD AUTO: 1.4 X10(3)/MCL (ref 0.6–4.6)
LYMPHOCYTES NFR BLD AUTO: 27 %
MAGNESIUM SERPL-MCNC: 1.7 MG/DL (ref 1.6–2.6)
MCH RBC QN AUTO: 29.6 PG (ref 26–34)
MCHC RBC AUTO-ENTMCNC: 32.1 GM/DL (ref 31–37)
MCV RBC AUTO: 92.2 FL (ref 80–100)
MONOCYTES # BLD AUTO: 0.6 X10(3)/MCL (ref 0.1–1.3)
MONOCYTES NFR BLD AUTO: 11 %
NEUTROPHILS # BLD AUTO: 2.89 X10(3)/MCL (ref 2.1–9.2)
NEUTROPHILS NFR BLD AUTO: 55 %
NRBC BLD AUTO-RTO: 0 % (ref 0–0.2)
PLATELET # BLD AUTO: 273 X10(3)/MCL (ref 130–400)
PMV BLD AUTO: 9.6 FL (ref 7.4–10.4)
POTASSIUM SERPL-SCNC: 3.5 MMOL/L (ref 3.5–5.1)
PROT SERPL-MCNC: 6.7 GM/DL (ref 6.4–8.3)
RBC # BLD AUTO: 4.22 X10(6)/MCL (ref 4–5.2)
SODIUM SERPL-SCNC: 141 MMOL/L (ref 136–145)
WBC # SPEC AUTO: 5.2 X10(3)/MCL (ref 4.5–11)

## 2021-06-30 ENCOUNTER — TELEPHONE (OUTPATIENT)
Dept: HEMATOLOGY/ONCOLOGY | Facility: CLINIC | Age: 26
End: 2021-06-30

## 2021-07-01 ENCOUNTER — TELEPHONE (OUTPATIENT)
Dept: HEMATOLOGY/ONCOLOGY | Facility: CLINIC | Age: 26
End: 2021-07-01

## 2021-07-06 ENCOUNTER — TELEPHONE (OUTPATIENT)
Dept: HEMATOLOGY/ONCOLOGY | Facility: CLINIC | Age: 26
End: 2021-07-06

## 2021-07-08 ENCOUNTER — TELEPHONE (OUTPATIENT)
Dept: HEMATOLOGY/ONCOLOGY | Facility: CLINIC | Age: 26
End: 2021-07-08

## 2021-07-12 ENCOUNTER — TELEPHONE (OUTPATIENT)
Dept: HEMATOLOGY/ONCOLOGY | Facility: CLINIC | Age: 26
End: 2021-07-12

## 2021-07-15 ENCOUNTER — HISTORICAL (OUTPATIENT)
Dept: ADMINISTRATIVE | Facility: HOSPITAL | Age: 26
End: 2021-07-15

## 2021-07-15 LAB
ABS NEUT (OLG): 2.59 X10(3)/MCL (ref 2.1–9.2)
ALBUMIN SERPL-MCNC: 4.2 GM/DL (ref 3.5–5)
ALBUMIN/GLOB SERPL: 1.5 RATIO (ref 1.1–2)
ALP SERPL-CCNC: 101 UNIT/L (ref 40–150)
ALT SERPL-CCNC: 6 UNIT/L (ref 0–55)
AST SERPL-CCNC: 10 UNIT/L (ref 5–34)
BASOPHILS # BLD AUTO: 0 X10(3)/MCL (ref 0–0.2)
BASOPHILS NFR BLD AUTO: 0 %
BILIRUB SERPL-MCNC: 0.4 MG/DL
BILIRUBIN DIRECT+TOT PNL SERPL-MCNC: 0.2 MG/DL (ref 0–0.5)
BILIRUBIN DIRECT+TOT PNL SERPL-MCNC: 0.2 MG/DL (ref 0–0.8)
BUN SERPL-MCNC: 7.2 MG/DL (ref 7–18.7)
CALCIUM SERPL-MCNC: 9.4 MG/DL (ref 8.4–10.2)
CHLORIDE SERPL-SCNC: 107 MMOL/L (ref 98–107)
CO2 SERPL-SCNC: 26 MMOL/L (ref 22–29)
CREAT SERPL-MCNC: 0.58 MG/DL (ref 0.55–1.02)
EOSINOPHIL # BLD AUTO: 0.2 X10(3)/MCL (ref 0–0.9)
EOSINOPHIL NFR BLD AUTO: 4 %
ERYTHROCYTE [DISTWIDTH] IN BLOOD BY AUTOMATED COUNT: 13.2 % (ref 11.5–14.5)
GLOBULIN SER-MCNC: 2.8 GM/DL (ref 2.4–3.5)
GLUCOSE SERPL-MCNC: 88 MG/DL (ref 74–100)
HCT VFR BLD AUTO: 42.1 % (ref 35–46)
HGB BLD-MCNC: 13.2 GM/DL (ref 12–16)
LDH SERPL-CCNC: 213 UNIT/L (ref 140–271)
LYMPHOCYTES # BLD AUTO: 1.5 X10(3)/MCL (ref 0.6–4.6)
LYMPHOCYTES NFR BLD AUTO: 31 %
MAGNESIUM SERPL-MCNC: 2 MG/DL (ref 1.6–2.6)
MCH RBC QN AUTO: 28.8 PG (ref 26–34)
MCHC RBC AUTO-ENTMCNC: 31.4 GM/DL (ref 31–37)
MCV RBC AUTO: 91.9 FL (ref 80–100)
MONOCYTES # BLD AUTO: 0.6 X10(3)/MCL (ref 0.1–1.3)
MONOCYTES NFR BLD AUTO: 11 %
NEUTROPHILS # BLD AUTO: 2.59 X10(3)/MCL (ref 2.1–9.2)
NEUTROPHILS NFR BLD AUTO: 54 %
NRBC BLD AUTO-RTO: 0 % (ref 0–0.2)
PLATELET # BLD AUTO: 295 X10(3)/MCL (ref 130–400)
PMV BLD AUTO: 9.5 FL (ref 7.4–10.4)
POTASSIUM SERPL-SCNC: 3.7 MMOL/L (ref 3.5–5.1)
PROT SERPL-MCNC: 7 GM/DL (ref 6.4–8.3)
RBC # BLD AUTO: 4.58 X10(6)/MCL (ref 4–5.2)
SODIUM SERPL-SCNC: 143 MMOL/L (ref 136–145)
WBC # SPEC AUTO: 4.8 X10(3)/MCL (ref 4.5–11)

## 2021-08-02 ENCOUNTER — OFFICE VISIT (OUTPATIENT)
Dept: HEMATOLOGY/ONCOLOGY | Facility: CLINIC | Age: 26
End: 2021-08-02
Payer: MEDICAID

## 2021-08-02 VITALS
BODY MASS INDEX: 53.17 KG/M2 | HEART RATE: 88 BPM | SYSTOLIC BLOOD PRESSURE: 133 MMHG | DIASTOLIC BLOOD PRESSURE: 87 MMHG | WEIGHT: 272.25 LBS | TEMPERATURE: 98 F | OXYGEN SATURATION: 98 % | RESPIRATION RATE: 16 BRPM

## 2021-08-02 DIAGNOSIS — C92.41 ACUTE PROMYELOCYTIC LEUKEMIA IN REMISSION: Primary | ICD-10-CM

## 2021-08-02 PROCEDURE — 99999 PR PBB SHADOW E&M-EST. PATIENT-LVL III: ICD-10-PCS | Mod: PBBFAC,,, | Performed by: INTERNAL MEDICINE

## 2021-08-02 PROCEDURE — 99999 PR PBB SHADOW E&M-EST. PATIENT-LVL III: CPT | Mod: PBBFAC,,, | Performed by: INTERNAL MEDICINE

## 2021-08-02 PROCEDURE — 99215 PR OFFICE/OUTPT VISIT, EST, LEVL V, 40-54 MIN: ICD-10-PCS | Mod: S$PBB,,, | Performed by: INTERNAL MEDICINE

## 2021-08-02 PROCEDURE — 99215 OFFICE O/P EST HI 40 MIN: CPT | Mod: S$PBB,,, | Performed by: INTERNAL MEDICINE

## 2021-08-02 PROCEDURE — 99213 OFFICE O/P EST LOW 20 MIN: CPT | Mod: PBBFAC | Performed by: INTERNAL MEDICINE

## 2021-10-06 ENCOUNTER — HISTORICAL (OUTPATIENT)
Dept: HEMATOLOGY/ONCOLOGY | Facility: CLINIC | Age: 26
End: 2021-10-06

## 2021-10-06 LAB
ABS NEUT (OLG): 2.32 X10(3)/MCL (ref 2.1–9.2)
ALBUMIN SERPL-MCNC: 4.4 GM/DL (ref 3.5–5)
ALBUMIN/GLOB SERPL: 1.1 RATIO (ref 1.1–2)
ALP SERPL-CCNC: 86 UNIT/L (ref 40–150)
ALT SERPL-CCNC: 8 UNIT/L (ref 0–55)
AST SERPL-CCNC: 12 UNIT/L (ref 5–34)
BASOPHILS # BLD AUTO: 0 X10(3)/MCL (ref 0–0.2)
BASOPHILS NFR BLD AUTO: 0 %
BILIRUB SERPL-MCNC: 0.4 MG/DL
BILIRUBIN DIRECT+TOT PNL SERPL-MCNC: 0.2 MG/DL (ref 0–0.5)
BILIRUBIN DIRECT+TOT PNL SERPL-MCNC: 0.2 MG/DL (ref 0–0.8)
BUN SERPL-MCNC: 8.2 MG/DL (ref 7–18.7)
CALCIUM SERPL-MCNC: 10.1 MG/DL (ref 8.4–10.2)
CHLORIDE SERPL-SCNC: 107 MMOL/L (ref 98–107)
CO2 SERPL-SCNC: 24 MMOL/L (ref 22–29)
CREAT SERPL-MCNC: 0.58 MG/DL (ref 0.55–1.02)
EOSINOPHIL # BLD AUTO: 0.1 X10(3)/MCL (ref 0–0.9)
EOSINOPHIL NFR BLD AUTO: 2 %
ERYTHROCYTE [DISTWIDTH] IN BLOOD BY AUTOMATED COUNT: 11.8 % (ref 11.5–14.5)
GLOBULIN SER-MCNC: 4 GM/DL (ref 2.4–3.5)
GLUCOSE SERPL-MCNC: 97 MG/DL (ref 74–100)
HCT VFR BLD AUTO: 48.1 % (ref 35–46)
HGB BLD-MCNC: 15.2 GM/DL (ref 12–16)
IMM GRANULOCYTES # BLD AUTO: 0.02 10*3/UL
IMM GRANULOCYTES NFR BLD AUTO: 0 %
LYMPHOCYTES # BLD AUTO: 1.3 X10(3)/MCL (ref 0.6–4.6)
LYMPHOCYTES NFR BLD AUTO: 32 %
MCH RBC QN AUTO: 27 PG (ref 26–34)
MCHC RBC AUTO-ENTMCNC: 31.6 GM/DL (ref 31–37)
MCV RBC AUTO: 85.4 FL (ref 80–100)
MONOCYTES # BLD AUTO: 0.4 X10(3)/MCL (ref 0.1–1.3)
MONOCYTES NFR BLD AUTO: 9 %
NEUTROPHILS # BLD AUTO: 2.32 X10(3)/MCL (ref 2.1–9.2)
NEUTROPHILS NFR BLD AUTO: 57 %
NRBC BLD AUTO-RTO: 0 % (ref 0–0.2)
PLATELET # BLD AUTO: 290 X10(3)/MCL (ref 130–400)
PMV BLD AUTO: 9.4 FL (ref 7.4–10.4)
POTASSIUM SERPL-SCNC: 3.9 MMOL/L (ref 3.5–5.1)
PROT SERPL-MCNC: 8.4 GM/DL (ref 6.4–8.3)
RBC # BLD AUTO: 5.63 X10(6)/MCL (ref 4–5.2)
SODIUM SERPL-SCNC: 141 MMOL/L (ref 136–145)
WBC # SPEC AUTO: 4 X10(3)/MCL (ref 4.5–11)

## 2021-10-20 ENCOUNTER — HISTORICAL (OUTPATIENT)
Dept: ADMINISTRATIVE | Facility: HOSPITAL | Age: 26
End: 2021-10-20

## 2021-10-20 LAB
ALBUMIN SERPL-MCNC: 4.4 GM/DL (ref 3.5–5)
ALBUMIN/GLOB SERPL: 1.2 RATIO (ref 1.1–2)
ALP SERPL-CCNC: 87 UNIT/L (ref 40–150)
ALT SERPL-CCNC: 8 UNIT/L (ref 0–55)
AST SERPL-CCNC: 12 UNIT/L (ref 5–34)
BILIRUB SERPL-MCNC: 0.4 MG/DL
BILIRUBIN DIRECT+TOT PNL SERPL-MCNC: 0.1 MG/DL (ref 0–0.5)
BILIRUBIN DIRECT+TOT PNL SERPL-MCNC: 0.3 MG/DL (ref 0–0.8)
BUN SERPL-MCNC: 9.7 MG/DL (ref 7–18.7)
CALCIUM SERPL-MCNC: 10.1 MG/DL (ref 8.4–10.2)
CHLORIDE SERPL-SCNC: 107 MMOL/L (ref 98–107)
CO2 SERPL-SCNC: 19 MMOL/L (ref 22–29)
CREAT SERPL-MCNC: 0.64 MG/DL (ref 0.55–1.02)
GLOBULIN SER-MCNC: 3.7 GM/DL (ref 2.4–3.5)
GLUCOSE SERPL-MCNC: 92 MG/DL (ref 74–100)
POTASSIUM SERPL-SCNC: 4 MMOL/L (ref 3.5–5.1)
PROT SERPL-MCNC: 8.1 GM/DL (ref 6.4–8.3)
SODIUM SERPL-SCNC: 139 MMOL/L (ref 136–145)

## 2022-01-12 ENCOUNTER — HISTORICAL (OUTPATIENT)
Dept: HEMATOLOGY/ONCOLOGY | Facility: CLINIC | Age: 27
End: 2022-01-12

## 2022-01-12 LAB
ABS NEUT (OLG): 2.71 X10(3)/MCL (ref 2.1–9.2)
ALBUMIN SERPL-MCNC: 4.3 GM/DL (ref 3.5–5)
ALBUMIN/GLOB SERPL: 1.2 RATIO (ref 1.1–2)
ALP SERPL-CCNC: 76 UNIT/L (ref 40–150)
ALT SERPL-CCNC: 12 UNIT/L (ref 0–55)
AST SERPL-CCNC: 13 UNIT/L (ref 5–34)
BASOPHILS # BLD AUTO: 0 X10(3)/MCL (ref 0–0.2)
BASOPHILS NFR BLD AUTO: 0 %
BILIRUB SERPL-MCNC: 0.5 MG/DL
BILIRUBIN DIRECT+TOT PNL SERPL-MCNC: 0.2 MG/DL (ref 0–0.5)
BILIRUBIN DIRECT+TOT PNL SERPL-MCNC: 0.3 MG/DL (ref 0–0.8)
BUN SERPL-MCNC: 7.2 MG/DL (ref 7–18.7)
CALCIUM SERPL-MCNC: 9.4 MG/DL (ref 8.7–10.5)
CHLORIDE SERPL-SCNC: 109 MMOL/L (ref 98–107)
CO2 SERPL-SCNC: 22 MMOL/L (ref 22–29)
CREAT SERPL-MCNC: 0.62 MG/DL (ref 0.55–1.02)
EOSINOPHIL # BLD AUTO: 0 X10(3)/MCL (ref 0–0.9)
EOSINOPHIL NFR BLD AUTO: 1 %
ERYTHROCYTE [DISTWIDTH] IN BLOOD BY AUTOMATED COUNT: 12.4 % (ref 11.5–14.5)
GLOBULIN SER-MCNC: 3.6 GM/DL (ref 2.4–3.5)
GLUCOSE SERPL-MCNC: 88 MG/DL (ref 74–100)
HCT VFR BLD AUTO: 44.3 % (ref 35–46)
HGB BLD-MCNC: 14.1 GM/DL (ref 12–16)
LDH SERPL-CCNC: 195 UNIT/L (ref 140–271)
LYMPHOCYTES # BLD AUTO: 1.8 X10(3)/MCL (ref 0.6–4.6)
LYMPHOCYTES NFR BLD AUTO: 35 %
MCH RBC QN AUTO: 26.8 PG (ref 26–34)
MCHC RBC AUTO-ENTMCNC: 31.8 GM/DL (ref 31–37)
MCV RBC AUTO: 84.2 FL (ref 80–100)
MONOCYTES # BLD AUTO: 0.4 X10(3)/MCL (ref 0.1–1.3)
MONOCYTES NFR BLD AUTO: 9 %
NEUTROPHILS # BLD AUTO: 2.71 X10(3)/MCL (ref 2.1–9.2)
NEUTROPHILS NFR BLD AUTO: 54 %
NRBC BLD AUTO-RTO: 0 % (ref 0–0.2)
PLATELET # BLD AUTO: 342 X10(3)/MCL (ref 130–400)
PMV BLD AUTO: 9.4 FL (ref 7.4–10.4)
POTASSIUM SERPL-SCNC: 3.8 MMOL/L (ref 3.5–5.1)
PROT SERPL-MCNC: 7.9 GM/DL (ref 6.4–8.3)
RBC # BLD AUTO: 5.26 X10(6)/MCL (ref 4–5.2)
SODIUM SERPL-SCNC: 141 MMOL/L (ref 136–145)
WBC # SPEC AUTO: 5 X10(3)/MCL (ref 4.5–11)

## 2022-02-22 DIAGNOSIS — D84.9 IMMUNOSUPPRESSED STATUS: ICD-10-CM

## 2022-04-22 ENCOUNTER — HISTORICAL (OUTPATIENT)
Dept: ADMINISTRATIVE | Facility: HOSPITAL | Age: 27
End: 2022-04-22
Payer: MEDICAID

## 2022-04-22 LAB
ABS NEUT (OLG): 2.28 (ref 2.1–9.2)
ALBUMIN SERPL-MCNC: 4 G/DL (ref 3.5–5)
ALBUMIN/GLOB SERPL: 1.1 {RATIO} (ref 1.1–2)
ALP SERPL-CCNC: 77 U/L (ref 40–150)
ALT SERPL-CCNC: 6 U/L (ref 0–55)
ANISOCYTOSIS BLD QL SMEAR: NORMAL
AST SERPL-CCNC: 12 U/L (ref 5–34)
BASOPHILS # BLD AUTO: 0 10*3/UL (ref 0–0.2)
BASOPHILS NFR BLD AUTO: 0 %
BILIRUB SERPL-MCNC: 0.3 MG/DL
BILIRUBIN DIRECT+TOT PNL SERPL-MCNC: 0.1 (ref 0–0.8)
BILIRUBIN DIRECT+TOT PNL SERPL-MCNC: 0.2 (ref 0–0.5)
BUN SERPL-MCNC: 10.2 MG/DL (ref 7–18.7)
CALCIUM SERPL-MCNC: 9.6 MG/DL (ref 8.7–10.5)
CHLORIDE SERPL-SCNC: 106 MMOL/L (ref 98–107)
CO2 SERPL-SCNC: 22 MMOL/L (ref 22–29)
CREAT SERPL-MCNC: 0.54 MG/DL (ref 0.55–1.02)
EOSINOPHIL # BLD AUTO: 0 10*3/UL (ref 0–0.9)
EOSINOPHIL NFR BLD AUTO: 1 %
ERYTHROCYTE [DISTWIDTH] IN BLOOD BY AUTOMATED COUNT: 12.2 % (ref 11.5–14.5)
FLAG2 (OHS): 60
FLAG3 (OHS): 80
FLAGS (OHS): 80
GLOBULIN SER-MCNC: 3.6 G/DL (ref 2.4–3.5)
GLUCOSE SERPL-MCNC: 98 MG/DL (ref 74–100)
HCT VFR BLD AUTO: 43.5 % (ref 35–46)
HEMOLYSIS INTERF INDEX SERPL-ACNC: 24
HGB BLD-MCNC: 13.6 G/DL (ref 12–16)
ICTERIC INTERF INDEX SERPL-ACNC: 0
IMM GRANULOCYTES # BLD AUTO: 0.01 10*3/UL
IMM GRANULOCYTES NFR BLD AUTO: 0 %
IMM. NE 2 SUSPECT FLAG (OHS): 10
LDH SERPL-CCNC: 182 U/L (ref 140–271)
LIPEMIC INTERF INDEX SERPL-ACNC: 8
LOW EVENT # SUSPECT FLAG (OHS): 80
LYMPHOCYTES # BLD AUTO: 1.3 10*3/UL (ref 0.6–4.6)
LYMPHOCYTES NFR BLD AUTO: 32 %
MANUAL DIFF? (OHS): NO
MCH RBC QN AUTO: 26.1 PG (ref 26–34)
MCHC RBC AUTO-ENTMCNC: 31.3 G/DL (ref 31–37)
MCV RBC AUTO: 83.5 FL (ref 80–100)
MO BLASTS SUSPECT FLAG (OHS): 40
MONOCYTES # BLD AUTO: 0.4 10*3/UL (ref 0.1–1.3)
MONOCYTES NFR BLD AUTO: 9 %
NEUTROPHILS # BLD AUTO: 2.28 10*3/UL (ref 2.1–9.2)
NEUTROPHILS NFR BLD AUTO: 57 %
NRBC BLD AUTO-RTO: 0 % (ref 0–0.2)
PLATELET # BLD AUTO: 257 10*3/UL (ref 130–400)
PLATELET # BLD EST: ADEQUATE 10*3/UL
PLATELET CLUMPS SUSPECT FLAG (OHS): 300
PLATELETS.RETICULATED NFR BLD AUTO: 5 % (ref 0.9–11.2)
PMV BLD AUTO: 10.6 FL (ref 7.4–10.4)
POTASSIUM SERPL-SCNC: 3.9 MMOL/L (ref 3.5–5.1)
PROT SERPL-MCNC: 7.6 G/DL (ref 6.4–8.3)
RBC # BLD AUTO: 5.21 10*6/UL (ref 4–5.2)
SCAN RECIEVED (OHS): YES
SODIUM SERPL-SCNC: 138 MMOL/L (ref 136–145)
WBC # SPEC AUTO: 4 10*3/UL (ref 4.5–11)
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
## 2022-05-03 NOTE — HISTORICAL [facility] CERNER
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 MRNA, LNP-S, PF- Pfizer 04/13/2021 Given   influenza virus vaccine, inactivated 10/27/2020 Given
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
## 2022-07-21 ENCOUNTER — CLINICAL SUPPORT (OUTPATIENT)
Dept: HEMATOLOGY/ONCOLOGY | Facility: CLINIC | Age: 27
End: 2022-07-21
Payer: MEDICAID

## 2022-07-21 DIAGNOSIS — C92.41 ACUTE PROMYELOCYTIC LEUKEMIA IN REMISSION: Primary | ICD-10-CM

## 2022-07-21 LAB
ALBUMIN SERPL-MCNC: 3.8 GM/DL (ref 3.5–5)
ALBUMIN/GLOB SERPL: 1 RATIO (ref 1.1–2)
ALP SERPL-CCNC: 80 UNIT/L (ref 40–150)
ALT SERPL-CCNC: 11 UNIT/L (ref 0–55)
AST SERPL-CCNC: 11 UNIT/L (ref 5–34)
BASOPHILS # BLD AUTO: 0.01 X10(3)/MCL (ref 0–0.2)
BASOPHILS NFR BLD AUTO: 0.2 %
BILIRUBIN DIRECT+TOT PNL SERPL-MCNC: 0.2 MG/DL
BUN SERPL-MCNC: 10.6 MG/DL (ref 7–18.7)
CALCIUM SERPL-MCNC: 9.9 MG/DL (ref 8.4–10.2)
CHLORIDE SERPL-SCNC: 104 MMOL/L (ref 98–107)
CO2 SERPL-SCNC: 25 MMOL/L (ref 22–29)
CREAT SERPL-MCNC: 0.59 MG/DL (ref 0.55–1.02)
EOSINOPHIL # BLD AUTO: 0.08 X10(3)/MCL (ref 0–0.9)
EOSINOPHIL NFR BLD AUTO: 1.4 %
ERYTHROCYTE [DISTWIDTH] IN BLOOD BY AUTOMATED COUNT: 12.6 % (ref 11.5–17)
GLOBULIN SER-MCNC: 3.8 GM/DL (ref 2.4–3.5)
GLUCOSE SERPL-MCNC: 104 MG/DL (ref 74–100)
HCT VFR BLD AUTO: 41.5 % (ref 37–47)
HGB BLD-MCNC: 13.2 GM/DL (ref 12–16)
IMM GRANULOCYTES # BLD AUTO: 0.02 X10(3)/MCL (ref 0–0.04)
IMM GRANULOCYTES NFR BLD AUTO: 0.3 %
LYMPHOCYTES # BLD AUTO: 1.8 X10(3)/MCL (ref 0.6–4.6)
LYMPHOCYTES NFR BLD AUTO: 30.5 %
MCH RBC QN AUTO: 26.5 PG (ref 27–31)
MCHC RBC AUTO-ENTMCNC: 31.8 MG/DL (ref 33–36)
MCV RBC AUTO: 83.2 FL (ref 80–94)
MONOCYTES # BLD AUTO: 0.55 X10(3)/MCL (ref 0.1–1.3)
MONOCYTES NFR BLD AUTO: 9.3 %
NEUTROPHILS # BLD AUTO: 3.5 X10(3)/MCL (ref 2.1–9.2)
NEUTROPHILS NFR BLD AUTO: 58.3 %
NRBC BLD AUTO-RTO: 0 %
PLATELET # BLD AUTO: 330 X10(3)/MCL (ref 130–400)
PMV BLD AUTO: 9.9 FL (ref 7.4–10.4)
POTASSIUM SERPL-SCNC: 3.4 MMOL/L (ref 3.5–5.1)
PROT SERPL-MCNC: 7.6 GM/DL (ref 6.4–8.3)
RBC # BLD AUTO: 4.99 X10(6)/MCL (ref 4.2–5.4)
SODIUM SERPL-SCNC: 143 MMOL/L (ref 136–145)
WBC # SPEC AUTO: 5.9 X10(3)/MCL (ref 4.5–11.5)

## 2022-07-21 PROCEDURE — 85025 COMPLETE CBC W/AUTO DIFF WBC: CPT

## 2022-07-21 PROCEDURE — 80053 COMPREHEN METABOLIC PANEL: CPT

## 2022-07-21 PROCEDURE — 36415 COLL VENOUS BLD VENIPUNCTURE: CPT

## 2022-07-21 PROCEDURE — 99211 OFF/OP EST MAY X REQ PHY/QHP: CPT | Mod: PBBFAC

## 2022-07-21 NOTE — PROGRESS NOTES
Potassium 3.4, low.    Check magnesium level.  Start potassium chloride 20 mEq p.o. q.day.  In 10 days, recheck CMP and magnesium level (NP to follow)

## 2022-07-22 ENCOUNTER — OFFICE VISIT (OUTPATIENT)
Dept: HEMATOLOGY/ONCOLOGY | Facility: CLINIC | Age: 27
End: 2022-07-22
Payer: MEDICAID

## 2022-07-22 DIAGNOSIS — C92.40 ACUTE PROMYELOCYTIC LEUKEMIA NOT HAVING ACHIEVED REMISSION: Primary | ICD-10-CM

## 2022-07-22 PROCEDURE — 99213 PR OFFICE/OUTPT VISIT, EST, LEVL III, 20-29 MIN: ICD-10-PCS | Mod: S$PBB,,, | Performed by: NURSE PRACTITIONER

## 2022-07-22 PROCEDURE — 99213 OFFICE O/P EST LOW 20 MIN: CPT | Mod: S$PBB,,, | Performed by: NURSE PRACTITIONER

## 2022-07-22 PROCEDURE — 1159F PR MEDICATION LIST DOCUMENTED IN MEDICAL RECORD: ICD-10-PCS | Mod: CPTII,,, | Performed by: NURSE PRACTITIONER

## 2022-07-22 PROCEDURE — 1159F MED LIST DOCD IN RCRD: CPT | Mod: CPTII,,, | Performed by: NURSE PRACTITIONER

## 2022-07-22 PROCEDURE — 99211 OFF/OP EST MAY X REQ PHY/QHP: CPT | Mod: PBBFAC | Performed by: NURSE PRACTITIONER
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
## 2022-10-24 ENCOUNTER — CLINICAL SUPPORT (OUTPATIENT)
Dept: HEMATOLOGY/ONCOLOGY | Facility: CLINIC | Age: 27
End: 2022-10-24
Payer: MEDICAID

## 2022-10-24 DIAGNOSIS — C92.40 ACUTE PROMYELOCYTIC LEUKEMIA NOT HAVING ACHIEVED REMISSION: ICD-10-CM

## 2022-10-24 LAB
ALBUMIN SERPL-MCNC: 3.9 GM/DL (ref 3.5–5)
ALBUMIN/GLOB SERPL: 1 RATIO (ref 1.1–2)
ALP SERPL-CCNC: 73 UNIT/L (ref 40–150)
ALT SERPL-CCNC: 8 UNIT/L (ref 0–55)
AST SERPL-CCNC: 11 UNIT/L (ref 5–34)
BASOPHILS # BLD AUTO: 0.01 X10(3)/MCL (ref 0–0.2)
BASOPHILS NFR BLD AUTO: 0.2 %
BILIRUBIN DIRECT+TOT PNL SERPL-MCNC: 0.3 MG/DL
BUN SERPL-MCNC: 12.1 MG/DL (ref 7–18.7)
CALCIUM SERPL-MCNC: 9.6 MG/DL (ref 8.4–10.2)
CHLORIDE SERPL-SCNC: 104 MMOL/L (ref 98–107)
CO2 SERPL-SCNC: 27 MMOL/L (ref 22–29)
CREAT SERPL-MCNC: 0.63 MG/DL (ref 0.55–1.02)
EOSINOPHIL # BLD AUTO: 0.08 X10(3)/MCL (ref 0–0.9)
EOSINOPHIL NFR BLD AUTO: 1.4 %
ERYTHROCYTE [DISTWIDTH] IN BLOOD BY AUTOMATED COUNT: 12.5 % (ref 11.5–17)
GFR SERPLBLD CREATININE-BSD FMLA CKD-EPI: >60 MLS/MIN/1.73/M2
GLOBULIN SER-MCNC: 3.9 GM/DL (ref 2.4–3.5)
GLUCOSE SERPL-MCNC: 102 MG/DL (ref 74–100)
HCT VFR BLD AUTO: 42.8 % (ref 37–47)
HGB BLD-MCNC: 13.4 GM/DL (ref 12–16)
IMM GRANULOCYTES # BLD AUTO: 0.01 X10(3)/MCL (ref 0–0.04)
IMM GRANULOCYTES NFR BLD AUTO: 0.2 %
LDH SERPL-CCNC: 188 U/L (ref 125–220)
LYMPHOCYTES # BLD AUTO: 1.86 X10(3)/MCL (ref 0.6–4.6)
LYMPHOCYTES NFR BLD AUTO: 32.3 %
MCH RBC QN AUTO: 27 PG (ref 27–31)
MCHC RBC AUTO-ENTMCNC: 31.3 MG/DL (ref 33–36)
MCV RBC AUTO: 86.1 FL (ref 80–94)
MONOCYTES # BLD AUTO: 0.59 X10(3)/MCL (ref 0.1–1.3)
MONOCYTES NFR BLD AUTO: 10.3 %
NEUTROPHILS # BLD AUTO: 3.2 X10(3)/MCL (ref 2.1–9.2)
NEUTROPHILS NFR BLD AUTO: 55.6 %
NRBC BLD AUTO-RTO: 0 %
PLATELET # BLD AUTO: 322 X10(3)/MCL (ref 130–400)
PMV BLD AUTO: 9.9 FL (ref 7.4–10.4)
POTASSIUM SERPL-SCNC: 3.9 MMOL/L (ref 3.5–5.1)
PROT SERPL-MCNC: 7.8 GM/DL (ref 6.4–8.3)
RBC # BLD AUTO: 4.97 X10(6)/MCL (ref 4.2–5.4)
SODIUM SERPL-SCNC: 142 MMOL/L (ref 136–145)
WBC # SPEC AUTO: 5.8 X10(3)/MCL (ref 4.5–11.5)

## 2022-10-24 PROCEDURE — 83615 LACTATE (LD) (LDH) ENZYME: CPT

## 2022-10-24 PROCEDURE — 85025 COMPLETE CBC W/AUTO DIFF WBC: CPT

## 2022-10-24 PROCEDURE — 36415 COLL VENOUS BLD VENIPUNCTURE: CPT

## 2022-10-24 PROCEDURE — 80053 COMPREHEN METABOLIC PANEL: CPT

## 2022-10-25 ENCOUNTER — OFFICE VISIT (OUTPATIENT)
Dept: HEMATOLOGY/ONCOLOGY | Facility: CLINIC | Age: 27
End: 2022-10-25
Payer: MEDICAID

## 2022-10-25 VITALS — HEIGHT: 61 IN | BODY MASS INDEX: 55.32 KG/M2 | WEIGHT: 293 LBS

## 2022-10-25 DIAGNOSIS — C92.40 ACUTE PROMYELOCYTIC LEUKEMIA NOT HAVING ACHIEVED REMISSION: Primary | ICD-10-CM

## 2022-10-25 PROCEDURE — 1159F PR MEDICATION LIST DOCUMENTED IN MEDICAL RECORD: ICD-10-PCS | Mod: CPTII,,, | Performed by: NURSE PRACTITIONER

## 2022-10-25 PROCEDURE — 1159F MED LIST DOCD IN RCRD: CPT | Mod: CPTII,,, | Performed by: NURSE PRACTITIONER

## 2022-10-25 PROCEDURE — 99213 OFFICE O/P EST LOW 20 MIN: CPT | Mod: FQ,S$PBB,, | Performed by: NURSE PRACTITIONER

## 2022-10-25 PROCEDURE — 99213 PR OFFICE/OUTPT VISIT, EST, LEVL III, 20-29 MIN: ICD-10-PCS | Mod: FQ,S$PBB,, | Performed by: NURSE PRACTITIONER

## 2022-10-25 RX ORDER — LATANOPROST 50 UG/ML
SOLUTION/ DROPS OPHTHALMIC
COMMUNITY
Start: 2022-10-22
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
## 2023-01-24 ENCOUNTER — CLINICAL SUPPORT (OUTPATIENT)
Dept: HEMATOLOGY/ONCOLOGY | Facility: CLINIC | Age: 28
End: 2023-01-24
Payer: MEDICAID

## 2023-01-24 DIAGNOSIS — C92.40 ACUTE PROMYELOCYTIC LEUKEMIA NOT HAVING ACHIEVED REMISSION: ICD-10-CM

## 2023-01-24 LAB
ALBUMIN SERPL-MCNC: 3.9 G/DL (ref 3.5–5)
ALBUMIN/GLOB SERPL: 1 RATIO (ref 1.1–2)
ALP SERPL-CCNC: 65 UNIT/L (ref 40–150)
ALT SERPL-CCNC: 15 UNIT/L (ref 0–55)
AST SERPL-CCNC: 12 UNIT/L (ref 5–34)
BASOPHILS # BLD AUTO: 0.01 X10(3)/MCL (ref 0–0.2)
BASOPHILS NFR BLD AUTO: 0.2 %
BILIRUBIN DIRECT+TOT PNL SERPL-MCNC: 0.3 MG/DL
BUN SERPL-MCNC: 7.7 MG/DL (ref 7–18.7)
CALCIUM SERPL-MCNC: 9.7 MG/DL (ref 8.4–10.2)
CHLORIDE SERPL-SCNC: 102 MMOL/L (ref 98–107)
CO2 SERPL-SCNC: 27 MMOL/L (ref 22–29)
CREAT SERPL-MCNC: 0.63 MG/DL (ref 0.55–1.02)
EOSINOPHIL # BLD AUTO: 0.09 X10(3)/MCL (ref 0–0.9)
EOSINOPHIL NFR BLD AUTO: 1.6 %
ERYTHROCYTE [DISTWIDTH] IN BLOOD BY AUTOMATED COUNT: 12.4 % (ref 11.5–17)
GFR SERPLBLD CREATININE-BSD FMLA CKD-EPI: >60 MLS/MIN/1.73/M2
GLOBULIN SER-MCNC: 4.1 GM/DL (ref 2.4–3.5)
GLUCOSE SERPL-MCNC: 97 MG/DL (ref 74–100)
HCT VFR BLD AUTO: 41.2 % (ref 37–47)
HGB BLD-MCNC: 13.2 GM/DL (ref 12–16)
IMM GRANULOCYTES # BLD AUTO: 0.01 X10(3)/MCL (ref 0–0.04)
IMM GRANULOCYTES NFR BLD AUTO: 0.2 %
LDH SERPL-CCNC: 169 U/L (ref 125–220)
LYMPHOCYTES # BLD AUTO: 1.74 X10(3)/MCL (ref 0.6–4.6)
LYMPHOCYTES NFR BLD AUTO: 30.1 %
MCH RBC QN AUTO: 26.2 PG
MCHC RBC AUTO-ENTMCNC: 32 MG/DL (ref 33–36)
MCV RBC AUTO: 81.7 FL (ref 80–94)
MONOCYTES # BLD AUTO: 0.52 X10(3)/MCL (ref 0.1–1.3)
MONOCYTES NFR BLD AUTO: 9 %
NEUTROPHILS # BLD AUTO: 3.42 X10(3)/MCL (ref 2.1–9.2)
NEUTROPHILS NFR BLD AUTO: 58.9 %
NRBC BLD AUTO-RTO: 0 %
PLATELET # BLD AUTO: 330 X10(3)/MCL (ref 130–400)
PMV BLD AUTO: 9.6 FL (ref 7.4–10.4)
POTASSIUM SERPL-SCNC: 3.2 MMOL/L (ref 3.5–5.1)
PROT SERPL-MCNC: 8 GM/DL (ref 6.4–8.3)
RBC # BLD AUTO: 5.04 X10(6)/MCL (ref 4.2–5.4)
SODIUM SERPL-SCNC: 139 MMOL/L (ref 136–145)
WBC # SPEC AUTO: 5.8 X10(3)/MCL (ref 4.5–11.5)

## 2023-01-24 PROCEDURE — 36415 COLL VENOUS BLD VENIPUNCTURE: CPT

## 2023-01-24 PROCEDURE — 85025 COMPLETE CBC W/AUTO DIFF WBC: CPT

## 2023-01-24 PROCEDURE — 83615 LACTATE (LD) (LDH) ENZYME: CPT

## 2023-01-24 PROCEDURE — 80053 COMPREHEN METABOLIC PANEL: CPT

## 2023-01-25 ENCOUNTER — OFFICE VISIT (OUTPATIENT)
Dept: HEMATOLOGY/ONCOLOGY | Facility: CLINIC | Age: 28
End: 2023-01-25
Payer: MEDICAID

## 2023-01-25 ENCOUNTER — DOCUMENTATION ONLY (OUTPATIENT)
Dept: HEMATOLOGY/ONCOLOGY | Facility: CLINIC | Age: 28
End: 2023-01-25
Payer: MEDICAID

## 2023-01-25 DIAGNOSIS — C92.41 ACUTE PROMYELOCYTIC LEUKEMIA IN REMISSION: Primary | ICD-10-CM

## 2023-01-25 DIAGNOSIS — E87.6 HYPOKALEMIA: ICD-10-CM

## 2023-01-25 PROCEDURE — 99214 PR OFFICE/OUTPT VISIT, EST, LEVL IV, 30-39 MIN: ICD-10-PCS | Mod: 95,,,

## 2023-01-25 PROCEDURE — 99214 OFFICE O/P EST MOD 30 MIN: CPT | Mod: 95,,,

## 2023-01-25 PROCEDURE — 1160F PR REVIEW ALL MEDS BY PRESCRIBER/CLIN PHARMACIST DOCUMENTED: ICD-10-PCS | Mod: CPTII,95,,

## 2023-01-25 PROCEDURE — 1160F RVW MEDS BY RX/DR IN RCRD: CPT | Mod: CPTII,95,,

## 2023-01-25 PROCEDURE — 1159F PR MEDICATION LIST DOCUMENTED IN MEDICAL RECORD: ICD-10-PCS | Mod: CPTII,95,,

## 2023-01-25 PROCEDURE — 1159F MED LIST DOCD IN RCRD: CPT | Mod: CPTII,95,,

## 2023-01-25 RX ORDER — POTASSIUM CHLORIDE 20 MEQ/1
20 TABLET, EXTENDED RELEASE ORAL ONCE
Qty: 14 TABLET | Refills: 0 | Status: SHIPPED | OUTPATIENT
Start: 2023-01-25 | End: 2023-01-25

## 2023-01-25 NOTE — PROGRESS NOTES
Referring Provider:   JEANNIE Sanchez    Reason for Visit:  Low Potassium    Spoke to pt's mother via phone regarding high potassium foods to incorporate in patient's diet; Pt also prescribed potassium supplementation. High potassium food list mailed out to patient to home address. Verified address with pt's mother.    Consult RD prn
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
## 2023-02-02 ENCOUNTER — TELEPHONE (OUTPATIENT)
Dept: HEMATOLOGY/ONCOLOGY | Facility: CLINIC | Age: 28
End: 2023-02-02
Payer: MEDICAID

## 2023-02-02 DIAGNOSIS — E87.6 HYPOKALEMIA: Primary | ICD-10-CM

## 2023-02-02 RX ORDER — POTASSIUM CHLORIDE 20 MEQ/1
20 TABLET, EXTENDED RELEASE ORAL DAILY
Qty: 14 TABLET | Refills: 0 | Status: SHIPPED | OUTPATIENT
Start: 2023-02-02 | End: 2023-07-06

## 2023-02-09 ENCOUNTER — APPOINTMENT (OUTPATIENT)
Dept: HEMATOLOGY/ONCOLOGY | Facility: CLINIC | Age: 28
End: 2023-02-09
Payer: MEDICAID

## 2023-02-09 DIAGNOSIS — C92.40 ACUTE PROMYELOCYTIC LEUKEMIA NOT HAVING ACHIEVED REMISSION: ICD-10-CM

## 2023-02-09 LAB
ALBUMIN SERPL-MCNC: 4 G/DL (ref 3.5–5)
ALBUMIN/GLOB SERPL: 1 RATIO (ref 1.1–2)
ALP SERPL-CCNC: 65 UNIT/L (ref 40–150)
ALT SERPL-CCNC: 13 UNIT/L (ref 0–55)
AST SERPL-CCNC: 13 UNIT/L (ref 5–34)
BASOPHILS # BLD AUTO: 0.02 X10(3)/MCL (ref 0–0.2)
BASOPHILS NFR BLD AUTO: 0.4 %
BILIRUBIN DIRECT+TOT PNL SERPL-MCNC: 0.2 MG/DL
BUN SERPL-MCNC: 14.4 MG/DL (ref 7–18.7)
CALCIUM SERPL-MCNC: 9.9 MG/DL (ref 8.4–10.2)
CHLORIDE SERPL-SCNC: 107 MMOL/L (ref 98–107)
CO2 SERPL-SCNC: 24 MMOL/L (ref 22–29)
CREAT SERPL-MCNC: 0.64 MG/DL (ref 0.55–1.02)
EOSINOPHIL # BLD AUTO: 0.12 X10(3)/MCL (ref 0–0.9)
EOSINOPHIL NFR BLD AUTO: 2.2 %
ERYTHROCYTE [DISTWIDTH] IN BLOOD BY AUTOMATED COUNT: 12.7 % (ref 11.5–17)
GFR SERPLBLD CREATININE-BSD FMLA CKD-EPI: >60 MLS/MIN/1.73/M2
GLOBULIN SER-MCNC: 4 GM/DL (ref 2.4–3.5)
GLUCOSE SERPL-MCNC: 113 MG/DL (ref 74–100)
HCT VFR BLD AUTO: 41.5 % (ref 37–47)
HGB BLD-MCNC: 13.3 GM/DL (ref 12–16)
IMM GRANULOCYTES # BLD AUTO: 0 X10(3)/MCL (ref 0–0.04)
IMM GRANULOCYTES NFR BLD AUTO: 0 %
LDH SERPL-CCNC: 222 U/L (ref 125–220)
LYMPHOCYTES # BLD AUTO: 2.06 X10(3)/MCL (ref 0.6–4.6)
LYMPHOCYTES NFR BLD AUTO: 37.3 %
MCH RBC QN AUTO: 26.3 PG
MCHC RBC AUTO-ENTMCNC: 32 MG/DL (ref 33–36)
MCV RBC AUTO: 82.2 FL (ref 80–94)
MONOCYTES # BLD AUTO: 0.49 X10(3)/MCL (ref 0.1–1.3)
MONOCYTES NFR BLD AUTO: 8.9 %
NEUTROPHILS # BLD AUTO: 2.84 X10(3)/MCL (ref 2.1–9.2)
NEUTROPHILS NFR BLD AUTO: 51.2 %
NRBC BLD AUTO-RTO: 0 %
PLATELET # BLD AUTO: 338 X10(3)/MCL (ref 130–400)
PMV BLD AUTO: 9.9 FL (ref 7.4–10.4)
POTASSIUM SERPL-SCNC: 4.3 MMOL/L (ref 3.5–5.1)
PROT SERPL-MCNC: 8 GM/DL (ref 6.4–8.3)
RBC # BLD AUTO: 5.05 X10(6)/MCL (ref 4.2–5.4)
SODIUM SERPL-SCNC: 140 MMOL/L (ref 136–145)
WBC # SPEC AUTO: 5.5 X10(3)/MCL (ref 4.5–11.5)

## 2023-02-09 PROCEDURE — 80053 COMPREHEN METABOLIC PANEL: CPT

## 2023-02-09 PROCEDURE — 83615 LACTATE (LD) (LDH) ENZYME: CPT

## 2023-02-09 PROCEDURE — 85025 COMPLETE CBC W/AUTO DIFF WBC: CPT

## 2023-02-09 PROCEDURE — 36415 COLL VENOUS BLD VENIPUNCTURE: CPT

## 2023-02-17 ENCOUNTER — TELEPHONE (OUTPATIENT)
Dept: HEMATOLOGY/ONCOLOGY | Facility: CLINIC | Age: 28
End: 2023-02-17
Payer: MEDICAID

## 2023-02-17 NOTE — TELEPHONE ENCOUNTER
Advised of below:    JEANNIE Sanchez LPN  Caller: Unspecified (Today, 11:58 AM)  Can you call patient and tell her that her potassium level is normal and she no longer needs oral potassium supplements because her potassium level is normal

## 2023-04-24 ENCOUNTER — APPOINTMENT (OUTPATIENT)
Dept: HEMATOLOGY/ONCOLOGY | Facility: CLINIC | Age: 28
End: 2023-04-24
Payer: MEDICAID

## 2023-04-24 DIAGNOSIS — C92.41 ACUTE PROMYELOCYTIC LEUKEMIA IN REMISSION: ICD-10-CM

## 2023-04-24 LAB
ALBUMIN SERPL-MCNC: 3.7 G/DL (ref 3.5–5)
ALBUMIN/GLOB SERPL: 1 RATIO (ref 1.1–2)
ALP SERPL-CCNC: 72 UNIT/L (ref 40–150)
ALT SERPL-CCNC: 12 UNIT/L (ref 0–55)
AST SERPL-CCNC: 10 UNIT/L (ref 5–34)
BASOPHILS # BLD AUTO: 0.01 X10(3)/MCL (ref 0–0.2)
BASOPHILS NFR BLD AUTO: 0.2 %
BILIRUBIN DIRECT+TOT PNL SERPL-MCNC: 0.3 MG/DL
BUN SERPL-MCNC: 8.7 MG/DL (ref 7–18.7)
CALCIUM SERPL-MCNC: 9 MG/DL (ref 8.4–10.2)
CHLORIDE SERPL-SCNC: 108 MMOL/L (ref 98–107)
CO2 SERPL-SCNC: 25 MMOL/L (ref 22–29)
CREAT SERPL-MCNC: 0.7 MG/DL (ref 0.55–1.02)
EOSINOPHIL # BLD AUTO: 0.12 X10(3)/MCL (ref 0–0.9)
EOSINOPHIL NFR BLD AUTO: 2 %
ERYTHROCYTE [DISTWIDTH] IN BLOOD BY AUTOMATED COUNT: 13 % (ref 11.5–17)
GFR SERPLBLD CREATININE-BSD FMLA CKD-EPI: >60 MLS/MIN/1.73/M2
GLOBULIN SER-MCNC: 3.8 GM/DL (ref 2.4–3.5)
GLUCOSE SERPL-MCNC: 116 MG/DL (ref 74–100)
HCT VFR BLD AUTO: 40 % (ref 37–47)
HGB BLD-MCNC: 12.7 G/DL (ref 12–16)
IMM GRANULOCYTES # BLD AUTO: 0.01 X10(3)/MCL (ref 0–0.04)
IMM GRANULOCYTES NFR BLD AUTO: 0.2 %
LDH SERPL-CCNC: 190 U/L (ref 125–220)
LYMPHOCYTES # BLD AUTO: 1.88 X10(3)/MCL (ref 0.6–4.6)
LYMPHOCYTES NFR BLD AUTO: 31.2 %
MCH RBC QN AUTO: 25.8 PG (ref 27–31)
MCHC RBC AUTO-ENTMCNC: 31.8 G/DL (ref 33–36)
MCV RBC AUTO: 81.3 FL (ref 80–94)
MONOCYTES # BLD AUTO: 0.48 X10(3)/MCL (ref 0.1–1.3)
MONOCYTES NFR BLD AUTO: 8 %
NEUTROPHILS # BLD AUTO: 3.53 X10(3)/MCL (ref 2.1–9.2)
NEUTROPHILS NFR BLD AUTO: 58.4 %
NRBC BLD AUTO-RTO: 0 %
PLATELET # BLD AUTO: 315 X10(3)/MCL (ref 130–400)
PMV BLD AUTO: 9.9 FL (ref 7.4–10.4)
POTASSIUM SERPL-SCNC: 3.8 MMOL/L (ref 3.5–5.1)
PROT SERPL-MCNC: 7.5 GM/DL (ref 6.4–8.3)
RBC # BLD AUTO: 4.92 X10(6)/MCL (ref 4.2–5.4)
SODIUM SERPL-SCNC: 143 MMOL/L (ref 136–145)
WBC # SPEC AUTO: 6 X10(3)/MCL (ref 4.5–11.5)

## 2023-04-24 PROCEDURE — 80053 COMPREHEN METABOLIC PANEL: CPT

## 2023-04-24 PROCEDURE — 36415 COLL VENOUS BLD VENIPUNCTURE: CPT

## 2023-04-24 PROCEDURE — 83615 LACTATE (LD) (LDH) ENZYME: CPT

## 2023-04-24 PROCEDURE — 85025 COMPLETE CBC W/AUTO DIFF WBC: CPT

## 2023-04-25 ENCOUNTER — OFFICE VISIT (OUTPATIENT)
Dept: HEMATOLOGY/ONCOLOGY | Facility: CLINIC | Age: 28
End: 2023-04-25
Payer: MEDICAID

## 2023-04-25 DIAGNOSIS — C92.41 ACUTE PROMYELOCYTIC LEUKEMIA IN REMISSION: Primary | ICD-10-CM

## 2023-04-25 PROCEDURE — 99213 PR OFFICE/OUTPT VISIT, EST, LEVL III, 20-29 MIN: ICD-10-PCS | Mod: 95,,, | Performed by: NURSE PRACTITIONER

## 2023-04-25 PROCEDURE — 1159F MED LIST DOCD IN RCRD: CPT | Mod: CPTII,95,, | Performed by: NURSE PRACTITIONER

## 2023-04-25 PROCEDURE — 1160F RVW MEDS BY RX/DR IN RCRD: CPT | Mod: CPTII,95,, | Performed by: NURSE PRACTITIONER

## 2023-04-25 PROCEDURE — 1160F PR REVIEW ALL MEDS BY PRESCRIBER/CLIN PHARMACIST DOCUMENTED: ICD-10-PCS | Mod: CPTII,95,, | Performed by: NURSE PRACTITIONER

## 2023-04-25 PROCEDURE — 99213 OFFICE O/P EST LOW 20 MIN: CPT | Mod: 95,,, | Performed by: NURSE PRACTITIONER

## 2023-04-25 PROCEDURE — 1159F PR MEDICATION LIST DOCUMENTED IN MEDICAL RECORD: ICD-10-PCS | Mod: CPTII,95,, | Performed by: NURSE PRACTITIONER

## 2023-04-25 RX ORDER — LANOLIN ALCOHOL/MO/W.PET/CERES
1 CREAM (GRAM) TOPICAL EVERY MORNING
COMMUNITY

## 2023-04-25 RX ORDER — LATANOPROST 50 UG/ML
1 SOLUTION/ DROPS OPHTHALMIC
COMMUNITY
Start: 2022-10-12

## 2023-04-25 RX ORDER — ACYCLOVIR 400 MG/1
1 TABLET ORAL
COMMUNITY

## 2023-04-25 RX ORDER — POTASSIUM CHLORIDE 20 MEQ/15ML
SOLUTION ORAL DAILY
COMMUNITY
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
## 2023-05-14 NOTE — SUBJECTIVE & OBJECTIVE
Subjective:     Interval History: PICC line placed yesterday. PML LORENA FISH confirmed low risk APL. Initiated ASO, today is day 2. ATRA day 5. Hypoxic overnight - briefly requiring 5L NC, now comfortable on 2L. CXR revealed ? RLL airspace disease.  Weight has increased 4kgs. Blood cultures have NGTD. Plan for PICC today.     Objective:     Vital Signs (Most Recent):  Temp: 98.6 °F (37 °C) (09/22/20 0400)  Pulse: 105 (09/22/20 0400)  Resp: 19 (09/22/20 0400)  BP: 128/72 (09/22/20 0400)  SpO2: 95 % (09/22/20 0430) Vital Signs (24h Range):  Temp:  [98.6 °F (37 °C)-99.2 °F (37.3 °C)] 98.6 °F (37 °C)  Pulse:  [] 105  Resp:  [16-20] 19  SpO2:  [80 %-100 %] 95 %  BP: (111-151)/() 128/72     Weight: (!) 138.3 kg (305 lb 0.1 oz)  Body mass index is 59.57 kg/m².  Body surface area is 2.42 meters squared.    ECOG SCORE         [unfilled]    Intake/Output - Last 3 Shifts       09/20 0700 - 09/21 0659 09/21 0700 - 09/22 0659    P.O. 567     I.V. (mL/kg) 2813 (20.3)     Blood 320     Total Intake(mL/kg) 3700 (26.6)     Urine (mL/kg/hr) 2300 (0.7) 900 (0.3)    Stool  0    Total Output 2300 900    Net +1400 -900          Urine Occurrence 1 x 1 x    Stool Occurrence  2 x          Physical Exam  Vitals signs reviewed.   Constitutional:       Appearance: Normal appearance. She is obese.   HENT:      Head: Normocephalic and atraumatic.      Nose: Nose normal.      Mouth/Throat:      Comments: Fever blister noted.  Eyes:      Extraocular Movements: Extraocular movements intact.   Neck:      Musculoskeletal: Normal range of motion and neck supple.   Cardiovascular:      Rate and Rhythm: Normal rate and regular rhythm.      Heart sounds: No murmur.   Pulmonary:      Effort: Pulmonary effort is normal. No respiratory distress.   Abdominal:      General: There is no distension.      Palpations: Abdomen is soft.      Tenderness: There is no abdominal tenderness.   Musculoskeletal: Normal range of motion.         General: No  2* SIADH likely  May be slightly dry on torsemide as pt is drinking less water nowadays in favor of coke  Was 137 x 2 preshopital now 127 on lexapro 10mg po daily and w/hx of NPH  No n/v/d but will hold torsemide to decrease salt loss continue w/fluid restriction and salt tabs    Already took dose in am so will hold am torsemide, continue NaCl 1g (tabs bid) swelling.   Skin:     General: Skin is warm and dry.   Neurological:      General: No focal deficit present.      Mental Status: She is alert and oriented to person, place, and time.   Psychiatric:         Mood and Affect: Mood normal.         Behavior: Behavior normal.         Significant Labs:   All pertinent labs from the last 24 hours have been reviewed.    Diagnostic Results:  I have reviewed and interpreted all pertinent imaging results/findings within the past 24 hours.

## 2023-05-18 NOTE — ASSESSMENT & PLAN NOTE
Patient presenting with possible APL and neutropenic fever. Likely due to positive Group B strep blood cultures (+ at OSH). She was started on vancomycin and cefepime at the OSH.     - Continue cefepime  - Follow up repeat cultures  - obtain records from OSH to tailor antibiotics    Yes

## 2023-06-28 DIAGNOSIS — G93.2 IIH (IDIOPATHIC INTRACRANIAL HYPERTENSION): Primary | ICD-10-CM

## 2023-07-06 ENCOUNTER — OFFICE VISIT (OUTPATIENT)
Dept: NEUROLOGY | Facility: CLINIC | Age: 28
End: 2023-07-06
Payer: MEDICAID

## 2023-07-06 VITALS
HEART RATE: 107 BPM | SYSTOLIC BLOOD PRESSURE: 137 MMHG | OXYGEN SATURATION: 97 % | HEIGHT: 61 IN | BODY MASS INDEX: 55.32 KG/M2 | DIASTOLIC BLOOD PRESSURE: 91 MMHG | WEIGHT: 293 LBS | TEMPERATURE: 98 F

## 2023-07-06 DIAGNOSIS — H47.10 OPTIC NERVE EDEMA: ICD-10-CM

## 2023-07-06 DIAGNOSIS — E66.01 MORBID OBESITY WITH BMI OF 50.0-59.9, ADULT: Primary | ICD-10-CM

## 2023-07-06 PROCEDURE — 1159F PR MEDICATION LIST DOCUMENTED IN MEDICAL RECORD: ICD-10-PCS | Mod: CPTII,,, | Performed by: PSYCHIATRY & NEUROLOGY

## 2023-07-06 PROCEDURE — 99205 OFFICE O/P NEW HI 60 MIN: CPT | Mod: S$PBB,,, | Performed by: PSYCHIATRY & NEUROLOGY

## 2023-07-06 PROCEDURE — 3075F SYST BP GE 130 - 139MM HG: CPT | Mod: CPTII,,, | Performed by: PSYCHIATRY & NEUROLOGY

## 2023-07-06 PROCEDURE — 3008F PR BODY MASS INDEX (BMI) DOCUMENTED: ICD-10-PCS | Mod: CPTII,,, | Performed by: PSYCHIATRY & NEUROLOGY

## 2023-07-06 PROCEDURE — 99205 PR OFFICE/OUTPT VISIT, NEW, LEVL V, 60-74 MIN: ICD-10-PCS | Mod: S$PBB,,, | Performed by: PSYCHIATRY & NEUROLOGY

## 2023-07-06 PROCEDURE — 3008F BODY MASS INDEX DOCD: CPT | Mod: CPTII,,, | Performed by: PSYCHIATRY & NEUROLOGY

## 2023-07-06 PROCEDURE — 3080F DIAST BP >= 90 MM HG: CPT | Mod: CPTII,,, | Performed by: PSYCHIATRY & NEUROLOGY

## 2023-07-06 PROCEDURE — 3080F PR MOST RECENT DIASTOLIC BLOOD PRESSURE >= 90 MM HG: ICD-10-PCS | Mod: CPTII,,, | Performed by: PSYCHIATRY & NEUROLOGY

## 2023-07-06 PROCEDURE — 1159F MED LIST DOCD IN RCRD: CPT | Mod: CPTII,,, | Performed by: PSYCHIATRY & NEUROLOGY

## 2023-07-06 PROCEDURE — 3075F PR MOST RECENT SYSTOLIC BLOOD PRESS GE 130-139MM HG: ICD-10-PCS | Mod: CPTII,,, | Performed by: PSYCHIATRY & NEUROLOGY

## 2023-07-06 PROCEDURE — 99214 OFFICE O/P EST MOD 30 MIN: CPT | Mod: PBBFAC | Performed by: PSYCHIATRY & NEUROLOGY

## 2023-07-06 RX ORDER — ACETAZOLAMIDE 500 MG/1
500 CAPSULE, EXTENDED RELEASE ORAL 2 TIMES DAILY
COMMUNITY
Start: 2023-06-21 | End: 2023-07-06

## 2023-07-06 RX ORDER — ACETAZOLAMIDE 250 MG/1
500 TABLET ORAL 2 TIMES DAILY
Qty: 120 TABLET | Refills: 3 | Status: SHIPPED | OUTPATIENT
Start: 2023-07-06 | End: 2023-11-02

## 2023-07-06 NOTE — PROGRESS NOTES
[facility] Neurology Initial Office Visit Note     Initial Visit Date: 7/6/2023  Current Visit Date:  07/05/2023     Chief Complaint:      No chief complaint on file.     History of Present Illness:       This is 28 y.o. female with history of acute promyelocytic leukemia status post Arsenic trioxide and Tretinoin, ocular hypertension OU, Latisse degeneration OU, pre retinal hemorrhage OU, morbid obesity who is referred for optic nerve edema OU.  Patient follows with Dr. Caleb Worthy, most recently seen on 6/21 where she was placed on diamox s/p LP performed 3/27/2023 showing increased OP.  She endorses occasional headaches, the most recent being approx 1 month ago, but states they are infrequent occurring only every few months.  Headaches are not associated with aura, vision changes, photophobia, phonophobia, N/V, or neurological deficits and they are typically relieved with tylenol/ibuprofen. She also endorses occasional episodes of tinnitus but states it is easily relieved once she places her finger inside her ear canal. Patient is somewhat of a poor historian as she has difficulty explaining her symptomatology as well as timeline of her symptoms. She states she has not started taking her diamox as she does not like taking capsule formulations.  She denies any recent vision changes and states she has been wearing glasses since she was 8 years old with little change in her prescriptions.  She is not currently on a diet or lifestyle modification plan and does not appear motivated to make lifestyle changes.  She denies any tobacco, alcohol, or recreational drug use.  She is not currently taking any daily medications and endorses a very sedentary lifestyle.       Medications:           Current Outpatient Medications   Medication Instructions    acyclovir (ZOVIRAX) 400 MG tablet 1 tablet, Oral, Every 4 hours while awake    latanoprost 0.005 % ophthalmic solution Both Eyes    latanoprost 0.005 % ophthalmic solution 1 drop,  Ophthalmic    magnesium oxide (MAG-OX) 400 mg (241.3 mg magnesium) tablet 1 tablet, Oral, Every morning    potassium chloride 10% (KAYCIEL) 20 mEq/15 mL oral solution Oral, Daily    potassium chloride SA (K-DUR,KLOR-CON) 20 MEQ tablet 20 mEq, Oral, Daily         Labs:            Results for orders placed or performed in visit on 04/24/23   Comprehensive Metabolic Panel   Result Value Ref Range     Sodium Level 143 136 - 145 mmol/L     Potassium Level 3.8 3.5 - 5.1 mmol/L     Chloride 108 (H) 98 - 107 mmol/L     Carbon Dioxide 25 22 - 29 mmol/L     Glucose Level 116 (H) 74 - 100 mg/dL     Blood Urea Nitrogen 8.7 7.0 - 18.7 mg/dL     Creatinine 0.70 0.55 - 1.02 mg/dL     Calcium Level Total 9.0 8.4 - 10.2 mg/dL     Protein Total 7.5 6.4 - 8.3 gm/dL     Albumin Level 3.7 3.5 - 5.0 g/dL     Globulin 3.8 (H) 2.4 - 3.5 gm/dL     Albumin/Globulin Ratio 1.0 (L) 1.1 - 2.0 ratio     Bilirubin Total 0.3 <=1.5 mg/dL     Alkaline Phosphatase 72 40 - 150 unit/L     Alanine Aminotransferase 12 0 - 55 unit/L     Aspartate Aminotransferase 10 5 - 34 unit/L     eGFR >60 mls/min/1.73/m2   Lactate Dehydrogenase   Result Value Ref Range     Lactate Dehydrogenase 190 125 - 220 U/L   CBC with Differential   Result Value Ref Range     WBC 6.0 4.5 - 11.5 x10(3)/mcL     RBC 4.92 4.20 - 5.40 x10(6)/mcL     Hgb 12.7 12.0 - 16.0 g/dL     Hct 40.0 37.0 - 47.0 %     MCV 81.3 80.0 - 94.0 fL     MCH 25.8 (L) 27.0 - 31.0 pg     MCHC 31.8 (L) 33.0 - 36.0 g/dL     RDW 13.0 11.5 - 17.0 %     Platelet 315 130 - 400 x10(3)/mcL     MPV 9.9 7.4 - 10.4 fL     Neut % 58.4 %     Lymph % 31.2 %     Mono % 8.0 %     Eos % 2.0 %     Basophil % 0.2 %     Lymph # 1.88 0.6 - 4.6 x10(3)/mcL     Neut # 3.53 2.1 - 9.2 x10(3)/mcL     Mono # 0.48 0.1 - 1.3 x10(3)/mcL     Eos # 0.12 0 - 0.9 x10(3)/mcL     Baso # 0.01 0 - 0.2 x10(3)/mcL     IG# 0.01 0 - 0.04 x10(3)/mcL     IG% 0.2 %     NRBC% 0.0 %      CSF CELL COUNT WITH DIFFERENTIAL       Ref Range & Units 3 mo ago    Appearance Fluid Clear Clear    WBC CSF <3-5/mm3 /mm3 /mm3 <3    RBC CSF <2000 /mm3 <2,000    Xanthochromia CSF Absent        Protein, CSF       Ref Range & Units 3 mo ago   Spinal Fluid Protein 15.0 - 45.0 MG/DL 18.7    Glucose, CSF       Ref Range & Units 3 mo ago   Spinal Fluid Glucose 40 - 90 MG/DL 65      VDRL, CSF       Ref Range & Units 3 mo ago   VDRL, CSF Non Mandan:<1:1 Non Reactive       ACE, CSF       Ref Range & Units 3 mo ago   ACE, CSF 0.0 - 2.5 U/L <1.5    Multiple Sclerosis Profile       Ref Range & Units 3 mo ago   IgG, CSF 0.0 - 6.7 mg/dL 0.8    Albumin CSF 7 - 29 mg/dL 8    Comment: **Results verified by repeat testing**   IgG/Alb Ratio CSF 0.00 - 0.25 0.10    CSF IgG Index 0.0 - 0.7 0.5    IgG Synthetic Rate -9.9 TO +3.3 mg/day -3.0       Studies:       MRI brain and MRV brain 8/30/2022:  As per documentation, No acute abnormalities of the brain. 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 There is preferential drainage into the left transverse sinus. No intracranial venous occlusion is seen.      Lumbar puncture 3/27/2023 at [facility]: as per documentation, OP 32 cm H2O.     OCT RNFL   12/17/20: 89//76; all green, thickening nasally // red S/N, yellow I, green T  01/21/20: 91//105 All green// All green with inferior and superior thickening   08/16/22: 79 // 91; S red, I yellow, rest green // all quads green   - HVF  01/27/21: Unreliable OU but essentially full   - Has since finished ATRA treatment and tretinoin treatment  - OCT RNFL and clinical exam 8/16/22 shows grade 1 disc edema OU.      Review of Systems:      Review of Systems   All other systems reviewed and are negative.     Physical Exams:      There were no vitals filed for this visit.     Physical Exam  Vitals and nursing note reviewed.   Constitutional:       Appearance: Normal appearance.   HENT:      Head: Normocephalic and atraumatic.      Nose: Nose normal.      Mouth/Throat:      Mouth: Mucous membranes  are moist.      Pharynx: Oropharynx is clear.   Eyes:      Conjunctiva/sclera: Conjunctivae normal.     Large black hole noted in right eye upon fundus exam with minimal edema noted bilaterally.   Cardiovascular:      Rate and Rhythm: Normal rate and regular rhythm.      Pulses: Normal pulses.   Pulmonary:      Effort: Pulmonary effort is normal.      Breath sounds: Normal breath sounds.   Abdominal:      General: Abdomen is flat.   Musculoskeletal:         General: Normal range of motion.      Cervical back: Normal range of motion.   Skin:     General: Skin is warm.   Neurological:      Mental Status: She is alert.         Comprehensive Neurological Exam:  Mental Status: Alert Oriented to Self, Date, and Place. omprehension wnl. No dysarthria.   CN II - XII: WALKER, No APD, Fundus exam showing large black hole in right eye with minimal optic nerve edema noted, VFFC, No ptosis OU, EOMI without nystagmus, LT/Temp symmetric in CN V1-3 distribution, Hearing grossly intact, Face Symmetric, Tongue and Uvula midline, Trapezius symmetric bilateral.   Motor: tone and bulk wnl throughout, no abnormal involuntary or voluntary movements, 5/5 to confrontation, Fine finger movements wnl b/l, No pronator drift.   Sensory: LT, Proprioception, Vibration, PP, Temp symmetric. No sensory simultagnosia.   Reflexes: 2+ throughout, plantar reflexes downward bilateral.   Cerebellar: FNF wnl b/l, RAHM wnl b/l  Romberg: Negative  Gait: normal. Heel Gait, Toe Gait, Tandem Gait wnl.      Assessment:      This is 28 y.o. female with history of acute promyelocytic leukemia status post Arsenic trioxide and Tretinoin, ocular hypertension OU, Latisse degeneration OU, pre retinal hemorrhage OU, morbid obesity who is referred for IIH.     Problem List Items Addressed This Visit      IIH     Plan:      [] Continue diamox 500mg BID, will change formulation to tablet as patient is refusing capsules.   [] Patient was counseled extensively regarding the  importance of weight loss, referring to nutritional services today for aid in lifestyle modification.      RTC in 3 months       I have explained the treatment plan, diagnosis, and prognosis to patient. All questions are answered to the best of my knowledge.      Face to face time 30 minutes, including documentation, chart review, counseling, education, review of test results, relevant medical records, and coordination of care.   I have explained the treatment plan, diagnosis, and prognosis to patient. All questions are answered to the best of my knowledge.      Yojana Andrew MD  [facility] Internal Medicine, PGY-2

## 2023-07-25 ENCOUNTER — OFFICE VISIT (OUTPATIENT)
Dept: HEMATOLOGY/ONCOLOGY | Facility: CLINIC | Age: 28
End: 2023-07-25
Payer: MEDICAID

## 2023-07-25 VITALS
TEMPERATURE: 98 F | WEIGHT: 293 LBS | RESPIRATION RATE: 20 BRPM | HEIGHT: 61 IN | SYSTOLIC BLOOD PRESSURE: 139 MMHG | OXYGEN SATURATION: 97 % | BODY MASS INDEX: 55.32 KG/M2 | DIASTOLIC BLOOD PRESSURE: 86 MMHG | HEART RATE: 101 BPM

## 2023-07-25 DIAGNOSIS — C92.41 ACUTE PROMYELOCYTIC LEUKEMIA IN REMISSION: Primary | ICD-10-CM

## 2023-07-25 PROCEDURE — 1160F RVW MEDS BY RX/DR IN RCRD: CPT | Mod: CPTII,,, | Performed by: NURSE PRACTITIONER

## 2023-07-25 PROCEDURE — 3008F PR BODY MASS INDEX (BMI) DOCUMENTED: ICD-10-PCS | Mod: CPTII,,, | Performed by: NURSE PRACTITIONER

## 2023-07-25 PROCEDURE — 99213 OFFICE O/P EST LOW 20 MIN: CPT | Mod: S$PBB,,, | Performed by: NURSE PRACTITIONER

## 2023-07-25 PROCEDURE — 1159F PR MEDICATION LIST DOCUMENTED IN MEDICAL RECORD: ICD-10-PCS | Mod: CPTII,,, | Performed by: NURSE PRACTITIONER

## 2023-07-25 PROCEDURE — 3008F BODY MASS INDEX DOCD: CPT | Mod: CPTII,,, | Performed by: NURSE PRACTITIONER

## 2023-07-25 PROCEDURE — 99213 PR OFFICE/OUTPT VISIT, EST, LEVL III, 20-29 MIN: ICD-10-PCS | Mod: S$PBB,,, | Performed by: NURSE PRACTITIONER

## 2023-07-25 PROCEDURE — 1160F PR REVIEW ALL MEDS BY PRESCRIBER/CLIN PHARMACIST DOCUMENTED: ICD-10-PCS | Mod: CPTII,,, | Performed by: NURSE PRACTITIONER

## 2023-07-25 PROCEDURE — 3075F SYST BP GE 130 - 139MM HG: CPT | Mod: CPTII,,, | Performed by: NURSE PRACTITIONER

## 2023-07-25 PROCEDURE — 3079F DIAST BP 80-89 MM HG: CPT | Mod: CPTII,,, | Performed by: NURSE PRACTITIONER

## 2023-07-25 PROCEDURE — 99213 OFFICE O/P EST LOW 20 MIN: CPT | Mod: PBBFAC | Performed by: NURSE PRACTITIONER

## 2023-07-25 PROCEDURE — 3075F PR MOST RECENT SYSTOLIC BLOOD PRESS GE 130-139MM HG: ICD-10-PCS | Mod: CPTII,,, | Performed by: NURSE PRACTITIONER

## 2023-07-25 PROCEDURE — 1159F MED LIST DOCD IN RCRD: CPT | Mod: CPTII,,, | Performed by: NURSE PRACTITIONER

## 2023-07-25 PROCEDURE — 3079F PR MOST RECENT DIASTOLIC BLOOD PRESSURE 80-89 MM HG: ICD-10-PCS | Mod: CPTII,,, | Performed by: NURSE PRACTITIONER
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
## 2023-08-15 ENCOUNTER — NUTRITION (OUTPATIENT)
Dept: NUTRITION | Facility: HOSPITAL | Age: 28
End: 2023-08-15
Attending: PSYCHIATRY & NEUROLOGY
Payer: MEDICAID

## 2023-08-15 VITALS — BODY MASS INDEX: 55.32 KG/M2 | WEIGHT: 293 LBS | HEIGHT: 61 IN

## 2023-08-15 DIAGNOSIS — H47.10 OPTIC NERVE EDEMA: ICD-10-CM

## 2023-08-15 PROCEDURE — 97802 MEDICAL NUTRITION INDIV IN: CPT

## 2023-08-15 NOTE — PROGRESS NOTES
"Nutrition Note: 8/15/2023   Referring Provider: Valeria Gonzalez MD  Reason for visit: Optic nerve edema and Obesity/Weight Management   Consultation Time: 60 Minutes  Apt Start: 0900   Apt End: 1000     A = NUTRITION ASSESSMENT   Patient Information:    Erika Saini  : 1995   28 y.o. female    Allergies/Intolerances: No known food allergies  Social Data: lives with  mom and dad .   Anthropometrics:     Wt: (!) 150.6 kg (332 lb)                                   Ht 5' 1" (1.549 m)   BMI: Body mass index is 62.73 kg/m².  IBW: 47.6 kg (316% IBW)     Supplements/Vitamins:    MVI/Supp:  previously gummy vitamins  Drug/Nutrient interactions: None Noted Activity Level:     Sedentary      Form of Activity: walked on vacation, patient stated not safe to walk in neighborhood, does not do exercise indoors.      Food/Nutrition-related hx:  Go grocery shopping with family. Rarely eat fruit because of price of fresh fruit. Discussed canned/frozen fruit options; patient open to trying it. Pt expressed uncertainty of making diet changes due to restrictive food preferences. Pt discussed her aunt who had been bed bound due to weight and passed away last year; patient is concerned about her own weight issues progressing in a similar manner.   Diet/PO Recall:   Appetite: Selective  Fluid Intake:  Inadequate water intake; >64 ounces of fluids including juice, soda, and powerade  Diet Recall:  7 am:   Breakfast: usually skip but had a leftover beignets  Mom cooks spaghetti - at 8 or 9 am  Lunch/supper 2 pm: plate lunch from [employer] (red beans and rice, double macaroni-did eat it)   bluebell [employer] [place] pint  Dinner: tv dinners, herrera chicken tenders, hot dogs, grilled cheese, or chicken sandwiches. Chicken and waffles with a lemonade.   Doesn't eat: ribs, pork chop, steak, no vegetables by themselves, nothing purple, pickles, ramen, deli meat, condiments except BBQ sauce, milk, grilled chicken  Snacks: snack cakes, " "popsicles, ice cream sandwiches,   Drinks: juice, water (1-2 bottles a day), soda, red powerade  ONS: no protein shakes  Frequency:   Vegetables: onion, peppers, celery cooked into meat/beans. Will eat mixed veggies in mixed into dish but not by themselves.   Fruits: eat rarely due to not being in the house: pineapple, oranges, strawberries  Whole Grains: 0-1  Sodas/Sweets: 2-3  Lean Protein:0-1  Water: 1-2  Eating out: 2-3x/week: plate lunch or fast food (popeyes, chicken tenders, or chicken sandwich and large rice dressing)  N/V/C/D: No GI Symptoms Noted - only with beans  Cultural/Spiritual/Personal Preferences: Per 7/6/23 Note Valeria Gonzalez MD; "Will refer to nutrition for weight management given significant obesity."  Patient Notes/Reports: Patient previously given education on potassium sources on 1/25/23 when potassium levels low. Per 7/6/23 Note Valeria Gonzalez MD; "Will refer to nutrition for weight management given significant obesity."     Medical Tests and Procedures:  Patient Active Problem List   Diagnosis    APL (acute promyelocytic leukemia)    Morbid obesity with BMI of 50.0-59.9, adult    Adjustment reaction with anxiety    Transaminitis    Optic nerve edema      Past Medical History:   Diagnosis Date    Leukemia, unspecified, in remission      Past Surgical History:   Procedure Laterality Date    BONE MARROW BIOPSY      lazer eye surgery      WISDOM TOOTH EXTRACTION         Current Outpatient Medications   Medication Instructions    acetaZOLAMIDE (DIAMOX) 500 mg, Oral, 2 times daily    acyclovir (ZOVIRAX) 400 MG tablet 1 tablet, Oral, Every 4 hours while awake    latanoprost 0.005 % ophthalmic solution Both Eyes    latanoprost 0.005 % ophthalmic solution 1 drop, Ophthalmic    magnesium oxide (MAG-OX) 400 mg (241.3 mg magnesium) tablet 1 tablet, Oral, Every morning    potassium chloride 10% (KAYCIEL) 20 mEq/15 mL oral solution Oral, Daily      Labs:    Lab Results   Component Value Date    WBC 5.52 " 07/25/2023    HGB 12.9 07/25/2023    HCT 41.4 07/25/2023    HGBA1C 6.0 (H) 10/12/2020     07/25/2023    K 3.6 07/25/2023    CALCIUM 9.3 07/25/2023         D = NUTRITION DIAGNOSIS    PES Statement:    Food and nutrition related knowledge deficit  related to  restrictive food preferences and precontemplation/contemplation stage of readiness to change  as evidenced by  patient diet recall, uncertainty of recommendations to improve health, and stated uncertainty about implementing any changes to diet .      I = NUTRITION INTERVENTION   Discussed healthy plate method on healthy eating, incorporating more fruits/vegetables, whole grains, and eliminating high calorie/sugary beverages.    Discussed sugary foods and/or beverages (potential health consequences of excessive sugar intake, ways to reduce sugar intake, healthy beverage alternatives, etc.).   Discussed nutrient-dense meals and snacks versus empty-calories.    Discussed benefits of adequate hydration and recommended fluid intake.   Discussed importance of small behavior/habit changes in improving long-term adherence and sustainability.   Answered parents/patient's questions appropriately.      Patient  active and engaged during session. Pt verbalized willing to try new things but uncertainty about implementing diet changes. Contact information provided, understanding verbalized.    Estimated Energy/Fluid Requirements:   Weight used: AIBW 88.9 kg  Calories: 2719-1327 kcal/day (20-25 kcal/kg)  Protein: 89-98 g/day (1-1.1 g/kg)  Fluid: 2000 mL/day or per MD.    Education Materials Provided:   MyPlate Weight Management Handout  Healthy Fast Food and Frozen Meal Options  Healthy Beverages and sugar free drink replacements  Healthy snack guide   Recommendations:   Ensure balanced eating pattern of 3 meals, 1-2 snacks daily. Avoid skipped meals.    Keep portions appropriate using body (fist, palm, etc)   Avoid/limit fried foods, fast food, and high calorie  "beverages. Shoot for limiting fast food to 1x/week.    Replace 50% of juice with water. Consume zero-calorie, zero-sugary beverages and choose water more often (crystal light, unsweet tea, diet soda, G2, Powerade zero, vitamin water zero, and skim/1%milk)   Focus on protein at all meals and snacks. Examples provided.    Try one to two new foods a week.      M/E = NUTRITION MONITORING AND EVALUATION   Patient negotiated goals:   Goal 1:  Weight maintenance or loss in the next 6 months (primary goal is no weight gain).   Indicator: Weight/BMI    Goal 2: Diet recall shows decrease in soda intake and replacing 50% of juice with water.     Indicator: Diet Recall     F/U: Family/Patient provided with Dietitian contact number and advised to call or make future appointment if further intervention is needed.    Communication with provider via Epic    Signature: Jeana Foster RD (Katie), MS       "

## 2023-09-15 ENCOUNTER — TELEPHONE (OUTPATIENT)
Dept: HEMATOLOGY/ONCOLOGY | Facility: CLINIC | Age: 28
End: 2023-09-15
Payer: MEDICAID

## 2023-09-15 NOTE — NURSING
Received phone call from patient's mother requesting SUSAN Martinez to initiate assistance in applying for funding through the [employer]. SUSAN Martinez contacted S at [phone]. Information provided for funding. Patient will need to fax SSI letter or information to [phone]

## 2023-10-20 NOTE — PROGRESS NOTES
"[facility] Neurology Follow Up Office Visit Note    Initial Visit Date: 7/6/2023  Last Visit Date: 7/6/2023  Current Visit Date:  10/24/2023    Chief Complaint:     Chief Complaint   Patient presents with    F/U IIH     Had appointment with nutritionist; Stated only taking medication when she "feels like it"       History of Present Illness:      This is 28 y.o. female with history of acute promyelocytic leukemia status post Arsenic trioxide and Tretinoin, ocular hypertension OU, Latisse degeneration OU, pre retinal hemorrhage OU, morbid obesity who is referred for probable IIH.  Noncompliant.  I am less concerned about IIH given the fact that she does not complain of other clinical symptoms consistent with IIH.  However, she is morbidly obese and is at high risk of having other issues related to morbid obesity such as hypertension.  During last visit, patient stated that she was not taking acetazolamide as she has difficulty swallowing pills.  She was switched to acetazolamide 250 mg tablets instead with a total of 1000 mg daily.  She was referred to nutrition for weight management. Still non compliant.     Age of Onset :  Unknown.    Headache Description: unable to describe the quality, duration of the headache    Frequency:  Occasional headache days every other month    Provocation Factors:  Denied    Risk Factors  - Weight fluctuation: Yes morbid obesity.  - Isotretinoin or Tetracycline use:  Not Applicable  - Family planning and contraceptive use: Not Applicable    Medications:     Current Prophylactic  Acetazolamide 1000 mg daily: takes 250 mg or 500 mg on occasion may be 2-3 days per weeks. Does not like the metallic taste and not willing to be compliant.     Devices:     - VNS:  - TNS  - TMS:     Procedures:     - Botox:  - PSG block:   - Occipital nerve block:     Labs:     Results for orders placed or performed in visit on 07/25/23   Comprehensive Metabolic Panel   Result Value Ref Range    Sodium Level 142 136 " - 145 mmol/L    Potassium Level 3.6 3.5 - 5.1 mmol/L    Chloride 106 98 - 107 mmol/L    Carbon Dioxide 26 22 - 29 mmol/L    Glucose Level 113 (H) 74 - 100 mg/dL    Blood Urea Nitrogen 7.5 7.0 - 18.7 mg/dL    Creatinine 0.63 0.55 - 1.02 mg/dL    Calcium Level Total 9.3 8.4 - 10.2 mg/dL    Protein Total 7.7 6.4 - 8.3 gm/dL    Albumin Level 3.7 3.5 - 5.0 g/dL    Globulin 4.0 (H) 2.4 - 3.5 gm/dL    Albumin/Globulin Ratio 0.9 (L) 1.1 - 2.0 ratio    Bilirubin Total 0.3 <=1.5 mg/dL    Alkaline Phosphatase 58 40 - 150 unit/L    Alanine Aminotransferase 10 0 - 55 unit/L    Aspartate Aminotransferase 11 5 - 34 unit/L    eGFR >60 mls/min/1.73/m2   CBC with Differential   Result Value Ref Range    WBC 5.52 4.50 - 11.50 x10(3)/mcL    RBC 5.08 4.20 - 5.40 x10(6)/mcL    Hgb 12.9 12.0 - 16.0 g/dL    Hct 41.4 37.0 - 47.0 %    MCV 81.5 80.0 - 94.0 fL    MCH 25.4 (L) 27.0 - 31.0 pg    MCHC 31.2 (L) 33.0 - 36.0 g/dL    RDW 13.2 11.5 - 17.0 %    Platelet 297 130 - 400 x10(3)/mcL    MPV 9.7 7.4 - 10.4 fL    Neut % 61.7 %    Lymph % 28.1 %    Mono % 8.5 %    Eos % 1.3 %    Basophil % 0.2 %    Lymph # 1.55 0.6 - 4.6 x10(3)/mcL    Neut # 3.41 2.1 - 9.2 x10(3)/mcL    Mono # 0.47 0.1 - 1.3 x10(3)/mcL    Eos # 0.07 0 - 0.9 x10(3)/mcL    Baso # 0.01 <=0.2 x10(3)/mcL    IG# 0.01 0 - 0.04 x10(3)/mcL    IG% 0.2 %    NRBC% 0.0 %   Lactate Dehydrogenase   Result Value Ref Range    Lactate Dehydrogenase 192 125 - 220 U/L   PML/LORENA Quantitative, PCR   Result Value Ref Range    Specimen Type BLOOD     Interpretation SEE COMMENTS     PMLR RESULT see interpretation        Studies:     MRI brain and MRV brain 8/30/2022:  As per documentation, No acute abnormalities of the brain. 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 There is preferential drainage into the left transverse sinus. No intracranial venous occlusion is seen.      Lumbar puncture 3/27/2023 at [facility]: as per documentation, OP 32 cm H2O prone.     OCT RNFL    12/17/20: 89//76; all green, thickening nasally // red S/N, yellow I, green T  01/21/20: 91//105 All green// All green with inferior and superior thickening   08/16/22: 79 // 91; S red, I yellow, rest green // all quads green   7/12/23: 70//62; S and I red, N yellow // S/N/I red, T green      01/27/21: Unreliable OU but essentially full   - Has since finished ATRA treatment and tretinoin treatment  - OCT RNFL and clinical exam 8/16/22 shows grade 1 disc edema OU.     Review of Systems:     Review of Systems   All other systems reviewed and are negative.      Physical Exams:     Vitals:    10/24/23 0924   BP: 134/82   Pulse: 103   Resp: 12   Temp: 97.9 °F (36.6 °C)       Physical Exam  Vitals and nursing note reviewed.   Constitutional:       Appearance: Normal appearance.   HENT:      Head: Normocephalic and atraumatic.      Nose: Nose normal.      Mouth/Throat:      Mouth: Mucous membranes are moist.      Pharynx: Oropharynx is clear.   Eyes:      Conjunctiva/sclera: Conjunctivae normal.   Cardiovascular:      Rate and Rhythm: Normal rate and regular rhythm.      Pulses: Normal pulses.   Pulmonary:      Effort: Pulmonary effort is normal.      Breath sounds: Normal breath sounds.   Abdominal:      General: Abdomen is flat.   Musculoskeletal:         General: Normal range of motion.      Cervical back: Normal range of motion.   Skin:     General: Skin is warm.   Neurological:      Mental Status: She is alert.         Comprehensive Neurological Exam:  Mental Status: Alert Oriented to Self, Date, and Place. omprehension wnl. No dysarthria.   CN II - XII: WALKER, No APD, VFFC, No ptosis OU, EOMI without nystagmus, LT/Temp symmetric in CN V1-3 distribution, Hearing grossly intact, Face Symmetric, Tongue and Uvula midline, Trapezius symmetric bilateral.   Motor: tone and bulk wnl throughout, no abnormal involuntary or voluntary movements, 5/5 to confrontation, Fine finger movements wnl b/l, No pronator drift.   Sensory:  LT, Proprioception, Vibration, PP, Temp symmetric. No sensory simultagnosia.   Reflexes: 2+ throughout, plantar reflexes downward bilateral.   Cerebellar: FNF wnl b/l, RAHM wnl b/l  Gait: normal. Heel Gait, Toe Gait, Tandem Gait wnl.     Assessment:     This is 28 y.o. female with history of acute promyelocytic leukemia status post Arsenic trioxide and Tretinoin, ocular hypertension OU, Latisse degeneration OU, pre retinal hemorrhage OU, morbid obesity who is referred for IIH.  Noncompliant.        Problem List Items Addressed This Visit          Endocrine    Morbid obesity with BMI of 50.0-59.9, adult     Other Visit Diagnoses       IIH (idiopathic intracranial hypertension)    -  Primary            Plan:     [] continue with acetazolamide 250 mg tablets instead with a total of 1000 mg a day.    RTC PRN     Headache education provided: good sleep hygiene and 7 hours of sleep per night, stress management, medication overuse education provided. Using more 3 OTC per week may worsen headaches, high intensity interval training has shown to reduce headache frequency. Low carb, high protein has shown to reduce headache frequency. Patient is instructed in keep headache diary.     I have explained the treatment plan, diagnosis, and prognosis to patient. All questions are answered to the best of my knowledge.     Face to face time 40 minutes, including documentation, chart review, counseling, education, review of test results, relevant medical records, and coordination of care.       Valeria Gonzalez MD   General Neurology  10/24/2023

## 2023-10-24 ENCOUNTER — OFFICE VISIT (OUTPATIENT)
Dept: NEUROLOGY | Facility: CLINIC | Age: 28
End: 2023-10-24
Payer: MEDICAID

## 2023-10-24 VITALS
DIASTOLIC BLOOD PRESSURE: 82 MMHG | HEIGHT: 61 IN | WEIGHT: 293 LBS | SYSTOLIC BLOOD PRESSURE: 134 MMHG | RESPIRATION RATE: 12 BRPM | BODY MASS INDEX: 55.32 KG/M2 | TEMPERATURE: 98 F | OXYGEN SATURATION: 99 % | HEART RATE: 103 BPM

## 2023-10-24 DIAGNOSIS — R46.89 NON-COMPLIANT BEHAVIOR: ICD-10-CM

## 2023-10-24 DIAGNOSIS — E66.01 MORBID OBESITY WITH BMI OF 50.0-59.9, ADULT: ICD-10-CM

## 2023-10-24 DIAGNOSIS — G93.2 IIH (IDIOPATHIC INTRACRANIAL HYPERTENSION): Primary | ICD-10-CM

## 2023-10-24 PROCEDURE — 3079F PR MOST RECENT DIASTOLIC BLOOD PRESSURE 80-89 MM HG: ICD-10-PCS | Mod: CPTII,,, | Performed by: PSYCHIATRY & NEUROLOGY

## 2023-10-24 PROCEDURE — 3008F PR BODY MASS INDEX (BMI) DOCUMENTED: ICD-10-PCS | Mod: CPTII,,, | Performed by: PSYCHIATRY & NEUROLOGY

## 2023-10-24 PROCEDURE — 3079F DIAST BP 80-89 MM HG: CPT | Mod: CPTII,,, | Performed by: PSYCHIATRY & NEUROLOGY

## 2023-10-24 PROCEDURE — 99214 OFFICE O/P EST MOD 30 MIN: CPT | Mod: S$PBB,,, | Performed by: PSYCHIATRY & NEUROLOGY

## 2023-10-24 PROCEDURE — 1159F PR MEDICATION LIST DOCUMENTED IN MEDICAL RECORD: ICD-10-PCS | Mod: CPTII,,, | Performed by: PSYCHIATRY & NEUROLOGY

## 2023-10-24 PROCEDURE — 3075F SYST BP GE 130 - 139MM HG: CPT | Mod: CPTII,,, | Performed by: PSYCHIATRY & NEUROLOGY

## 2023-10-24 PROCEDURE — 1160F PR REVIEW ALL MEDS BY PRESCRIBER/CLIN PHARMACIST DOCUMENTED: ICD-10-PCS | Mod: CPTII,,, | Performed by: PSYCHIATRY & NEUROLOGY

## 2023-10-24 PROCEDURE — 1159F MED LIST DOCD IN RCRD: CPT | Mod: CPTII,,, | Performed by: PSYCHIATRY & NEUROLOGY

## 2023-10-24 PROCEDURE — 3008F BODY MASS INDEX DOCD: CPT | Mod: CPTII,,, | Performed by: PSYCHIATRY & NEUROLOGY

## 2023-10-24 PROCEDURE — 1160F RVW MEDS BY RX/DR IN RCRD: CPT | Mod: CPTII,,, | Performed by: PSYCHIATRY & NEUROLOGY

## 2023-10-24 PROCEDURE — 3075F PR MOST RECENT SYSTOLIC BLOOD PRESS GE 130-139MM HG: ICD-10-PCS | Mod: CPTII,,, | Performed by: PSYCHIATRY & NEUROLOGY

## 2023-10-24 PROCEDURE — 99213 OFFICE O/P EST LOW 20 MIN: CPT | Mod: PBBFAC | Performed by: PSYCHIATRY & NEUROLOGY

## 2023-10-24 PROCEDURE — 99214 PR OFFICE/OUTPT VISIT, EST, LEVL IV, 30-39 MIN: ICD-10-PCS | Mod: S$PBB,,, | Performed by: PSYCHIATRY & NEUROLOGY

## 2023-11-02 DIAGNOSIS — H47.10 OPTIC NERVE EDEMA: ICD-10-CM

## 2023-11-02 RX ORDER — ACETAZOLAMIDE 250 MG/1
TABLET ORAL
Qty: 120 TABLET | Refills: 3 | Status: SHIPPED | OUTPATIENT
Start: 2023-11-02
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
## 2023-12-06 ENCOUNTER — OFFICE VISIT (OUTPATIENT)
Dept: HEMATOLOGY/ONCOLOGY | Facility: CLINIC | Age: 28
End: 2023-12-06
Attending: INTERNAL MEDICINE
Payer: MEDICAID

## 2023-12-06 VITALS
BODY MASS INDEX: 55.32 KG/M2 | HEIGHT: 61 IN | HEART RATE: 93 BPM | RESPIRATION RATE: 18 BRPM | SYSTOLIC BLOOD PRESSURE: 144 MMHG | WEIGHT: 293 LBS | DIASTOLIC BLOOD PRESSURE: 92 MMHG | TEMPERATURE: 98 F | OXYGEN SATURATION: 100 %

## 2023-12-06 DIAGNOSIS — E66.01 MORBID OBESITY WITH BMI OF 50.0-59.9, ADULT: ICD-10-CM

## 2023-12-06 DIAGNOSIS — H47.10 OPTIC NERVE EDEMA: ICD-10-CM

## 2023-12-06 DIAGNOSIS — F43.22 ADJUSTMENT REACTION WITH ANXIETY: ICD-10-CM

## 2023-12-06 DIAGNOSIS — C92.41 ACUTE PROMYELOCYTIC LEUKEMIA IN REMISSION: Primary | ICD-10-CM

## 2023-12-06 DIAGNOSIS — C92.41 ACUTE PROMYELOCYTIC LEUKEMIA IN REMISSION: ICD-10-CM

## 2023-12-06 DIAGNOSIS — G93.2 IIH (IDIOPATHIC INTRACRANIAL HYPERTENSION): Primary | ICD-10-CM

## 2023-12-06 PROCEDURE — 3080F DIAST BP >= 90 MM HG: CPT | Mod: CPTII,,, | Performed by: INTERNAL MEDICINE

## 2023-12-06 PROCEDURE — 3008F BODY MASS INDEX DOCD: CPT | Mod: CPTII,,, | Performed by: INTERNAL MEDICINE

## 2023-12-06 PROCEDURE — 1159F PR MEDICATION LIST DOCUMENTED IN MEDICAL RECORD: ICD-10-PCS | Mod: CPTII,,, | Performed by: INTERNAL MEDICINE

## 2023-12-06 PROCEDURE — 3008F PR BODY MASS INDEX (BMI) DOCUMENTED: ICD-10-PCS | Mod: CPTII,,, | Performed by: INTERNAL MEDICINE

## 2023-12-06 PROCEDURE — 1160F PR REVIEW ALL MEDS BY PRESCRIBER/CLIN PHARMACIST DOCUMENTED: ICD-10-PCS | Mod: CPTII,,, | Performed by: INTERNAL MEDICINE

## 2023-12-06 PROCEDURE — 1159F MED LIST DOCD IN RCRD: CPT | Mod: CPTII,,, | Performed by: INTERNAL MEDICINE

## 2023-12-06 PROCEDURE — 99213 OFFICE O/P EST LOW 20 MIN: CPT | Mod: PBBFAC | Performed by: INTERNAL MEDICINE

## 2023-12-06 PROCEDURE — 99214 OFFICE O/P EST MOD 30 MIN: CPT | Mod: S$PBB,,, | Performed by: INTERNAL MEDICINE

## 2023-12-06 PROCEDURE — 3080F PR MOST RECENT DIASTOLIC BLOOD PRESSURE >= 90 MM HG: ICD-10-PCS | Mod: CPTII,,, | Performed by: INTERNAL MEDICINE

## 2023-12-06 PROCEDURE — 3077F SYST BP >= 140 MM HG: CPT | Mod: CPTII,,, | Performed by: INTERNAL MEDICINE

## 2023-12-06 PROCEDURE — 1160F RVW MEDS BY RX/DR IN RCRD: CPT | Mod: CPTII,,, | Performed by: INTERNAL MEDICINE

## 2023-12-06 PROCEDURE — 99214 PR OFFICE/OUTPT VISIT, EST, LEVL IV, 30-39 MIN: ICD-10-PCS | Mod: S$PBB,,, | Performed by: INTERNAL MEDICINE

## 2023-12-06 PROCEDURE — 3077F PR MOST RECENT SYSTOLIC BLOOD PRESSURE >= 140 MM HG: ICD-10-PCS | Mod: CPTII,,, | Performed by: INTERNAL MEDICINE

## 2024-01-14 NOTE — ASSESSMENT & PLAN NOTE
- Was desaturing tot he 60's and becoming sob with movement  - CTA showed b/l pulmonary edema with left sided pleural effusion and small pericardial effusion, no PE however limited by body habitus  - Per echo, CVP 8 from 3 previously during admission  - Max weight 141.3kg, currently below admission weight at 125.6kg  - Completed 3 days of Dex 10mg q12hrs, weaned to 6mg q12hrs for 3 days, currently 4mg q12hrs  - Improved with IV lasix, uptrending BUN indicating pt is reaching a euvolemic state, will give single dose oral today  - Wean O2 as tolerated, goal >92%   No

## 2024-02-04 PROBLEM — H40.059 OCULAR HYPERTENSION: Status: ACTIVE | Noted: 2024-02-04
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
## 2024-02-05 ENCOUNTER — TELEPHONE (OUTPATIENT)
Dept: HEMATOLOGY/ONCOLOGY | Facility: CLINIC | Age: 29
End: 2024-02-05
Payer: MEDICAID

## 2024-02-06 ENCOUNTER — OFFICE VISIT (OUTPATIENT)
Dept: HEMATOLOGY/ONCOLOGY | Facility: CLINIC | Age: 29
End: 2024-02-06
Attending: INTERNAL MEDICINE
Payer: MEDICAID

## 2024-02-06 ENCOUNTER — APPOINTMENT (OUTPATIENT)
Dept: HEMATOLOGY/ONCOLOGY | Facility: CLINIC | Age: 29
End: 2024-02-06
Payer: MEDICAID

## 2024-02-06 VITALS
DIASTOLIC BLOOD PRESSURE: 93 MMHG | BODY MASS INDEX: 55.32 KG/M2 | OXYGEN SATURATION: 98 % | RESPIRATION RATE: 19 BRPM | HEART RATE: 103 BPM | WEIGHT: 293 LBS | HEIGHT: 61 IN | SYSTOLIC BLOOD PRESSURE: 133 MMHG | TEMPERATURE: 98 F

## 2024-02-06 DIAGNOSIS — H47.10 OPTIC NERVE EDEMA: ICD-10-CM

## 2024-02-06 DIAGNOSIS — H40.051 OCULAR HYPERTENSION OF RIGHT EYE: ICD-10-CM

## 2024-02-06 DIAGNOSIS — F43.22 ADJUSTMENT REACTION WITH ANXIETY: ICD-10-CM

## 2024-02-06 DIAGNOSIS — C92.41 ACUTE PROMYELOCYTIC LEUKEMIA IN REMISSION: Primary | ICD-10-CM

## 2024-02-06 DIAGNOSIS — E66.01 MORBID OBESITY WITH BMI OF 50.0-59.9, ADULT: ICD-10-CM

## 2024-02-06 DIAGNOSIS — G93.2 IIH (IDIOPATHIC INTRACRANIAL HYPERTENSION): ICD-10-CM

## 2024-02-06 DIAGNOSIS — G93.2 IIH (IDIOPATHIC INTRACRANIAL HYPERTENSION): Primary | ICD-10-CM

## 2024-02-06 DIAGNOSIS — C92.41 ACUTE PROMYELOCYTIC LEUKEMIA IN REMISSION: ICD-10-CM

## 2024-02-06 LAB
ALBUMIN SERPL-MCNC: 3.8 G/DL (ref 3.5–5)
ALBUMIN/GLOB SERPL: 0.9 RATIO (ref 1.1–2)
ALP SERPL-CCNC: 66 UNIT/L (ref 40–150)
ALT SERPL-CCNC: 14 UNIT/L (ref 0–55)
AST SERPL-CCNC: 14 UNIT/L (ref 5–34)
BILIRUB SERPL-MCNC: 0.3 MG/DL
BUN SERPL-MCNC: 9.1 MG/DL (ref 7–18.7)
CALCIUM SERPL-MCNC: 9.3 MG/DL (ref 8.4–10.2)
CHLORIDE SERPL-SCNC: 106 MMOL/L (ref 98–107)
CO2 SERPL-SCNC: 26 MMOL/L (ref 22–29)
CREAT SERPL-MCNC: 0.68 MG/DL (ref 0.55–1.02)
GFR SERPLBLD CREATININE-BSD FMLA CKD-EPI: >60 MLS/MIN/1.73/M2
GLOBULIN SER-MCNC: 4.2 GM/DL (ref 2.4–3.5)
GLUCOSE SERPL-MCNC: 123 MG/DL (ref 74–100)
LDH SERPL-CCNC: 229 U/L (ref 125–220)
POTASSIUM SERPL-SCNC: 3.6 MMOL/L (ref 3.5–5.1)
PROT SERPL-MCNC: 8 GM/DL (ref 6.4–8.3)
SODIUM SERPL-SCNC: 141 MMOL/L (ref 136–145)

## 2024-02-06 PROCEDURE — 3080F DIAST BP >= 90 MM HG: CPT | Mod: CPTII,,, | Performed by: INTERNAL MEDICINE

## 2024-02-06 PROCEDURE — 80053 COMPREHEN METABOLIC PANEL: CPT

## 2024-02-06 PROCEDURE — 99213 OFFICE O/P EST LOW 20 MIN: CPT | Mod: S$PBB,,, | Performed by: INTERNAL MEDICINE

## 2024-02-06 PROCEDURE — 99213 OFFICE O/P EST LOW 20 MIN: CPT | Mod: PBBFAC | Performed by: INTERNAL MEDICINE

## 2024-02-06 PROCEDURE — 3008F BODY MASS INDEX DOCD: CPT | Mod: CPTII,,, | Performed by: INTERNAL MEDICINE

## 2024-02-06 PROCEDURE — 1159F MED LIST DOCD IN RCRD: CPT | Mod: CPTII,,, | Performed by: INTERNAL MEDICINE

## 2024-02-06 PROCEDURE — 3075F SYST BP GE 130 - 139MM HG: CPT | Mod: CPTII,,, | Performed by: INTERNAL MEDICINE

## 2024-02-06 PROCEDURE — 81315 PML/RARALPHA COM BREAKPOINTS: CPT

## 2024-02-06 PROCEDURE — 36415 COLL VENOUS BLD VENIPUNCTURE: CPT

## 2024-02-06 PROCEDURE — 1160F RVW MEDS BY RX/DR IN RCRD: CPT | Mod: CPTII,,, | Performed by: INTERNAL MEDICINE

## 2024-02-06 PROCEDURE — 83615 LACTATE (LD) (LDH) ENZYME: CPT

## 2024-02-06 NOTE — Clinical Note
Repeat CBC, CMP, LDH, PCR for PML-LORENA 6 months (June) Follow-up with Neurology and Ophthalmology Follow-up in June, with labs.

## 2024-02-09 LAB
GENETICIST REVIEW: NORMAL
M PMLR RESULT: NORMAL
SPECIMEN SOURCE: NORMAL

## 2024-05-18 DIAGNOSIS — H47.10 OPTIC NERVE EDEMA: ICD-10-CM

## 2024-05-20 RX ORDER — ACETAZOLAMIDE 250 MG/1
500 TABLET ORAL 2 TIMES DAILY
Qty: 120 TABLET | Refills: 3 | Status: SHIPPED | OUTPATIENT
Start: 2024-05-20

## 2024-06-05 ENCOUNTER — TELEPHONE (OUTPATIENT)
Dept: HEMATOLOGY/ONCOLOGY | Facility: CLINIC | Age: 29
End: 2024-06-05
Payer: MEDICAID

## 2024-06-05 DIAGNOSIS — C92.41 ACUTE PROMYELOCYTIC LEUKEMIA IN REMISSION: Primary | ICD-10-CM

## 2024-06-05 DIAGNOSIS — G93.2 IIH (IDIOPATHIC INTRACRANIAL HYPERTENSION): ICD-10-CM

## 2024-06-06 ENCOUNTER — LAB VISIT (OUTPATIENT)
Dept: LAB | Facility: HOSPITAL | Age: 29
End: 2024-06-06
Attending: INTERNAL MEDICINE
Payer: MEDICAID

## 2024-06-06 ENCOUNTER — TELEPHONE (OUTPATIENT)
Dept: HEMATOLOGY/ONCOLOGY | Facility: CLINIC | Age: 29
End: 2024-06-06
Payer: MEDICAID

## 2024-06-06 DIAGNOSIS — C92.41 ACUTE PROMYELOCYTIC LEUKEMIA IN REMISSION: ICD-10-CM

## 2024-06-06 DIAGNOSIS — C92.41 ACUTE PROMYELOCYTIC LEUKEMIA IN REMISSION: Primary | ICD-10-CM

## 2024-06-06 DIAGNOSIS — G93.2 IIH (IDIOPATHIC INTRACRANIAL HYPERTENSION): ICD-10-CM

## 2024-06-06 DIAGNOSIS — E87.6 HYPOKALEMIA: Primary | ICD-10-CM

## 2024-06-06 LAB
ALBUMIN SERPL-MCNC: 3.8 G/DL (ref 3.5–5)
ALBUMIN/GLOB SERPL: 1 RATIO (ref 1.1–2)
ALP SERPL-CCNC: 63 UNIT/L (ref 40–150)
ALT SERPL-CCNC: 13 UNIT/L (ref 0–55)
ANION GAP SERPL CALC-SCNC: 10 MEQ/L
AST SERPL-CCNC: 14 UNIT/L (ref 5–34)
BASOPHILS # BLD AUTO: 0.01 X10(3)/MCL
BASOPHILS NFR BLD AUTO: 0.1 %
BILIRUB SERPL-MCNC: 0.4 MG/DL
BUN SERPL-MCNC: 8.2 MG/DL (ref 7–18.7)
CALCIUM SERPL-MCNC: 9.5 MG/DL (ref 8.4–10.2)
CHLORIDE SERPL-SCNC: 106 MMOL/L (ref 98–107)
CO2 SERPL-SCNC: 25 MMOL/L (ref 22–29)
CREAT SERPL-MCNC: 0.7 MG/DL (ref 0.55–1.02)
CREAT/UREA NIT SERPL: 12
EOSINOPHIL # BLD AUTO: 0.09 X10(3)/MCL (ref 0–0.9)
EOSINOPHIL NFR BLD AUTO: 1.3 %
ERYTHROCYTE [DISTWIDTH] IN BLOOD BY AUTOMATED COUNT: 13.8 % (ref 11.5–17)
GFR SERPLBLD CREATININE-BSD FMLA CKD-EPI: >60 ML/MIN/1.73/M2
GLOBULIN SER-MCNC: 3.8 GM/DL (ref 2.4–3.5)
GLUCOSE SERPL-MCNC: 105 MG/DL (ref 74–100)
HCT VFR BLD AUTO: 39.7 % (ref 37–47)
HGB BLD-MCNC: 12.5 G/DL (ref 12–16)
IMM GRANULOCYTES # BLD AUTO: 0.01 X10(3)/MCL (ref 0–0.04)
IMM GRANULOCYTES NFR BLD AUTO: 0.1 %
LDH SERPL-CCNC: 166 U/L (ref 125–220)
LYMPHOCYTES # BLD AUTO: 2.73 X10(3)/MCL (ref 0.6–4.6)
LYMPHOCYTES NFR BLD AUTO: 38.3 %
MAGNESIUM SERPL-MCNC: 1.9 MG/DL (ref 1.6–2.6)
MCH RBC QN AUTO: 25.4 PG (ref 27–31)
MCHC RBC AUTO-ENTMCNC: 31.5 G/DL (ref 33–36)
MCV RBC AUTO: 80.5 FL (ref 80–94)
MONOCYTES # BLD AUTO: 0.52 X10(3)/MCL (ref 0.1–1.3)
MONOCYTES NFR BLD AUTO: 7.3 %
NEUTROPHILS # BLD AUTO: 3.77 X10(3)/MCL (ref 2.1–9.2)
NEUTROPHILS NFR BLD AUTO: 52.9 %
NRBC BLD AUTO-RTO: 0 %
PLATELET # BLD AUTO: 345 X10(3)/MCL (ref 130–400)
PMV BLD AUTO: 9.6 FL (ref 7.4–10.4)
POTASSIUM SERPL-SCNC: 3.4 MMOL/L (ref 3.5–5.1)
PROT SERPL-MCNC: 7.6 GM/DL (ref 6.4–8.3)
RBC # BLD AUTO: 4.93 X10(6)/MCL (ref 4.2–5.4)
SODIUM SERPL-SCNC: 141 MMOL/L (ref 136–145)
WBC # SPEC AUTO: 7.13 X10(3)/MCL (ref 4.5–11.5)

## 2024-06-06 PROCEDURE — 85025 COMPLETE CBC W/AUTO DIFF WBC: CPT

## 2024-06-06 PROCEDURE — 36415 COLL VENOUS BLD VENIPUNCTURE: CPT

## 2024-06-06 PROCEDURE — 80053 COMPREHEN METABOLIC PANEL: CPT

## 2024-06-06 PROCEDURE — 83615 LACTATE (LD) (LDH) ENZYME: CPT

## 2024-06-06 PROCEDURE — 81315 PML/RARALPHA COM BREAKPOINTS: CPT

## 2024-06-06 RX ORDER — POTASSIUM CHLORIDE 20 MEQ/15ML
20 SOLUTION ORAL DAILY
Qty: 210 ML | Refills: 0 | Status: SHIPPED | OUTPATIENT
Start: 2024-06-06 | End: 2024-06-20

## 2024-06-06 NOTE — PROGRESS NOTES
Potassium 3.4, low.      Plan:   Check magnesium level   Start potassium chloride 20 mEq p.o. q.d. x2 weeks; no refills   In 2 weeks, recheck CMP and magnesium level.

## 2024-06-13 LAB
GENETICIST REVIEW: NORMAL
M PMLR RESULT: NORMAL
SPECIMEN SOURCE: NORMAL

## 2024-06-24 ENCOUNTER — TELEPHONE (OUTPATIENT)
Dept: HEMATOLOGY/ONCOLOGY | Facility: CLINIC | Age: 29
End: 2024-06-24
Payer: MEDICAID

## 2024-06-25 ENCOUNTER — OFFICE VISIT (OUTPATIENT)
Dept: HEMATOLOGY/ONCOLOGY | Facility: CLINIC | Age: 29
End: 2024-06-25
Attending: INTERNAL MEDICINE
Payer: MEDICAID

## 2024-06-25 VITALS
HEART RATE: 94 BPM | HEIGHT: 61 IN | RESPIRATION RATE: 19 BRPM | DIASTOLIC BLOOD PRESSURE: 85 MMHG | OXYGEN SATURATION: 96 % | WEIGHT: 293 LBS | SYSTOLIC BLOOD PRESSURE: 126 MMHG | TEMPERATURE: 98 F | BODY MASS INDEX: 55.32 KG/M2

## 2024-06-25 DIAGNOSIS — R46.89 NON-COMPLIANT BEHAVIOR: ICD-10-CM

## 2024-06-25 DIAGNOSIS — C92.41 ACUTE PROMYELOCYTIC LEUKEMIA IN REMISSION: ICD-10-CM

## 2024-06-25 DIAGNOSIS — G93.2 IIH (IDIOPATHIC INTRACRANIAL HYPERTENSION): ICD-10-CM

## 2024-06-25 DIAGNOSIS — F43.22 ADJUSTMENT REACTION WITH ANXIETY: ICD-10-CM

## 2024-06-25 DIAGNOSIS — C92.41 ACUTE PROMYELOCYTIC LEUKEMIA IN REMISSION: Primary | ICD-10-CM

## 2024-06-25 DIAGNOSIS — G93.2 IIH (IDIOPATHIC INTRACRANIAL HYPERTENSION): Primary | ICD-10-CM

## 2024-06-25 DIAGNOSIS — E66.01 MORBID OBESITY WITH BMI OF 50.0-59.9, ADULT: ICD-10-CM

## 2024-06-25 PROCEDURE — 3008F BODY MASS INDEX DOCD: CPT | Mod: CPTII,,, | Performed by: INTERNAL MEDICINE

## 2024-06-25 PROCEDURE — 3079F DIAST BP 80-89 MM HG: CPT | Mod: CPTII,,, | Performed by: INTERNAL MEDICINE

## 2024-06-25 PROCEDURE — 99214 OFFICE O/P EST MOD 30 MIN: CPT | Mod: S$PBB,,, | Performed by: INTERNAL MEDICINE

## 2024-06-25 PROCEDURE — 1160F RVW MEDS BY RX/DR IN RCRD: CPT | Mod: CPTII,,, | Performed by: INTERNAL MEDICINE

## 2024-06-25 PROCEDURE — 3074F SYST BP LT 130 MM HG: CPT | Mod: CPTII,,, | Performed by: INTERNAL MEDICINE

## 2024-06-25 PROCEDURE — 99214 OFFICE O/P EST MOD 30 MIN: CPT | Mod: PBBFAC | Performed by: INTERNAL MEDICINE

## 2024-06-25 PROCEDURE — 1159F MED LIST DOCD IN RCRD: CPT | Mod: CPTII,,, | Performed by: INTERNAL MEDICINE

## 2024-06-25 RX ORDER — POTASSIUM CHLORIDE 750 MG/1
10 CAPSULE, EXTENDED RELEASE ORAL ONCE
COMMUNITY

## 2024-06-25 NOTE — Clinical Note
-repeat CBC, CMP, LDH, PCR for PML-LORENA in December 2024 Follow-up with Neurology and Ophthalmology Follow-up in December with labs
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
## 2024-12-16 ENCOUNTER — LAB VISIT (OUTPATIENT)
Dept: HEMATOLOGY/ONCOLOGY | Facility: CLINIC | Age: 29
End: 2024-12-16
Payer: MEDICAID

## 2024-12-16 DIAGNOSIS — G93.2 IIH (IDIOPATHIC INTRACRANIAL HYPERTENSION): ICD-10-CM

## 2024-12-16 DIAGNOSIS — F43.22 ADJUSTMENT REACTION WITH ANXIETY: ICD-10-CM

## 2024-12-16 DIAGNOSIS — C92.41 ACUTE PROMYELOCYTIC LEUKEMIA IN REMISSION: ICD-10-CM

## 2024-12-16 LAB
ALBUMIN SERPL-MCNC: 3.9 G/DL (ref 3.5–5)
ALBUMIN/GLOB SERPL: 1 RATIO (ref 1.1–2)
ALP SERPL-CCNC: 67 UNIT/L (ref 40–150)
ALT SERPL-CCNC: 19 UNIT/L (ref 0–55)
ANION GAP SERPL CALC-SCNC: 10 MEQ/L
AST SERPL-CCNC: 19 UNIT/L (ref 5–34)
BASOPHILS # BLD AUTO: 0.01 X10(3)/MCL
BASOPHILS NFR BLD AUTO: 0.2 %
BILIRUB SERPL-MCNC: 0.3 MG/DL
BUN SERPL-MCNC: 7.8 MG/DL (ref 7–18.7)
CALCIUM SERPL-MCNC: 9.7 MG/DL (ref 8.4–10.2)
CHLORIDE SERPL-SCNC: 104 MMOL/L (ref 98–107)
CO2 SERPL-SCNC: 26 MMOL/L (ref 22–29)
CREAT SERPL-MCNC: 0.73 MG/DL (ref 0.55–1.02)
CREAT/UREA NIT SERPL: 11
EOSINOPHIL # BLD AUTO: 0.11 X10(3)/MCL (ref 0–0.9)
EOSINOPHIL NFR BLD AUTO: 2.2 %
ERYTHROCYTE [DISTWIDTH] IN BLOOD BY AUTOMATED COUNT: 13.8 % (ref 11.5–17)
GFR SERPLBLD CREATININE-BSD FMLA CKD-EPI: >60 ML/MIN/1.73/M2
GLOBULIN SER-MCNC: 3.8 GM/DL (ref 2.4–3.5)
GLUCOSE SERPL-MCNC: 132 MG/DL (ref 74–100)
HCT VFR BLD AUTO: 40.6 % (ref 37–47)
HGB BLD-MCNC: 12.9 G/DL (ref 12–16)
IMM GRANULOCYTES # BLD AUTO: 0.01 X10(3)/MCL (ref 0–0.04)
IMM GRANULOCYTES NFR BLD AUTO: 0.2 %
LDH SERPL-CCNC: 186 U/L (ref 125–220)
LYMPHOCYTES # BLD AUTO: 1.96 X10(3)/MCL (ref 0.6–4.6)
LYMPHOCYTES NFR BLD AUTO: 39.4 %
MCH RBC QN AUTO: 26 PG (ref 27–31)
MCHC RBC AUTO-ENTMCNC: 31.8 G/DL (ref 33–36)
MCV RBC AUTO: 81.7 FL (ref 80–94)
MONOCYTES # BLD AUTO: 0.49 X10(3)/MCL (ref 0.1–1.3)
MONOCYTES NFR BLD AUTO: 9.9 %
NEUTROPHILS # BLD AUTO: 2.39 X10(3)/MCL (ref 2.1–9.2)
NEUTROPHILS NFR BLD AUTO: 48.1 %
NRBC BLD AUTO-RTO: 0 %
PLATELET # BLD AUTO: 332 X10(3)/MCL (ref 130–400)
PMV BLD AUTO: 10.1 FL (ref 7.4–10.4)
POTASSIUM SERPL-SCNC: 3.8 MMOL/L (ref 3.5–5.1)
PROT SERPL-MCNC: 7.7 GM/DL (ref 6.4–8.3)
RBC # BLD AUTO: 4.97 X10(6)/MCL (ref 4.2–5.4)
SODIUM SERPL-SCNC: 140 MMOL/L (ref 136–145)
WBC # BLD AUTO: 4.97 X10(3)/MCL (ref 4.5–11.5)

## 2024-12-16 PROCEDURE — 85025 COMPLETE CBC W/AUTO DIFF WBC: CPT

## 2024-12-16 PROCEDURE — 80053 COMPREHEN METABOLIC PANEL: CPT

## 2024-12-16 PROCEDURE — 83615 LACTATE (LD) (LDH) ENZYME: CPT

## 2024-12-16 PROCEDURE — 81315 PML/RARALPHA COM BREAKPOINTS: CPT

## 2024-12-16 PROCEDURE — 36415 COLL VENOUS BLD VENIPUNCTURE: CPT

## 2024-12-19 ENCOUNTER — TELEPHONE (OUTPATIENT)
Dept: HEMATOLOGY/ONCOLOGY | Facility: CLINIC | Age: 29
End: 2024-12-19
Payer: MEDICAID

## 2024-12-19 LAB — MAYO GENERIC ORDERABLE RESULT: NORMAL

## 2024-12-20 ENCOUNTER — OFFICE VISIT (OUTPATIENT)
Dept: HEMATOLOGY/ONCOLOGY | Facility: CLINIC | Age: 29
End: 2024-12-20
Attending: INTERNAL MEDICINE
Payer: MEDICAID

## 2024-12-20 VITALS
OXYGEN SATURATION: 95 % | RESPIRATION RATE: 20 BRPM | HEIGHT: 61 IN | TEMPERATURE: 98 F | DIASTOLIC BLOOD PRESSURE: 84 MMHG | SYSTOLIC BLOOD PRESSURE: 125 MMHG | WEIGHT: 293 LBS | HEART RATE: 96 BPM | BODY MASS INDEX: 55.32 KG/M2

## 2024-12-20 DIAGNOSIS — G93.2 IIH (IDIOPATHIC INTRACRANIAL HYPERTENSION): ICD-10-CM

## 2024-12-20 DIAGNOSIS — E66.01 MORBID OBESITY WITH BMI OF 50.0-59.9, ADULT: ICD-10-CM

## 2024-12-20 DIAGNOSIS — C92.40 ACUTE PROMYELOCYTIC LEUKEMIA NOT HAVING ACHIEVED REMISSION: ICD-10-CM

## 2024-12-20 DIAGNOSIS — C92.41 ACUTE PROMYELOCYTIC LEUKEMIA IN REMISSION: ICD-10-CM

## 2024-12-20 DIAGNOSIS — G93.2 IIH (IDIOPATHIC INTRACRANIAL HYPERTENSION): Primary | ICD-10-CM

## 2024-12-20 DIAGNOSIS — H40.051 OCULAR HYPERTENSION OF RIGHT EYE: ICD-10-CM

## 2024-12-20 DIAGNOSIS — F43.22 ADJUSTMENT REACTION WITH ANXIETY: ICD-10-CM

## 2024-12-20 DIAGNOSIS — C92.41 ACUTE PROMYELOCYTIC LEUKEMIA IN REMISSION: Primary | ICD-10-CM

## 2024-12-20 PROCEDURE — 99213 OFFICE O/P EST LOW 20 MIN: CPT | Mod: PBBFAC | Performed by: INTERNAL MEDICINE
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
## 2025-01-07 ENCOUNTER — TELEPHONE (OUTPATIENT)
Dept: HEMATOLOGY/ONCOLOGY | Facility: CLINIC | Age: 30
End: 2025-01-07
Payer: MEDICAID

## 2025-06-13 ENCOUNTER — LAB VISIT (OUTPATIENT)
Dept: HEMATOLOGY/ONCOLOGY | Facility: CLINIC | Age: 30
End: 2025-06-13
Payer: MEDICAID

## 2025-06-13 DIAGNOSIS — G93.2 IIH (IDIOPATHIC INTRACRANIAL HYPERTENSION): ICD-10-CM

## 2025-06-13 DIAGNOSIS — C92.40 ACUTE PROMYELOCYTIC LEUKEMIA NOT HAVING ACHIEVED REMISSION: ICD-10-CM

## 2025-06-13 DIAGNOSIS — C92.41 ACUTE PROMYELOCYTIC LEUKEMIA IN REMISSION: ICD-10-CM

## 2025-06-13 LAB
ALBUMIN SERPL-MCNC: 3.6 G/DL (ref 3.5–5)
ALBUMIN/GLOB SERPL: 0.9 RATIO (ref 1.1–2)
ALP SERPL-CCNC: 64 UNIT/L (ref 40–150)
ALT SERPL-CCNC: 10 UNIT/L (ref 0–55)
ANION GAP SERPL CALC-SCNC: 11 MEQ/L
AST SERPL-CCNC: 10 UNIT/L (ref 11–45)
BASOPHILS # BLD AUTO: 0.01 X10(3)/MCL
BASOPHILS NFR BLD AUTO: 0.2 %
BILIRUB SERPL-MCNC: 0.4 MG/DL
BUN SERPL-MCNC: 6.2 MG/DL (ref 7–18.7)
CALCIUM SERPL-MCNC: 8.9 MG/DL (ref 8.4–10.2)
CHLORIDE SERPL-SCNC: 108 MMOL/L (ref 98–107)
CO2 SERPL-SCNC: 21 MMOL/L (ref 22–29)
CREAT SERPL-MCNC: 0.63 MG/DL (ref 0.55–1.02)
CREAT/UREA NIT SERPL: 10
EOSINOPHIL # BLD AUTO: 0.11 X10(3)/MCL (ref 0–0.9)
EOSINOPHIL NFR BLD AUTO: 1.8 %
ERYTHROCYTE [DISTWIDTH] IN BLOOD BY AUTOMATED COUNT: 13.4 % (ref 11.5–17)
GFR SERPLBLD CREATININE-BSD FMLA CKD-EPI: >60 ML/MIN/1.73/M2
GLOBULIN SER-MCNC: 4.2 GM/DL (ref 2.4–3.5)
GLUCOSE SERPL-MCNC: 113 MG/DL (ref 74–100)
HCT VFR BLD AUTO: 42.9 % (ref 37–47)
HGB BLD-MCNC: 13.3 G/DL (ref 12–16)
IMM GRANULOCYTES # BLD AUTO: 0.01 X10(3)/MCL (ref 0–0.04)
IMM GRANULOCYTES NFR BLD AUTO: 0.2 %
LDH SERPL-CCNC: 201 U/L (ref 125–220)
LYMPHOCYTES # BLD AUTO: 2.7 X10(3)/MCL (ref 0.6–4.6)
LYMPHOCYTES NFR BLD AUTO: 43.7 %
MCH RBC QN AUTO: 25.2 PG (ref 27–31)
MCHC RBC AUTO-ENTMCNC: 31 G/DL (ref 33–36)
MCV RBC AUTO: 81.4 FL (ref 80–94)
MONOCYTES # BLD AUTO: 0.54 X10(3)/MCL (ref 0.1–1.3)
MONOCYTES NFR BLD AUTO: 8.7 %
NEUTROPHILS # BLD AUTO: 2.81 X10(3)/MCL (ref 2.1–9.2)
NEUTROPHILS NFR BLD AUTO: 45.4 %
NRBC BLD AUTO-RTO: 0 %
PLATELET # BLD AUTO: 345 X10(3)/MCL (ref 130–400)
PMV BLD AUTO: 9.7 FL (ref 7.4–10.4)
POTASSIUM SERPL-SCNC: 3.7 MMOL/L (ref 3.5–5.1)
PROT SERPL-MCNC: 7.8 GM/DL (ref 6.4–8.3)
RBC # BLD AUTO: 5.27 X10(6)/MCL (ref 4.2–5.4)
SODIUM SERPL-SCNC: 140 MMOL/L (ref 136–145)
WBC # BLD AUTO: 6.18 X10(3)/MCL (ref 4.5–11.5)

## 2025-06-13 PROCEDURE — 83615 LACTATE (LD) (LDH) ENZYME: CPT

## 2025-06-13 PROCEDURE — 80053 COMPREHEN METABOLIC PANEL: CPT

## 2025-06-13 PROCEDURE — 85025 COMPLETE CBC W/AUTO DIFF WBC: CPT

## 2025-06-13 PROCEDURE — 81315 PML/RARALPHA COM BREAKPOINTS: CPT

## 2025-06-18 LAB
GENETICIST REVIEW: NORMAL
M PMLR RESULT: NORMAL
SPECIMEN SOURCE: NORMAL

## 2025-07-16 DIAGNOSIS — C92.41 ACUTE PROMYELOCYTIC LEUKEMIA IN REMISSION: Primary | ICD-10-CM

## 2025-07-17 ENCOUNTER — OFFICE VISIT (OUTPATIENT)
Dept: HEMATOLOGY/ONCOLOGY | Facility: CLINIC | Age: 30
End: 2025-07-17
Attending: INTERNAL MEDICINE
Payer: MEDICAID

## 2025-07-17 VITALS
WEIGHT: 293 LBS | SYSTOLIC BLOOD PRESSURE: 135 MMHG | BODY MASS INDEX: 55.32 KG/M2 | TEMPERATURE: 98 F | HEART RATE: 85 BPM | DIASTOLIC BLOOD PRESSURE: 88 MMHG | HEIGHT: 61 IN | RESPIRATION RATE: 20 BRPM | OXYGEN SATURATION: 97 %

## 2025-07-17 DIAGNOSIS — G93.2 IIH (IDIOPATHIC INTRACRANIAL HYPERTENSION): ICD-10-CM

## 2025-07-17 DIAGNOSIS — E66.01 MORBID OBESITY WITH BMI OF 50.0-59.9, ADULT: ICD-10-CM

## 2025-07-17 DIAGNOSIS — F43.22 ADJUSTMENT REACTION WITH ANXIETY: ICD-10-CM

## 2025-07-17 DIAGNOSIS — C92.40 ACUTE PROMYELOCYTIC LEUKEMIA NOT HAVING ACHIEVED REMISSION: ICD-10-CM

## 2025-07-17 DIAGNOSIS — C92.41 ACUTE PROMYELOCYTIC LEUKEMIA IN REMISSION: Primary | ICD-10-CM

## 2025-07-17 PROCEDURE — 1159F MED LIST DOCD IN RCRD: CPT | Mod: CPTII,,, | Performed by: INTERNAL MEDICINE

## 2025-07-17 PROCEDURE — 1160F RVW MEDS BY RX/DR IN RCRD: CPT | Mod: CPTII,,, | Performed by: INTERNAL MEDICINE

## 2025-07-17 PROCEDURE — 99214 OFFICE O/P EST MOD 30 MIN: CPT | Mod: S$PBB,,, | Performed by: INTERNAL MEDICINE

## 2025-07-17 PROCEDURE — 3008F BODY MASS INDEX DOCD: CPT | Mod: CPTII,,, | Performed by: INTERNAL MEDICINE

## 2025-07-17 PROCEDURE — 99213 OFFICE O/P EST LOW 20 MIN: CPT | Mod: PBBFAC | Performed by: INTERNAL MEDICINE

## 2025-07-17 PROCEDURE — 3075F SYST BP GE 130 - 139MM HG: CPT | Mod: CPTII,,, | Performed by: INTERNAL MEDICINE

## 2025-07-17 PROCEDURE — 3079F DIAST BP 80-89 MM HG: CPT | Mod: CPTII,,, | Performed by: INTERNAL MEDICINE
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
## 2025-07-17 NOTE — Clinical Note
Orders for 07/17/2025:  Repeat CBC, CMP, LDH, and PML-LORENA by quantitative RT-PCR assay of blood in December, then follow-up visit